# Patient Record
Sex: FEMALE | Race: OTHER | HISPANIC OR LATINO | Employment: FULL TIME | URBAN - METROPOLITAN AREA
[De-identification: names, ages, dates, MRNs, and addresses within clinical notes are randomized per-mention and may not be internally consistent; named-entity substitution may affect disease eponyms.]

---

## 2017-06-09 ENCOUNTER — OFFICE VISIT (OUTPATIENT)
Dept: URGENT CARE | Facility: PHYSICIAN GROUP | Age: 27
End: 2017-06-09
Payer: COMMERCIAL

## 2017-06-09 VITALS
OXYGEN SATURATION: 100 % | HEIGHT: 66 IN | DIASTOLIC BLOOD PRESSURE: 72 MMHG | HEART RATE: 65 BPM | WEIGHT: 193 LBS | SYSTOLIC BLOOD PRESSURE: 116 MMHG | TEMPERATURE: 97.2 F | BODY MASS INDEX: 31.02 KG/M2

## 2017-06-09 DIAGNOSIS — R11.0 NAUSEA: ICD-10-CM

## 2017-06-09 DIAGNOSIS — M54.5 BILATERAL LOW BACK PAIN, UNSPECIFIED CHRONICITY, WITH SCIATICA PRESENCE UNSPECIFIED: ICD-10-CM

## 2017-06-09 DIAGNOSIS — Z32.01 POSITIVE PREGNANCY TEST: ICD-10-CM

## 2017-06-09 LAB
APPEARANCE UR: CLEAR
BILIRUB UR STRIP-MCNC: NORMAL MG/DL
COLOR UR AUTO: NORMAL
GLUCOSE UR STRIP.AUTO-MCNC: NORMAL MG/DL
INT CON NEG: NEGATIVE
INT CON POS: POSITIVE
KETONES UR STRIP.AUTO-MCNC: NORMAL MG/DL
LEUKOCYTE ESTERASE UR QL STRIP.AUTO: NORMAL
NITRITE UR QL STRIP.AUTO: NORMAL
PH UR STRIP.AUTO: 6 [PH] (ref 5–8)
POC URINE PREGNANCY TEST: NORMAL
PROT UR QL STRIP: NORMAL MG/DL
RBC UR QL AUTO: NORMAL
SP GR UR STRIP.AUTO: 1.01
UROBILINOGEN UR STRIP-MCNC: NORMAL MG/DL

## 2017-06-09 PROCEDURE — 81002 URINALYSIS NONAUTO W/O SCOPE: CPT | Performed by: PHYSICIAN ASSISTANT

## 2017-06-09 PROCEDURE — 99213 OFFICE O/P EST LOW 20 MIN: CPT | Performed by: PHYSICIAN ASSISTANT

## 2017-06-09 PROCEDURE — 81025 URINE PREGNANCY TEST: CPT | Performed by: PHYSICIAN ASSISTANT

## 2017-06-09 RX ORDER — KETOROLAC TROMETHAMINE 30 MG/ML
60 INJECTION, SOLUTION INTRAMUSCULAR; INTRAVENOUS ONCE
Status: DISCONTINUED | OUTPATIENT
Start: 2017-06-09 | End: 2017-06-09

## 2017-06-09 ASSESSMENT — ENCOUNTER SYMPTOMS
NAUSEA: 1
PARESTHESIAS: 0
TINGLING: 0
ABDOMINAL PAIN: 0
VOMITING: 0
BACK PAIN: 1
NUMBNESS: 0
HEADACHES: 0
SHORTNESS OF BREATH: 0
PERIANAL NUMBNESS: 0
COUGH: 0
PALPITATIONS: 0
FEVER: 0
FLANK PAIN: 0
PARESIS: 0
CHILLS: 0
WEAKNESS: 0
BOWEL INCONTINENCE: 0
LEG PAIN: 0

## 2017-06-10 NOTE — PROGRESS NOTES
"Subjective:      Tonia Trevizo is a 27 y.o. female who presents with Back Pain            Back Pain  This is a new problem. Episode onset: 4 weeks  The problem occurs constantly. The problem is unchanged. The pain is present in the lumbar spine. The quality of the pain is described as aching. The pain does not radiate. The pain is at a severity of 7/10. The pain is the same all the time. Exacerbated by: nothing  Pertinent negatives include no abdominal pain, bladder incontinence, bowel incontinence, chest pain, dysuria, fever, headaches, leg pain, numbness, paresis, paresthesias, pelvic pain, perianal numbness, tingling or weakness. (Nausea)   The patient reports a history of chronic back pain that started in adolescence. She states she has never had an injury. She has had x-rays done in the  Past that showed \"arthritis\".,  She states she has noticed worsening pain over the past 4 weeks.     Past Medical History   Diagnosis Date   • Psychiatric problem      anxiety     Past Surgical History   Procedure Laterality Date   • Acl reconstruction       right   • Pr  delivery only     • Knee arthroscopy  2014     Performed by Dexter Pimentel M.D. at [facility]   • Hardware removal ortho  2014     Performed by Dexter Pimentel M.D. at [facility]   • Bone graft  2014     Performed by Dexter Pimentel M.D. at [facility]   • Acl reconstruction scope  10/2/2014     Performed by Dexter Pimentel M.D. at [facility]   • Repeat c section  10/19/2015     Procedure: REPEAT C SECTION;  Surgeon: Mary Randolph M.D.;  Location: LABOR AND DELIVERY;  Service:        Family History   Problem Relation Age of Onset   • Diabetes     • Hypertension         No Known Allergies    Medications, Allergies, and current problem list reviewed today in Epic    Review of Systems   Constitutional: Negative for fever and chills.   Respiratory: Negative for cough and shortness " "of breath.    Cardiovascular: Negative for chest pain, palpitations and leg swelling.   Gastrointestinal: Positive for nausea. Negative for vomiting, abdominal pain and bowel incontinence.   Genitourinary: Negative for bladder incontinence, dysuria, urgency, frequency, hematuria, flank pain and pelvic pain.   Musculoskeletal: Positive for back pain.   Neurological: Negative for tingling, weakness, numbness, headaches and paresthesias.     All other systems reviewed and are negative.       Objective:     /72 mmHg  Pulse 65  Temp(Src) 36.2 °C (97.2 °F)  Ht 1.676 m (5' 6\")  Wt 87.544 kg (193 lb)  BMI 31.17 kg/m2  SpO2 100%  LMP 05/06/2017     Physical Exam   Constitutional: She is oriented to person, place, and time. She appears well-developed and well-nourished. No distress.   Eyes: Conjunctivae are normal.   Cardiovascular: Normal rate, regular rhythm and normal heart sounds.  Exam reveals no gallop and no friction rub.    No murmur heard.  Pulmonary/Chest: Effort normal and breath sounds normal. No respiratory distress. She has no wheezes. She has no rales.   Abdominal: Normal appearance and bowel sounds are normal. She exhibits no distension. There is no tenderness.   Musculoskeletal:        Lumbar back: She exhibits tenderness and bony tenderness. She exhibits normal range of motion, no swelling, no edema, no pain and no spasm.        Back:    Extremities with FROM and distal n/v intact.   Neurological: She is alert and oriented to person, place, and time. No cranial nerve deficit.   Psychiatric: She has a normal mood and affect. Her behavior is normal. Judgment and thought content normal.     UA: trace of blood. No leuks. No nitrates        POCT pregnancy- positive.    Assessment/Plan:     1. Bilateral low back pain, unspecified chronicity, with sciatica presence unspecified    2. Nausea    3. Positive pregnancy test      I believe the patient's nausea and lower back pain is related to pregnancy. "   The patient admits that she had similar back pain with her previous pregnancies.   She states she believes her LMP was about 1 month ago. Advised heat, ice, massage.  Suggested OTC Tylenol for severe pain. Encouraged follow-up with OB. Start Pre- vitamins.  Suggested avoidance of NSAIDS.     Differential diagnoses, Supportive care, and indications for immediate follow-up discussed with patient.   Instructed to return to clinic or nearest emergency department for any change in condition, further concerns, or worsening of symptoms.    The patient demonstrated a good understanding and agreed with the treatment plan.    Linda Wade PA-C

## 2017-09-29 ENCOUNTER — OFFICE VISIT (OUTPATIENT)
Dept: URGENT CARE | Facility: PHYSICIAN GROUP | Age: 27
End: 2017-09-29
Payer: COMMERCIAL

## 2017-09-29 VITALS
TEMPERATURE: 98.6 F | OXYGEN SATURATION: 98 % | HEART RATE: 114 BPM | SYSTOLIC BLOOD PRESSURE: 124 MMHG | RESPIRATION RATE: 14 BRPM | BODY MASS INDEX: 34.09 KG/M2 | WEIGHT: 204.6 LBS | HEIGHT: 65 IN | DIASTOLIC BLOOD PRESSURE: 74 MMHG

## 2017-09-29 DIAGNOSIS — J02.0 STREP THROAT: ICD-10-CM

## 2017-09-29 DIAGNOSIS — J02.9 SORE THROAT: ICD-10-CM

## 2017-09-29 DIAGNOSIS — Z3A.22 22 WEEKS GESTATION OF PREGNANCY: ICD-10-CM

## 2017-09-29 LAB
INT CON NEG: NEGATIVE
INT CON POS: POSITIVE
S PYO AG THROAT QL: POSITIVE

## 2017-09-29 PROCEDURE — 87880 STREP A ASSAY W/OPTIC: CPT | Performed by: NURSE PRACTITIONER

## 2017-09-29 PROCEDURE — 99214 OFFICE O/P EST MOD 30 MIN: CPT | Performed by: NURSE PRACTITIONER

## 2017-09-29 RX ORDER — AMOXICILLIN 875 MG/1
875 TABLET, COATED ORAL 2 TIMES DAILY
Qty: 20 TAB | Refills: 0 | Status: ON HOLD | OUTPATIENT
Start: 2017-09-29 | End: 2017-11-29

## 2017-09-29 ASSESSMENT — PAIN SCALES - GENERAL: PAINLEVEL: NO PAIN

## 2017-09-29 NOTE — PROGRESS NOTES
"Subjective:      Tonia Trevizo is a 27 y.o. female who presents with Cough (x4 days. nasal congestion and sore throat)            HPI New problem. 27 year old female with 4 day history of cough, congestion and sore throat. She denies ear pain, fever, chills or myalgia. She denies nausea or vomiting. She is 22 week pregnant, feeling baby move. Not taking any medication for these symptoms.  Allergies, medications and history reviewed by me today  Son with similar symptoms.    ROS  Please see HPI       Objective:     /74   Pulse (!) 114   Temp 37 °C (98.6 °F)   Resp 14   Ht 1.651 m (5' 5\")   Wt 92.8 kg (204 lb 9.6 oz)   SpO2 98%   Breastfeeding? No   BMI 34.05 kg/m²      Physical Exam   Constitutional: She is oriented to person, place, and time. She appears well-developed and well-nourished. No distress.   HENT:   Head: Normocephalic and atraumatic.   Right Ear: External ear and ear canal normal. Tympanic membrane is not injected and not perforated. No middle ear effusion.   Left Ear: External ear and ear canal normal. Tympanic membrane is not injected and not perforated.  No middle ear effusion.   Nose: Mucosal edema present.   Mouth/Throat: Posterior oropharyngeal erythema present. No oropharyngeal exudate.   Eyes: Conjunctivae are normal. Right eye exhibits no discharge. Left eye exhibits no discharge.   Neck: Normal range of motion. Neck supple.   Cardiovascular: Normal rate, regular rhythm and normal heart sounds.    No murmur heard.  Pulmonary/Chest: Effort normal and breath sounds normal. No respiratory distress.   Abdominal:   gravid   Musculoskeletal: Normal range of motion.   Normal movement of all 4 extremities.   Lymphadenopathy:     She has no cervical adenopathy.        Right: No supraclavicular adenopathy present.        Left: No supraclavicular adenopathy present.   Neurological: She is alert and oriented to person, place, and time. Gait normal.   Skin: Skin is warm and dry. "   Psychiatric: She has a normal mood and affect. Her behavior is normal. Thought content normal.   Nursing note and vitals reviewed.              Assessment/Plan:     1. Sore throat  POCT Rapid Strep A   2. Strep throat  amoxicillin (AMOXIL) 875 MG tablet   3. 22 weeks gestation of pregnancy       Change toothbrush in 48 hours. Follow up with PCP as needed. May take tylenol for discomfort as directed. Patient v/u of care instructions.  Work note given.  Follow up as needed.

## 2017-09-29 NOTE — LETTER
September 29, 2017        Tonia Trevizo  [address]  [place] [address]        Tonia was seen in our clinic today and she is excused from work. Thank you.  If you have any questions or concerns, please don't hesitate to call.        Sincerely,        COLE Rose.    Electronically Signed

## 2017-11-04 ENCOUNTER — HOSPITAL ENCOUNTER (INPATIENT)
Facility: MEDICAL CENTER | Age: 27
LOS: 25 days | End: 2017-11-29
Attending: OBSTETRICS & GYNECOLOGY | Admitting: OBSTETRICS & GYNECOLOGY
Payer: COMMERCIAL

## 2017-11-04 PROBLEM — O42.919 PRETERM PREMATURE RUPTURE OF MEMBRANES (PPROM) WITH UNKNOWN ONSET OF LABOR: Status: ACTIVE | Noted: 2017-11-04

## 2017-11-04 LAB — A1 MICROGLOB PLACENTAL VAG QL: POSITIVE

## 2017-11-04 PROCEDURE — 770002 HCHG ROOM/CARE - OB PRIVATE (112)

## 2017-11-04 PROCEDURE — 81003 URINALYSIS AUTO W/O SCOPE: CPT

## 2017-11-04 PROCEDURE — 302790 HCHG STAT ANTEPARTUM CARE, DAILY

## 2017-11-04 PROCEDURE — 84112 EVAL AMNIOTIC FLUID PROTEIN: CPT

## 2017-11-04 RX ORDER — MAGNESIUM SULFATE HEPTAHYDRATE 40 MG/ML
2 INJECTION, SOLUTION INTRAVENOUS CONTINUOUS
Status: DISCONTINUED | OUTPATIENT
Start: 2017-11-05 | End: 2017-11-25 | Stop reason: HOSPADM

## 2017-11-04 RX ORDER — CALCIUM GLUCONATE 94 MG/ML
1 INJECTION, SOLUTION INTRAVENOUS PRN
Status: DISCONTINUED | OUTPATIENT
Start: 2017-11-04 | End: 2017-11-25 | Stop reason: HOSPADM

## 2017-11-04 RX ORDER — ONDANSETRON 4 MG/1
4 TABLET, ORALLY DISINTEGRATING ORAL EVERY 6 HOURS PRN
Status: DISCONTINUED | OUTPATIENT
Start: 2017-11-04 | End: 2017-11-25 | Stop reason: HOSPADM

## 2017-11-04 RX ORDER — AMOXICILLIN 500 MG/1
500 CAPSULE ORAL EVERY 8 HOURS
Status: COMPLETED | OUTPATIENT
Start: 2017-11-07 | End: 2017-11-11

## 2017-11-04 RX ORDER — SODIUM CHLORIDE, SODIUM LACTATE, POTASSIUM CHLORIDE, CALCIUM CHLORIDE 600; 310; 30; 20 MG/100ML; MG/100ML; MG/100ML; MG/100ML
INJECTION, SOLUTION INTRAVENOUS CONTINUOUS
Status: DISPENSED | OUTPATIENT
Start: 2017-11-05 | End: 2017-11-05

## 2017-11-04 RX ORDER — MAGNESIUM SULFATE HEPTAHYDRATE 40 MG/ML
6 INJECTION, SOLUTION INTRAVENOUS ONCE
Status: COMPLETED | OUTPATIENT
Start: 2017-11-05 | End: 2017-11-05

## 2017-11-04 RX ORDER — BETAMETHASONE SODIUM PHOSPHATE AND BETAMETHASONE ACETATE 3; 3 MG/ML; MG/ML
12 INJECTION, SUSPENSION INTRA-ARTICULAR; INTRALESIONAL; INTRAMUSCULAR; SOFT TISSUE EVERY 24 HOURS
Status: COMPLETED | OUTPATIENT
Start: 2017-11-05 | End: 2017-11-05

## 2017-11-04 RX ORDER — AMPICILLIN 2 G/1
2 INJECTION, POWDER, FOR SOLUTION INTRAVENOUS EVERY 6 HOURS
Status: COMPLETED | OUTPATIENT
Start: 2017-11-05 | End: 2017-11-06

## 2017-11-04 RX ORDER — DOCUSATE SODIUM 100 MG/1
100 CAPSULE, LIQUID FILLED ORAL 2 TIMES DAILY
Status: DISCONTINUED | OUTPATIENT
Start: 2017-11-05 | End: 2017-11-25 | Stop reason: HOSPADM

## 2017-11-04 RX ORDER — AZITHROMYCIN 250 MG/1
1000 TABLET, FILM COATED ORAL ONCE
Status: COMPLETED | OUTPATIENT
Start: 2017-11-05 | End: 2017-11-05

## 2017-11-04 RX ORDER — ONDANSETRON 2 MG/ML
4 INJECTION INTRAMUSCULAR; INTRAVENOUS EVERY 6 HOURS PRN
Status: DISCONTINUED | OUTPATIENT
Start: 2017-11-04 | End: 2017-11-25 | Stop reason: HOSPADM

## 2017-11-05 ENCOUNTER — APPOINTMENT (OUTPATIENT)
Dept: RADIOLOGY | Facility: MEDICAL CENTER | Age: 27
End: 2017-11-05
Attending: OBSTETRICS & GYNECOLOGY
Payer: COMMERCIAL

## 2017-11-05 LAB
ALBUMIN SERPL BCP-MCNC: 3 G/DL (ref 3.2–4.9)
ALBUMIN/GLOB SERPL: 1.1 G/DL
ALP SERPL-CCNC: 120 U/L (ref 30–99)
ALT SERPL-CCNC: 8 U/L (ref 2–50)
ANION GAP SERPL CALC-SCNC: 8 MMOL/L (ref 0–11.9)
APPEARANCE UR: CLEAR
AST SERPL-CCNC: 10 U/L (ref 12–45)
BASOPHILS # BLD AUTO: 0.3 % (ref 0–1.8)
BASOPHILS # BLD: 0.05 K/UL (ref 0–0.12)
BILIRUB SERPL-MCNC: 0.4 MG/DL (ref 0.1–1.5)
BILIRUB UR QL STRIP.AUTO: NEGATIVE
BUN SERPL-MCNC: 7 MG/DL (ref 8–22)
CALCIUM SERPL-MCNC: 7.6 MG/DL (ref 8.5–10.5)
CHLORIDE SERPL-SCNC: 106 MMOL/L (ref 96–112)
CO2 SERPL-SCNC: 20 MMOL/L (ref 20–33)
COLOR UR: YELLOW
CREAT SERPL-MCNC: 0.47 MG/DL (ref 0.5–1.4)
EOSINOPHIL # BLD AUTO: 0.24 K/UL (ref 0–0.51)
EOSINOPHIL NFR BLD: 1.5 % (ref 0–6.9)
ERYTHROCYTE [DISTWIDTH] IN BLOOD BY AUTOMATED COUNT: 34.8 FL (ref 35.9–50)
GFR SERPL CREATININE-BSD FRML MDRD: >60 ML/MIN/1.73 M 2
GLOBULIN SER CALC-MCNC: 2.8 G/DL (ref 1.9–3.5)
GLUCOSE SERPL-MCNC: 117 MG/DL (ref 65–99)
GLUCOSE UR STRIP.AUTO-MCNC: NEGATIVE MG/DL
GP B STREP DNA SPEC QL NAA+PROBE: NEGATIVE
HCT VFR BLD AUTO: 33.8 % (ref 37–47)
HGB BLD-MCNC: 11.1 G/DL (ref 12–16)
HOLDING TUBE BB 8507: NORMAL
IMM GRANULOCYTES # BLD AUTO: 0.19 K/UL (ref 0–0.11)
IMM GRANULOCYTES NFR BLD AUTO: 1.2 % (ref 0–0.9)
KETONES UR STRIP.AUTO-MCNC: NEGATIVE MG/DL
LEUKOCYTE ESTERASE UR QL STRIP.AUTO: NEGATIVE
LYMPHOCYTES # BLD AUTO: 2.21 K/UL (ref 1–4.8)
LYMPHOCYTES NFR BLD: 13.5 % (ref 22–41)
MAGNESIUM SERPL-MCNC: 5.1 MG/DL (ref 1.5–2.5)
MCH RBC QN AUTO: 23.4 PG (ref 27–33)
MCHC RBC AUTO-ENTMCNC: 32.8 G/DL (ref 33.6–35)
MCV RBC AUTO: 71.3 FL (ref 81.4–97.8)
MICRO URNS: NORMAL
MONOCYTES # BLD AUTO: 0.95 K/UL (ref 0–0.85)
MONOCYTES NFR BLD AUTO: 5.8 % (ref 0–13.4)
NEUTROPHILS # BLD AUTO: 12.68 K/UL (ref 2–7.15)
NEUTROPHILS NFR BLD: 77.7 % (ref 44–72)
NITRITE UR QL STRIP.AUTO: NEGATIVE
NRBC # BLD AUTO: 0 K/UL
NRBC BLD AUTO-RTO: 0 /100 WBC
PH UR STRIP.AUTO: 7.5 [PH]
PLATELET # BLD AUTO: 299 K/UL (ref 164–446)
PMV BLD AUTO: 10.9 FL (ref 9–12.9)
POTASSIUM SERPL-SCNC: 4.2 MMOL/L (ref 3.6–5.5)
PROT SERPL-MCNC: 5.8 G/DL (ref 6–8.2)
PROT UR QL STRIP: NEGATIVE MG/DL
RBC # BLD AUTO: 4.74 M/UL (ref 4.2–5.4)
RBC UR QL AUTO: NEGATIVE
SODIUM SERPL-SCNC: 134 MMOL/L (ref 135–145)
SP GR UR STRIP.AUTO: 1.01
UROBILINOGEN UR STRIP.AUTO-MCNC: 0.2 MG/DL
WBC # BLD AUTO: 16.3 K/UL (ref 4.8–10.8)

## 2017-11-05 PROCEDURE — 770002 HCHG ROOM/CARE - OB PRIVATE (112)

## 2017-11-05 PROCEDURE — 36415 COLL VENOUS BLD VENIPUNCTURE: CPT

## 2017-11-05 PROCEDURE — 87653 STREP B DNA AMP PROBE: CPT

## 2017-11-05 PROCEDURE — 700102 HCHG RX REV CODE 250 W/ 637 OVERRIDE(OP): Performed by: OBSTETRICS & GYNECOLOGY

## 2017-11-05 PROCEDURE — 700112 HCHG RX REV CODE 229: Performed by: OBSTETRICS & GYNECOLOGY

## 2017-11-05 PROCEDURE — 76815 OB US LIMITED FETUS(S): CPT

## 2017-11-05 PROCEDURE — 85025 COMPLETE CBC W/AUTO DIFF WBC: CPT

## 2017-11-05 PROCEDURE — 83735 ASSAY OF MAGNESIUM: CPT

## 2017-11-05 PROCEDURE — A9270 NON-COVERED ITEM OR SERVICE: HCPCS | Performed by: OBSTETRICS & GYNECOLOGY

## 2017-11-05 PROCEDURE — 80053 COMPREHEN METABOLIC PANEL: CPT

## 2017-11-05 PROCEDURE — 700105 HCHG RX REV CODE 258

## 2017-11-05 PROCEDURE — 700111 HCHG RX REV CODE 636 W/ 250 OVERRIDE (IP): Performed by: OBSTETRICS & GYNECOLOGY

## 2017-11-05 PROCEDURE — 302790 HCHG STAT ANTEPARTUM CARE, DAILY

## 2017-11-05 PROCEDURE — 700105 HCHG RX REV CODE 258: Performed by: OBSTETRICS & GYNECOLOGY

## 2017-11-05 RX ORDER — SODIUM CHLORIDE, SODIUM LACTATE, POTASSIUM CHLORIDE, CALCIUM CHLORIDE 600; 310; 30; 20 MG/100ML; MG/100ML; MG/100ML; MG/100ML
INJECTION, SOLUTION INTRAVENOUS
Status: ACTIVE
Start: 2017-11-05 | End: 2017-11-06

## 2017-11-05 RX ORDER — SODIUM CHLORIDE, SODIUM LACTATE, POTASSIUM CHLORIDE, CALCIUM CHLORIDE 600; 310; 30; 20 MG/100ML; MG/100ML; MG/100ML; MG/100ML
INJECTION, SOLUTION INTRAVENOUS CONTINUOUS
Status: DISCONTINUED | OUTPATIENT
Start: 2017-11-05 | End: 2017-11-25

## 2017-11-05 RX ORDER — SODIUM CHLORIDE 9 MG/ML
INJECTION, SOLUTION INTRAVENOUS
Status: COMPLETED
Start: 2017-11-05 | End: 2017-11-05

## 2017-11-05 RX ORDER — ACETAMINOPHEN 325 MG/1
975 TABLET ORAL EVERY 8 HOURS PRN
Status: DISCONTINUED | OUTPATIENT
Start: 2017-11-05 | End: 2017-11-05

## 2017-11-05 RX ORDER — VITAMIN A ACETATE, BETA CAROTENE, ASCORBIC ACID, CHOLECALCIFEROL, .ALPHA.-TOCOPHEROL ACETATE, DL-, THIAMINE MONONITRATE, RIBOFLAVIN, NIACINAMIDE, PYRIDOXINE HYDROCHLORIDE, FOLIC ACID, CYANOCOBALAMIN, CALCIUM CARBONATE, FERROUS FUMARATE, ZINC OXIDE, CUPRIC OXIDE 3080; 12; 120; 400; 1; 1.84; 3; 20; 22; 920; 25; 200; 27; 10; 2 [IU]/1; UG/1; MG/1; [IU]/1; MG/1; MG/1; MG/1; MG/1; MG/1; [IU]/1; MG/1; MG/1; MG/1; MG/1; MG/1
1 TABLET, FILM COATED ORAL EVERY MORNING
Status: DISCONTINUED | OUTPATIENT
Start: 2017-11-05 | End: 2017-11-25

## 2017-11-05 RX ORDER — ACETAMINOPHEN 500 MG
1000 TABLET ORAL EVERY 8 HOURS PRN
Status: DISCONTINUED | OUTPATIENT
Start: 2017-11-05 | End: 2017-11-25

## 2017-11-05 RX ADMIN — ONDANSETRON 4 MG: 2 INJECTION INTRAMUSCULAR; INTRAVENOUS at 01:19

## 2017-11-05 RX ADMIN — MAGNESIUM SULFATE IN WATER 2 G/HR: 40 INJECTION, SOLUTION INTRAVENOUS at 19:33

## 2017-11-05 RX ADMIN — ONDANSETRON 4 MG: 2 INJECTION INTRAMUSCULAR; INTRAVENOUS at 16:05

## 2017-11-05 RX ADMIN — AZITHROMYCIN 1000 MG: 250 TABLET, FILM COATED ORAL at 01:30

## 2017-11-05 RX ADMIN — ACETAMINOPHEN 1000 MG: 500 TABLET ORAL at 20:48

## 2017-11-05 RX ADMIN — BETAMETHASONE SODIUM PHOSPHATE AND BETAMETHASONE ACETATE 12 MG: 3; 3 INJECTION, SUSPENSION INTRA-ARTICULAR; INTRALESIONAL; INTRAMUSCULAR at 00:43

## 2017-11-05 RX ADMIN — BETAMETHASONE SODIUM PHOSPHATE AND BETAMETHASONE ACETATE 12 MG: 3; 3 INJECTION, SUSPENSION INTRA-ARTICULAR; INTRALESIONAL; INTRAMUSCULAR at 23:38

## 2017-11-05 RX ADMIN — SODIUM CHLORIDE 100 ML: 9 INJECTION, SOLUTION INTRAVENOUS at 01:20

## 2017-11-05 RX ADMIN — AMPICILLIN SODIUM 2 G: 2 INJECTION, POWDER, FOR SOLUTION INTRAMUSCULAR; INTRAVENOUS at 01:20

## 2017-11-05 RX ADMIN — SODIUM CHLORIDE, POTASSIUM CHLORIDE, SODIUM LACTATE AND CALCIUM CHLORIDE: 600; 310; 30; 20 INJECTION, SOLUTION INTRAVENOUS at 08:34

## 2017-11-05 RX ADMIN — MAGNESIUM SULFATE IN WATER 2 G/HR: 40 INJECTION, SOLUTION INTRAVENOUS at 10:25

## 2017-11-05 RX ADMIN — AMPICILLIN SODIUM 2 G: 2 INJECTION, POWDER, FOR SOLUTION INTRAMUSCULAR; INTRAVENOUS at 18:23

## 2017-11-05 RX ADMIN — AMPICILLIN SODIUM 2 G: 2 INJECTION, POWDER, FOR SOLUTION INTRAMUSCULAR; INTRAVENOUS at 06:07

## 2017-11-05 RX ADMIN — DOCUSATE SODIUM 100 MG: 100 CAPSULE ORAL at 19:33

## 2017-11-05 RX ADMIN — AMPICILLIN SODIUM 2 G: 2 INJECTION, POWDER, FOR SOLUTION INTRAMUSCULAR; INTRAVENOUS at 12:13

## 2017-11-05 RX ADMIN — SODIUM CHLORIDE, POTASSIUM CHLORIDE, SODIUM LACTATE AND CALCIUM CHLORIDE: 600; 310; 30; 20 INJECTION, SOLUTION INTRAVENOUS at 00:20

## 2017-11-05 RX ADMIN — AMPICILLIN SODIUM 2 G: 2 INJECTION, POWDER, FOR SOLUTION INTRAMUSCULAR; INTRAVENOUS at 23:37

## 2017-11-05 RX ADMIN — MAGNESIUM SULFATE IN WATER 2 G/HR: 40 INJECTION, SOLUTION INTRAVENOUS at 01:48

## 2017-11-05 RX ADMIN — MAGNESIUM SULFATE IN WATER 6 G: 40 INJECTION, SOLUTION INTRAVENOUS at 01:01

## 2017-11-05 RX ADMIN — ONDANSETRON 4 MG: 2 INJECTION INTRAMUSCULAR; INTRAVENOUS at 08:32

## 2017-11-05 RX ADMIN — DOCUSATE SODIUM 100 MG: 100 CAPSULE ORAL at 10:00

## 2017-11-05 RX ADMIN — Medication 1 TABLET: at 16:05

## 2017-11-05 RX ADMIN — SODIUM CHLORIDE, POTASSIUM CHLORIDE, SODIUM LACTATE AND CALCIUM CHLORIDE: 600; 310; 30; 20 INJECTION, SOLUTION INTRAVENOUS at 23:41

## 2017-11-05 ASSESSMENT — COPD QUESTIONNAIRES
HAVE YOU SMOKED AT LEAST 100 CIGARETTES IN YOUR ENTIRE LIFE: NO/DON'T KNOW
DO YOU EVER COUGH UP ANY MUCUS OR PHLEGM?: NO/ONLY WITH OCCASIONAL COLDS OR INFECTIONS
COPD SCREENING SCORE: 0
DURING THE PAST 4 WEEKS HOW MUCH DID YOU FEEL SHORT OF BREATH: NONE/LITTLE OF THE TIME
HAVE YOU SMOKED AT LEAST 100 CIGARETTES IN YOUR ENTIRE LIFE: NO/DON'T KNOW
DURING THE PAST 4 WEEKS HOW MUCH DID YOU FEEL SHORT OF BREATH: NONE/LITTLE OF THE TIME
DO YOU EVER COUGH UP ANY MUCUS OR PHLEGM?: NO/ONLY WITH OCCASIONAL COLDS OR INFECTIONS

## 2017-11-05 ASSESSMENT — PAIN SCALES - GENERAL
PAINLEVEL_OUTOF10: 0
PAINLEVEL_OUTOF10: 0

## 2017-11-05 ASSESSMENT — LIFESTYLE VARIABLES
EVER_SMOKED: NEVER
EVER_SMOKED: NEVER
DO YOU DRINK ALCOHOL: NO
DO YOU DRINK ALCOHOL: NO
ALCOHOL_USE: NO
DO YOU DRINK ALCOHOL: NO

## 2017-11-05 ASSESSMENT — PATIENT HEALTH QUESTIONNAIRE - PHQ9
2. FEELING DOWN, DEPRESSED, IRRITABLE, OR HOPELESS: NOT AT ALL
2. FEELING DOWN, DEPRESSED, IRRITABLE, OR HOPELESS: NOT AT ALL
SUM OF ALL RESPONSES TO PHQ QUESTIONS 1-9: 0
SUM OF ALL RESPONSES TO PHQ9 QUESTIONS 1 AND 2: 0
SUM OF ALL RESPONSES TO PHQ QUESTIONS 1-9: 0
1. LITTLE INTEREST OR PLEASURE IN DOING THINGS: NOT AT ALL
SUM OF ALL RESPONSES TO PHQ9 QUESTIONS 1 AND 2: 0
1. LITTLE INTEREST OR PLEASURE IN DOING THINGS: NOT AT ALL

## 2017-11-05 NOTE — CARE PLAN
Problem: Knowledge Deficit  Goal: Patient/Support Person demonstrates understanding regarding condition, prognosis and treatment needs during the pregnancy  Outcome: MET Date Met: 11/05/17  POC discussed and pt states understanding at this time, Pt asks questions as they present.

## 2017-11-05 NOTE — CARE PLAN
Problem: Risk for Deep Vein Thrombosis/Venous Thromboembolism  Goal: DVT/VTE Prevention Measures in Place  Outcome: PROGRESSING AS EXPECTED  SCDs applied and on, pt moving limbs/readjusting position frequently    Problem: Risk for Infection, Impaired Wound Healing  Goal: Remain free from signs and symptoms of infection  Outcome: PROGRESSING AS EXPECTED  Pt is afebrile at this time and shows no s/s of infection.

## 2017-11-05 NOTE — PROGRESS NOTES
0700  Report from NOC RN in room with pt, pt resting and RN will return later for assessment.  0730  RN into room and pt up at this time to BR, pt states she is feeling a little nauseated but she feels ok getting up still.  POC discussed and pt states understanding at this time.  0830 Pt given medication for her nausea.  Pt continues to report positive fetal movement and leaking of fluid.  1030  Pt has no new complaints at this time.  Pt desires to try to sit at BS to help herself feel better.   Pt reports a mild HA and she wants to try to nap first before taking any medications for it.  1230  Pt eating lunch at BS chair and states that she is feeling better with her HA.  Pt states that her belly feels weird and she doesn't know how to explain it better.   Dr. Bolivar given report and new orders received at this time.  1430  Pt back to bed and reports that she just feels a little nauseated.  POC discussed and no new complaints at this time.  1630  Pt continues to have visitors throughout the day, voids in BR and RN measures.  1850  Report to NOC RN in room with pt at this time.  Pt has no new complaints.

## 2017-11-05 NOTE — PROGRESS NOTES
Pt is a  26 yo who presents to L&D c/o lof clear, without odor beginning at approximately 2100 . Pt denies VB or UCs. + fetal movement.      Pt oriented to room and unit, external monitors applied. POC discussed. Pt and s/o encouraged to state needs or questions at any time.     Dr. Bolivar called and given report, received order to collect sample for amnisure ROM assay     Received call from [employer] [id number] in lab, was informed of positive amnisure ROM assay    230 Dr. Bolivar informed of lab result    4725 Report given to AMEE Lange RN by ALEENA Pearce RN

## 2017-11-05 NOTE — H&P
"Labor and Delivery History and Physical    CC: leaking fluid    HPI:Tonia Trevizo is a 26 yo  at 27wk5d who presented to OBT with complaints of leaking fluid. Denies contractions. Reports positive fetal movement. Denies fever or chills. She received her initial prenatal care with [facility] and is due to transfer care to myself next week.     All other systems were reviewed and were negative.    PMH:none  PSH: x2, 3 right knee surgeries  OB HX:2 term  deliveries  Gyn Hx:denies STI or abnormal pap smears  SH:denies T/E/D  Meds:PNV  Allergies:NKDA    BP (!) 99/54 Comment: pt states BP is within normal range for her  Pulse 86   Temp 36.3 °C (97.4 °F)   Ht 1.651 m (5' 5\")   Wt 93.9 kg (207 lb)   BMI 34.45 kg/m²     Physical Exam  Gen: NAD  Abd: gravid, non tender  Ext: no edema    FHT:140/mod variability  [place]: quiescent  SVE: deferred    + Amnisure    Laboratory Evaluation  ABO: B pos  GBS unknown  GTT:unknown  Rubella: Immune  HIV: Neg  RPR: NR  Hep sAg: neg  Pap: NILM    Assessment:   1.  intrauterine pregnancy  2. PPROM  3. Previous  delivery x2    Plan:Discussed positive amnisure test. Admit to L&D. Will give BMTZ x2. Start magnesium for tocolysis while starting azithro and ampicillin. Will get sonogram for GERMÁN, position and cervical examination. Plan for repeat  at 34 weeks. NICU consult while in house    Asmita Bolivar M.D.      "

## 2017-11-05 NOTE — CARE PLAN
Problem: Pain  Goal: Alleviation of Pain or a reduction in pain to the patient's comfort goal  Outcome: MET Date Met: 11/05/17  Pt not having pain with her UC's, pt understands to notify RN if she starts hurting anywhere.

## 2017-11-06 LAB — MAGNESIUM SERPL-MCNC: 4.7 MG/DL (ref 1.5–2.5)

## 2017-11-06 PROCEDURE — 3E0234Z INTRODUCTION OF SERUM, TOXOID AND VACCINE INTO MUSCLE, PERCUTANEOUS APPROACH: ICD-10-PCS | Performed by: OBSTETRICS & GYNECOLOGY

## 2017-11-06 PROCEDURE — 700102 HCHG RX REV CODE 250 W/ 637 OVERRIDE(OP): Performed by: OBSTETRICS & GYNECOLOGY

## 2017-11-06 PROCEDURE — 83735 ASSAY OF MAGNESIUM: CPT

## 2017-11-06 PROCEDURE — 700111 HCHG RX REV CODE 636 W/ 250 OVERRIDE (IP): Performed by: OBSTETRICS & GYNECOLOGY

## 2017-11-06 PROCEDURE — 770002 HCHG ROOM/CARE - OB PRIVATE (112)

## 2017-11-06 PROCEDURE — 700105 HCHG RX REV CODE 258: Performed by: OBSTETRICS & GYNECOLOGY

## 2017-11-06 PROCEDURE — A9270 NON-COVERED ITEM OR SERVICE: HCPCS | Performed by: OBSTETRICS & GYNECOLOGY

## 2017-11-06 PROCEDURE — 302790 HCHG STAT ANTEPARTUM CARE, DAILY

## 2017-11-06 PROCEDURE — 700112 HCHG RX REV CODE 229: Performed by: OBSTETRICS & GYNECOLOGY

## 2017-11-06 PROCEDURE — 36415 COLL VENOUS BLD VENIPUNCTURE: CPT

## 2017-11-06 RX ORDER — SODIUM CHLORIDE 9 MG/ML
INJECTION, SOLUTION INTRAVENOUS
Status: ACTIVE
Start: 2017-11-06 | End: 2017-11-07

## 2017-11-06 RX ORDER — SODIUM CHLORIDE 9 MG/ML
INJECTION, SOLUTION INTRAVENOUS
Status: ACTIVE
Start: 2017-11-06 | End: 2017-11-06

## 2017-11-06 RX ADMIN — DOCUSATE SODIUM 100 MG: 100 CAPSULE ORAL at 22:42

## 2017-11-06 RX ADMIN — MAGNESIUM SULFATE IN WATER 2 G/HR: 40 INJECTION, SOLUTION INTRAVENOUS at 05:35

## 2017-11-06 RX ADMIN — MAGNESIUM SULFATE IN WATER 2 G/HR: 40 INJECTION, SOLUTION INTRAVENOUS at 13:09

## 2017-11-06 RX ADMIN — SODIUM CHLORIDE, POTASSIUM CHLORIDE, SODIUM LACTATE AND CALCIUM CHLORIDE 1000 ML: 600; 310; 30; 20 INJECTION, SOLUTION INTRAVENOUS at 11:51

## 2017-11-06 RX ADMIN — AMPICILLIN SODIUM 2 G: 2 INJECTION, POWDER, FOR SOLUTION INTRAMUSCULAR; INTRAVENOUS at 05:35

## 2017-11-06 RX ADMIN — AMPICILLIN SODIUM 2 G: 2 INJECTION, POWDER, FOR SOLUTION INTRAMUSCULAR; INTRAVENOUS at 17:54

## 2017-11-06 RX ADMIN — Medication 1 TABLET: at 11:50

## 2017-11-06 RX ADMIN — DOCUSATE SODIUM 100 MG: 100 CAPSULE ORAL at 11:49

## 2017-11-06 RX ADMIN — AMPICILLIN SODIUM 2 G: 2 INJECTION, POWDER, FOR SOLUTION INTRAMUSCULAR; INTRAVENOUS at 13:07

## 2017-11-06 ASSESSMENT — COPD QUESTIONNAIRES
DO YOU EVER COUGH UP ANY MUCUS OR PHLEGM?: NO/ONLY WITH OCCASIONAL COLDS OR INFECTIONS
DURING THE PAST 4 WEEKS HOW MUCH DID YOU FEEL SHORT OF BREATH: NONE/LITTLE OF THE TIME
COPD SCREENING SCORE: 0
HAVE YOU SMOKED AT LEAST 100 CIGARETTES IN YOUR ENTIRE LIFE: NO/DON'T KNOW

## 2017-11-06 ASSESSMENT — PAIN SCALES - GENERAL
PAINLEVEL_OUTOF10: 0

## 2017-11-06 ASSESSMENT — PATIENT HEALTH QUESTIONNAIRE - PHQ9
SUM OF ALL RESPONSES TO PHQ QUESTIONS 1-9: 0
SUM OF ALL RESPONSES TO PHQ9 QUESTIONS 1 AND 2: 0
1. LITTLE INTEREST OR PLEASURE IN DOING THINGS: NOT AT ALL
2. FEELING DOWN, DEPRESSED, IRRITABLE, OR HOPELESS: NOT AT ALL

## 2017-11-06 ASSESSMENT — LIFESTYLE VARIABLES: DO YOU DRINK ALCOHOL: NO

## 2017-11-06 NOTE — PROGRESS NOTES
"Antepartum Progress Note    Tonia Trevizo is a 28 yo  at 27wk6d who presented with positive PPROM. She is doing well this morning. No fever or chills. Denies abdominal pain. No contractions or bleeding. Reports positive FM.  Eating and voiding with mild nausea.     BP (!) 96/53   Pulse 90   Temp 36.4 °C (97.6 °F)   Resp 20   Ht 1.651 m (5' 5\")   Wt 93.9 kg (207 lb)   SpO2 94%   BMI 34.45 kg/m²     Gen: laying on her back in bed, NAD  GI: soft, NT, ND  : no fundal tenderness  Ext: SCD in place, no edema    FHT: 130, moderate variability   [place]: quiescent    Assessment:  1.  intrauterine pregnancy at 27wk6d  2. PPROM  3. Fetal breech malpresentation  4. History of previous  x2    Plan:  She received her second dose of BMTZ last night. I would like to continue her magnesium until 24 hours post her second dose. She is currently transitioning from IV to PO latency antibiotics today. She appears largely quiescent and although she does demonstrate leukocytosis and intermittent tachycardia, there is otherwise no clinical evidence of chorioamnionitis or  labor. NICU did see her yesterday. Delivery plan is by repeat  at 34 weeks or sooner if the above conditions develop. Will monitor closely    Asmita Bolivar M.D.      "

## 2017-11-06 NOTE — CARE PLAN
Problem: Safety  Goal: Will remain free from injury  Outcome: PROGRESSING AS EXPECTED  Patient/Family instructed to call RN with any spills, cords in the way on the floor or any other safety hazards. Patient/Family also instructed to call RN with change to LOC, feelings of dizziness, blurry vision or any change to comfort or concern for safety.   Medication administered as ordered, verified with patient/scanned in to MAR and armband on patient.     Problem: Infection  Goal: Will remain free from infection  Outcome: PROGRESSING AS EXPECTED      Problem: Venous Thromboembolism (VTW)/Deep Vein Thrombosis (DVT) Prevention:  Goal: Patient will participate in Venous Thrombosis (VTE)/Deep Vein Thrombosis (DVT)Prevention Measures  Outcome: PROGRESSING AS EXPECTED  Ongoing discussion regarding importance of moving extremities frequently. Ambulating to the BR, walking to the sink and while in bed stretching legs/arms, moving feet and pointing toes toward the wall and the ceiling frequently. Use of sequentials for napping and at bedtime.

## 2017-11-06 NOTE — PROGRESS NOTES
Camilla from Lab called with critical result ofMagnesium 5.1 at 2238. Critical lab result read back to Camilla.   This critical lab result is within parameters established by  for this patient

## 2017-11-06 NOTE — PROGRESS NOTES
0700 - Report received from Sondra HARRISON RN, care assumed. Gregg Gestation today at 27.6 weeks    Patient sleeping soundly without family/friends at BS, no signs of distress noted- respirations at 15, FM/TOCO in place.  Date and RN contact information changed on the dry erase board, patient left undisturbed.     0800 - RN at BS to adjust monitor and assist patient to the BR. Patient in bed just waking without family/friends at BS; patient is expecting visitors today. Reports sleep last night, denies contractions/cramping or discomfort, denies ill feeling, reports frequent FM, SROM on 11/04/2017 at 2100 - clear fluid. No Bleeding,  Denies change to vision/edema/HA; states she has been getting up to the BR without feeling dizziness or weakness, states she calls for assistance to unplug the IV/monitors/sequentials/BP cuff and SpO2 when she needs to get up but otherwise needs no assistance.    FM/[place] in place, discussed POC, cheerful affect/mood, denies questions/concerns regarding care since arrival to [facility]. Patient encouraged to call RN with all questions/concerns/needs. Dry erase board updated with RN contact information, with review of information.      1900 - Report to Gerri MIGUEL RN

## 2017-11-07 PROCEDURE — 770002 HCHG ROOM/CARE - OB PRIVATE (112)

## 2017-11-07 PROCEDURE — 700102 HCHG RX REV CODE 250 W/ 637 OVERRIDE(OP): Performed by: OBSTETRICS & GYNECOLOGY

## 2017-11-07 PROCEDURE — A9270 NON-COVERED ITEM OR SERVICE: HCPCS | Performed by: OBSTETRICS & GYNECOLOGY

## 2017-11-07 PROCEDURE — 700112 HCHG RX REV CODE 229: Performed by: OBSTETRICS & GYNECOLOGY

## 2017-11-07 PROCEDURE — 302790 HCHG STAT ANTEPARTUM CARE, DAILY

## 2017-11-07 RX ORDER — CALCIUM CARBONATE 500 MG/1
500 TABLET, CHEWABLE ORAL
Status: DISCONTINUED | OUTPATIENT
Start: 2017-11-07 | End: 2017-11-25

## 2017-11-07 RX ADMIN — AMOXICILLIN 500 MG: 500 CAPSULE ORAL at 00:03

## 2017-11-07 RX ADMIN — AMOXICILLIN 500 MG: 500 CAPSULE ORAL at 08:51

## 2017-11-07 RX ADMIN — AMOXICILLIN 500 MG: 500 CAPSULE ORAL at 18:00

## 2017-11-07 RX ADMIN — DOCUSATE SODIUM 100 MG: 100 CAPSULE ORAL at 21:44

## 2017-11-07 RX ADMIN — Medication 1 TABLET: at 08:51

## 2017-11-07 RX ADMIN — ANTACID TABLETS 500 MG: 500 TABLET, CHEWABLE ORAL at 21:44

## 2017-11-07 RX ADMIN — DOCUSATE SODIUM 100 MG: 100 CAPSULE ORAL at 08:51

## 2017-11-07 ASSESSMENT — PATIENT HEALTH QUESTIONNAIRE - PHQ9
SUM OF ALL RESPONSES TO PHQ9 QUESTIONS 1 AND 2: 0
2. FEELING DOWN, DEPRESSED, IRRITABLE, OR HOPELESS: NOT AT ALL
SUM OF ALL RESPONSES TO PHQ QUESTIONS 1-9: 0
1. LITTLE INTEREST OR PLEASURE IN DOING THINGS: NOT AT ALL

## 2017-11-07 NOTE — PROGRESS NOTES
1900: Report received from RAMANDEEP Mark RN. POC discussed with pt. Family at bedside. Pt on magnesium sulfate infusion. Pt ambulates to bathroom. Encouraged pt to call with needs.   2119: Assessment done. Pt afebrile. Denies VB, UCs, or cramping. Reports + FM. Notes clear fluid. Instructed pt to notify RN if feeling feverish or with chills, if pt notes odor in amniotic fluid, or if pt feeling cramping or UCs. Pt verbalized understanding.   2120: Dr. Howard given report on pt. MD ordering for the magnesium sulfate to be discontinued and saline locked at 0000 and for external U/S to be removed at that time. Continuous toco ordered.   0000: Magnesium sulfate infusion discontinued.   0700: Report given to JELANI Barnes RN. Dr. Bolivar at bedside. MD ordering for pt to receive NST q shift for fetal monitoring starting today.

## 2017-11-07 NOTE — PROGRESS NOTES
"Antepartum Progress Note    Tonia Trevizo is a 28 yo  at 28wk0d who presented with positive PPROM. No complaints this morning. Denies abdominal pain or contractions. Continues to leak small amounts of fluid. No bleeding. Positive FM. Eating and voiding without difficulty.     BP (!) 81/43 Comment: RN notified  Pulse 78   Temp 37.1 °C (98.8 °F)   Resp 16   Ht 1.651 m (5' 5\")   Wt 93.9 kg (207 lb)   SpO2 90%   BMI 34.45 kg/m²     Gen: laying on her back in bed, NAD  GI: soft, NT, ND  : no fundal tenderness  Ext: SCD in place, no edema    FHT: 130, moderate variability   [place]: quiescent    Assessment:  1.  intrauterine pregnancy at 28wk0d  2. PPROM  3. Fetal breech malpresentation  4. History of previous  x2    Plan:  Now s/p BMTZ x2 and magnesium for tocolysis. No clinical evidence of  labor or chorioamnionitis. She is on day 2 of PO antibiotics. GTT tomorrow. Will continue to monitor. Delivery plan is RLTCS at 34 weeks if possible.    Asmita Bolivar M.D.      "

## 2017-11-08 ENCOUNTER — APPOINTMENT (OUTPATIENT)
Dept: RADIOLOGY | Facility: MEDICAL CENTER | Age: 27
End: 2017-11-08
Attending: OBSTETRICS & GYNECOLOGY
Payer: COMMERCIAL

## 2017-11-08 LAB
GLUCOSE 1H P CHAL SERPL-MCNC: 105 MG/DL (ref 65–180)
GLUCOSE 2H P CHAL SERPL-MCNC: 98 MG/DL (ref 65–155)
GLUCOSE 3H P CHAL SERPL-MCNC: 90 MG/DL (ref 65–140)
GLUCOSE BS SERPL-MCNC: 80 MG/DL (ref 65–95)

## 2017-11-08 PROCEDURE — 90471 IMMUNIZATION ADMIN: CPT

## 2017-11-08 PROCEDURE — 700102 HCHG RX REV CODE 250 W/ 637 OVERRIDE(OP): Performed by: OBSTETRICS & GYNECOLOGY

## 2017-11-08 PROCEDURE — 82952 GTT-ADDED SAMPLES: CPT

## 2017-11-08 PROCEDURE — 82951 GLUCOSE TOLERANCE TEST (GTT): CPT

## 2017-11-08 PROCEDURE — 700111 HCHG RX REV CODE 636 W/ 250 OVERRIDE (IP): Performed by: OBSTETRICS & GYNECOLOGY

## 2017-11-08 PROCEDURE — A9270 NON-COVERED ITEM OR SERVICE: HCPCS | Performed by: OBSTETRICS & GYNECOLOGY

## 2017-11-08 PROCEDURE — 90715 TDAP VACCINE 7 YRS/> IM: CPT | Performed by: OBSTETRICS & GYNECOLOGY

## 2017-11-08 PROCEDURE — 3E0234Z INTRODUCTION OF SERUM, TOXOID AND VACCINE INTO MUSCLE, PERCUTANEOUS APPROACH: ICD-10-PCS | Performed by: OBSTETRICS & GYNECOLOGY

## 2017-11-08 PROCEDURE — 36415 COLL VENOUS BLD VENIPUNCTURE: CPT

## 2017-11-08 PROCEDURE — 76819 FETAL BIOPHYS PROFIL W/O NST: CPT

## 2017-11-08 PROCEDURE — 302790 HCHG STAT ANTEPARTUM CARE, DAILY

## 2017-11-08 PROCEDURE — 770002 HCHG ROOM/CARE - OB PRIVATE (112)

## 2017-11-08 RX ORDER — LOPERAMIDE HYDROCHLORIDE 2 MG/1
2 CAPSULE ORAL EVERY 4 HOURS PRN
Status: DISCONTINUED | OUTPATIENT
Start: 2017-11-08 | End: 2017-11-25

## 2017-11-08 RX ADMIN — AMOXICILLIN 500 MG: 500 CAPSULE ORAL at 00:22

## 2017-11-08 RX ADMIN — CLOSTRIDIUM TETANI TOXOID ANTIGEN (FORMALDEHYDE INACTIVATED), CORYNEBACTERIUM DIPHTHERIAE TOXOID ANTIGEN (FORMALDEHYDE INACTIVATED), BORDETELLA PERTUSSIS TOXOID ANTIGEN (GLUTARALDEHYDE INACTIVATED), BORDETELLA PERTUSSIS FILAMENTOUS HEMAGGLUTININ ANTIGEN (FORMALDEHYDE INACTIVATED), BORDETELLA PERTUSSIS PERTACTIN ANTIGEN, AND BORDETELLA PERTUSSIS FIMBRIAE 2/3 ANTIGEN 0.5 ML: 5; 2; 2.5; 5; 3; 5 INJECTION, SUSPENSION INTRAMUSCULAR at 08:29

## 2017-11-08 RX ADMIN — LOPERAMIDE HYDROCHLORIDE 2 MG: 2 CAPSULE ORAL at 08:28

## 2017-11-08 RX ADMIN — LOPERAMIDE HYDROCHLORIDE 2 MG: 2 CAPSULE ORAL at 15:56

## 2017-11-08 RX ADMIN — Medication 1 TABLET: at 08:28

## 2017-11-08 RX ADMIN — AMOXICILLIN 500 MG: 500 CAPSULE ORAL at 08:28

## 2017-11-08 RX ADMIN — LOPERAMIDE HYDROCHLORIDE 2 MG: 2 CAPSULE ORAL at 21:32

## 2017-11-08 RX ADMIN — AMOXICILLIN 500 MG: 500 CAPSULE ORAL at 15:56

## 2017-11-08 NOTE — PROGRESS NOTES
27 y.o.  EDC 17 EGA 28.0 PPROM 17 2100 clear. NKDA. Hx C/Sx2. Steroids complete on 17   PO Amoxicillin given q 8 hr.     1600 Pt reports SOB, RN applied pulse ox sat >90% Lung sounds diminished, IS given, pt hitting 500 ml on IS. Pt educated to use IS every time a commercial comes on the TV. 1700 Pt now hitting 1000 ml on IS.     1800 late dose of amoxicillin given.   1900 Report to Jayleen LEÓN RN.

## 2017-11-08 NOTE — PROGRESS NOTES
1900-rcvd report from dayshift RN and assumed care of pt.  0000-pt NPO at this time for 3 hour glucose draw at 0800  0600-pt had restful/uneventful night.  0700-report given to dayshift RN

## 2017-11-08 NOTE — PROGRESS NOTES
"Antepartum Progress Note    Tonia Trevizo is a 28 yo  at 28wk1d who presented with positive PPROM at 27wk5d. Fasting this morning for her GTT. Nonbloody diarrhea. Small pink spotting this morning. Positive FM, no contractions.    BP (!) 97/51   Pulse 85   Temp 36.9 °C (98.5 °F)   Resp 16   Ht 1.651 m (5' 5\")   Wt 93.9 kg (207 lb)   SpO2 95%   BMI 34.45 kg/m²     Gen: laying on her back in bed, NAD  GI: soft, NT, ND  : no fundal tenderness  Ext: no edema    FHT: 140, moderate variability, positive accelerations  [place]: quiescent    Assessment:  1.  intrauterine pregnancy at 28wk1d  2. PPROM  3. Fetal breech malpresentation  4. History of previous  x2    Plan:  Now s/p BMTZ x2 and magnesium for tocolysis. No clinical evidence of  labor or chorioamnionitis. She is on day 3 of PO antibiotics. GTT today. Biophysical profile weekly. Will perform today. Imodium for diarrhea. IS use encouraged. Plan for  RLTCS at 34 weeks.    Asmita Bolivar M.D.      "

## 2017-11-09 PROCEDURE — 302790 HCHG STAT ANTEPARTUM CARE, DAILY

## 2017-11-09 PROCEDURE — A9270 NON-COVERED ITEM OR SERVICE: HCPCS | Performed by: OBSTETRICS & GYNECOLOGY

## 2017-11-09 PROCEDURE — 700102 HCHG RX REV CODE 250 W/ 637 OVERRIDE(OP): Performed by: OBSTETRICS & GYNECOLOGY

## 2017-11-09 PROCEDURE — 59025 FETAL NON-STRESS TEST: CPT | Performed by: OBSTETRICS & GYNECOLOGY

## 2017-11-09 PROCEDURE — 770002 HCHG ROOM/CARE - OB PRIVATE (112)

## 2017-11-09 RX ADMIN — AMOXICILLIN 500 MG: 500 CAPSULE ORAL at 00:04

## 2017-11-09 RX ADMIN — Medication 1 TABLET: at 08:09

## 2017-11-09 RX ADMIN — AMOXICILLIN 500 MG: 500 CAPSULE ORAL at 08:09

## 2017-11-09 RX ADMIN — AMOXICILLIN 500 MG: 500 CAPSULE ORAL at 16:08

## 2017-11-09 ASSESSMENT — PAIN SCALES - GENERAL
PAINLEVEL_OUTOF10: 0

## 2017-11-09 NOTE — PROGRESS NOTES
"Antepartum Progress Note    Tonia Trevizo is a 28 yo  at 28wk2d who presented with positive PPROM at 27wk5d. No complaints. No CTX, bleeding or LOF. Diarrhea improved. No CP or SOB.    BP (!) 97/50   Pulse 82   Temp 37 °C (98.6 °F)   Resp 16   Ht 1.651 m (5' 5\")   Wt 93.9 kg (207 lb)   SpO2 95%   BMI 34.45 kg/m²     Gen: laying on her back in bed, NAD  GI: soft, NT, ND  : no fundal tenderness  Ext: no edema    FHT: 140, moderate variability, positive accelerations  [place]: quiescent    Assessment:  1.  intrauterine pregnancy at 28wk2d  2. PPROM  3. History of previous  x2    Plan:  Now s/p BMTZ x2 and magnesium for tocolysis. Day 3 of PO antibiotics. No clinical evidence of  labor or chorioamnionitis.  Associates consulted for routine growth assessments.     Asmita Bolivar M.D.      "

## 2017-11-09 NOTE — PROGRESS NOTES
1900: Report received from EMILY Burns POC discussed care assumed.  2130: At bedside, VSS.  Imodium given per pt request.  Pt states scant LOF-clear.  No other needs at this time  0000: Pt sleeping-did not disturb  0400: Pt sleeping-did not disturb  0700: Report given to EMILY Irene POC discussed.

## 2017-11-09 NOTE — PROGRESS NOTES
Report received from NOC RN, POC discussed, assumed pt care.  0700 Dr. Bolivar at bedside, MD desires weekly BPP's  Pt reports diarhhea, Imodium ordered.   0800 Lab here for 3 hr glucose tolerance test. Baseline drawn. Glucola given.   0828 Loperamide given. TDAP vaccine given.  0930 BPP 8/9, GERMÁN 5.3 cm.  SL R hand, flushed and reinforced.  0900 Lab draw  1000 Lab draw  1100 Lab draw    1420 EFM & [place] placed 's, moderate variability, accels present, occaisional variable. [place] shows no UC's, abdomen palpates soft. PT reports positive fetal movement, denies vaginal bleeding scant LOF. Afebrile VSS  1556 Additional Loperamide given per Pt request.

## 2017-11-09 NOTE — CARE PLAN
Problem: Infection  Goal: Will remain free from infection  Outcome: PROGRESSING AS EXPECTED  Patient is afebrile. No S&S of infections.     Problem: Pain  Goal: Alleviation of Pain or a reduction in pain to the patient's comfort goal  Outcome: PROGRESSING AS EXPECTED  Patient denies any pain or contractions. Educated to notify RN if any pain develops.

## 2017-11-09 NOTE — PROGRESS NOTES
0700- Report received from JOSELINE Clay. Plan of care assumed. Patient is a , edc 18 making her 28.3 weeks.    0810- Assessment done. Patient denies any contractions or any vaginal bleeding. States positive fetal movement. Continues to leak a scant amount clear fluid. No abdomen tenderness.     1230- patient resting comfortably. Denies any needs at this time.     1600- patient resting comfortably. Denies any needs at this time.     1900- report given to Myrna DIAZ

## 2017-11-10 PROCEDURE — A9270 NON-COVERED ITEM OR SERVICE: HCPCS | Performed by: OBSTETRICS & GYNECOLOGY

## 2017-11-10 PROCEDURE — 59025 FETAL NON-STRESS TEST: CPT | Performed by: OBSTETRICS & GYNECOLOGY

## 2017-11-10 PROCEDURE — 302790 HCHG STAT ANTEPARTUM CARE, DAILY

## 2017-11-10 PROCEDURE — 700102 HCHG RX REV CODE 250 W/ 637 OVERRIDE(OP): Performed by: OBSTETRICS & GYNECOLOGY

## 2017-11-10 PROCEDURE — 770002 HCHG ROOM/CARE - OB PRIVATE (112)

## 2017-11-10 PROCEDURE — 700112 HCHG RX REV CODE 229: Performed by: OBSTETRICS & GYNECOLOGY

## 2017-11-10 RX ADMIN — Medication 1 TABLET: at 07:40

## 2017-11-10 RX ADMIN — AMOXICILLIN 500 MG: 500 CAPSULE ORAL at 00:22

## 2017-11-10 RX ADMIN — AMOXICILLIN 500 MG: 500 CAPSULE ORAL at 07:40

## 2017-11-10 RX ADMIN — DOCUSATE SODIUM 100 MG: 100 CAPSULE ORAL at 07:40

## 2017-11-10 RX ADMIN — ACETAMINOPHEN 1000 MG: 500 TABLET ORAL at 15:47

## 2017-11-10 RX ADMIN — AMOXICILLIN 500 MG: 500 CAPSULE ORAL at 15:47

## 2017-11-10 ASSESSMENT — PATIENT HEALTH QUESTIONNAIRE - PHQ9
1. LITTLE INTEREST OR PLEASURE IN DOING THINGS: NOT AT ALL
1. LITTLE INTEREST OR PLEASURE IN DOING THINGS: NOT AT ALL
SUM OF ALL RESPONSES TO PHQ QUESTIONS 1-9: 0
2. FEELING DOWN, DEPRESSED, IRRITABLE, OR HOPELESS: NOT AT ALL
SUM OF ALL RESPONSES TO PHQ9 QUESTIONS 1 AND 2: 0
SUM OF ALL RESPONSES TO PHQ QUESTIONS 1-9: 0
2. FEELING DOWN, DEPRESSED, IRRITABLE, OR HOPELESS: NOT AT ALL
SUM OF ALL RESPONSES TO PHQ9 QUESTIONS 1 AND 2: 0

## 2017-11-10 ASSESSMENT — LIFESTYLE VARIABLES
DO YOU DRINK ALCOHOL: NO
DO YOU DRINK ALCOHOL: NO

## 2017-11-10 ASSESSMENT — PAIN SCALES - GENERAL
PAINLEVEL_OUTOF10: 0
PAINLEVEL_OUTOF10: 0

## 2017-11-10 ASSESSMENT — COPD QUESTIONNAIRES
DO YOU EVER COUGH UP ANY MUCUS OR PHLEGM?: NO/ONLY WITH OCCASIONAL COLDS OR INFECTIONS
COPD SCREENING SCORE: 0
HAVE YOU SMOKED AT LEAST 100 CIGARETTES IN YOUR ENTIRE LIFE: NO/DON'T KNOW
HAVE YOU SMOKED AT LEAST 100 CIGARETTES IN YOUR ENTIRE LIFE: NO/DON'T KNOW
DO YOU EVER COUGH UP ANY MUCUS OR PHLEGM?: NO/ONLY WITH OCCASIONAL COLDS OR INFECTIONS
DURING THE PAST 4 WEEKS HOW MUCH DID YOU FEEL SHORT OF BREATH: NONE/LITTLE OF THE TIME
DURING THE PAST 4 WEEKS HOW MUCH DID YOU FEEL SHORT OF BREATH: NONE/LITTLE OF THE TIME
COPD SCREENING SCORE: 0

## 2017-11-10 NOTE — PROGRESS NOTES
0700 - Report received from Myrna CHEN RN, care assumed. Gregg Gestation today at 28.4 weeks    Patient sleeping soundly without family/friends at BS, no signs of distress noted- respirations at 15, FM/TOCOnot in place at this time.  Date and RN contact information changed on the dry erase board, patient left undisturbed.     0830 - Patient awake to RN at BS. Patient in bed without family/friends at BS; patient is expecting visitors later today. Reports sleep last night, denies contractions/cramping or discomfort, denies ill feeling, reports frequent FM, SROM on 11/04/2017 at 2100 - clear fluid reported without change to color or odor. No Bleeding,  Denies change to vision/edema/HA; states she has been getting up to the BR herself without assist, denies feeling dizziness or weakness.    FM/[place] to be placed, discussed POC, cheerful affect/mood, denies questions/concerns regarding care since arrival to [facility]. Patient encouraged to call RN with all questions/concerns/needs. Dry erase board updated with RN contact information, with review of information.      2130 - Report to Zabrina HOWARD RN

## 2017-11-10 NOTE — PROGRESS NOTES
Received report and assumed care of patient from JELANI Pastor. Patient is resting comfortably at this time. POC reviewed, questions answered, needs met at this time.   0700 Report given to RAMANDEEP Mark and assumed care of patient.

## 2017-11-10 NOTE — CARE PLAN
Problem: Safety  Goal: Will remain free from injury  Outcome: PROGRESSING AS EXPECTED  Patient instructed to call RN with any spills, cords in the way on the floor or any other safety hazards. Patient also instructed to call RN with change to LOC, feelings of dizziness, blurry vision or any change to comfort or concern for safety. Medication administered as ordered, verified with patient/scanned in to MAR and armband on patient.     Problem: Infection  Goal: Will remain free from infection  Outcome: PROGRESSING AS EXPECTED  Ongoing observation and assessment of signs/symptoms of potential infection. Patient/family aware of importance of handwashing and available foam on the wall for use as well.

## 2017-11-11 PROCEDURE — 700102 HCHG RX REV CODE 250 W/ 637 OVERRIDE(OP): Performed by: OBSTETRICS & GYNECOLOGY

## 2017-11-11 PROCEDURE — 770002 HCHG ROOM/CARE - OB PRIVATE (112)

## 2017-11-11 PROCEDURE — 302790 HCHG STAT ANTEPARTUM CARE, DAILY

## 2017-11-11 PROCEDURE — A9270 NON-COVERED ITEM OR SERVICE: HCPCS | Performed by: OBSTETRICS & GYNECOLOGY

## 2017-11-11 PROCEDURE — 59025 FETAL NON-STRESS TEST: CPT | Performed by: OBSTETRICS & GYNECOLOGY

## 2017-11-11 PROCEDURE — 700112 HCHG RX REV CODE 229: Performed by: OBSTETRICS & GYNECOLOGY

## 2017-11-11 RX ADMIN — AMOXICILLIN 500 MG: 500 CAPSULE ORAL at 16:04

## 2017-11-11 RX ADMIN — DOCUSATE SODIUM 100 MG: 100 CAPSULE ORAL at 21:39

## 2017-11-11 RX ADMIN — AMOXICILLIN 500 MG: 500 CAPSULE ORAL at 09:22

## 2017-11-11 RX ADMIN — DOCUSATE SODIUM 100 MG: 100 CAPSULE ORAL at 09:22

## 2017-11-11 RX ADMIN — AMOXICILLIN 500 MG: 500 CAPSULE ORAL at 01:19

## 2017-11-11 RX ADMIN — Medication 1 TABLET: at 09:22

## 2017-11-11 ASSESSMENT — PAIN SCALES - GENERAL
PAINLEVEL_OUTOF10: 0
PAINLEVEL_OUTOF10: 0

## 2017-11-11 ASSESSMENT — COPD QUESTIONNAIRES
HAVE YOU SMOKED AT LEAST 100 CIGARETTES IN YOUR ENTIRE LIFE: NO/DON'T KNOW
DURING THE PAST 4 WEEKS HOW MUCH DID YOU FEEL SHORT OF BREATH: NONE/LITTLE OF THE TIME
DO YOU EVER COUGH UP ANY MUCUS OR PHLEGM?: NO/ONLY WITH OCCASIONAL COLDS OR INFECTIONS

## 2017-11-11 ASSESSMENT — LIFESTYLE VARIABLES: DO YOU DRINK ALCOHOL: NO

## 2017-11-11 NOTE — PROGRESS NOTES
"Antepartum Progress Note    Tonia Trevizo is a 28 yo  at 28wk3d who presented with positive PPROM at 27wk5d. Overnight no new complaints. No pain, bleeding or any further leaking. Positive FM. Eating and voiding well. No fever or chills    BP (!) 94/52   Pulse 76   Temp 36.2 °C (97.2 °F)   Resp 16   Ht 1.651 m (5' 5\")   Wt 93.9 kg (207 lb)   SpO2 95%   BMI 34.45 kg/m²     Gen: laying on her back in bed, NAD  GI: soft, NT, ND  : no fundal tenderness  Ext: no edema    FHT: 140, moderate variability, positive accelerations  [place]: quiescent    Assessment:  1.  intrauterine pregnancy at 28wk3d  2. PPROM  3. History of previous  x2    Plan:  Now s/p BMTZ x2 and magnesium for tocolysis. Day 5 of PO antibiotics. No clinical evidence of  labor or chorioamnionitis.  Associates consulted for routine growth assessments. Will continue to monitor closely    Asmita Bolivar M.D.      "

## 2017-11-11 NOTE — CARE PLAN
Problem: Pain  Goal: Patient will have relaxed facial expressions and be able to rest between uterine contractions  Pt not having UC's and understands to notify RN if she starts hurting.

## 2017-11-11 NOTE — CARE PLAN
Problem: Knowledge Deficit  Goal: Knowledge of disease process/condition, treatment plan, diagnostic tests, and medications will improve  Outcome: MET Date Met: 11/11/17  POC discussed and pt states understanding at this time.  Pt asks questions as they present.

## 2017-11-11 NOTE — PROGRESS NOTES
"Antepartum Progress Note    Tonia Trevizo is a 26 yo  at 28wk3d who presented with positive PPROM at 27wk5d. Doing well today overall but does feel weakened from laying in bed and endorses vaginal discomfort.     BP (!) 99/48   Pulse 90   Temp 35.9 °C (96.7 °F)   Resp 17   Ht 1.651 m (5' 5\")   Wt 93.9 kg (207 lb)   SpO2 95%   BMI 34.45 kg/m²     Gen: laying on her back in bed, NAD  GI: soft, NT, ND  : no fundal tenderness  Ext: no edema    FHT: 140, moderate variability, positive accelerations  [place]: quiescent    Assessment:  1.  intrauterine pregnancy at 28wk3d  2. PPROM  3. History of previous  x2    Plan:  Now s/p BMTZ x2 and magnesium for tocolysis. Day 4 of PO antibiotics. No clinical evidence of  labor or chorioamnionitis.  Associates consulted for routine growth assessments. Will continue to monitor closely.     Asmita Bolivar M.D.      "

## 2017-11-11 NOTE — PROGRESS NOTES
2130: report received from Bry grijalva, assumed care of patient  2145: rn at bs, pt denies needs.  Pt reports minimal cramping.  Pt to report if cramping gets worse.  Pt denies vaginal bleeding and reports fetal movement.  Pt denies needs at this time.    0000: rn and cna at bs, pt sleeping.  Denies needs  0100: rn at bs, pt denies needs  0400: cna at bs, pt denies needs  0630: rn at bs, pt sleeping  0700: report to sulma grijalva

## 2017-11-12 PROCEDURE — 302790 HCHG STAT ANTEPARTUM CARE, DAILY

## 2017-11-12 PROCEDURE — 700112 HCHG RX REV CODE 229: Performed by: OBSTETRICS & GYNECOLOGY

## 2017-11-12 PROCEDURE — 59025 FETAL NON-STRESS TEST: CPT | Performed by: OBSTETRICS & GYNECOLOGY

## 2017-11-12 PROCEDURE — 700102 HCHG RX REV CODE 250 W/ 637 OVERRIDE(OP): Performed by: OBSTETRICS & GYNECOLOGY

## 2017-11-12 PROCEDURE — 770002 HCHG ROOM/CARE - OB PRIVATE (112)

## 2017-11-12 PROCEDURE — A9270 NON-COVERED ITEM OR SERVICE: HCPCS | Performed by: OBSTETRICS & GYNECOLOGY

## 2017-11-12 PROCEDURE — 700105 HCHG RX REV CODE 258: Performed by: OBSTETRICS & GYNECOLOGY

## 2017-11-12 RX ORDER — SODIUM CHLORIDE, SODIUM LACTATE, POTASSIUM CHLORIDE, CALCIUM CHLORIDE 600; 310; 30; 20 MG/100ML; MG/100ML; MG/100ML; MG/100ML
1000 INJECTION, SOLUTION INTRAVENOUS CONTINUOUS
Status: DISCONTINUED | OUTPATIENT
Start: 2017-11-12 | End: 2017-11-25

## 2017-11-12 RX ADMIN — DOCUSATE SODIUM 100 MG: 100 CAPSULE ORAL at 09:06

## 2017-11-12 RX ADMIN — DOCUSATE SODIUM 100 MG: 100 CAPSULE ORAL at 21:00

## 2017-11-12 RX ADMIN — SODIUM CHLORIDE, POTASSIUM CHLORIDE, SODIUM LACTATE AND CALCIUM CHLORIDE 1000 ML: 600; 310; 30; 20 INJECTION, SOLUTION INTRAVENOUS at 02:06

## 2017-11-12 RX ADMIN — Medication 1 TABLET: at 09:07

## 2017-11-12 ASSESSMENT — PAIN SCALES - GENERAL
PAINLEVEL_OUTOF10: 0

## 2017-11-12 NOTE — CARE PLAN
Problem: Infection  Goal: Will remain free from infection  Outcome: PROGRESSING AS EXPECTED  Pt educated on s/s of infection and to report them ASAP. Will continue to monitor VS.     Problem: Venous Thromboembolism (VTW)/Deep Vein Thrombosis (DVT) Prevention:  Goal: Patient will participate in Venous Thrombosis (VTE)/Deep Vein Thrombosis (DVT)Prevention Measures  Outcome: PROGRESSING AS EXPECTED  Pt ambulatory to restroom. Pt educated on the need for SED if not getting out of bed.

## 2017-11-12 NOTE — PROGRESS NOTES
Tonia Trevizo is a 26 yo  at 28wk4d who presented with positive PPROM at 27wk5d. Overnight had some increase in cramping and received an IV fluid bolus. Now has no pain, bleeding or any further leaking. Positive FM. Eating and voiding well. No fever or chills     Vitals:    17 0419 17 0802 17 1208 17 1251   BP: (!) 96/47 101/55 104/53    Pulse: 77 78 85    Resp:     Temp: 37 °C (98.6 °F) 36 °C (96.8 °F) 36 °C (96.8 °F) 36.2 °C (97.1 °F)   TempSrc:       SpO2:       Weight:       Height:            Gen: sitting up in bed, NAD  GI: soft, NT, ND  : no fundal tenderness  Ext: no edema     FHT: 140, moderate variability, positive accelerations  [place]: rare ctx     Assessment:  1.  intrauterine pregnancy at 28wk4d  2. PPROM  3. History of previous  x2     Plan:  Now s/p BMTZ x2 and magnesium for tocolysis.s/p 5 days of PO antibiotics. No clinical evidence of  labor or chorioamnionitis.  Associates consulted for routine growth assessments. Will continue to monitor closely

## 2017-11-12 NOTE — PROGRESS NOTES
1900 Report received from Carmelina VARGHESE RN, POC discussed    0050 CNA Gladys updated RN on pt stated she's feeling cramps.     0054 TOCO applied, pt stated that the cramping is worse than it was last night.     0150 Dr. Mcwilliams updated on pt status, orders to give pt a bolus of LR.     0600 RN at bedside, to replace IV, pt sleeping comfortably, will attempt IV start once pt is awake.     0700 Report given to Mariano DIAZ, POC discussed

## 2017-11-12 NOTE — PROGRESS NOTES
0700  Report to NOC RN in room with pt at this time.  Pt is resting and RN will return later for assessment.  0920  PO medications given and pt awake and alert at this time.  Pt has no report of UC's and no changes in her leaking.  Pt understands to notify RN with any changes.  1130  Pt has no new complaints and POC discussed.  Pt will have lunch and then desires to monitor the baby.  1230  Monitors placed and HT's obtained.  Pt reports positive fetal movement and has no other complaints.  1430  Pt has family visiting and will notify RN when she desires to shower.  1630  Pt phoned CNA for shower and linens changed at this time.  1850  Report to NOC RN in room with pt and pt has no new complaints at this time.

## 2017-11-12 NOTE — PROGRESS NOTES
Late Entry    0700- Bedside report received from EMILY Castillo. Pt resting comfortably in bed. No c/o pain or discomfort. VS stable.  1000- 18 G IV in right hand not patent and d/c'd. New 18G IV started in Left forearm. Placed by EMILY Byers. Pt tolerated procedure well.   1055- Reactive NST completed from [id number]. Pt states baby moving well.   1550- Pt states feeling more cramping in lower abdomen and lower back.[place] in place and will monitor.  1600-pt states cramping has gotten better. [place] readjusted.   1800- Pt resting comfortably in bed. VS stable.   1915- Report given to EMILY Javed

## 2017-11-13 PROCEDURE — 770002 HCHG ROOM/CARE - OB PRIVATE (112)

## 2017-11-13 PROCEDURE — 700102 HCHG RX REV CODE 250 W/ 637 OVERRIDE(OP): Performed by: OBSTETRICS & GYNECOLOGY

## 2017-11-13 PROCEDURE — A9270 NON-COVERED ITEM OR SERVICE: HCPCS | Performed by: OBSTETRICS & GYNECOLOGY

## 2017-11-13 PROCEDURE — 700112 HCHG RX REV CODE 229: Performed by: OBSTETRICS & GYNECOLOGY

## 2017-11-13 PROCEDURE — 302790 HCHG STAT ANTEPARTUM CARE, DAILY

## 2017-11-13 RX ADMIN — DOCUSATE SODIUM 100 MG: 100 CAPSULE ORAL at 20:19

## 2017-11-13 RX ADMIN — ACETAMINOPHEN 1000 MG: 500 TABLET ORAL at 20:19

## 2017-11-13 RX ADMIN — Medication 1 TABLET: at 09:18

## 2017-11-13 ASSESSMENT — PAIN SCALES - GENERAL
PAINLEVEL_OUTOF10: 0
PAINLEVEL_OUTOF10: 0

## 2017-11-13 NOTE — PROGRESS NOTES
"Antepartum Progress Note    Tonia Trevizo is a 28 yo  at 28wk5d who presented with positive PPROM at 27wk5d. This morning she noticed a small amount of bright red blood on her pad. She has a sensation of increased pelvic pressure but no cramping pain currently. Positive FM. Eating and voiding without difficulty. No fever or chills    /54   Pulse 77   Temp 36.4 °C (97.6 °F)   Resp 18   Ht 1.651 m (5' 5\")   Wt 93.9 kg (207 lb)   SpO2 95%   BMI 34.45 kg/m²     Gen: laying on her back in bed, NAD  GI: soft, NT, ND  : no fundal tenderness, uterus soft  Ext: no edema    FHT: 130, moderate variability, positive accelerations  [place]: quiescent    Assessment:  1.  intrauterine pregnancy at 28wk5d  2. PPROM  3. History of previous  x2    Plan:  Now s/p BMTZ x2 and magnesium for tocolysis. She has completed her latency antibiotics. Reviewed warning signs for  labor. I am currently not suspicious she may be in early labor but certainly SVE may be warranted if her bleeding persists or her sensation of pelvic heaviness worsens. Will arrange for follow up growth scan tomorrow with  associates. Will monitor closely.    Asmita Bolivar M.D.      "

## 2017-11-13 NOTE — CARE PLAN
Problem: Infection  Goal: Will remain free from infection  Outcome: PROGRESSING AS EXPECTED  Patient is afebrile.  Denies uterine tenderness, no foul smell from amniotic fluid.  Doing well.

## 2017-11-13 NOTE — PROGRESS NOTES
Report received and plan of care discussed.  GA = 28.5 wks.  Patient states she is feeling well, patient is afebrile, denies feeling uterine tenderness, denies foul odor from amniotic fluid, states she is feeling the baby moving well, denies feeling UC's that are painful or frequent.   0000--Patient resting comfortably @ this time.  No change in status.  0400--Patient has been resting well.  Denies feeling painful or frequent UC's,  States baby has been moving well.  0600--No change in status.  0700--Report to oncoming RN and plan of care discussed.

## 2017-11-13 NOTE — PROGRESS NOTES
0700 Report received from NOC RN, Ivelisse Javed, POC discussed assumed pt care.   27 y.o.  @28 . PPROM on 17. Hx C/sx2, POC R C/S at 34 weeks if possible. Steroids complete on 17     0700 Pt reports small amount bright red bleeding on pad and bed sheets. Pt reports positive fetal movement, denies increased cramping or contractions. Up to the bathroom. Pads changed, linens changed. Will continue to monitor and notify MD.   0920 EFM applied 's with moderate variability, accels present, no decels. Reactive NST obtained. Uterus palpates soft. toco shows minimal uterine activity.  Pt reports decreased bleeding. Will continue to monitor.   1115  Office called, Message with RN for MD to return call when available for Pt update.   1140 Dr. Bolivar returned call. Updated on pt status. MD will come assess pt.   1200 Dr. Bolivar at bedside, no new orders at present time.   1700 Pt reports decreased bleeding but still present, no cramping, Positive fetal movement.   1930 Report to Skye Jamil RN.

## 2017-11-13 NOTE — CARE PLAN
Problem: Psychosocial needs  Goal: Anxiety reduction  Outcome: PROGRESSING AS EXPECTED  Patient given choices in her care where possible.  All questions asked and answered to her understanding.  Participating in her own POC.

## 2017-11-14 PROCEDURE — A9270 NON-COVERED ITEM OR SERVICE: HCPCS | Performed by: OBSTETRICS & GYNECOLOGY

## 2017-11-14 PROCEDURE — 770002 HCHG ROOM/CARE - OB PRIVATE (112)

## 2017-11-14 PROCEDURE — 302790 HCHG STAT ANTEPARTUM CARE, DAILY

## 2017-11-14 PROCEDURE — 700102 HCHG RX REV CODE 250 W/ 637 OVERRIDE(OP): Performed by: OBSTETRICS & GYNECOLOGY

## 2017-11-14 PROCEDURE — 700112 HCHG RX REV CODE 229: Performed by: OBSTETRICS & GYNECOLOGY

## 2017-11-14 RX ADMIN — DOCUSATE SODIUM 100 MG: 100 CAPSULE ORAL at 21:22

## 2017-11-14 RX ADMIN — Medication 1 TABLET: at 09:09

## 2017-11-14 RX ADMIN — DOCUSATE SODIUM 100 MG: 100 CAPSULE ORAL at 09:09

## 2017-11-14 ASSESSMENT — PAIN SCALES - GENERAL
PAINLEVEL_OUTOF10: 0
PAINLEVEL_OUTOF10: 0

## 2017-11-14 NOTE — PROGRESS NOTES
0700 report from Skye DIAZ.patient is sleeping.no new bleeding.seen by Dr Bolivar.0900 meds.no bleeding or cramping at this time.1200 lunch.1230 patient stated that she had a little spotting-looked at by nurse-a small light red spot on pad.TOCO readjusted-patient said she felt a little cramping-will observe.1645 red blood on pad-she isn't cramping.she feels fine.patient said they checked her yesterday and she was not dilated.baby is moving well.

## 2017-11-14 NOTE — PROGRESS NOTES
"Antepartum Progress Note    Tonia Trevizo is a 28 yo  at 28wk6d who presented with positive PPROM at 27wk5d. Pelvic pressure overnight but SVE closed and [place]eter quiescent. No complaints otherwise    BP (!) 88/47   Pulse 77   Temp 36.4 °C (97.6 °F)   Resp 16   Ht 1.651 m (5' 5\")   Wt 93.9 kg (207 lb)   SpO2 95%   BMI 34.45 kg/m²     Gen: laying on her back in bed, NAD  GI: soft, NT, ND  : no fundal tenderness, uterus soft  Ext: no edema    FHT: 140, moderate variability, positive accelerations  [place]: quiescent    Assessment:  1.  intrauterine pregnancy at 28wk6d  2. PPROM  3. History of previous  x2    Plan:  Now s/p BMTZ x2 and magnesium for tocolysis. She has completed her latency antibiotics. Reviewed warning signs for  labor. Will obtain growth scan today    Asmita Bolivar M.D.      "

## 2017-11-14 NOTE — PROGRESS NOTES
"1730: Report received from JELANI Barnes RN.   1815: Assessment completed, POC discussed. Pt states, \"+FM, but declines UC's.\" Pt remains afebrile at this time, no foul odor from amniotic fluid.   0015: Pt calls out with complaints of UC's and states, \"I have been feeling like I am having regular contractions every 5 minutes that are really painful.\" Pt rates contraction pain at a 6 using a 0 to 10 pain scale. No contractions felt upon palpations, TOCO not picking up contractions at this time. LR bolus started at this time, 500 mL.  0020: Dr. Briscoe's updated with patient status. No orders at this time.   0038: Pt up to bathroom, blood noted on pad. Pt states, \"I have been having bleeding since yesterday morning.\" Pt states there has been a small amount of bleeding on her  pads every time she gets up to the bathroom. This RN asked pt to save pads, so this RN can assess bleeding.  0040: Dr. Briscoe updated with blood noted on pad. Orders received from Dr. Briscoe to check cervix.   0045: SVE Fingertip, unsure if foot presentation upon assessment.   0050: Dr. Briscoe updated.   0105: Dr. Working at bedside, SVE closed.   0235: Pt resting at this time.   0536: Pt resting at this time.   0700: Report given to CARL Beal RN.   "

## 2017-11-15 PROCEDURE — A9270 NON-COVERED ITEM OR SERVICE: HCPCS | Performed by: OBSTETRICS & GYNECOLOGY

## 2017-11-15 PROCEDURE — 770002 HCHG ROOM/CARE - OB PRIVATE (112)

## 2017-11-15 PROCEDURE — 700102 HCHG RX REV CODE 250 W/ 637 OVERRIDE(OP): Performed by: OBSTETRICS & GYNECOLOGY

## 2017-11-15 PROCEDURE — 700112 HCHG RX REV CODE 229: Performed by: OBSTETRICS & GYNECOLOGY

## 2017-11-15 PROCEDURE — 302790 HCHG STAT ANTEPARTUM CARE, DAILY

## 2017-11-15 RX ORDER — DIPHENHYDRAMINE HCL 25 MG
25 TABLET ORAL NIGHTLY PRN
Status: DISCONTINUED | OUTPATIENT
Start: 2017-11-15 | End: 2017-11-25

## 2017-11-15 RX ADMIN — DOCUSATE SODIUM 100 MG: 100 CAPSULE ORAL at 21:10

## 2017-11-15 RX ADMIN — DOCUSATE SODIUM 100 MG: 100 CAPSULE ORAL at 09:18

## 2017-11-15 RX ADMIN — DIPHENHYDRAMINE HCL 25 MG: 25 TABLET ORAL at 21:10

## 2017-11-15 RX ADMIN — Medication 1 TABLET: at 09:18

## 2017-11-15 ASSESSMENT — PAIN SCALES - GENERAL
PAINLEVEL_OUTOF10: 0
PAINLEVEL_OUTOF10: 5
PAINLEVEL_OUTOF10: 1
PAINLEVEL_OUTOF10: 2

## 2017-11-15 ASSESSMENT — PATIENT HEALTH QUESTIONNAIRE - PHQ9
SUM OF ALL RESPONSES TO PHQ QUESTIONS 1-9: 0
2. FEELING DOWN, DEPRESSED, IRRITABLE, OR HOPELESS: NOT AT ALL
SUM OF ALL RESPONSES TO PHQ9 QUESTIONS 1 AND 2: 0
1. LITTLE INTEREST OR PLEASURE IN DOING THINGS: NOT AT ALL

## 2017-11-15 ASSESSMENT — COPD QUESTIONNAIRES
HAVE YOU SMOKED AT LEAST 100 CIGARETTES IN YOUR ENTIRE LIFE: NO/DON'T KNOW
DO YOU EVER COUGH UP ANY MUCUS OR PHLEGM?: NO/ONLY WITH OCCASIONAL COLDS OR INFECTIONS
COPD SCREENING SCORE: 0
DURING THE PAST 4 WEEKS HOW MUCH DID YOU FEEL SHORT OF BREATH: NONE/LITTLE OF THE TIME

## 2017-11-15 ASSESSMENT — LIFESTYLE VARIABLES: DO YOU DRINK ALCOHOL: NO

## 2017-11-15 NOTE — PROGRESS NOTES
"Report rcvd from EMILY Loyd. POC discussed, pt care assumed.   0740 Pt found to be sitting up in bed, eating breakfast. Assessment done. POC discussed. Pt would like to shower today. Will ask Dr. Bolivar if pt is okay for booth ambulation and wheelchair ride. No further needs at this time. Pt reports pos fm, light bleeding and occasional UCs.   1045 Pt up to bathroom for shower. CNA reported to RN that there was a large spot of blood on gown when pt got up.   1200 Pt states that she has had \"a little more cramping and bleeding today.\" Pos fm. Feels better after shower.   1510 Dr. Zarate at bedside. BPP done.   1600 Pt states that before Dr. Zarate came she was having more cramping.   1840 Pt called RN to say that she was having a sharp pain in her right abdomen that was radiating to the front. RN at bedside. Palpation revealed UC. Pt in tears. FHTs placed on.   1845 Dr. Lloyd notified. Will continue to monitor baby and UCs continuously.   1900 Report to EMILY Leone, at bedside.       "

## 2017-11-15 NOTE — PROGRESS NOTES
"Antepartum Progress Note    Tonia Trevizo is a 26 yo  at 29wk0d who presented with positive PPROM at 27wk5d. Pressure improved overnight but continued spotting. Good fetal movement. Eating and voiding without difficulty. No fever or chills.     BP (!) 85/40   Pulse 75   Temp 36.3 °C (97.3 °F)   Resp 16   Ht 1.651 m (5' 5\")   Wt 93.9 kg (207 lb)   SpO2 95%   BMI 34.45 kg/m²     Gen: laying on her back in bed, NAD  GI: soft, NT, ND  : no fundal tenderness, uterus soft  Ext: no edema    FHT: 140, moderate variability, positive accelerations  [place]: quiescent    Assessment:  1.  intrauterine pregnancy at 29wk0d  2. PPROM  3. History of previous  x2    Plan:  Now s/p BMTZ x2 and magnesium for tocolysis. She has completed her latency antibiotics. No clinical signs of  labor or chorio.  associates contacted yesterday late afternoon. Plan for growth scan today.     Asmita Bolivar M.D.      "

## 2017-11-15 NOTE — CARE PLAN
Problem: Infection  Goal: Will remain free from infection    Intervention: Assess signs and symptoms of infection  Pt afebrile, abdomen soft, non tender.

## 2017-11-15 NOTE — CONSULTS
Asked to see and evaluate  Patient by Dr Bolivar    Ultrasound:    SIUP in vertex presentation  EFW: 1408g (57%ile)  GERMÁN: reduced: 3.24  Doppler of UA normal (S/D: 2.56)    No clinical indication of chorio.

## 2017-11-15 NOTE — CARE PLAN
Problem: Risk for injury  Goal: Patient and fetus will be free of preventable injury/complications    Intervention: Monitor Fetal well being  Reactive NST obtained. Pt states + fetal movement.

## 2017-11-16 LAB
BASOPHILS # BLD AUTO: 0.3 % (ref 0–1.8)
BASOPHILS # BLD: 0.05 K/UL (ref 0–0.12)
EOSINOPHIL # BLD AUTO: 0.17 K/UL (ref 0–0.51)
EOSINOPHIL NFR BLD: 1 % (ref 0–6.9)
ERYTHROCYTE [DISTWIDTH] IN BLOOD BY AUTOMATED COUNT: 36.3 FL (ref 35.9–50)
HCT VFR BLD AUTO: 31.8 % (ref 37–47)
HGB BLD-MCNC: 10.5 G/DL (ref 12–16)
IMM GRANULOCYTES # BLD AUTO: 0.32 K/UL (ref 0–0.11)
IMM GRANULOCYTES NFR BLD AUTO: 2 % (ref 0–0.9)
LYMPHOCYTES # BLD AUTO: 2.35 K/UL (ref 1–4.8)
LYMPHOCYTES NFR BLD: 14.4 % (ref 22–41)
MCH RBC QN AUTO: 23.8 PG (ref 27–33)
MCHC RBC AUTO-ENTMCNC: 33 G/DL (ref 33.6–35)
MCV RBC AUTO: 71.9 FL (ref 81.4–97.8)
MONOCYTES # BLD AUTO: 1.2 K/UL (ref 0–0.85)
MONOCYTES NFR BLD AUTO: 7.3 % (ref 0–13.4)
NEUTROPHILS # BLD AUTO: 12.28 K/UL (ref 2–7.15)
NEUTROPHILS NFR BLD: 75 % (ref 44–72)
NRBC # BLD AUTO: 0 K/UL
NRBC BLD AUTO-RTO: 0 /100 WBC
PLATELET # BLD AUTO: 296 K/UL (ref 164–446)
PMV BLD AUTO: 10.6 FL (ref 9–12.9)
RBC # BLD AUTO: 4.42 M/UL (ref 4.2–5.4)
WBC # BLD AUTO: 16.4 K/UL (ref 4.8–10.8)

## 2017-11-16 PROCEDURE — 59025 FETAL NON-STRESS TEST: CPT | Performed by: OBSTETRICS & GYNECOLOGY

## 2017-11-16 PROCEDURE — A9270 NON-COVERED ITEM OR SERVICE: HCPCS | Performed by: OBSTETRICS & GYNECOLOGY

## 2017-11-16 PROCEDURE — 85025 COMPLETE CBC W/AUTO DIFF WBC: CPT

## 2017-11-16 PROCEDURE — 700112 HCHG RX REV CODE 229: Performed by: OBSTETRICS & GYNECOLOGY

## 2017-11-16 PROCEDURE — 90471 IMMUNIZATION ADMIN: CPT

## 2017-11-16 PROCEDURE — 90686 IIV4 VACC NO PRSV 0.5 ML IM: CPT | Performed by: OBSTETRICS & GYNECOLOGY

## 2017-11-16 PROCEDURE — 36415 COLL VENOUS BLD VENIPUNCTURE: CPT

## 2017-11-16 PROCEDURE — 700102 HCHG RX REV CODE 250 W/ 637 OVERRIDE(OP): Performed by: OBSTETRICS & GYNECOLOGY

## 2017-11-16 PROCEDURE — 700111 HCHG RX REV CODE 636 W/ 250 OVERRIDE (IP): Performed by: OBSTETRICS & GYNECOLOGY

## 2017-11-16 PROCEDURE — 770002 HCHG ROOM/CARE - OB PRIVATE (112)

## 2017-11-16 PROCEDURE — 302790 HCHG STAT ANTEPARTUM CARE, DAILY

## 2017-11-16 RX ADMIN — INFLUENZA A VIRUS A/MICHIGAN/45/2015 X-275 (H1N1) ANTIGEN (FORMALDEHYDE INACTIVATED), INFLUENZA A VIRUS A/HONG KONG/4801/2014 X-263B (H3N2) ANTIGEN (FORMALDEHYDE INACTIVATED), INFLUENZA B VIRUS B/PHUKET/3073/2013 ANTIGEN (FORMALDEHYDE INACTIVATED), AND INFLUENZA B VIRUS B/BRISBANE/60/2008 ANTIGEN (FORMALDEHYDE INACTIVATED) 0.5 ML: 15; 15; 15; 15 INJECTION, SUSPENSION INTRAMUSCULAR at 17:16

## 2017-11-16 RX ADMIN — DOCUSATE SODIUM 100 MG: 100 CAPSULE ORAL at 20:33

## 2017-11-16 RX ADMIN — DOCUSATE SODIUM 100 MG: 100 CAPSULE ORAL at 08:22

## 2017-11-16 RX ADMIN — Medication 1 TABLET: at 08:22

## 2017-11-16 ASSESSMENT — PAIN SCALES - GENERAL
PAINLEVEL_OUTOF10: 0
PAINLEVEL_OUTOF10: 0
PAINLEVEL_OUTOF10: 2
PAINLEVEL_OUTOF10: 0

## 2017-11-16 ASSESSMENT — COPD QUESTIONNAIRES
DO YOU EVER COUGH UP ANY MUCUS OR PHLEGM?: NO/ONLY WITH OCCASIONAL COLDS OR INFECTIONS
DURING THE PAST 4 WEEKS HOW MUCH DID YOU FEEL SHORT OF BREATH: NONE/LITTLE OF THE TIME
HAVE YOU SMOKED AT LEAST 100 CIGARETTES IN YOUR ENTIRE LIFE: NO/DON'T KNOW
COPD SCREENING SCORE: 0

## 2017-11-16 ASSESSMENT — LIFESTYLE VARIABLES: DO YOU DRINK ALCOHOL: NO

## 2017-11-16 NOTE — CARE PLAN
Problem: Risk for Infection, Impaired Wound Healing  Goal: Remain free from signs and symptoms of infection    Intervention: Infection prevention measures  [place] pads provided for frequent changing to prevent risk of infection.      Problem: Fatigue  Goal: Patient will use techniques to conserve energy    Intervention: Plan care to limit interruptions  Care clustered to provide rest to combat fatigue.

## 2017-11-16 NOTE — PROGRESS NOTES
"1900- Report received from CARL Byers RN. Pt is anxious,  family at the bedside. Pt has lower abdomen discomfort, 5/10 pain. Pt denies rhythmical UC's, but has soreness in lower abdomen. Pt has a small amount of pink tinged fluid on peripad, no odor present. Pt reports +FM.  2005- Dr. Lloyd at the bedside, pt assessed, pt has pink tinged fluid on peripad, pad is saturated, pt is having less lower abdominal discomfort, 2/10 discomfort . Pt reports +FM. Dr. Lloyd is ok with pt having regular diet at this time.   0000- pt denies discomfort at this time.  0145- pt states, \"I just can't get comfortable.\" Pt denies UC's, 2/10 discomfort, abdomen palpates soft with bouts of mild irritability.  Pt encouraged to use restroom.  0200- pt voided, pt states, \"I feel much better.\"  0603- pt denies UC's.  0630- Dr. Lloyd updated on pt's status, labs ordered.  0700-Report given to RAMANDEEP Mark RN.  "

## 2017-11-16 NOTE — PROGRESS NOTES
0700 - Report received care assumed. [place] Gestation today at 29.2 weeks    0730 - Patient in bed sleeping, awake briefly with RN at BS, without family/friends at BS; patient is expecting visitors today. Reports sleep last night, denies contractions/cramping or discomfort, denies ill feeling, reports frequent FM, SROM on 11/4, patient reports no change to odor/amount or color of fluid discharge patient does reports light pink/tinged fluid noted occasionally on tissue after void. Denies change to vision/edema/HA; states she has been getting up to the BR herself without assist, denies dizziness or weakness.    FM/[place] in place, discussed POC, sleepy affect/mood, denies questions/concerns regarding care since arrival [facility]. Patient encouraged to call RN with all questions/concerns/needs. Dry erase board updated with RN/CNA contact information.      1600 - Dr. Bolivar at  discussing current status and POC, patient denies needs at this time. No new orders.    1900 -  report to Marli HENRY RN

## 2017-11-17 PROCEDURE — A9270 NON-COVERED ITEM OR SERVICE: HCPCS | Performed by: OBSTETRICS & GYNECOLOGY

## 2017-11-17 PROCEDURE — 700112 HCHG RX REV CODE 229: Performed by: OBSTETRICS & GYNECOLOGY

## 2017-11-17 PROCEDURE — 770002 HCHG ROOM/CARE - OB PRIVATE (112)

## 2017-11-17 PROCEDURE — 59025 FETAL NON-STRESS TEST: CPT | Performed by: OBSTETRICS & GYNECOLOGY

## 2017-11-17 PROCEDURE — 700102 HCHG RX REV CODE 250 W/ 637 OVERRIDE(OP): Performed by: OBSTETRICS & GYNECOLOGY

## 2017-11-17 PROCEDURE — 302790 HCHG STAT ANTEPARTUM CARE, DAILY

## 2017-11-17 RX ADMIN — Medication 1 TABLET: at 09:00

## 2017-11-17 RX ADMIN — DOCUSATE SODIUM 100 MG: 100 CAPSULE ORAL at 20:21

## 2017-11-17 RX ADMIN — DOCUSATE SODIUM 100 MG: 100 CAPSULE ORAL at 09:00

## 2017-11-17 ASSESSMENT — PAIN SCALES - GENERAL
PAINLEVEL_OUTOF10: 0

## 2017-11-17 ASSESSMENT — LIFESTYLE VARIABLES: DO YOU DRINK ALCOHOL: NO

## 2017-11-17 ASSESSMENT — COPD QUESTIONNAIRES
DURING THE PAST 4 WEEKS HOW MUCH DID YOU FEEL SHORT OF BREATH: NONE/LITTLE OF THE TIME
HAVE YOU SMOKED AT LEAST 100 CIGARETTES IN YOUR ENTIRE LIFE: NO/DON'T KNOW
DO YOU EVER COUGH UP ANY MUCUS OR PHLEGM?: NO/ONLY WITH OCCASIONAL COLDS OR INFECTIONS

## 2017-11-17 NOTE — PROGRESS NOTES
0700  Report from NOC RN in room with pt and pt resting at this time, RN will return later for assessment.  Dr. Bolivar changed continuous monitoring to 1 hour per shift.  0800  In with pt and POC discussed, pt has no new complaints at this time.  No report of any changes with her bleeding or leaking.  HT monitors removed at this time and pt given breakfast tray.  1200  In with pt and pt has no new complaints at this time.  Pt does report occasional painful UC, maybe 1 or 2 and hour.  Pt will notify RN if she feels like they get more regular.  1630  In with pt and pt reports that she went to the BR and she had bleeding on her pad, pad observed in the trash and soaked through.  Pt reports that she had some pressure and possible tightening, she wasn't sure if it was that, so she got up to pee and saw the blood.  While placing the monitors pt reports that she feels the continued leaking onto her panty liner.  RN observed and it remains bright red.  1700  Dr. Bolivar notified and no new orders received at this time.  Dr. Bolivar will notify Dr. Alvarado.  1800  Second pad of bleeding noted at this time, same amount. 1930  Third pad noted, smaller amount with some clotting noted.  Pt continues to have no report of UC's and positive fetal movement.

## 2017-11-17 NOTE — CARE PLAN
Problem: Knowledge Deficit  Goal: Knowledge of the prescribed therapeutic regimen will improve  Outcome: MET Date Met: 11/17/17  Pt understands the POC and has no new concerns at this time.

## 2017-11-17 NOTE — CARE PLAN
Problem: Risk for Infection, Impaired Wound Healing  Goal: Remain free from signs and symptoms of infection    Intervention: Infection prevention measures  VSS. Pt is afebrile. Discussed the reasoning for continuous vs monitoring and discussed s/s of infection and what to look for, pt verbalized understanding.       Problem: Fatigue  Goal: Patient will use techniques to conserve energy    Intervention: Plan care to limit interruptions  Will continue to cluster care to promote rest and prevent fatigue.

## 2017-11-17 NOTE — PROGRESS NOTES
"Antepartum Progress Note    Tonia Trevizo is a 28 yo  at 29wk2d who presented with positive PPROM at 27wk5d. Small contractions overnight but no other complaints.      /58   Pulse 80   Temp 36.3 °C (97.3 °F) (Temporal)   Resp 18   Ht 1.651 m (5' 5\")   Wt 92.6 kg (204 lb 2.3 oz)   SpO2 95%   BMI 33.97 kg/m²     Gen: getting up to use the restroom  GI: soft, NT, ND  : no fundal tenderness, uterus soft  Ext: no edema    FHT: 140, moderate variability, positive accelerations  [place]: quiescent    Assessment:  1.  intrauterine pregnancy at 29wk2d  2. PPROM  3. History of previous  x2    Plan:  Now s/p BMTZ x2 and magnesium for tocolysis. She has completed her latency antibiotics. No clinical signs of  labor or chorio. Will continue to monitor closely. Plan for delivery with repeat  at 34 weeks    Asmita Bolivar M.D.      "

## 2017-11-17 NOTE — CARE PLAN
Problem: Pain Management  Goal: Pain level will decrease to patient's comfort goal  Outcome: MET Date Met: 11/17/17  Pt does not have pain at this time, pt will notify RN if this changes and she is having painful UC's.

## 2017-11-17 NOTE — PROGRESS NOTES
"Antepartum Progress Note    Tonia Trevizo is a 28 yo  at 29wk1d who presented with positive PPROM at 27wk5d. Continued light bloody fluid. One painful contraction overnight but comfortable today.     /58   Pulse 80   Temp 36.3 °C (97.3 °F)   Resp 16   Ht 1.651 m (5' 5\")   Wt 92.6 kg (204 lb 2.3 oz)   SpO2 95%   BMI 33.97 kg/m²     Gen: getting up to use the restroom  GI: soft, NT, ND  : no fundal tenderness, uterus soft  Ext: no edema    FHT: 150, moderate variability, positive accelerations  [place]: quiescent    Assessment:  1.  intrauterine pregnancy at 29wk1d  2. PPROM  3. History of previous  x2    Plan:  Now s/p BMTZ x2 and magnesium for tocolysis. She has completed her latency antibiotics. No clinical signs of  labor or chorio. Growth appropriate. VTX position. Will continue to monitor closely.     Asmita Bolivar M.D.      "

## 2017-11-17 NOTE — PROGRESS NOTES
1900- Report received from Bry DIAZ. POC discussed. PT resting comfortably, declines interventions at this time. VSS. Pt denies feeling ill or s/s of infection. PT also reported +FM, states spotting has been minimal with no change from previously noted, and -UCs felt at this time.  0355- Rn at bedside after reviewing tracing. Palpating mild-moderate UCs. Pt states she has been feeling cramping for 15 min. Statine her pain is a 6/10 with UCs. Pt encourage to void and orally hydrate.   0420- RN at bedside no UCs palpated after pt up to BR and orally hydrated. Pt states she has no pain and is no longer feeling cramping at this time. Pt educated to call RN if cramping persists or worsens.    0700- Dr Bolivar updated on pt evening. Orders to notify Dr Bolivar if pt actively labor; would like to personally deliver if possible. Report given to Carmelina KIM RN.

## 2017-11-18 PROCEDURE — 59025 FETAL NON-STRESS TEST: CPT | Performed by: OBSTETRICS & GYNECOLOGY

## 2017-11-18 PROCEDURE — 770002 HCHG ROOM/CARE - OB PRIVATE (112)

## 2017-11-18 PROCEDURE — 700112 HCHG RX REV CODE 229: Performed by: OBSTETRICS & GYNECOLOGY

## 2017-11-18 PROCEDURE — A9270 NON-COVERED ITEM OR SERVICE: HCPCS | Performed by: OBSTETRICS & GYNECOLOGY

## 2017-11-18 PROCEDURE — 302790 HCHG STAT ANTEPARTUM CARE, DAILY

## 2017-11-18 PROCEDURE — 700102 HCHG RX REV CODE 250 W/ 637 OVERRIDE(OP): Performed by: OBSTETRICS & GYNECOLOGY

## 2017-11-18 RX ADMIN — DOCUSATE SODIUM 100 MG: 100 CAPSULE ORAL at 21:22

## 2017-11-18 RX ADMIN — Medication 1 TABLET: at 07:56

## 2017-11-18 RX ADMIN — DOCUSATE SODIUM 100 MG: 100 CAPSULE ORAL at 07:56

## 2017-11-18 ASSESSMENT — PATIENT HEALTH QUESTIONNAIRE - PHQ9
SUM OF ALL RESPONSES TO PHQ9 QUESTIONS 1 AND 2: 0
1. LITTLE INTEREST OR PLEASURE IN DOING THINGS: NOT AT ALL
SUM OF ALL RESPONSES TO PHQ QUESTIONS 1-9: 0
2. FEELING DOWN, DEPRESSED, IRRITABLE, OR HOPELESS: NOT AT ALL

## 2017-11-18 ASSESSMENT — PAIN SCALES - GENERAL
PAINLEVEL_OUTOF10: 0
PAINLEVEL_OUTOF10: 4

## 2017-11-18 NOTE — PROGRESS NOTES
Received report and assumed care of patient from JELANI Goncalvse. Patient is resting comfortably at this time. POC reviewed, questions answered, needs met at this time.  0700 Report given to JELANI Pastor and assumed care of patient.

## 2017-11-18 NOTE — PROGRESS NOTES
29 3/7 week prolonged PROM    Remains free of pain or ctx  Afebrile  abd not tender    No change in clinical status

## 2017-11-18 NOTE — CARE PLAN
Problem: Risk for Infection, Impaired Wound Healing  Goal: Remain free from signs and symptoms of infection  Outcome: PROGRESSING AS EXPECTED  Patient is afebrile. No abdomen tenderness. No foul odor. No S&S of infections.     Problem: Pain  Goal: Alleviation of Pain or a reduction in pain to the patient's comfort goal  Outcome: PROGRESSING AS EXPECTED  Patient having occasional cramping. Rates cramping at a 4/10. Denies any need for pain intervention at this time.

## 2017-11-18 NOTE — PROGRESS NOTES
0700- Report received from HAYDEE Lundberg RN. Plan of care assumed. Patient is a , edc  making her 29.4 weeks.    0755- Assessment done. Patient states she is having cramping that started at 0300 today. States she has had 1-2 contractions/ hr. Rates cramping as mild rating it at a 4/10. Patient is still leaking pink tinge fluid. No abdomen tenderness. No change to smell of fluid per patient.  Patient just changed her pad. Will assess pad at next change. States positive fetal movement.    0815- IV in left hand leaking at site when flushed. Patient has pain with IV. IV restarted in right FA with 18 g.     1015-  Patient states the cramping is better and is not really there any more. Pad assessed. Pad full of fluid. Bright red blood on pad. Patient states it is slightly lighter than it was yesterday. EFM and TOCO off. Patient sleepy. Will continue to monitor.     1240- patient resting comfortably. States cramping is gone. Small amount of bright red blood on pad.     1620- patient continues to deny any cramping or contractions. Patient still has a small amount of bright red fluid on pad. Will continue to monitor.     1900- report given to MAURO Cordero RN

## 2017-11-19 PROCEDURE — A9270 NON-COVERED ITEM OR SERVICE: HCPCS | Performed by: OBSTETRICS & GYNECOLOGY

## 2017-11-19 PROCEDURE — 59025 FETAL NON-STRESS TEST: CPT | Performed by: OBSTETRICS & GYNECOLOGY

## 2017-11-19 PROCEDURE — 700112 HCHG RX REV CODE 229: Performed by: OBSTETRICS & GYNECOLOGY

## 2017-11-19 PROCEDURE — 700102 HCHG RX REV CODE 250 W/ 637 OVERRIDE(OP): Performed by: OBSTETRICS & GYNECOLOGY

## 2017-11-19 PROCEDURE — 770002 HCHG ROOM/CARE - OB PRIVATE (112)

## 2017-11-19 PROCEDURE — 302790 HCHG STAT ANTEPARTUM CARE, DAILY

## 2017-11-19 RX ADMIN — DOCUSATE SODIUM 100 MG: 100 CAPSULE ORAL at 21:35

## 2017-11-19 RX ADMIN — DOCUSATE SODIUM 100 MG: 100 CAPSULE ORAL at 07:54

## 2017-11-19 RX ADMIN — Medication 1 TABLET: at 07:54

## 2017-11-19 ASSESSMENT — PAIN SCALES - GENERAL
PAINLEVEL_OUTOF10: 0
PAINLEVEL_OUTOF10: 2
PAINLEVEL_OUTOF10: 0
PAINLEVEL_OUTOF10: 0
PAINLEVEL_OUTOF10: 1

## 2017-11-19 ASSESSMENT — PATIENT HEALTH QUESTIONNAIRE - PHQ9
1. LITTLE INTEREST OR PLEASURE IN DOING THINGS: NOT AT ALL
SUM OF ALL RESPONSES TO PHQ QUESTIONS 1-9: 0
SUM OF ALL RESPONSES TO PHQ9 QUESTIONS 1 AND 2: 0
2. FEELING DOWN, DEPRESSED, IRRITABLE, OR HOPELESS: NOT AT ALL

## 2017-11-19 NOTE — PROGRESS NOTES
0700-Report received from GODFREY Cordero . Plan of care assumed. Patient is a , edc  making her 29.5 weeks.    0755- Assessment done. Patient states she feels occasional cramping. Denies any contractions. Patient leaking a small amount of bloody fluid. Patient states her leaking has decreased over the last day. States positive fetal movement. EFM and TOCO applied. Positive fetal heart tones.     0930- patient states she was sleeping and woke up because she was wet. Patient pad full of bloody fluid. Patient had a 8x11 size of light brownish blood on linen on bed. Liens changed and pads change. will reassess.     1140-patient had half her pad of bloody fluid. Patient states she feels an occasional cramping, but states its feeling better. Will continue to monitor    1250- report given to JOSELINE Goncalves RN.

## 2017-11-19 NOTE — PROGRESS NOTES
1900- Received report from from. JELANI Rutherford RN. Assumed care of pt.   1925- Pt up in room. On phone. Will monitor.    2009- Pt assessed. WDL. VSS. Pt states minimal LOF, denies bright red vaginal bleeding, UC's, cramping, states + fetal movement. Monitors applied. Pt denies needs at this time. Encouraged to call with needs. Will monitor.    7021- Report to JELANI Rutherford RN

## 2017-11-19 NOTE — PROGRESS NOTES
29 4/7 week PPROM    Remains symptom free, no pain or bleeding  Normal FM    Afebrile  Abd not tender FHT present and reactive    Remains stable at rest

## 2017-11-19 NOTE — CARE PLAN
Problem: Risk for Infection, Impaired Wound Healing  Goal: Remain free from signs and symptoms of infection    Intervention: Instruct Patient regarding signs and symptoms of infection  Pt instructed to call RN for abdominal tenderness, foul smelling discharge, UC's. Pt denies those signs/symptoms of infection.

## 2017-11-19 NOTE — CARE PLAN
Problem: Risk for Infection, Impaired Wound Healing  Goal: Remain free from signs and symptoms of infection  Outcome: PROGRESSING AS EXPECTED  Patient is afebrile. No abdomen tenderness. No S&S of infections.     Problem: Pain  Goal: Alleviation of Pain or a reduction in pain to the patient's comfort goal  Outcome: PROGRESSING AS EXPECTED  Patient states she has occasional cramping. Denies any need for pain intervention.

## 2017-11-20 PROCEDURE — 302790 HCHG STAT ANTEPARTUM CARE, DAILY

## 2017-11-20 PROCEDURE — 700112 HCHG RX REV CODE 229: Performed by: OBSTETRICS & GYNECOLOGY

## 2017-11-20 PROCEDURE — A9270 NON-COVERED ITEM OR SERVICE: HCPCS | Performed by: OBSTETRICS & GYNECOLOGY

## 2017-11-20 PROCEDURE — 770002 HCHG ROOM/CARE - OB PRIVATE (112)

## 2017-11-20 PROCEDURE — 700102 HCHG RX REV CODE 250 W/ 637 OVERRIDE(OP): Performed by: OBSTETRICS & GYNECOLOGY

## 2017-11-20 RX ADMIN — DOCUSATE SODIUM 100 MG: 100 CAPSULE ORAL at 09:08

## 2017-11-20 RX ADMIN — Medication 1 TABLET: at 09:08

## 2017-11-20 RX ADMIN — ACETAMINOPHEN 1000 MG: 500 TABLET ORAL at 14:55

## 2017-11-20 ASSESSMENT — PAIN SCALES - GENERAL
PAINLEVEL_OUTOF10: 0
PAINLEVEL_OUTOF10: 2
PAINLEVEL_OUTOF10: 0
PAINLEVEL_OUTOF10: 2

## 2017-11-20 NOTE — PROGRESS NOTES
1850- Received report from JELANI Goncalves RN. Assumed care of pt. Pt denies needs at this time. Will monitor.    2020- Pt assessed. WDL. VSS. Monitors applied. Pt denies cramping, UC's, states small amount of LOF, states minimal vaginal, improved since this AM, states + fetal movement. Abdomen soft, non tender. POC discussed. Will monitor x 1 hour and maximize pts rest periods. Encouraged to call with needs. Will monitor.    0545- Pt called RN to room. Pt experiencing an increase in fluid on pad. Pink, blood tinge color noted. Pt denies cramping, TOCO placed on pt. Will monitor.    0655- Dr. Bolivar at BS.    0700- Report to JELANI Barnes RN

## 2017-11-20 NOTE — CARE PLAN
Problem: Risk for Infection, Impaired Wound Healing  Goal: Remain free from signs and symptoms of infection    Intervention: Instruct Patient regarding signs and symptoms of infection  Pt remains free from signs of infection. No foul discharge noted, pt afebrile, abdomen remains soft, non tender. Pt instructed to call if she experiences any of these.

## 2017-11-20 NOTE — PROGRESS NOTES
1300  Report from JELANI Pastor at this time, pt resting and RN will return later for assessment.  1500  Pt desires a shower, pt understands to move quickly and notify RN if she has a change in her leaking.  Pt currently leaking amniotic fluid mixed with blood.  1600  Pt has no report of change in fluid and no new complaints at this time.  1850  Report to NOC RN in room with pt.

## 2017-11-20 NOTE — PROGRESS NOTES
0700 Report received from NOC RN Rach Cordero, YUSEF discussed assumed pt care.   27 y.o.  EDC 18 EGA 33.6 Hx of C/S x 2  Admitted for PPROM on 17. Pt had increased bleeding this morning saturating through a pad. Pt reports mild abdomal cramping and lower back pain, uterus palpates soft. Pt notified to call RN if cramping intensifies or bleeding increases. 6378 Dr. Bolivar at bedside. MD visualized pad in garbage. Will continue to monitor.

## 2017-11-20 NOTE — CARE PLAN
Problem: Psychosocial needs  Goal: Anxiety reduction    Intervention: Identify and remove anxiety triggers  Pt feeling nervous about increase in fluid. Support given to pt.

## 2017-11-20 NOTE — PROGRESS NOTES
"Antepartum Progress Note    Tonia Trevizo is a 26 yo  at 29wk6d who presented with positive PPROM at 27wk5d. Continued bloody fluid.  Occasional cramping lower abdominal pain. Positive FM. No fever or chills    /55   Pulse 76   Temp 36.9 °C (98.4 °F)   Resp 16   Ht 1.651 m (5' 5\")   Wt 92.6 kg (204 lb 2.3 oz)   SpO2 95%   BMI 33.97 kg/m²     Gen: laying comfortably in bed  GI: soft, NT, ND  : no fundal tenderness, uterus soft  Ext: no edema    FHT: 140-150, moderate variability, positive accelerations  [place]: quiescent    Assessment:  1.  intrauterine pregnancy at 29wk6d  2. PPROM  3. History of previous  x2    Plan:  Now s/p BMTZ x2 and magnesium for tocolysis. She has completed her latency antibiotics. No clinical signs of  labor or chorio. Will continue to monitor closely. Plan for delivery with repeat  at 34 weeks    Asmita Bolivar M.D.      "

## 2017-11-21 PROCEDURE — 302790 HCHG STAT ANTEPARTUM CARE, DAILY

## 2017-11-21 PROCEDURE — 770002 HCHG ROOM/CARE - OB PRIVATE (112)

## 2017-11-21 PROCEDURE — 700112 HCHG RX REV CODE 229: Performed by: OBSTETRICS & GYNECOLOGY

## 2017-11-21 PROCEDURE — A9270 NON-COVERED ITEM OR SERVICE: HCPCS | Performed by: OBSTETRICS & GYNECOLOGY

## 2017-11-21 PROCEDURE — 700102 HCHG RX REV CODE 250 W/ 637 OVERRIDE(OP): Performed by: OBSTETRICS & GYNECOLOGY

## 2017-11-21 RX ADMIN — Medication 1 TABLET: at 09:09

## 2017-11-21 RX ADMIN — DOCUSATE SODIUM 100 MG: 100 CAPSULE ORAL at 09:09

## 2017-11-21 ASSESSMENT — PAIN SCALES - GENERAL
PAINLEVEL_OUTOF10: 4
PAINLEVEL_OUTOF10: 7
PAINLEVEL_OUTOF10: 3
PAINLEVEL_OUTOF10: 3
PAINLEVEL_OUTOF10: 8
PAINLEVEL_OUTOF10: 4
PAINLEVEL_OUTOF10: 6
PAINLEVEL_OUTOF10: 2
PAINLEVEL_OUTOF10: 0
PAINLEVEL_OUTOF10: 4

## 2017-11-21 NOTE — CARE PLAN
Problem: Risk for Infection, Impaired Wound Healing  Goal: Remain free from signs and symptoms of infection  Outcome: PROGRESSING AS EXPECTED  Pt afebrile, no odor noted in amniotic fluid, no abdominal tenderness    Problem: Pain  Goal: Alleviation of Pain or a reduction in pain to the patient's comfort goal  Outcome: PROGRESSING AS EXPECTED  Pt reports pain as tolerable, declines intervention, care clustered to allow pt to sleep

## 2017-11-21 NOTE — PROGRESS NOTES
Report received from NOC RN, Sudha Kirkpatrick, POC discussed, assumed pt care. PT has been NPO since 2100 after increased bleeding and contractions through out night. Report to Dr. Bolivar. Dr. Bolivar at bedside SVE closed. Will continue to monitor.

## 2017-11-21 NOTE — CARE PLAN
Problem: Risk for Infection, Impaired Wound Healing  Goal: Remain free from signs and symptoms of infection  Outcome: PROGRESSING AS EXPECTED  Pt afebrile, no odor noted in amniotic fluid, denies abdominal tenderness    Problem: Pain  Goal: Alleviation of Pain or a reduction in pain to the patient's comfort goal  Outcome: PROGRESSING AS EXPECTED  Pt reports pain as manageable at this time, denies h/a, desires rest for pain management

## 2017-11-21 NOTE — PROGRESS NOTES
27 y.o. , EDC =29w6d, PPROM     1810- Report received from Katy MCRAE RN, assumed care and to pt bedside, all needs met at this time     - External monitors applied     - Call to Dr. Bolivar, updated on pt status, ctx q 4 minutes, pt reports lower abdominal tightening though minimal pain. Per Dr. Bolivar keep pt NPO until AM, continuous monitoring, will update Dr. Hernandez if ctx/pain intensifies     0400- To bedside to readjust monitor, pt reports bright bloody fluid on pad, pads visualized and saved, previous LOF pink-tinged, reports no change in intensity of ctx. Call to Dr. Hernandez, updated, will continue to monitor    0600- Report given to Katy MCRAE RN

## 2017-11-21 NOTE — PROGRESS NOTES
"Antepartum Progress Note    Tonia Trevizo is a 28 yo  at 30wk0d who presented with positive PPROM at 27wk5d. Regular, painful contractions last night.     /53   Pulse 83   Temp 36.7 °C (98 °F)   Resp 16   Ht 1.651 m (5' 5\")   Wt 92.6 kg (204 lb 2.3 oz)   SpO2 95%   BMI 33.97 kg/m²     Gen: laying comfortably in bed  GI: soft, NT, ND  : no fundal tenderness, uterus soft  Ext: no edema    FHT: 140-150, moderate variability, positive accelerations  [place]: every 4 minutes  SVE by myself: FT/thick/high    Assessment:  1.  intrauterine pregnancy at 30wk0d  2. PPROM  3. History of previous  x2    Plan:  Now s/p BMTZ x2 and magnesium for tocolysis. She has completed her latency antibiotics. Concern for  labor but currently fingertip examination. Will continue to monitor closely.     Asmita Bolivar M.D.      "

## 2017-11-22 PROCEDURE — 700112 HCHG RX REV CODE 229: Performed by: OBSTETRICS & GYNECOLOGY

## 2017-11-22 PROCEDURE — 770002 HCHG ROOM/CARE - OB PRIVATE (112)

## 2017-11-22 PROCEDURE — A9270 NON-COVERED ITEM OR SERVICE: HCPCS | Performed by: OBSTETRICS & GYNECOLOGY

## 2017-11-22 PROCEDURE — 302790 HCHG STAT ANTEPARTUM CARE, DAILY

## 2017-11-22 PROCEDURE — 700102 HCHG RX REV CODE 250 W/ 637 OVERRIDE(OP): Performed by: OBSTETRICS & GYNECOLOGY

## 2017-11-22 RX ADMIN — DOCUSATE SODIUM 100 MG: 100 CAPSULE ORAL at 08:37

## 2017-11-22 RX ADMIN — DOCUSATE SODIUM 100 MG: 100 CAPSULE ORAL at 00:10

## 2017-11-22 RX ADMIN — DOCUSATE SODIUM 100 MG: 100 CAPSULE ORAL at 20:53

## 2017-11-22 RX ADMIN — Medication 1 TABLET: at 08:37

## 2017-11-22 ASSESSMENT — PAIN SCALES - GENERAL
PAINLEVEL_OUTOF10: 0
PAINLEVEL_OUTOF10: 2
PAINLEVEL_OUTOF10: 0
PAINLEVEL_OUTOF10: 0
PAINLEVEL_OUTOF10: 6
PAINLEVEL_OUTOF10: 1
PAINLEVEL_OUTOF10: 0
PAINLEVEL_OUTOF10: 0

## 2017-11-22 ASSESSMENT — COPD QUESTIONNAIRES
HAVE YOU SMOKED AT LEAST 100 CIGARETTES IN YOUR ENTIRE LIFE: NO/DON'T KNOW
COPD SCREENING SCORE: 0
DO YOU EVER COUGH UP ANY MUCUS OR PHLEGM?: NO/ONLY WITH OCCASIONAL COLDS OR INFECTIONS
DURING THE PAST 4 WEEKS HOW MUCH DID YOU FEEL SHORT OF BREATH: NONE/LITTLE OF THE TIME

## 2017-11-22 ASSESSMENT — PATIENT HEALTH QUESTIONNAIRE - PHQ9
1. LITTLE INTEREST OR PLEASURE IN DOING THINGS: NOT AT ALL
SUM OF ALL RESPONSES TO PHQ9 QUESTIONS 1 AND 2: 0
2. FEELING DOWN, DEPRESSED, IRRITABLE, OR HOPELESS: NOT AT ALL
SUM OF ALL RESPONSES TO PHQ QUESTIONS 1-9: 0

## 2017-11-22 ASSESSMENT — LIFESTYLE VARIABLES: DO YOU DRINK ALCOHOL: NO

## 2017-11-22 NOTE — CARE PLAN
Problem: Risk for Infection, Impaired Wound Healing  Goal: Remain free from signs and symptoms of infection  Outcome: PROGRESSING AS EXPECTED  Pt remains afebrile with no other s/s of infection.      Problem: Risk for injury  Goal: Patient and fetus will be free of preventable injury/complications  Outcome: PROGRESSING AS EXPECTED  Pt placed on EFM and TOCO every shift to monitor for fetal well being and uterine activity.

## 2017-11-22 NOTE — CARE PLAN
Problem: Infection  Goal: Will remain free from infection    Intervention: Assess signs and symptoms of infection  Pt afebrile, no foul smelling discharge noted.

## 2017-11-22 NOTE — CARE PLAN
Problem: Pain Management  Goal: Pain level will decrease to patient's comfort goal    Intervention: Follow pain managment plan developed in collaboration with patient and Interdisciplinary Team  Pt had contractions throughout shift. Pain well controlled.

## 2017-11-22 NOTE — PROGRESS NOTES
IUP 30 1/7wks  S) pt without c/o, occasional uc's  O) vss  Fundus: nontender, gravid  Ext: no edema  Fht's 140's reactive  [place]: no uc's  A/P IUP 30 1/7wks, Psrom  Stable - continue orders, deliver if s/s chorio/labor, by repeat c/s

## 2017-11-22 NOTE — PROGRESS NOTES
Pt reports decreased cramping and contractions. Still has some bleeding. Pt instructed to call RN if increased cramping or bleeding occurs.

## 2017-11-22 NOTE — PROGRESS NOTES
"0700 received report from GODFREY Cordero RN, assumed care.  RN at bedside to check on pt, POC discussed, all questions answered. Pt resting comfortably in bed, denies any needs at this time. Pt reports positive fetal movement, denies any change in amniotic fluid or leaking of vaginal fluid character. Pt denies pain or abdominal tenderness, no odor present, pt remains afebrile. Pt encouraged to call RN for any needs, wants desires or concerns. VSS. Pt on continuous EFm and TOCO.  0837 RN at bedside to administer daytime meds, assessment complete, VSS, pt denies needs, reports positive fetal movement, denies feeling difference in contractions/cramping and states she will let RN know of any changes and or pain. Will continue to monitor.   1222 RN at bedside, pt called out stating that she was starting to feel contractions again and wanted to  Notify me. RN discussed with pt what she was feeling, she states it was like what she felt off and on throughout the night, just a tightening across her lower abdomen, TOCO readjusted, POC discussed.  1240 RN phoned Dr. Bolivar, update given, RN to continue to monitor and if pt becomes increasingly more uncomfortable then RN is to perform SVE and update MD as needed. Will continue with POC.  1646 RN at bedside to readjust FHR monitor, upon RN assessment, pt states that her cramping is still there but comes and goes as it has been, when RN adjusts abdominal straps, pt states she feels some tenderness on her right side and a little bit \"hot\", at this time pt remains afebrile with temporal thermometer, pt states she just drank so RN will be back in 20 min to take oral temp. No foul odor is noted by Pt or RN and pt denies any change in color, consistency or odor of vaginal fluid.  1710 pt remains afebrile orally.  1723 Dr Bolivar phoned and updated on pt status and RN's assessment. At this time, per MD order, will continue with POC and monitor maternal VS closely as well as EFM(fetal " tachycardia). Per MD order, pt is allowed to eat dinner at this time.   1745 pt remains afebrile with oral temp, pt given dinner tray.   1900 report given to NOC RN, assumed care. POC discussed, all questions answered.

## 2017-11-22 NOTE — CARE PLAN
Problem: Risk for Infection, Impaired Wound Healing  Goal: Remain free from signs and symptoms of infection    Intervention: Instruct Patient regarding signs and symptoms of infection  P instructed to call RN for abdominal tenderness, cramping, UC's, or foul smelling discharge. Pt free from signs of infection. Pt had minimal UC's throughout shift, which resolved.

## 2017-11-22 NOTE — PROGRESS NOTES
1900- Received report from JELANI Barnes RN. Assumed care of pt.    1906- Pt assessed. WDL. VSS. Monitors in place. Pt states + fetal movement, denies cramping, UC's, minimal vaginal bleeding and LOF. Abdomen soft, non tender. Pt denies needs at this time. Encouraged to call with needs. Will monitor.    2359- P feeling cramping. Abdominal tenderness noted 2/10. Will monitor.    0350- Pt called RN to room. Pad saturated with amniotic fluid and blood. Pt feeling cramping. TOCO readjusted. Abdomen soft, non tender. Pt encouraged to call RN if cramping gets worse.    0700- Report to INDIA Cabrales RN

## 2017-11-23 LAB
BASOPHILS # BLD AUTO: 0.2 % (ref 0–1.8)
BASOPHILS # BLD: 0.03 K/UL (ref 0–0.12)
EOSINOPHIL # BLD AUTO: 0.19 K/UL (ref 0–0.51)
EOSINOPHIL NFR BLD: 1.3 % (ref 0–6.9)
ERYTHROCYTE [DISTWIDTH] IN BLOOD BY AUTOMATED COUNT: 37.5 FL (ref 35.9–50)
HCT VFR BLD AUTO: 33.4 % (ref 37–47)
HGB BLD-MCNC: 11.2 G/DL (ref 12–16)
IMM GRANULOCYTES # BLD AUTO: 0.14 K/UL (ref 0–0.11)
IMM GRANULOCYTES NFR BLD AUTO: 0.9 % (ref 0–0.9)
LYMPHOCYTES # BLD AUTO: 2.15 K/UL (ref 1–4.8)
LYMPHOCYTES NFR BLD: 14.3 % (ref 22–41)
MCH RBC QN AUTO: 23.7 PG (ref 27–33)
MCHC RBC AUTO-ENTMCNC: 33.5 G/DL (ref 33.6–35)
MCV RBC AUTO: 70.8 FL (ref 81.4–97.8)
MONOCYTES # BLD AUTO: 0.91 K/UL (ref 0–0.85)
MONOCYTES NFR BLD AUTO: 6.1 % (ref 0–13.4)
NEUTROPHILS # BLD AUTO: 11.62 K/UL (ref 2–7.15)
NEUTROPHILS NFR BLD: 77.2 % (ref 44–72)
NRBC # BLD AUTO: 0 K/UL
NRBC BLD AUTO-RTO: 0 /100 WBC
PLATELET # BLD AUTO: 263 K/UL (ref 164–446)
PMV BLD AUTO: 10.4 FL (ref 9–12.9)
RBC # BLD AUTO: 4.72 M/UL (ref 4.2–5.4)
WBC # BLD AUTO: 15 K/UL (ref 4.8–10.8)

## 2017-11-23 PROCEDURE — A9270 NON-COVERED ITEM OR SERVICE: HCPCS | Performed by: OBSTETRICS & GYNECOLOGY

## 2017-11-23 PROCEDURE — 302790 HCHG STAT ANTEPARTUM CARE, DAILY

## 2017-11-23 PROCEDURE — 700102 HCHG RX REV CODE 250 W/ 637 OVERRIDE(OP): Performed by: OBSTETRICS & GYNECOLOGY

## 2017-11-23 PROCEDURE — 770002 HCHG ROOM/CARE - OB PRIVATE (112)

## 2017-11-23 PROCEDURE — 85025 COMPLETE CBC W/AUTO DIFF WBC: CPT

## 2017-11-23 PROCEDURE — 700112 HCHG RX REV CODE 229: Performed by: OBSTETRICS & GYNECOLOGY

## 2017-11-23 RX ADMIN — Medication 1 TABLET: at 09:00

## 2017-11-23 RX ADMIN — DOCUSATE SODIUM 100 MG: 100 CAPSULE ORAL at 20:47

## 2017-11-23 RX ADMIN — DOCUSATE SODIUM 100 MG: 100 CAPSULE ORAL at 09:00

## 2017-11-23 ASSESSMENT — PAIN SCALES - GENERAL
PAINLEVEL_OUTOF10: 0
PAINLEVEL_OUTOF10: 4

## 2017-11-23 ASSESSMENT — COPD QUESTIONNAIRES
DURING THE PAST 4 WEEKS HOW MUCH DID YOU FEEL SHORT OF BREATH: NONE/LITTLE OF THE TIME
COPD SCREENING SCORE: 0
DO YOU EVER COUGH UP ANY MUCUS OR PHLEGM?: NO/ONLY WITH OCCASIONAL COLDS OR INFECTIONS
HAVE YOU SMOKED AT LEAST 100 CIGARETTES IN YOUR ENTIRE LIFE: NO/DON'T KNOW

## 2017-11-23 NOTE — PROGRESS NOTES
1900- Received report from INDIA Cabrales RN. Assumed care of pt. Pt sitting up in bed. Visiting with friends. Pt denies current abdominal tenderness. Planning to shower soon. Will call RN to be placed back on monitors.    2052- Pt assessed. WDL. VSS. Pt states + fetal movement, LOF, denies regular UC's. Abdomen soft, non tender, no foul discharge noted. Encouraged to call with needs. Will monitor.    0700- Report to CARL Barton RN

## 2017-11-23 NOTE — CARE PLAN
Problem: Risk for Infection, Impaired Wound Healing  Goal: Remain free from signs and symptoms of infection    Intervention: Instruct Patient regarding signs and symptoms of infection  Pt free from signs of infection. Abdomen soft, non tender, pt afebrile.

## 2017-11-23 NOTE — PROGRESS NOTES
2017    IUP 30 2/7wks  S) patient reports good fetal movement, still has mild to moderate bleeding which has been persistent for the last week  O) vss  Fundus: nontender, gravid  Ext: no edema  Fht's 140's reactive  [place]: no uc's  A/P IUP 30 2/7wks,   PPROM  Continue current care. Delivery by  section of delivery deemed necessary

## 2017-11-23 NOTE — CARE PLAN
Problem: Knowledge Deficit  Goal: Patient/Support Person demonstrates understanding regarding condition, prognosis and treatment needs during the pregnancy  Outcome: PROGRESSING AS EXPECTED  Pt is up to date on the POC. She will ask questions as needed and the RN will notify her of any changes.

## 2017-11-23 NOTE — CARE PLAN
Problem: Pain Management  Goal: Pain level will decrease to patient's comfort goal  Outcome: PROGRESSING AS EXPECTED  Pt will let the RN know when and if she is having pain and knows her pain mangement options.

## 2017-11-23 NOTE — PROGRESS NOTES
0700. Report from Rach PALOMARES RN. POC discussed and resumed.    0825. Pt called out with c/o cramping in her lower abdomin. Pt up to void. Pad noted to have bright red bloody fluid. Pt states that this is not different for her. TOCO and EFM adjusted.    0900. In room, pt states she is still having cramping in her lower abdomen, TOCO readjusted.    0920. Dr. Moore in room, observed bloody fluid on the pads, no new orders received.    1815. In room, pt c/o increase in cramping. TOCO readjusted.    1900. Report to Rach PALOMARES RN. POC discussed.

## 2017-11-24 ENCOUNTER — APPOINTMENT (OUTPATIENT)
Dept: RADIOLOGY | Facility: MEDICAL CENTER | Age: 27
End: 2017-11-24
Attending: OBSTETRICS & GYNECOLOGY
Payer: COMMERCIAL

## 2017-11-24 PROCEDURE — 700105 HCHG RX REV CODE 258: Performed by: OBSTETRICS & GYNECOLOGY

## 2017-11-24 PROCEDURE — A9270 NON-COVERED ITEM OR SERVICE: HCPCS | Performed by: OBSTETRICS & GYNECOLOGY

## 2017-11-24 PROCEDURE — 700112 HCHG RX REV CODE 229: Performed by: OBSTETRICS & GYNECOLOGY

## 2017-11-24 PROCEDURE — 302128 INFUSION PUMP: Performed by: OBSTETRICS & GYNECOLOGY

## 2017-11-24 PROCEDURE — 700102 HCHG RX REV CODE 250 W/ 637 OVERRIDE(OP): Performed by: OBSTETRICS & GYNECOLOGY

## 2017-11-24 PROCEDURE — 700111 HCHG RX REV CODE 636 W/ 250 OVERRIDE (IP): Performed by: OBSTETRICS & GYNECOLOGY

## 2017-11-24 PROCEDURE — 76819 FETAL BIOPHYS PROFIL W/O NST: CPT

## 2017-11-24 PROCEDURE — 302790 HCHG STAT ANTEPARTUM CARE, DAILY

## 2017-11-24 PROCEDURE — 85025 COMPLETE CBC W/AUTO DIFF WBC: CPT

## 2017-11-24 PROCEDURE — 36415 COLL VENOUS BLD VENIPUNCTURE: CPT

## 2017-11-24 PROCEDURE — 770002 HCHG ROOM/CARE - OB PRIVATE (112)

## 2017-11-24 RX ORDER — SODIUM CHLORIDE, SODIUM LACTATE, POTASSIUM CHLORIDE, CALCIUM CHLORIDE 600; 310; 30; 20 MG/100ML; MG/100ML; MG/100ML; MG/100ML
500 INJECTION, SOLUTION INTRAVENOUS ONCE
Status: COMPLETED | OUTPATIENT
Start: 2017-11-24 | End: 2017-11-24

## 2017-11-24 RX ADMIN — SODIUM CHLORIDE, POTASSIUM CHLORIDE, SODIUM LACTATE AND CALCIUM CHLORIDE 500 ML: 600; 310; 30; 20 INJECTION, SOLUTION INTRAVENOUS at 18:37

## 2017-11-24 RX ADMIN — SODIUM CHLORIDE, POTASSIUM CHLORIDE, SODIUM LACTATE AND CALCIUM CHLORIDE: 600; 310; 30; 20 INJECTION, SOLUTION INTRAVENOUS at 23:49

## 2017-11-24 RX ADMIN — DOCUSATE SODIUM 100 MG: 100 CAPSULE ORAL at 21:14

## 2017-11-24 RX ADMIN — Medication 1 TABLET: at 09:15

## 2017-11-24 RX ADMIN — ACETAMINOPHEN 1000 MG: 500 TABLET ORAL at 09:17

## 2017-11-24 RX ADMIN — DOCUSATE SODIUM 100 MG: 100 CAPSULE ORAL at 09:15

## 2017-11-24 RX ADMIN — ACETAMINOPHEN 1000 MG: 500 TABLET ORAL at 21:14

## 2017-11-24 RX ADMIN — FENTANYL CITRATE 50 MCG: 50 INJECTION, SOLUTION INTRAMUSCULAR; INTRAVENOUS at 23:54

## 2017-11-24 ASSESSMENT — PATIENT HEALTH QUESTIONNAIRE - PHQ9
SUM OF ALL RESPONSES TO PHQ9 QUESTIONS 1 AND 2: 0
1. LITTLE INTEREST OR PLEASURE IN DOING THINGS: NOT AT ALL
2. FEELING DOWN, DEPRESSED, IRRITABLE, OR HOPELESS: NOT AT ALL
SUM OF ALL RESPONSES TO PHQ QUESTIONS 1-9: 0

## 2017-11-24 ASSESSMENT — COPD QUESTIONNAIRES
DURING THE PAST 4 WEEKS HOW MUCH DID YOU FEEL SHORT OF BREATH: NONE/LITTLE OF THE TIME
COPD SCREENING SCORE: 0
HAVE YOU SMOKED AT LEAST 100 CIGARETTES IN YOUR ENTIRE LIFE: NO/DON'T KNOW
DO YOU EVER COUGH UP ANY MUCUS OR PHLEGM?: NO/ONLY WITH OCCASIONAL COLDS OR INFECTIONS

## 2017-11-24 ASSESSMENT — PAIN SCALES - GENERAL
PAINLEVEL_OUTOF10: 7
PAINLEVEL_OUTOF10: 7
PAINLEVEL_OUTOF10: 8
PAINLEVEL_OUTOF10: 0
PAINLEVEL_OUTOF10: 0
PAINLEVEL_OUTOF10: 7
PAINLEVEL_OUTOF10: 7
PAINLEVEL_OUTOF10: 5

## 2017-11-24 NOTE — PROGRESS NOTES
1900- Received report from CARL Barton RN. Assumed care of pt. Pt denies needs at time. Pt denies cramping or UC's at this time. Instructed to call RN with changes and encouraged to call RN with other needs.    2049- Pt assessed. WDL. Abdomen soft, non tender. Pt denies complaints at this time. Encouraged to call with needs. Will monitor.    0700- Report to CARL Barton, RN

## 2017-11-24 NOTE — CARE PLAN
Problem: Skin Integrity  Goal: Skin Integrity is maintained or improved  Outcome: PROGRESSING AS EXPECTED  Pt is able to ambulate and change positions freely.

## 2017-11-24 NOTE — CARE PLAN
Problem: Risk for Infection, Impaired Wound Healing  Goal: Remain free from signs and symptoms of infection    Intervention: Instruct Patient regarding signs and symptoms of infection  Pt educated to call RN for abdominal cramping, foul smelling discharge, or abdominal tenderness. Abdomen remains soft, non tender. Pt afebrile.

## 2017-11-24 NOTE — CARE PLAN
Problem: Knowledge Deficit  Goal: Patient/Support Person demonstrates understanding regarding condition, prognosis and treatment needs during the pregnancy  Outcome: PROGRESSING AS EXPECTED  Pt is up tot date on the POC. She will ask questions as needed and the RN will notify her of any changes.

## 2017-11-24 NOTE — PROGRESS NOTES
0700. Report from Rach PALOMARES RN. POC discussed and resumed.    1100. Dr. Howard in room, SVE 1/Thick/posterior. No new orders received.    1230. Ultrasound in room, BPP6/8. GERMÁN of 1. Dr. Howard notified, no new orders received.    1400. In room, pt still feeling some contractions and cramping. Pt states that her bleeding is the same as it has been for the past week.    1700. In room, pt has no needs at this time.    1830. In room, pt states that her uc's are getting stronger and she is having a hard time lying in because of the back pain. Report to Dr. Howard, orders for 500 ml bolus of LR.    1900. Report to  Anita DIAZ. POC discussed.

## 2017-11-24 NOTE — CARE PLAN
Problem: Psychosocial Needs:  Goal: Level of anxiety will decrease    Intervention: Identify and develop with patient strategies to cope with anxiety triggers  Pt expressed sadness about family leaving and being in the hospital over the holiday. Support given to pt.

## 2017-11-24 NOTE — PROGRESS NOTES
Labor Progress Note    Tonia Trevizo       Subjective:  30 3/7  Pt states she was breathing though some of the contractions she was feeling. Still having some vaginal bleeding/spotting.  Otherwise no complaints. States ctxns are no change from yesterdays.   Objective:   Vitals:    11/23/17 2355 11/24/17 0552 11/24/17 0821 11/24/17 1137   BP: (!) 95/54 (!) 93/53 (!) 94/55 106/53   Pulse: 73 77 73 72   Resp: 17 16 16 16   Temp: 36.4 °C (97.5 °F) 36.8 °C (98.2 °F) 36.3 °C (97.3 °F) 36.4 °C (97.5 °F)   TempSrc:       SpO2:       Weight:       Height:         Gen: no acute distress  Abd: gravid Non-tender fundus  Ext: no calf pain bilateral  SVE: Mod amt of blood admixed w/ fluid on her peripad: 1/thick/posterior - small amt of blood on my jacobsen  [place]: irregular- q2-10   Fht: cat 2: mod variability, reactive but occasional variables present    bpp 6/8  (off for nano of 1cm)  Labs:  Recent Results (from the past 24 hour(s))   CBC WITH DIFFERENTIAL    Collection Time: 11/23/17  1:33 PM   Result Value Ref Range    WBC 15.0 (H) 4.8 - 10.8 K/uL    RBC 4.72 4.20 - 5.40 M/uL    Hemoglobin 11.2 (L) 12.0 - 16.0 g/dL    Hematocrit 33.4 (L) 37.0 - 47.0 %    MCV 70.8 (L) 81.4 - 97.8 fL    MCH 23.7 (L) 27.0 - 33.0 pg    MCHC 33.5 (L) 33.6 - 35.0 g/dL    RDW 37.5 35.9 - 50.0 fL    Platelet Count 263 164 - 446 K/uL    MPV 10.4 9.0 - 12.9 fL    Neutrophils-Polys 77.20 (H) 44.00 - 72.00 %    Lymphocytes 14.30 (L) 22.00 - 41.00 %    Monocytes 6.10 0.00 - 13.40 %    Eosinophils 1.30 0.00 - 6.90 %    Basophils 0.20 0.00 - 1.80 %    Immature Granulocytes 0.90 0.00 - 0.90 %    Nucleated RBC 0.00 /100 WBC    Neutrophils (Absolute) 11.62 (H) 2.00 - 7.15 K/uL    Lymphs (Absolute) 2.15 1.00 - 4.80 K/uL    Monos (Absolute) 0.91 (H) 0.00 - 0.85 K/uL    Eos (Absolute) 0.19 0.00 - 0.51 K/uL    Baso (Absolute) 0.03 0.00 - 0.12 K/uL    Immature Granulocytes (abs) 0.14 (H) 0.00 - 0.11 K/uL    NRBC (Absolute) 0.00 K/uL       Assessment:   30 3/7  weeks  PPROM -  Bloody fluid and some contractions but no cervical change. S/p steroids/mag/abx  Prev c/s x 2  Bpp reassuring despite variables  Continuous monitoring - no sign of chorio but may have chronic abruption since this bloody fluid has been present over a week now.   Gbs negative      Arely Howard

## 2017-11-25 LAB
BASOPHILS # BLD AUTO: 0.3 % (ref 0–1.8)
BASOPHILS # BLD: 0.05 K/UL (ref 0–0.12)
EOSINOPHIL # BLD AUTO: 0.2 K/UL (ref 0–0.51)
EOSINOPHIL NFR BLD: 1.3 % (ref 0–6.9)
ERYTHROCYTE [DISTWIDTH] IN BLOOD BY AUTOMATED COUNT: 37.6 FL (ref 35.9–50)
ERYTHROCYTE [DISTWIDTH] IN BLOOD BY AUTOMATED COUNT: 38.3 FL (ref 35.9–50)
HCT VFR BLD AUTO: 27.4 % (ref 37–47)
HCT VFR BLD AUTO: 30.2 % (ref 37–47)
HGB BLD-MCNC: 10.1 G/DL (ref 12–16)
HGB BLD-MCNC: 9.1 G/DL (ref 12–16)
HOLDING TUBE BB 8507: NORMAL
IMM GRANULOCYTES # BLD AUTO: 0.19 K/UL (ref 0–0.11)
IMM GRANULOCYTES NFR BLD AUTO: 1.2 % (ref 0–0.9)
LYMPHOCYTES # BLD AUTO: 2.77 K/UL (ref 1–4.8)
LYMPHOCYTES NFR BLD: 18 % (ref 22–41)
MCH RBC QN AUTO: 23.5 PG (ref 27–33)
MCH RBC QN AUTO: 23.7 PG (ref 27–33)
MCHC RBC AUTO-ENTMCNC: 33.2 G/DL (ref 33.6–35)
MCHC RBC AUTO-ENTMCNC: 33.4 G/DL (ref 33.6–35)
MCV RBC AUTO: 70.2 FL (ref 81.4–97.8)
MCV RBC AUTO: 71.4 FL (ref 81.4–97.8)
MONOCYTES # BLD AUTO: 1.07 K/UL (ref 0–0.85)
MONOCYTES NFR BLD AUTO: 6.9 % (ref 0–13.4)
NEUTROPHILS # BLD AUTO: 11.15 K/UL (ref 2–7.15)
NEUTROPHILS NFR BLD: 72.3 % (ref 44–72)
NRBC # BLD AUTO: 0 K/UL
NRBC BLD AUTO-RTO: 0 /100 WBC
PLATELET # BLD AUTO: 233 K/UL (ref 164–446)
PLATELET # BLD AUTO: 234 K/UL (ref 164–446)
PMV BLD AUTO: 10.8 FL (ref 9–12.9)
PMV BLD AUTO: 11 FL (ref 9–12.9)
RBC # BLD AUTO: 3.84 M/UL (ref 4.2–5.4)
RBC # BLD AUTO: 4.3 M/UL (ref 4.2–5.4)
WBC # BLD AUTO: 15.4 K/UL (ref 4.8–10.8)
WBC # BLD AUTO: 19.4 K/UL (ref 4.8–10.8)

## 2017-11-25 PROCEDURE — 36415 COLL VENOUS BLD VENIPUNCTURE: CPT

## 2017-11-25 PROCEDURE — 88307 TISSUE EXAM BY PATHOLOGIST: CPT

## 2017-11-25 PROCEDURE — A9270 NON-COVERED ITEM OR SERVICE: HCPCS | Performed by: OBSTETRICS & GYNECOLOGY

## 2017-11-25 PROCEDURE — 700102 HCHG RX REV CODE 250 W/ 637 OVERRIDE(OP): Performed by: ANESTHESIOLOGY

## 2017-11-25 PROCEDURE — 306288 HCHG RETRACTOR C SECTION LG

## 2017-11-25 PROCEDURE — 304964 HCHG RECOVERY ROOM TIME 1HR

## 2017-11-25 PROCEDURE — 700101 HCHG RX REV CODE 250

## 2017-11-25 PROCEDURE — 770002 HCHG ROOM/CARE - OB PRIVATE (112)

## 2017-11-25 PROCEDURE — 59514 CESAREAN DELIVERY ONLY: CPT

## 2017-11-25 PROCEDURE — 700111 HCHG RX REV CODE 636 W/ 250 OVERRIDE (IP): Performed by: ANESTHESIOLOGY

## 2017-11-25 PROCEDURE — 305385 HCHG SURGICAL SERVICES 1/4 HOUR

## 2017-11-25 PROCEDURE — 700111 HCHG RX REV CODE 636 W/ 250 OVERRIDE (IP): Performed by: OBSTETRICS & GYNECOLOGY

## 2017-11-25 PROCEDURE — 306828 HCHG ANES-TIME GENERAL: Performed by: OBSTETRICS & GYNECOLOGY

## 2017-11-25 PROCEDURE — 700112 HCHG RX REV CODE 229: Performed by: OBSTETRICS & GYNECOLOGY

## 2017-11-25 PROCEDURE — A9270 NON-COVERED ITEM OR SERVICE: HCPCS | Performed by: ANESTHESIOLOGY

## 2017-11-25 PROCEDURE — 700111 HCHG RX REV CODE 636 W/ 250 OVERRIDE (IP)

## 2017-11-25 PROCEDURE — 700102 HCHG RX REV CODE 250 W/ 637 OVERRIDE(OP): Performed by: OBSTETRICS & GYNECOLOGY

## 2017-11-25 PROCEDURE — 700105 HCHG RX REV CODE 258: Performed by: OBSTETRICS & GYNECOLOGY

## 2017-11-25 PROCEDURE — 85027 COMPLETE CBC AUTOMATED: CPT

## 2017-11-25 RX ORDER — HYDROMORPHONE HYDROCHLORIDE 2 MG/ML
0.2 INJECTION, SOLUTION INTRAMUSCULAR; INTRAVENOUS; SUBCUTANEOUS
Status: DISCONTINUED | OUTPATIENT
Start: 2017-11-25 | End: 2017-11-26

## 2017-11-25 RX ORDER — SODIUM CHLORIDE, SODIUM LACTATE, POTASSIUM CHLORIDE, CALCIUM CHLORIDE 600; 310; 30; 20 MG/100ML; MG/100ML; MG/100ML; MG/100ML
INJECTION, SOLUTION INTRAVENOUS PRN
Status: DISCONTINUED | OUTPATIENT
Start: 2017-11-25 | End: 2017-11-29 | Stop reason: HOSPADM

## 2017-11-25 RX ORDER — KETOROLAC TROMETHAMINE 30 MG/ML
30 INJECTION, SOLUTION INTRAMUSCULAR; INTRAVENOUS EVERY 6 HOURS
Status: DISCONTINUED | OUTPATIENT
Start: 2017-11-25 | End: 2017-11-25

## 2017-11-25 RX ORDER — MORPHINE SULFATE 4 MG/ML
4 INJECTION, SOLUTION INTRAMUSCULAR; INTRAVENOUS
Status: DISCONTINUED | OUTPATIENT
Start: 2017-11-25 | End: 2017-11-29 | Stop reason: HOSPADM

## 2017-11-25 RX ORDER — DOCUSATE SODIUM 100 MG/1
100 CAPSULE, LIQUID FILLED ORAL 2 TIMES DAILY PRN
Status: DISCONTINUED | OUTPATIENT
Start: 2017-11-25 | End: 2017-11-29 | Stop reason: HOSPADM

## 2017-11-25 RX ORDER — SIMETHICONE 80 MG
80 TABLET,CHEWABLE ORAL 4 TIMES DAILY PRN
Status: DISCONTINUED | OUTPATIENT
Start: 2017-11-25 | End: 2017-11-29 | Stop reason: HOSPADM

## 2017-11-25 RX ORDER — METOCLOPRAMIDE HYDROCHLORIDE 5 MG/ML
10 INJECTION INTRAMUSCULAR; INTRAVENOUS EVERY 6 HOURS
Status: DISCONTINUED | OUTPATIENT
Start: 2017-11-25 | End: 2017-11-25

## 2017-11-25 RX ORDER — MISOPROSTOL 200 UG/1
600 TABLET ORAL
Status: DISCONTINUED | OUTPATIENT
Start: 2017-11-25 | End: 2017-11-29 | Stop reason: HOSPADM

## 2017-11-25 RX ORDER — ONDANSETRON 2 MG/ML
4 INJECTION INTRAMUSCULAR; INTRAVENOUS EVERY 6 HOURS PRN
Status: DISCONTINUED | OUTPATIENT
Start: 2017-11-25 | End: 2017-11-26

## 2017-11-25 RX ORDER — METHYLERGONOVINE MALEATE 0.2 MG/ML
0.2 INJECTION INTRAVENOUS
Status: DISCONTINUED | OUTPATIENT
Start: 2017-11-25 | End: 2017-11-29 | Stop reason: HOSPADM

## 2017-11-25 RX ORDER — CARBOPROST TROMETHAMINE 250 UG/ML
250 INJECTION, SOLUTION INTRAMUSCULAR
Status: DISCONTINUED | OUTPATIENT
Start: 2017-11-25 | End: 2017-11-29 | Stop reason: HOSPADM

## 2017-11-25 RX ORDER — OXYCODONE HYDROCHLORIDE AND ACETAMINOPHEN 5; 325 MG/1; MG/1
1 TABLET ORAL EVERY 4 HOURS PRN
Status: DISCONTINUED | OUTPATIENT
Start: 2017-11-26 | End: 2017-11-29 | Stop reason: HOSPADM

## 2017-11-25 RX ORDER — CITRIC ACID/SODIUM CITRATE 334-500MG
30 SOLUTION, ORAL ORAL ONCE
Status: COMPLETED | OUTPATIENT
Start: 2017-11-25 | End: 2017-11-25

## 2017-11-25 RX ORDER — BISACODYL 10 MG
10 SUPPOSITORY, RECTAL RECTAL PRN
Status: DISCONTINUED | OUTPATIENT
Start: 2017-11-25 | End: 2017-11-29 | Stop reason: HOSPADM

## 2017-11-25 RX ORDER — KETOROLAC TROMETHAMINE 30 MG/ML
30 INJECTION, SOLUTION INTRAMUSCULAR; INTRAVENOUS EVERY 6 HOURS
Status: DISCONTINUED | OUTPATIENT
Start: 2017-11-25 | End: 2017-11-26

## 2017-11-25 RX ORDER — DIPHENHYDRAMINE HYDROCHLORIDE 50 MG/ML
25 INJECTION INTRAMUSCULAR; INTRAVENOUS EVERY 6 HOURS PRN
Status: DISCONTINUED | OUTPATIENT
Start: 2017-11-25 | End: 2017-11-25

## 2017-11-25 RX ORDER — MAGNESIUM SULFATE HEPTAHYDRATE 40 MG/ML
4 INJECTION, SOLUTION INTRAVENOUS ONCE
Status: COMPLETED | OUTPATIENT
Start: 2017-11-25 | End: 2017-11-25

## 2017-11-25 RX ORDER — OXYCODONE HYDROCHLORIDE AND ACETAMINOPHEN 5; 325 MG/1; MG/1
1 TABLET ORAL EVERY 4 HOURS PRN
Status: DISCONTINUED | OUTPATIENT
Start: 2017-11-25 | End: 2017-11-26

## 2017-11-25 RX ORDER — HYDROMORPHONE HYDROCHLORIDE 2 MG/ML
0.4 INJECTION, SOLUTION INTRAMUSCULAR; INTRAVENOUS; SUBCUTANEOUS
Status: DISCONTINUED | OUTPATIENT
Start: 2017-11-25 | End: 2017-11-26

## 2017-11-25 RX ORDER — METOCLOPRAMIDE HYDROCHLORIDE 5 MG/ML
10 INJECTION INTRAMUSCULAR; INTRAVENOUS EVERY 6 HOURS PRN
Status: DISCONTINUED | OUTPATIENT
Start: 2017-11-25 | End: 2017-11-25

## 2017-11-25 RX ORDER — MAGNESIUM SULFATE HEPTAHYDRATE 40 MG/ML
2 INJECTION, SOLUTION INTRAVENOUS ONCE
Status: COMPLETED | OUTPATIENT
Start: 2017-11-25 | End: 2017-11-25

## 2017-11-25 RX ORDER — ONDANSETRON 2 MG/ML
4 INJECTION INTRAMUSCULAR; INTRAVENOUS EVERY 6 HOURS PRN
Status: DISCONTINUED | OUTPATIENT
Start: 2017-11-25 | End: 2017-11-25

## 2017-11-25 RX ORDER — METOCLOPRAMIDE HYDROCHLORIDE 5 MG/ML
10 INJECTION INTRAMUSCULAR; INTRAVENOUS EVERY 6 HOURS PRN
Status: DISCONTINUED | OUTPATIENT
Start: 2017-11-25 | End: 2017-11-29 | Stop reason: HOSPADM

## 2017-11-25 RX ORDER — MAG HYDROX/ALUMINUM HYD/SIMETH 400-400-40
1 SUSPENSION, ORAL (FINAL DOSE FORM) ORAL
Status: DISCONTINUED | OUTPATIENT
Start: 2017-11-25 | End: 2017-11-29 | Stop reason: HOSPADM

## 2017-11-25 RX ORDER — BETAMETHASONE SODIUM PHOSPHATE AND BETAMETHASONE ACETATE 3; 3 MG/ML; MG/ML
12 INJECTION, SUSPENSION INTRA-ARTICULAR; INTRALESIONAL; INTRAMUSCULAR; SOFT TISSUE EVERY 24 HOURS
Status: DISCONTINUED | OUTPATIENT
Start: 2017-11-25 | End: 2017-11-25

## 2017-11-25 RX ORDER — NALOXONE HYDROCHLORIDE 0.4 MG/ML
0.1 INJECTION, SOLUTION INTRAMUSCULAR; INTRAVENOUS; SUBCUTANEOUS PRN
Status: DISCONTINUED | OUTPATIENT
Start: 2017-11-25 | End: 2017-11-26

## 2017-11-25 RX ORDER — ONDANSETRON 4 MG/1
4 TABLET, ORALLY DISINTEGRATING ORAL EVERY 6 HOURS PRN
Status: DISCONTINUED | OUTPATIENT
Start: 2017-11-25 | End: 2017-11-25

## 2017-11-25 RX ORDER — OXYCODONE AND ACETAMINOPHEN 10; 325 MG/1; MG/1
1 TABLET ORAL EVERY 4 HOURS PRN
Status: DISCONTINUED | OUTPATIENT
Start: 2017-11-25 | End: 2017-11-26

## 2017-11-25 RX ORDER — VITAMIN A ACETATE, BETA CAROTENE, ASCORBIC ACID, CHOLECALCIFEROL, .ALPHA.-TOCOPHEROL ACETATE, DL-, THIAMINE MONONITRATE, RIBOFLAVIN, NIACINAMIDE, PYRIDOXINE HYDROCHLORIDE, FOLIC ACID, CYANOCOBALAMIN, CALCIUM CARBONATE, FERROUS FUMARATE, ZINC OXIDE, CUPRIC OXIDE 3080; 12; 120; 400; 1; 1.84; 3; 20; 22; 920; 25; 200; 27; 10; 2 [IU]/1; UG/1; MG/1; [IU]/1; MG/1; MG/1; MG/1; MG/1; MG/1; [IU]/1; MG/1; MG/1; MG/1; MG/1; MG/1
1 TABLET, FILM COATED ORAL EVERY MORNING
Status: DISCONTINUED | OUTPATIENT
Start: 2017-11-25 | End: 2017-11-29 | Stop reason: HOSPADM

## 2017-11-25 RX ORDER — OXYCODONE HYDROCHLORIDE AND ACETAMINOPHEN 5; 325 MG/1; MG/1
1 TABLET ORAL EVERY 4 HOURS PRN
Status: DISCONTINUED | OUTPATIENT
Start: 2017-11-25 | End: 2017-11-25

## 2017-11-25 RX ORDER — ACETAMINOPHEN 325 MG/1
325 TABLET ORAL EVERY 4 HOURS PRN
Status: DISCONTINUED | OUTPATIENT
Start: 2017-11-25 | End: 2017-11-29 | Stop reason: HOSPADM

## 2017-11-25 RX ORDER — MAGNESIUM SULFATE HEPTAHYDRATE 40 MG/ML
3 INJECTION, SOLUTION INTRAVENOUS CONTINUOUS
Status: DISCONTINUED | OUTPATIENT
Start: 2017-11-25 | End: 2017-11-25

## 2017-11-25 RX ORDER — DIPHENHYDRAMINE HCL 25 MG
25 TABLET ORAL EVERY 6 HOURS PRN
Status: DISCONTINUED | OUTPATIENT
Start: 2017-11-25 | End: 2017-11-29 | Stop reason: HOSPADM

## 2017-11-25 RX ORDER — DIPHENHYDRAMINE HCL 25 MG
25 TABLET ORAL ONCE
Status: DISCONTINUED | OUTPATIENT
Start: 2017-11-25 | End: 2017-11-25

## 2017-11-25 RX ORDER — DIPHENHYDRAMINE HYDROCHLORIDE 50 MG/ML
25 INJECTION INTRAMUSCULAR; INTRAVENOUS EVERY 6 HOURS PRN
Status: DISCONTINUED | OUTPATIENT
Start: 2017-11-25 | End: 2017-11-29 | Stop reason: HOSPADM

## 2017-11-25 RX ORDER — IBUPROFEN 800 MG/1
800 TABLET ORAL EVERY 8 HOURS PRN
Status: DISCONTINUED | OUTPATIENT
Start: 2017-11-26 | End: 2017-11-29 | Stop reason: HOSPADM

## 2017-11-25 RX ORDER — IBUPROFEN 800 MG/1
800 TABLET ORAL EVERY 8 HOURS PRN
Status: DISCONTINUED | OUTPATIENT
Start: 2017-11-25 | End: 2017-11-25

## 2017-11-25 RX ORDER — OXYCODONE HYDROCHLORIDE AND ACETAMINOPHEN 5; 325 MG/1; MG/1
2 TABLET ORAL EVERY 4 HOURS PRN
Status: DISCONTINUED | OUTPATIENT
Start: 2017-11-25 | End: 2017-11-25

## 2017-11-25 RX ORDER — DIPHENHYDRAMINE HYDROCHLORIDE 50 MG/ML
12.5 INJECTION INTRAMUSCULAR; INTRAVENOUS EVERY 6 HOURS PRN
Status: DISCONTINUED | OUTPATIENT
Start: 2017-11-25 | End: 2017-11-25

## 2017-11-25 RX ORDER — OXYCODONE HYDROCHLORIDE AND ACETAMINOPHEN 5; 325 MG/1; MG/1
2 TABLET ORAL EVERY 4 HOURS PRN
Status: DISCONTINUED | OUTPATIENT
Start: 2017-11-26 | End: 2017-11-29 | Stop reason: HOSPADM

## 2017-11-25 RX ADMIN — DIPHENHYDRAMINE HCL 25 MG: 25 TABLET ORAL at 01:53

## 2017-11-25 RX ADMIN — FENTANYL CITRATE 50 MCG: 50 INJECTION, SOLUTION INTRAMUSCULAR; INTRAVENOUS at 07:15

## 2017-11-25 RX ADMIN — FAMOTIDINE 20 MG: 10 INJECTION, SOLUTION INTRAVENOUS at 04:39

## 2017-11-25 RX ADMIN — SODIUM CITRATE AND CITRIC ACID MONOHYDRATE 30 ML: 500; 334 SOLUTION ORAL at 04:39

## 2017-11-25 RX ADMIN — KETOROLAC TROMETHAMINE 30 MG: 30 INJECTION, SOLUTION INTRAMUSCULAR at 18:26

## 2017-11-25 RX ADMIN — ONDANSETRON 4 MG: 2 INJECTION INTRAMUSCULAR; INTRAVENOUS at 15:58

## 2017-11-25 RX ADMIN — OXYCODONE HYDROCHLORIDE AND ACETAMINOPHEN 1 TABLET: 5; 325 TABLET ORAL at 22:26

## 2017-11-25 RX ADMIN — Medication 20 UNITS: at 09:15

## 2017-11-25 RX ADMIN — MAGNESIUM SULFATE IN WATER 2 G: 40 INJECTION, SOLUTION INTRAVENOUS at 04:01

## 2017-11-25 RX ADMIN — FENTANYL CITRATE 50 MCG: 50 INJECTION, SOLUTION INTRAMUSCULAR; INTRAVENOUS at 01:52

## 2017-11-25 RX ADMIN — ONDANSETRON 4 MG: 2 INJECTION INTRAMUSCULAR; INTRAVENOUS at 09:20

## 2017-11-25 RX ADMIN — OXYCODONE HYDROCHLORIDE AND ACETAMINOPHEN 1 TABLET: 5; 325 TABLET ORAL at 18:26

## 2017-11-25 RX ADMIN — KETOROLAC TROMETHAMINE 30 MG: 30 INJECTION, SOLUTION INTRAMUSCULAR at 12:19

## 2017-11-25 RX ADMIN — FENTANYL CITRATE 50 MCG: 50 INJECTION, SOLUTION INTRAMUSCULAR; INTRAVENOUS at 07:55

## 2017-11-25 RX ADMIN — DOCUSATE SODIUM 100 MG: 100 CAPSULE ORAL at 22:26

## 2017-11-25 RX ADMIN — METOCLOPRAMIDE 10 MG: 5 INJECTION, SOLUTION INTRAMUSCULAR; INTRAVENOUS at 04:39

## 2017-11-25 RX ADMIN — ONDANSETRON 4 MG: 2 INJECTION INTRAMUSCULAR; INTRAVENOUS at 00:45

## 2017-11-25 RX ADMIN — BETAMETHASONE SODIUM PHOSPHATE AND BETAMETHASONE ACETATE 12 MG: 3; 3 INJECTION, SUSPENSION INTRA-ARTICULAR; INTRALESIONAL; INTRAMUSCULAR at 04:47

## 2017-11-25 RX ADMIN — SODIUM CHLORIDE, POTASSIUM CHLORIDE, SODIUM LACTATE AND CALCIUM CHLORIDE: 600; 310; 30; 20 INJECTION, SOLUTION INTRAVENOUS at 04:45

## 2017-11-25 RX ADMIN — MAGNESIUM SULFATE IN WATER 4 G: 40 INJECTION, SOLUTION INTRAVENOUS at 04:30

## 2017-11-25 RX ADMIN — MAGNESIUM SULFATE IN WATER 3 G/HR: 40 INJECTION, SOLUTION INTRAVENOUS at 05:37

## 2017-11-25 RX ADMIN — OXYCODONE HYDROCHLORIDE AND ACETAMINOPHEN 1 TABLET: 5; 325 TABLET ORAL at 12:19

## 2017-11-25 ASSESSMENT — PAIN SCALES - GENERAL
PAINLEVEL_OUTOF10: 8
PAINLEVEL_OUTOF10: 1
PAINLEVEL_OUTOF10: 2
PAINLEVEL_OUTOF10: 5
PAINLEVEL_OUTOF10: 9
PAINLEVEL_OUTOF10: 0
PAINLEVEL_OUTOF10: 5
PAINLEVEL_OUTOF10: 2
PAINLEVEL_OUTOF10: 3
PAINLEVEL_OUTOF10: 9
PAINLEVEL_OUTOF10: 9
PAINLEVEL_OUTOF10: 5
PAINLEVEL_OUTOF10: 5
PAINLEVEL_OUTOF10: 3
PAINLEVEL_OUTOF10: 2
PAINLEVEL_OUTOF10: 3

## 2017-11-25 NOTE — H&P
"History and Physical Update    Original H&P performed on 17 by myself. There are no updates to her medical history. Plan for repeat  delivery at this time for PPROM with labor.     Asmita Bolivar M.D.      Labor and Delivery History and Physical    CC: leaking fluid    HPI:Tonia Trevizo is a 28 yo  at 27wk5d who presented to [facility] with complaints of leaking fluid. Denies contractions. Reports positive fetal movement. Denies fever or chills. She received her initial prenatal care with [facility] and is due to transfer care to myself next week.     All other systems were reviewed and were negative.    PMH:none  PSH: x2, 3 right knee surgeries  OB HX:2 term  deliveries  Gyn Hx:denies STI or abnormal pap smears  SH:denies T/E/D  Meds:PNV  Allergies:NKDA    /56   Pulse 78   Temp 36.1 °C (97 °F)   Resp 20   Ht 1.651 m (5' 5\")   Wt 92.6 kg (204 lb 2.3 oz)   SpO2 95%   BMI 33.97 kg/m²     Physical Exam  Gen: NAD  Abd: gravid, non tender  Ext: no edema    FHT:140/mod variability  [place]: quiescent  SVE: deferred    + Amnisure    Laboratory Evaluation  ABO: B pos  GBS unknown  GTT:unknown  Rubella: Immune  HIV: Neg  RPR: NR  Hep sAg: neg  Pap: NILM    Assessment:   1.  intrauterine pregnancy  2. PPROM  3. Previous  delivery x2    Plan:Discussed positive amnisure test. Admit to L&D. Will give BMTZ x2. Start magnesium for tocolysis while starting azithro and ampicillin. Will get sonogram for GERMÁN, position and cervical examination. Plan for repeat  at 34 weeks. NICU consult while in house    Asmita Bolivar M.D.      "

## 2017-11-25 NOTE — CARE PLAN
Problem: Altered physiologic condition related to postoperative  delivery  Goal: Patient physiologically stable as evidenced by normal lochia, palpable uterine involution and vital signs within normal limits    Intervention: Massage fundus as necessary to prevent excessive lochia  Fundus firm, lochia light      Problem: Potential for postpartum infection related to surgical incision, compromised uterine condition, urinary tract or respiratory compromise  Goal: Patient will be afebrile and free from signs and symptoms of infection  Outcome: PROGRESSING AS EXPECTED  Pt remains afebrile and free from signs of infection

## 2017-11-25 NOTE — PROGRESS NOTES
Discussed electric breast pump with patient. Discussed settings, cleaning and duration. No questions at this time.

## 2017-11-25 NOTE — PROGRESS NOTES
0500- report received from Lianet DIAZ, POC discussed.  0525- pt to OR 2 for repeat  section.  0535- order received to restart magnesium at 3gm/hr for fetal neuro protection.   0954- placenta sent to lab; [place]ger in lab notified

## 2017-11-25 NOTE — PROGRESS NOTES
Pt up from L&D via [facility]. Assessment complete. VSS. Fundus firm, lochia light. , farr and SCD's in place. Pt oriented to room, call light, emergency light, skylight. Call light within reach. Will continue to monitor.

## 2017-11-25 NOTE — PROGRESS NOTES
OB Progress Note    Patient is now reporting 9/10 pain with contractions. Fetal strip reviewed. Baseline 140's with occasional late decelerations. Tocometer with contractions every 3-5 min. Discussed with patient that although her cervical exam remains unchanged I am concerned about the possibility of impending uterine rupture. Will bolus magnesium for neuroprotection, provide a rescue dose of betamethasone as it has been more than two weeks since her original dosing and proceed to the OR for RLTCS. Discussed with Dr Palm in NICU.     Asmita Bolivar M.D.

## 2017-11-25 NOTE — PROGRESS NOTES
1900- Report from CARL Barton RN. Pt resting in bed. LR bolus infusing. POC discussed.    2050- Pt c/o painful UCs. Dr. Howard notified by phone. Orders received. SVE by PAULINE East RN inconclusive. Dr. Howard called with update. Requested Dr. Howard perform SVE.    2100- SVE FT    2330- Report to Dr. Howard regarding increased frequency of UCs. Labs ordered.    2340- SVE FT. Orders for IV fluids, Fentanyl, and NPO diet received.    0143- Report to Dr. Howard in-unit regarding pt being woken by [facility].  Orders received for Fentanyl and Benadryl.    0325- Pt reporting 9/10 pain with [facility].    0328- Report to Dr. Howard and Dr. Bolivar by phone. C/S called by Dr. Bolivar.    0505- Report given to ANITA Goncalves RN.

## 2017-11-25 NOTE — PROGRESS NOTES
Late entry note   2100: called to see patient to recheck her. She had been complaining about feeling her ctxn more frequently and painful and breathing through some of them.  RN was unsure of exam so asked me to recheck:  Ft/thick/posterior - dark blood on exam  Fht: cat 1, mod variability; yisel q2-4 min  No fundal tenderness on exam  Will reevaluate in a few hours  Advised pt I want really to minimize her checks    2335  RN called by pt stating she is more uncomfortable and wants to be rechecked.  Again she is yisel frequently, fetal tracing still category 1  SVE: ft/thick/posterior; no cervical change (bleeding is less than before).    Advised pt that unless her cervix is changing; starting to have fundal tenderness or signs or chorio or fetal distress I'd like to only proceed w/ c/s if needed. Will keep npo/start iv fluids now, recheck cbc, and make sure hold clot is updated. Will try to make her comfortable w/ some pain meds but she's not a tocolytic candidate. Will give some fentanyl to see if helps her relax some. If needs delivery she will need mag for neuro protection. Dr Palm (NICU) is aware.

## 2017-11-25 NOTE — PROGRESS NOTES
Reviewed pump use, settings, milk collection, and cleaning of pump parts. Encouraged breast massage and hand expression, encouraged to watch [employer] hand expression video. Mother breast fed older two children successfully but has not had a child in the NICU before. Support and encouragement provided. Offered to assist with pumping session at this time, mother reports she is feeling nauseated and will pump after she has some clear liquids. Encouraged to call for assistance if needed when she is ready.

## 2017-11-25 NOTE — OP REPORT
DATE OF SERVICE:  2017    PREOPERATIVE DIAGNOSES:  1.   intrauterine pregnancy at 30 weeks and 3 days.  2.   premature rupture of membranes at 27 weeks and 5 days.  3.  Labor.  4.  History of previous  x2, desires future parity    POSTOPERATIVE DIAGNOSES:  1.   intrauterine pregnancy at 30 weeks and 3 days.  2.   premature rupture of membranes at 27 weeks and 5 days.  3.  Labor.  4.  History of previous  x2, desires future parity    PROCEDURE:  Repeat low transverse  delivery via Pfannenstiel incision.    SURGEON:  Asmita Bolivar MD    ASSISTANT:  Arely Howard MD    ANESTHESIA:  Spinal.    ESTIMATED BLOOD LOSS:  500 mL.    IV FLUIDS:  2000 mL.    URINE OUTPUT:  200 mL.    SPECIMENS:  Cord gases, placenta.    COMPLICATIONS:  None.    INTRAOPERATIVE FINDINGS:  Normal-appearing uterus, fallopian tubes and ovaries bilaterally.  Male infant in cephalic presentation with occiput posterior position, Apgars 8 and 9.  Amniotic fluid with large blood clots    INDICATIONS AND CONSENT:  The patient is a 27-year-old , admitted at 27 weeks and 5 days with AmniSure confirmed  premature rupture of membranes.  She received betamethasone x2 with the assistance of magnesium tocolysis.  She completed her latency antibiotics.  She did develop leaking of blood-tinged fluid with intermittent contractions within the last week.  Cervical examination confirmed that she was only 1 cm dilated. At 30 weeks and 3 days, she began to experience painful regular contractions and despite no evidence of cervical change, she did develop intermittent category   2 strip with concern for possible impending uterine rupture.  We discussed the risks, benefits, alternatives of repeat  delivery.  Patient stated understanding and desire to proceed.  Magnesium was infused for neuroprotection.  She did receive a 1 time rescue dose of betamethasone.    PROCEDURE IN DETAIL:  Patient  was taken back to the operating room where spinal anesthesia was administered without difficulty.  She was prepped and draped in dorsal supine position with a leftward tilt.  Time-out was performed.  A Pfannenstiel incision was made on her skin with a scalpel.  The incision  was carried down to the fascia with the scalpel.  The fascia was incised and extended laterally.  The superior and inferior aspects of the fascia were grasped with Kocher clamps and rectus muscles were dissected off.  The rectus muscles were  bluntly in the midline and the peritoneal cavity was entered sharply.  The incision was extended superiorly and inferiorly to good  visualization of the bladder.  An Adolfo O retractor was placed.  The anterior uterine wall was examined with no evidence of window.  An incision was made on the lower uterine segment with a scalpel.  The bag was ruptured sharply and a large volume of clots did come out.  The head of the infant was elevated tthe hysterotomy and delivered with gentle fundal pressure.  The remainder of the body delivered without difficulty.  The infant was placed in a preemie bag.  The cord was clamped and cut.  The placenta was removed with gentle traction.  The inner lining of the uterus was wiped free with moist laparotomy sponges.  The hysterotomy was reapproximated with 0 chromic in 2 layers.  The fallopian tubes and ovaries were examined and found to be normal.  Irrigation was performed.  The hysterotomy was reexamined and found to be hemostatic.  The Adolfo O retractor was removed.  The peritoneum was closed with 3-0   Vicryl.  The muscles were reapproximated with 3-0 Vicryl.  The fascia was reapproximated with 0 PDS.  The subcutaneous layer was approximated with 3-0 Vicryl and the skin was closed with 4-0 Vicryl.  All counts were correct x2.       ____________________________________     MD SHWETA Catalan / MILLIE    DD:  11/25/2017 07:05:28  DT:  11/25/2017  08:51:07    D#:  [id number]  Job#:  [id number]

## 2017-11-25 NOTE — OP REPORT
Brief Operative Note    Pre Operative Diagnosis:  1.  intrauterine pregnancy at 30wk3d  2. PPROM at 27wk5d  3. Labor  4. History of previous  x2. Desires future parity.    Post Operative Diagnosis  1.  intrauterine pregnancy at 30wk3d  2. PPROM at 27wk5d  3. Labor  4. History of previous     Procedure: Repeat low transverse  delivery via Pfannenstiel incision    Surgeon: Katt DE LOS SANTOS    Assistant:Lee DE LOS SANTOS    Intraoperative findings: Normal appearing uterus, fallopian tubes and ovaries bilaterally, male infant in cephalic presentation with occiput posterior position and APGARS 8/9, bloody fluid with large blood clots    Anesthesia:spinal    EBL:500cc    IVF:2000cc    UOP:200cc    Specimens:Cord gases, placenta    Complications:None    Dispo:to PACU    Asmita Bolivar M.D.

## 2017-11-26 PROCEDURE — A9270 NON-COVERED ITEM OR SERVICE: HCPCS | Performed by: ANESTHESIOLOGY

## 2017-11-26 PROCEDURE — A9270 NON-COVERED ITEM OR SERVICE: HCPCS | Performed by: OBSTETRICS & GYNECOLOGY

## 2017-11-26 PROCEDURE — 700102 HCHG RX REV CODE 250 W/ 637 OVERRIDE(OP): Performed by: OBSTETRICS & GYNECOLOGY

## 2017-11-26 PROCEDURE — 700102 HCHG RX REV CODE 250 W/ 637 OVERRIDE(OP): Performed by: ANESTHESIOLOGY

## 2017-11-26 PROCEDURE — 700111 HCHG RX REV CODE 636 W/ 250 OVERRIDE (IP): Performed by: ANESTHESIOLOGY

## 2017-11-26 PROCEDURE — 770002 HCHG ROOM/CARE - OB PRIVATE (112)

## 2017-11-26 PROCEDURE — 700112 HCHG RX REV CODE 229: Performed by: OBSTETRICS & GYNECOLOGY

## 2017-11-26 RX ADMIN — OXYCODONE HYDROCHLORIDE AND ACETAMINOPHEN 2 TABLET: 5; 325 TABLET ORAL at 15:45

## 2017-11-26 RX ADMIN — OXYCODONE HYDROCHLORIDE AND ACETAMINOPHEN 1 TABLET: 5; 325 TABLET ORAL at 10:45

## 2017-11-26 RX ADMIN — Medication 1 TABLET: at 07:33

## 2017-11-26 RX ADMIN — IBUPROFEN 800 MG: 800 TABLET, FILM COATED ORAL at 12:25

## 2017-11-26 RX ADMIN — OXYCODONE HYDROCHLORIDE AND ACETAMINOPHEN 1 TABLET: 5; 325 TABLET ORAL at 02:35

## 2017-11-26 RX ADMIN — KETOROLAC TROMETHAMINE 30 MG: 30 INJECTION, SOLUTION INTRAMUSCULAR at 00:35

## 2017-11-26 RX ADMIN — DOCUSATE SODIUM 100 MG: 100 CAPSULE ORAL at 10:45

## 2017-11-26 RX ADMIN — OXYCODONE HYDROCHLORIDE AND ACETAMINOPHEN 1 TABLET: 5; 325 TABLET ORAL at 20:48

## 2017-11-26 RX ADMIN — IBUPROFEN 800 MG: 800 TABLET, FILM COATED ORAL at 20:48

## 2017-11-26 RX ADMIN — OXYCODONE HYDROCHLORIDE AND ACETAMINOPHEN 1 TABLET: 5; 325 TABLET ORAL at 06:38

## 2017-11-26 ASSESSMENT — PAIN SCALES - GENERAL
PAINLEVEL_OUTOF10: 5
PAINLEVEL_OUTOF10: 3
PAINLEVEL_OUTOF10: 1
PAINLEVEL_OUTOF10: 4
PAINLEVEL_OUTOF10: 5
PAINLEVEL_OUTOF10: 4
PAINLEVEL_OUTOF10: 4
PAINLEVEL_OUTOF10: 7
PAINLEVEL_OUTOF10: 2
PAINLEVEL_OUTOF10: 4
PAINLEVEL_OUTOF10: 5

## 2017-11-26 NOTE — PROGRESS NOTES
"Patient was given abdominal binder after c/o pain from lack of support on her stomach when walking.  Patient states that the binder \"feels better.\"  Patient told that she may wear it as much or as little as she would like that it is only to aid in her comfort.  "

## 2017-11-26 NOTE — PROGRESS NOTES
Abdominal binder was delivered to patient.  If any further assistance needed, please call extension [phone] or place order for Ortho Technician assistance as a communication order in [employer].

## 2017-11-26 NOTE — PROGRESS NOTES
Baby in NICU, mom has been pumping 3 times yesterday and once today. Discussed pumping frequency q 3 hours for 15 minutes with 4-5 hours sleep time at night, settings maintenance,  and pumping bedside in NICU to help initiate her milk supply. Mother may be able to put the baby skin to skin today, gave written information on NICU pathway. Encouraged her to call for LC help when she can put baby to breast. Has dual breast pump at home, she got it from the hospital with her first child, thinks it is hospital grade. She denies any nipple/breast pain or soreness.

## 2017-11-26 NOTE — CARE PLAN
Problem: Alteration in comfort related to surgical incision and/or after birth pains  Goal: Patient verbalizes acceptable pain level  Outcome: PROGRESSING AS EXPECTED  Patient states pain is tolerable with pain medication.  Will continue to reassess with hourly rounding and medicate accordingly to 0-10 pain scale.    Problem: Potential anxiety related to difficulty adapting to parental role  Goal: Patient will verbalize and demonstrate effective bonding and parenting behavior  Outcome: PROGRESSING AS EXPECTED  Patient is bonding well with baby, is pumping, and visiting baby frequently in NICU.

## 2017-11-26 NOTE — CARE PLAN
Problem: Alteration in comfort related to surgical incision and/or after birth pains  Goal: Patient is able to ambulate, care for self and infant with acceptable pain level  Outcome: PROGRESSING AS EXPECTED  Assess pain every 2-4 hours. Give pain medications routinely per patient request

## 2017-11-26 NOTE — CARE PLAN
Problem: Altered physiologic condition related to postoperative  delivery  Goal: Patient physiologically stable as evidenced by normal lochia, palpable uterine involution and vital signs within normal limits  Outcome: PROGRESSING AS EXPECTED  Assess lochia and fundus q 4 hours x12 followed by every shift. Educate on appropriate amounts of bleeding and when to contact physician

## 2017-11-26 NOTE — PROGRESS NOTES
BEdside report received from off going RN. Plan of care discussed, questions answered and understanding verbalized. Reoriented to room, call light, care board, emergency light and darinel light. Encouraged to call for assistance or with questions, comments or concerns. Patient is pumping due to infant in NICU.  She has had farr removed and is aware that she has 6 hours to void.  She would like to have her pain medications given on schedule.

## 2017-11-26 NOTE — PROGRESS NOTES
Pt assessment complete, wnl. Fundus firm at umbilicus with minimal discharge.  Pt ambulating and voiding without difficulty. Bonding well with infant and pumping. Plan of care discussed with patient for the day. Questions answered. Encouraged to call with needs, will monitor.

## 2017-11-26 NOTE — PROGRESS NOTES
"POD#1    S: Pain controlled. Lochia normal. +Flatus. Lucian po. Denies n/v. No BM yet. Void without issues. Baby in NICU \"doing well\"    O: Blood pressure 102/62, pulse 72, temperature 36.4 °C (97.6 °F), resp. rate 16, height 1.651 m (5' 5\"), weight 92.6 kg (204 lb 2.3 oz), SpO2 94 %.  Gen: NAD  Fundus firm below umbilicus  Incision: +suture. Bandage c/d/i  Ext: no c/c/e    Labs: Hct 30-->27    A/P 26yo  s/p ERCS, PPROM  1. Routing post-op care. Bandage off today.   2. Home in 2-3 days   "

## 2017-11-27 PROCEDURE — A9270 NON-COVERED ITEM OR SERVICE: HCPCS | Performed by: ANESTHESIOLOGY

## 2017-11-27 PROCEDURE — 700102 HCHG RX REV CODE 250 W/ 637 OVERRIDE(OP): Performed by: OBSTETRICS & GYNECOLOGY

## 2017-11-27 PROCEDURE — 302131 K PAD MOTOR: Performed by: OBSTETRICS & GYNECOLOGY

## 2017-11-27 PROCEDURE — A9270 NON-COVERED ITEM OR SERVICE: HCPCS | Performed by: OBSTETRICS & GYNECOLOGY

## 2017-11-27 PROCEDURE — 302151 K-PAD 3X20: Performed by: OBSTETRICS & GYNECOLOGY

## 2017-11-27 PROCEDURE — 700102 HCHG RX REV CODE 250 W/ 637 OVERRIDE(OP): Performed by: ANESTHESIOLOGY

## 2017-11-27 PROCEDURE — 770002 HCHG ROOM/CARE - OB PRIVATE (112)

## 2017-11-27 PROCEDURE — 700112 HCHG RX REV CODE 229: Performed by: OBSTETRICS & GYNECOLOGY

## 2017-11-27 RX ORDER — FERROUS SULFATE 325(65) MG
325 TABLET ORAL
Status: DISCONTINUED | OUTPATIENT
Start: 2017-11-28 | End: 2017-11-29 | Stop reason: HOSPADM

## 2017-11-27 RX ADMIN — OXYCODONE HYDROCHLORIDE AND ACETAMINOPHEN 1 TABLET: 5; 325 TABLET ORAL at 09:12

## 2017-11-27 RX ADMIN — IBUPROFEN 800 MG: 800 TABLET, FILM COATED ORAL at 12:56

## 2017-11-27 RX ADMIN — IBUPROFEN 800 MG: 800 TABLET, FILM COATED ORAL at 05:02

## 2017-11-27 RX ADMIN — DOCUSATE SODIUM 100 MG: 100 CAPSULE ORAL at 07:37

## 2017-11-27 RX ADMIN — Medication 1 TABLET: at 07:37

## 2017-11-27 RX ADMIN — OXYCODONE HYDROCHLORIDE AND ACETAMINOPHEN 1 TABLET: 5; 325 TABLET ORAL at 01:13

## 2017-11-27 RX ADMIN — OXYCODONE HYDROCHLORIDE AND ACETAMINOPHEN 1 TABLET: 5; 325 TABLET ORAL at 05:02

## 2017-11-27 RX ADMIN — IBUPROFEN 800 MG: 800 TABLET, FILM COATED ORAL at 21:37

## 2017-11-27 RX ADMIN — OXYCODONE HYDROCHLORIDE AND ACETAMINOPHEN 1 TABLET: 5; 325 TABLET ORAL at 19:50

## 2017-11-27 RX ADMIN — OXYCODONE HYDROCHLORIDE AND ACETAMINOPHEN 1 TABLET: 5; 325 TABLET ORAL at 12:56

## 2017-11-27 ASSESSMENT — PAIN SCALES - GENERAL
PAINLEVEL_OUTOF10: 2
PAINLEVEL_OUTOF10: 1
PAINLEVEL_OUTOF10: 1
PAINLEVEL_OUTOF10: 4
PAINLEVEL_OUTOF10: 2
PAINLEVEL_OUTOF10: 5
PAINLEVEL_OUTOF10: 4
PAINLEVEL_OUTOF10: 4
PAINLEVEL_OUTOF10: 3
PAINLEVEL_OUTOF10: 3
PAINLEVEL_OUTOF10: 1
PAINLEVEL_OUTOF10: 5

## 2017-11-27 NOTE — PROGRESS NOTES
Assessment done. Vital signs stable. Patient voiding without difficulty, claims to be passing flatus. Fundus firm at umbilicus with light lochia. Steri strips over low abdominal incision with old drainage noted . No active drainage seen. No redness or swelling. Skin well approximated. Patient ambulating with steady gait. Patient claims to have good pain relief with p.o meds. Pumping for infant in nicu. Family at bedside. Patient encouraged to call for any needs. Pt claims she would like medication as scheduled

## 2017-11-27 NOTE — PROGRESS NOTES
Pt assessment done, medicated for pain per mar.  POC discussed, pt requested to have pain medication when available.  Reviewed pump settings with pt, questions answered.  All needs attended to at this time.

## 2017-11-27 NOTE — CONSULTS
Spoke to mom about her pumping plan. Mom was pleased that she collected 2mls. Colostrum this am. Mom is currently pumping every 3 hours; suction of 25, rate of 80, (which she knows to decrease to 60 once milk flows), and a time of 15 minutes. Reviewed cleaning of pump parts. Mom will be losing her insurance after 11/30 and plans to apply for [place].  Mom will use her personal double electric pump once discharged. Rental information discussed.

## 2017-11-27 NOTE — CARE PLAN
Problem: Potential for postpartum infection related to surgical incision, compromised uterine condition, urinary tract or respiratory compromise  Goal: Patient will be afebrile and free from signs and symptoms of infection  Outcome: PROGRESSING AS EXPECTED  No signs of infection noted at time of assessment    Problem: Alteration in comfort related to surgical incision and/or after birth pains  Goal: Patient verbalizes acceptable pain level  Outcome: PROGRESSING AS EXPECTED  Pt claims to have good pain relief with P.O medications

## 2017-11-27 NOTE — CARE PLAN
Problem: Altered physiologic condition related to postoperative  delivery  Goal: Patient physiologically stable as evidenced by normal lochia, palpable uterine involution and vital signs within normal limits  Outcome: PROGRESSING AS EXPECTED  Fundus firm, lochia light, vss.  Assessment to be done Q shift.    Problem: Alteration in comfort related to surgical incision and/or after birth pains  Goal: Patient is able to ambulate, care for self and infant with acceptable pain level  Outcome: PROGRESSING AS EXPECTED  Pt's pain well managed with prn pain medication, pt able to care for self and ambulate to ICN to visit infant.  Pt requesting to have pain medication when available.

## 2017-11-27 NOTE — PROGRESS NOTES
"OB Post Operative Note    Doing well. Breast pumping. Pain controlled. Tolerating PO with flatus. Voiding without difficulty    Vitals  /70   Pulse 83   Temp 36.7 °C (98.1 °F)   Resp 18   Ht 1.651 m (5' 5\")   Wt 92.6 kg (204 lb 2.3 oz)   SpO2 99%   BMI 33.97 kg/m²     Gen: NAD, resting comfortably  GI: soft, non tender to palpation, incision c/d/i  :fundus firm and below umbilicus  Ext: no edema      Recent Labs      11/24/17   2335  11/25/17   1659   WBC  15.4*  19.4*   RBC  4.30  3.84*   HEMOGLOBIN  10.1*  9.1*   HEMATOCRIT  30.2*  27.4*   MCV  70.2*  71.4*   MCH  23.5*  23.7*   RDW  37.6  38.3   PLATELETCT  233  234   MPV  11.0  10.8   NEUTSPOLYS  72.30*   --    LYMPHOCYTES  18.00*   --    MONOCYTES  6.90   --    EOSINOPHILS  1.30   --    BASOPHILS  0.30   --        Assessment:  POD day # 2  Anemia likely iron deficiency and acute blood loss    Plan:  Routine post operative care  Start ferrous sulfate  Discharge home on POD# 4    Asmita Bolivar M.D.      "

## 2017-11-28 PROCEDURE — 700102 HCHG RX REV CODE 250 W/ 637 OVERRIDE(OP): Performed by: ANESTHESIOLOGY

## 2017-11-28 PROCEDURE — A9270 NON-COVERED ITEM OR SERVICE: HCPCS | Performed by: OBSTETRICS & GYNECOLOGY

## 2017-11-28 PROCEDURE — A9270 NON-COVERED ITEM OR SERVICE: HCPCS | Performed by: ANESTHESIOLOGY

## 2017-11-28 PROCEDURE — 770002 HCHG ROOM/CARE - OB PRIVATE (112)

## 2017-11-28 PROCEDURE — 700112 HCHG RX REV CODE 229: Performed by: OBSTETRICS & GYNECOLOGY

## 2017-11-28 PROCEDURE — 700102 HCHG RX REV CODE 250 W/ 637 OVERRIDE(OP): Performed by: OBSTETRICS & GYNECOLOGY

## 2017-11-28 RX ADMIN — OXYCODONE HYDROCHLORIDE AND ACETAMINOPHEN 1 TABLET: 5; 325 TABLET ORAL at 00:26

## 2017-11-28 RX ADMIN — Medication 325 MG: at 07:43

## 2017-11-28 RX ADMIN — IBUPROFEN 800 MG: 800 TABLET, FILM COATED ORAL at 23:39

## 2017-11-28 RX ADMIN — OXYCODONE HYDROCHLORIDE AND ACETAMINOPHEN 1 TABLET: 5; 325 TABLET ORAL at 23:39

## 2017-11-28 RX ADMIN — DOCUSATE SODIUM 100 MG: 100 CAPSULE ORAL at 07:43

## 2017-11-28 RX ADMIN — OXYCODONE HYDROCHLORIDE AND ACETAMINOPHEN 1 TABLET: 5; 325 TABLET ORAL at 11:39

## 2017-11-28 RX ADMIN — Medication 1 TABLET: at 07:43

## 2017-11-28 RX ADMIN — IBUPROFEN 800 MG: 800 TABLET, FILM COATED ORAL at 14:17

## 2017-11-28 RX ADMIN — IBUPROFEN 800 MG: 800 TABLET, FILM COATED ORAL at 05:51

## 2017-11-28 RX ADMIN — OXYCODONE HYDROCHLORIDE AND ACETAMINOPHEN 2 TABLET: 5; 325 TABLET ORAL at 15:31

## 2017-11-28 RX ADMIN — OXYCODONE HYDROCHLORIDE AND ACETAMINOPHEN 1 TABLET: 5; 325 TABLET ORAL at 05:51

## 2017-11-28 ASSESSMENT — PAIN SCALES - GENERAL
PAINLEVEL_OUTOF10: 3
PAINLEVEL_OUTOF10: 3
PAINLEVEL_OUTOF10: 5
PAINLEVEL_OUTOF10: 7
PAINLEVEL_OUTOF10: 5
PAINLEVEL_OUTOF10: 3
PAINLEVEL_OUTOF10: 7
PAINLEVEL_OUTOF10: 4

## 2017-11-28 NOTE — PROGRESS NOTES
HG pump is in room, and MOB has been pumping and getting increasing amounts of colostrum/milk.     MOB states she still feels full when she pumps for  15 minutes. Assessed right breast, and MOB is filling, and some knots are palpable along lateral side of breast. Encouraged to do gentle massage to help with swelling, and use warm packs before pumping.     Assessed flange size, some rubbing noted from nipples. Changed to 28.5, and MOB reported less pain from pumping.     Encouraged to call for support as needed when infant is in NICU.

## 2017-11-28 NOTE — CARE PLAN
Problem: Urinary Elimination:  Goal: Ability to reestablish a normal urinary elimination pattern will improve  Outcome: PROGRESSING AS EXPECTED  Pt claims to be voiding without difficulty    Problem: Potential for postpartum infection related to surgical incision, compromised uterine condition, urinary tract or respiratory compromise  Goal: Patient will be afebrile and free from signs and symptoms of infection  Outcome: PROGRESSING AS EXPECTED  No signs of infection noted at time of assessment

## 2017-11-28 NOTE — CARE PLAN
Problem: Urinary Elimination:  Goal: Ability to reestablish a normal urinary elimination pattern will improve  Outcome: PROGRESSING AS EXPECTED  Pt voiding without difficulty.    Problem: Potential knowledge deficit related to lack of understanding of self and  care  Goal: Patient will demonstrate ability to care for self and infant  Outcome: PROGRESSING AS EXPECTED  Pt demonstrates the ability to care for herself. Pt is pumping and ambulating to ICN for infant care times.

## 2017-11-28 NOTE — PROGRESS NOTES
Mom's breasts filling with edema and inflammation. Assisted with pumping using the larger flanges and breast massage. Rat 80 to 60, suction of 25.   Ice packs given to apply to breasts for 10 minutes after pumping until inflammation lessens.

## 2017-11-29 VITALS
WEIGHT: 204.15 LBS | HEART RATE: 68 BPM | RESPIRATION RATE: 18 BRPM | TEMPERATURE: 98.6 F | DIASTOLIC BLOOD PRESSURE: 53 MMHG | BODY MASS INDEX: 34.01 KG/M2 | HEIGHT: 65 IN | SYSTOLIC BLOOD PRESSURE: 99 MMHG | OXYGEN SATURATION: 98 %

## 2017-11-29 PROBLEM — Z98.891 S/P CESAREAN SECTION: Status: ACTIVE | Noted: 2017-11-29

## 2017-11-29 PROCEDURE — A9270 NON-COVERED ITEM OR SERVICE: HCPCS | Performed by: OBSTETRICS & GYNECOLOGY

## 2017-11-29 PROCEDURE — A9270 NON-COVERED ITEM OR SERVICE: HCPCS | Performed by: ANESTHESIOLOGY

## 2017-11-29 PROCEDURE — 700102 HCHG RX REV CODE 250 W/ 637 OVERRIDE(OP): Performed by: ANESTHESIOLOGY

## 2017-11-29 PROCEDURE — 700102 HCHG RX REV CODE 250 W/ 637 OVERRIDE(OP): Performed by: OBSTETRICS & GYNECOLOGY

## 2017-11-29 RX ORDER — FERROUS SULFATE 325(65) MG
325 TABLET ORAL
Qty: 60 TAB | Refills: 0 | Status: SHIPPED | OUTPATIENT
Start: 2017-11-29 | End: 2018-11-08

## 2017-11-29 RX ORDER — IBUPROFEN 800 MG/1
800 TABLET ORAL EVERY 8 HOURS PRN
Qty: 30 TAB | Refills: 0 | Status: SHIPPED | OUTPATIENT
Start: 2017-11-29 | End: 2018-01-11

## 2017-11-29 RX ORDER — OXYCODONE HYDROCHLORIDE AND ACETAMINOPHEN 5; 325 MG/1; MG/1
1 TABLET ORAL EVERY 4 HOURS PRN
Qty: 20 TAB | Refills: 0 | Status: SHIPPED | OUTPATIENT
Start: 2017-11-29 | End: 2018-01-11

## 2017-11-29 RX ADMIN — OXYCODONE HYDROCHLORIDE AND ACETAMINOPHEN 1 TABLET: 5; 325 TABLET ORAL at 03:36

## 2017-11-29 RX ADMIN — Medication 1 TABLET: at 07:42

## 2017-11-29 RX ADMIN — Medication 325 MG: at 07:42

## 2017-11-29 RX ADMIN — OXYCODONE HYDROCHLORIDE AND ACETAMINOPHEN 1 TABLET: 5; 325 TABLET ORAL at 07:44

## 2017-11-29 ASSESSMENT — PAIN SCALES - GENERAL
PAINLEVEL_OUTOF10: 4
PAINLEVEL_OUTOF10: 5

## 2017-11-29 NOTE — PROGRESS NOTES
Delayed Entry Note 11/28 at 12:45    POD#3  No new complaints today. Desires D/C home tomorrow  Vital signs stable. Exam unchanged  Routine post operative care  D/C home POD #4.    Asmita Bolivar M.D.

## 2017-11-29 NOTE — PROGRESS NOTES
Discharge orders received. Paperwork reviewed and signed. Prescriptions given. No questions at this time.

## 2017-11-29 NOTE — CARE PLAN
Problem: Safety  Goal: Will remain free from falls  Outcome: PROGRESSING AS EXPECTED  Pt is up self and steady.  Ambulates to NICU without difficulty.  Denies any sob or dizziness.  Will continue to monitor

## 2017-11-29 NOTE — PROGRESS NOTES
Pt care assumed and assessment completed.  VSS Fundus firm and lochia light .  Steri strips over lower abd incision CDI old drainage noted.  Pt up self and steady. Ice packs provided and medicated pain per MAR.  Pumping without difficulty, breast milk delivered to NICU.  Q2 rounds in place and call light within reach

## 2017-11-29 NOTE — DISCHARGE INSTRUCTIONS
POSTPARTUM DISCHARGE INSTRUCTIONS FOR MOM    YOB: 1990   Age: 27 y.o.               Admit Date: 2017     Discharge Date: 2017  Attending Doctor:  Asmita Bolivar M.D.                  Allergies:  Patient has no known allergies.    Discharged to home by car. Discharged via wheelchair, hospital escort: Yes.  Special equipment needed: Not Applicable  Belongings with: Personal  Be sure to schedule a follow-up appointment with your primary care doctor or any specialists as instructed.     Discharge Plan:   Diet Plan: Discussed  Activity Level: Discussed  Confirmed Follow up Appointment: Patient to Call and Schedule Appointment  Confirmed Symptoms Management: Discussed  Medication Reconciliation Updated: Yes  Influenza Vaccine Indication: Indicated: 9 to 64 years of age  Influenza Vaccine Given - only chart on this line when given: Influenza Vaccine Given (See MAR)    REASONS TO CALL YOUR OBSTETRICIAN:  1.   Persistent fever or shaking chills (Temperature higher than 100.4)  2.   Heavy bleeding (soaking more than 1 pad per hour); Passing clots  3.   Foul odor from vagina  4.   Mastitis (Breast infection; breast pain, chills, fever, redness)  5.   Urinary pain, burning or frequency  6.   Episiotomy infection  7.   Abdominal incision infection  8.   Severe depression longer than 24 hours    HAND WASHING  · Prior to handling the baby.  · Before breastfeeding or bottle feeding baby.  · After using the bathroom or changing the baby's diaper.    WOUND CARE  Ask your physician for additional care instructions.  In general:    ·  Incision:      · Keep clean and dry.    · Do NOT lift anything heavier than your baby for up to 6 weeks.    · There should not be any opening or pus.      VAGINAL CARE  · Nothing inside vagina for 6 weeks: no sexual intercourse, tampons or douching.  · Bleeding may continue for 2-4 weeks.  Amount may vary.    · Call your physician for heavy bleeding which means soaking  "more than 1 pad per hour    BIRTH CONTROL  · It is possible to become pregnant at any time after delivery and while breastfeeding.  · Plan to discuss a method of birth control with your physician at your follow up visit. visit.    DIET AND ELIMINATION  · Eating more fiber (bran cereal, fruits, and vegetables) and drinking plenty of fluids will help to avoid constipation.  · Urinary frequency after childbirth is normal.    POSTPARTUM BLUES  During the first few days after birth, you may experience a sense of the \"blues\" which may include impatience, irritability or even crying.  These feeling come and go quickly.  However, as many as 1 in 10 women experience emotional symptoms known as postpartum depression.    Postpartum depression:  May start as early as the second or third day after delivery or take several weeks or months to develop.  Symptoms of \"blues\" are present, but are more intense:  Crying spells; loss of appetite; feelings of hopelessness or loss of control; fear of touching the baby; over concern or no concern at all about the baby; little or no concern about your own appearance/caring for yourself; and/or inability to sleep or excessive sleeping.  Contact your physician if you are experiencing any of these symptoms.    Crisis Hotline:  · [place] Crisis Hotline:  1-627-STQGZEN  Or 1-247.395.8439  · [place] Crisis Hotline:  1-645.811.7511  Or 995-230-8360    PREVENTING SHAKEN BABY:  If you are angry or stressed, PUT THE BABY IN THE CRIB, step away, take some deep breaths, and wait until you are calm to care for the baby.  DO NOT SHAKE THE BABY.  You are not alone, call a supporter for help.    · Crisis Call Center 24/7 crisis line 328-148-5640 or 1-141.523.5652  · You can also text them, text \"ANSWER\" to 147424    QUIT SMOKING/TOBACCO USE:  I understand the use of any tobacco products increases my chance of suffering from future heart disease and could cause other illnesses which may shorten my life. " Quitting the use of tobacco products is the single most important thing I can do to improve my health. For further information on smoking / tobacco cessation call a Toll Free Quit Line at 1-950.204.2488 (*[facility] [place]) or 1-302.373.8056 (American Lung Association) or you can access the web based program at www.lungusa.org.    · [place] Tobacco Users Help Line:  (581) 205-2473       Toll Free: 1-990.375.4991  · Quit Tobacco Program [facility] (150)163-1365    DEPRESSION / SUICIDE RISK:  As you are discharged from this [facility], it is important to learn how to keep safe from harming yourself.    Recognize the warning signs:  · Abrupt changes in personality, positive or negative- including increase in energy   · Giving away possessions  · Change in eating patterns- significant weight changes-  positive or negative  · Change in sleeping patterns- unable to sleep or sleeping all the time   · Unwillingness or inability to communicate  · Depression  · Unusual sadness, discouragement and loneliness  · Talk of wanting to die  · Neglect of personal appearance   · Rebelliousness- reckless behavior  · Withdrawal from people/activities they love  · Confusion- inability to concentrate     If you or a loved one observes any of these behaviors or has concerns about self-harm, here's what you can do:  · Talk about it- your feelings and reasons for harming yourself  · Remove any means that you might use to hurt yourself (examples: pills, rope, extension cords, firearm)  · Get professional help from the community (Mental Health, Substance Abuse, psychological counseling)  · Do not be alone:Call your Safe Contact- someone whom you trust who will be there for you.  · Call your local CRISIS HOTLINE 525-5357 or 726-003-8257  · Call your local Children's Mobile Crisis Response Team [place] (026) 151-5872 or www.[employer]  · Call the toll free National Suicide Prevention Hotlines    · National Suicide Prevention Lifeline 249-219-VPUC (2039)  · National Hope Line Network 800-SUICIDE (944-3998)    DISCHARGE SURVEY:  Thank you for choosing [facility].  We hope we provided you with very good care.  You may be receiving a survey in the mail.  Please fill it out.  Your opinion is valuable to us.    ADDITIONAL EDUCATIONAL MATERIALS GIVEN TO PATIENT:        My signature on this form indicates that:  1.  I have reviewed and understand the above information  2.  My questions regarding this information have been answered to my satisfaction.  3.  I have formulated a plan with my discharge nurse to obtain my prescribed medication for home.

## 2017-11-29 NOTE — DISCHARGE SUMMARY
"Discharge Summary    Admission Date: 17    Discharge Date: 17    Admission Diagnosis:  premature rupture of membranes    Discharge Diagnosis: Status post repeat  delivery    Subjective: Denies complaints this morning. Ambulating. Scant lochia. Eating and voiding without difficulty. Pain controlled.     /62   Pulse 78   Temp 36.5 °C (97.7 °F)   Resp 18   Ht 1.651 m (5' 5\")   Wt 92.6 kg (204 lb 2.3 oz)   SpO2 94%   BMI 33.97 kg/m²     GEN:laying bed comfortable  GI: soft, non tender, non distended, Pfannenstiel incision c/d/i  : fundus firm and below umbilicus  EXT: no edema    Hospital Course: Tonia Trevizo is a 26 yo  admitted at 27wk5d with PPROM. She had a history of previous  delivery x2. She received a full steroid course with the assistance of magnesium tocolysis. She was completed a full course of latency antibiotics. She began have leakage of bloody fluid approximately 1-2 weeks after admission and developed painful contractions at 30wk3d. Although she was not demonstrating cervical change, her fetal tracing developed variable decelerations potentially worrisome for impending uterine rupture. She was started on magnesium again for neuroprotection during delivery and received a rescue dose of steroids. She underwent a RLTCS via Pfannenstiel incision. Delivery of a viable male infant \"John\" with frankly bloody amniotic fluid concerning for chronic abruption. APGARS 8/9. EBL 500cc. She was meeting all post operative goals and ready for discharge home on POD #4.    Discharge Instructions   1. Diet: General   2. Activity: pelvic rest for 6 weeks, no heavy lifting, pushing, pulling >10 lbs for six weeks, keep incisions clean and dry     Tonia Trevizo   Home Medication Instructions MARGOT:[id number]    Printed on:17 8291   Medication Information                      ferrous sulfate 325 (65 Fe) MG tablet  Take 1 Tab by mouth every morning with breakfast.      "        ibuprofen (MOTRIN) 800 MG Tab  Take 1 Tab by mouth every 8 hours as needed (For cramping after delivery; do not give if patient is receiving ketorolac (Toradol)).             Omega-3 Fatty Acids (FISH OIL) 1200 MG CAPS  Take  by mouth every day.             oxycodone-acetaminophen (PERCOCET) 5-325 MG Tab  Take 1 Tab by mouth every four hours as needed (for Moderate Pain (Pain Scale 4-6) after delivery).             Prenatal MV-Min-Fe Fum-FA-DHA (PRENATAL 1 PO)  Take 1 Tab by mouth every day.                 Follow up: 2 weeks with Dr Bolivar in office    Complications: none    Asmita Bolivar M.D.

## 2017-11-29 NOTE — PROGRESS NOTES
Assumed pt care. Assessment complete. VSS. Fundus firm, lochia scant. Incision ALEXEY, no drainage. Pt ambulating and voiding without difficulty. Pt would like pain medication as needed. Call light within reach. Will continue to monitor.

## 2018-01-11 ENCOUNTER — OFFICE VISIT (OUTPATIENT)
Dept: MEDICAL GROUP | Facility: MEDICAL CENTER | Age: 28
End: 2018-01-11
Attending: NURSE PRACTITIONER
Payer: MEDICAID

## 2018-01-11 VITALS
TEMPERATURE: 97.6 F | HEIGHT: 65 IN | DIASTOLIC BLOOD PRESSURE: 76 MMHG | RESPIRATION RATE: 16 BRPM | SYSTOLIC BLOOD PRESSURE: 100 MMHG | BODY MASS INDEX: 30.82 KG/M2 | OXYGEN SATURATION: 98 % | WEIGHT: 185 LBS | HEART RATE: 56 BPM

## 2018-01-11 DIAGNOSIS — B35.1 FUNGAL TOENAIL INFECTION: ICD-10-CM

## 2018-01-11 DIAGNOSIS — Z13.1 SCREENING FOR DIABETES MELLITUS: ICD-10-CM

## 2018-01-11 DIAGNOSIS — Z13.220 SCREENING CHOLESTEROL LEVEL: ICD-10-CM

## 2018-01-11 DIAGNOSIS — L60.8 TOENAIL DEFORMITY: ICD-10-CM

## 2018-01-11 DIAGNOSIS — F41.9 ANXIETY: ICD-10-CM

## 2018-01-11 DIAGNOSIS — Z13.0 SCREENING, IRON DEFICIENCY ANEMIA: ICD-10-CM

## 2018-01-11 DIAGNOSIS — E66.9 OBESITY (BMI 30-39.9): ICD-10-CM

## 2018-01-11 DIAGNOSIS — Z13.21 ENCOUNTER FOR VITAMIN DEFICIENCY SCREENING: ICD-10-CM

## 2018-01-11 DIAGNOSIS — Z13.29 SCREENING FOR THYROID DISORDER: ICD-10-CM

## 2018-01-11 PROBLEM — Z98.891 S/P CESAREAN SECTION: Status: RESOLVED | Noted: 2017-11-29 | Resolved: 2018-01-11

## 2018-01-11 PROBLEM — O42.919 PRETERM PREMATURE RUPTURE OF MEMBRANES (PPROM) WITH UNKNOWN ONSET OF LABOR: Status: RESOLVED | Noted: 2017-11-04 | Resolved: 2018-01-11

## 2018-01-11 PROCEDURE — 99203 OFFICE O/P NEW LOW 30 MIN: CPT | Performed by: NURSE PRACTITIONER

## 2018-01-11 PROCEDURE — 99214 OFFICE O/P EST MOD 30 MIN: CPT | Performed by: NURSE PRACTITIONER

## 2018-01-11 RX ORDER — FLUCONAZOLE 150 MG/1
TABLET ORAL
Qty: 6 TAB | Refills: 0 | Status: SHIPPED | OUTPATIENT
Start: 2018-01-11 | End: 2018-10-17

## 2018-01-11 RX ORDER — SERTRALINE HYDROCHLORIDE 25 MG/1
25 TABLET, FILM COATED ORAL DAILY
Qty: 30 TAB | Refills: 3 | Status: SHIPPED | OUTPATIENT
Start: 2018-01-11 | End: 2018-02-01

## 2018-01-11 RX ORDER — CLOTRIMAZOLE AND BETAMETHASONE DIPROPIONATE 10; .64 MG/G; MG/G
CREAM TOPICAL
Qty: 1 TUBE | Refills: 2 | Status: SHIPPED | OUTPATIENT
Start: 2018-01-11 | End: 2018-10-17

## 2018-01-11 ASSESSMENT — PATIENT HEALTH QUESTIONNAIRE - PHQ9: CLINICAL INTERPRETATION OF PHQ2 SCORE: 0

## 2018-01-11 ASSESSMENT — PAIN SCALES - GENERAL: PAINLEVEL: NO PAIN

## 2018-01-11 NOTE — ASSESSMENT & PLAN NOTE
Pt reports large toe nails have dark area under each nail  Did not get better w Tea Tree oil BID and has not helped.

## 2018-02-01 ENCOUNTER — OFFICE VISIT (OUTPATIENT)
Dept: MEDICAL GROUP | Facility: MEDICAL CENTER | Age: 28
End: 2018-02-01
Attending: NURSE PRACTITIONER
Payer: MEDICAID

## 2018-02-01 VITALS
HEART RATE: 84 BPM | WEIGHT: 186 LBS | DIASTOLIC BLOOD PRESSURE: 70 MMHG | TEMPERATURE: 96.8 F | OXYGEN SATURATION: 99 % | RESPIRATION RATE: 16 BRPM | SYSTOLIC BLOOD PRESSURE: 100 MMHG | BODY MASS INDEX: 29.89 KG/M2 | HEIGHT: 66 IN

## 2018-02-01 DIAGNOSIS — E55.9 VITAMIN D DEFICIENCY: ICD-10-CM

## 2018-02-01 DIAGNOSIS — F41.9 ANXIETY: ICD-10-CM

## 2018-02-01 DIAGNOSIS — L60.8 TOENAIL DEFORMITY: ICD-10-CM

## 2018-02-01 PROCEDURE — 99213 OFFICE O/P EST LOW 20 MIN: CPT | Performed by: NURSE PRACTITIONER

## 2018-02-01 NOTE — ASSESSMENT & PLAN NOTE
All labs reviewed, Discussed mildly low Vit d and why optimal levels  Are important to her health.  Recommend low dose supplement.

## 2018-02-01 NOTE — ASSESSMENT & PLAN NOTE
Pt reports no change in mood from Zoloft for her anxiety.  Is taking Zoloft 25 and wants to try increasing.    Pt also reports needs DETR paperwork filled out but does not have it with her.  Have asked her to make appt next week to do the DETR papers.

## 2018-02-01 NOTE — PROGRESS NOTES
Tonia presents to the clinic for Lab Results    Her PMH includes:  Anxiety  ACl Reconstruction of Right Ankle  Obesity  LBP/Back Strain  Strep Throat  Toenail darkening  Breast Feeding Currently (1/11/18)  Vitamin D Deficiency     [place]  RE[place]:  11/29/17 Percocet 5/325 # 20 by Maureen Bolivar     Review of Records:  1/11/18 Clinic New Patient, Breast feeding, Toenail deformity, Anxiety  LAbs ordered, RX for Lotrisone, Diflucan, Zoloft.    Results REview:  CBC- WBC, H and H normal, iron levels normal, B12 normal, A1c= 5.1, CMP normal except ALT= 37.7, Lipids normal  Vitamin D= 28, low.    Chief Complaint: Results, Wants DETR papers completed in near future    HPI:      Vitamin D deficiency  All labs reviewed, Discussed mildly low Vit d and why optimal levels  Are important to her health.  Recommend low dose supplement.    Anxiety  Pt reports no change in mood from Zoloft for her anxiety.  Is taking Zoloft 25 and wants to try increasing.    Pt also reports needs DETR paperwork filled out but does not have it with her.  Have asked her to make appt next week to do the DETR papers.      Patient Active Problem List    Diagnosis Date Noted   • Vitamin D deficiency 02/01/2018   • Toenail deformity 01/11/2018   • Obesity (BMI 30-39.9) 01/11/2018   • Anxiety 01/11/2018       Allergies:Patient has no known allergies.    Current Outpatient Prescriptions   Medication Sig Dispense Refill   • Cholecalciferol 1000 UNIT Cap Take 1 Cap by mouth every day. 30 Cap 5   • sertraline (ZOLOFT) 50 MG Tab Take 1 Tab by mouth every day. 30 Tab 2   • clotrimazole-betamethasone (LOTRISONE) 1-0.05 % Cream Apply to affected toes twice daily 1 Tube 2   • fluconazole (DIFLUCAN) 150 MG tablet Take one tablet day 1, then one tablet day 3, then one tablet day 7, May repeat in 2 weeks 6 Tab 0   • ferrous sulfate 325 (65 Fe) MG tablet Take 1 Tab by mouth every morning with breakfast. 60 Tab 0   • Prenatal MV-Min-Fe Fum-FA-DHA (PRENATAL 1 PO) Take 1  "Tab by mouth every day.       No current facility-administered medications for this visit.        Social History   Substance Use Topics   • Smoking status: Never Smoker   • Smokeless tobacco: Never Used   • Alcohol use No       Family History   Problem Relation Age of Onset   • Diabetes     • Hypertension         ROS:  Review of Systems   See HPI Above        Exam:  Blood pressure 100/70, pulse 84, temperature 36 °C (96.8 °F), resp. rate 16, height 1.676 m (5' 6\"), weight 84.4 kg (186 lb), SpO2 99 %, currently breastfeeding.  General:  Well nourished, well developed female in NAD  HENT:Head is grossly normal. PERRL.  Neck: Supple. Trachea is midline.  Pulmonary: Clear to ausculation .  Normal effort. No rales, ronchi, or wheezing.   Cardiovascular: Regular rate and rhythm.  Abdomen-Abdomen is soft, No tenderness.  Upper extremities-  Good ROM  Lower extremities- neg for edema, redness, tenderness.  Neuro- A & O x 4. Speech clear and appropriate.     Current medications, allergies, and problem list reviewed with patient and updated in  [facility] today.    Assessment/Plan:  1. Vitamin D deficiency  Cholecalciferol 1000 UNIT Cap   2. Toenail deformity  REFERRAL TO PODIATRY   3. Anxiety  sertraline (ZOLOFT) 50 MG Tab-increase in dose   Follow up in 2 weeks for DETR papers. Call or return if questions, concerns, or worsening condition.     "

## 2018-02-06 ENCOUNTER — OFFICE VISIT (OUTPATIENT)
Dept: MEDICAL GROUP | Facility: MEDICAL CENTER | Age: 28
End: 2018-02-06
Attending: NURSE PRACTITIONER
Payer: MEDICAID

## 2018-02-06 VITALS
OXYGEN SATURATION: 100 % | WEIGHT: 187 LBS | HEART RATE: 60 BPM | HEIGHT: 66 IN | BODY MASS INDEX: 30.05 KG/M2 | TEMPERATURE: 97.8 F | SYSTOLIC BLOOD PRESSURE: 90 MMHG | RESPIRATION RATE: 16 BRPM | DIASTOLIC BLOOD PRESSURE: 60 MMHG

## 2018-02-06 DIAGNOSIS — Z56.0 OUT OF WORK: ICD-10-CM

## 2018-02-06 PROCEDURE — 7101 PR PHYSICAL: Performed by: NURSE PRACTITIONER

## 2018-02-06 PROCEDURE — 99213 OFFICE O/P EST LOW 20 MIN: CPT | Performed by: NURSE PRACTITIONER

## 2018-02-06 SDOH — ECONOMIC STABILITY - INCOME SECURITY: UNEMPLOYMENT, UNSPECIFIED: Z56.0

## 2018-02-06 NOTE — PROGRESS NOTES
"Tonia presents to the clinic for DETR paperwork r/t her previous pregnancy issue.    Her PMH includes:    Anxiety  ACl Reconstruction of Right Ankle  Obesity  LBP/Back Strain  Strep Throat  Toenail darkening  Breast Feeding Currently (18)  Vitamin D Deficiency     [place]  RE[place]  17 Percocet 5/325 # 20 by Maureen Bolivar      Review of Records:  18 Clinic for Results Review, RX for Vitamin D,  Increase Zoloft to 50 mg at hs for Anxiety, Referred to Podiatry for toenail darkening  But no Podiatry accepting her insurance.    18 Clinic New Patient, Breast feeding, Toenail deformity, Anxiety  LAbs ordered, RX for Lotrisone, Diflucan, Zoloft.    Chief Complaint: Paperwork for DETR r/t issues w complicate pregnancy in 2017.    HPI:      Out of work  Pt reports is actively looking for work.  Is asking for me to complete DETR paper work r/t her inability to work due to her hospitalization from  17 through 17 ( Premature Rupture of Membranes followed by ).  D/C notes by Ob GYN recommend: No heavy lifting, or pushing/pulling > 10 lbs for 6 weeks.  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  After interviewing Tonia and reviewing the Medical Record I filled out the DETR paperwork  And asked Adrienne LARSEN to fax it to [facility] per directions, scanned it into our records,  And original given to Tonia.          Patient Active Problem List    Diagnosis Date Noted   • Out of work 2018   • Vitamin D deficiency 2018   • Toenail deformity 2018   • Obesity (BMI 30-39.9) 2018   • Anxiety 2018       Allergies:Patient has no known allergies.    Current Outpatient Prescriptions   Medication Sig Dispense Refill   • Cholecalciferol 1000 UNIT Cap Take 1 Cap by mouth every day. 30 Cap 5   • sertraline (ZOLOFT) 50 MG Tab Take 1 Tab by mouth every day. 30 Tab 2   • clotrimazole-betamethasone (LOTRISONE) 1-0.05 % Cream Apply to affected toes twice daily 1 Tube 2   • " "fluconazole (DIFLUCAN) 150 MG tablet Take one tablet day 1, then one tablet day 3, then one tablet day 7, May repeat in 2 weeks 6 Tab 0   • ferrous sulfate 325 (65 Fe) MG tablet Take 1 Tab by mouth every morning with breakfast. 60 Tab 0   • Prenatal MV-Min-Fe Fum-FA-DHA (PRENATAL 1 PO) Take 1 Tab by mouth every day.       No current facility-administered medications for this visit.        Social History   Substance Use Topics   • Smoking status: Never Smoker   • Smokeless tobacco: Never Used   • Alcohol use No       Family History   Problem Relation Age of Onset   • Diabetes     • Hypertension         ROS:  Review of Systems   See HPI Above        Exam:  Blood pressure (!) 90/60, pulse 60, temperature 36.6 °C (97.8 °F), resp. rate 16, height 1.67 m (5' 5.75\"), weight 84.8 kg (187 lb), SpO2 100 %, currently breastfeeding.  General:  Well nourished, well developed female in NAD  HENT:Head is grossly normal. PERRL.  Neck: Supple. Trachea is midline.  Pulmonary: speaks in full sentences w ease. Normal effort.   Cardiovascular: Regular rate and rhythm.  Abdomen-Abdomen is soft  Upper extremities- Good ROM  Lower extremities- neg for edema, redness, tenderness.  Neuro- A & O x 4. Speech clear and appropriate.     Current medications, allergies, and problem list reviewed with patient and updated in  [facility] today.    Assessment/Plan:  1. Out of work  DETR paperwork completed after interviewing patient and reviewing medical records.  (15 minutes)   Follow up as needed. Call or return if questions, concerns, or worsening condition.       "

## 2018-02-06 NOTE — ASSESSMENT & PLAN NOTE
"Pt reports is actively looking for work.  Is asking for me to complete DETR paper work r/t her inability to work due to her hospitalization from  17 through 17 ( Premature Rupture of Membranes followed by ).  D/C notes by Ob GYN recommend: No heavy lifting, or pushing/pulling > 10 lbs for 6 weeks.  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  After interviewing Tonia and reviewing the Medical Record I filled out the DETR paperwork  And asked Adrienne LARSEN to fax it to [facility] per directions, scanned it into our records,  And original given to Tonia.      "

## 2018-05-02 DIAGNOSIS — F41.9 ANXIETY: ICD-10-CM

## 2018-05-22 ENCOUNTER — HOSPITAL ENCOUNTER (OUTPATIENT)
Facility: MEDICAL CENTER | Age: 28
End: 2018-05-22
Attending: PREVENTIVE MEDICINE
Payer: COMMERCIAL

## 2018-05-22 ENCOUNTER — EH NON-PROVIDER (OUTPATIENT)
Dept: OCCUPATIONAL MEDICINE | Facility: CLINIC | Age: 28
End: 2018-05-22

## 2018-05-22 ENCOUNTER — EMPLOYEE HEALTH (OUTPATIENT)
Dept: OCCUPATIONAL MEDICINE | Facility: CLINIC | Age: 28
End: 2018-05-22

## 2018-05-22 VITALS
WEIGHT: 198 LBS | RESPIRATION RATE: 16 BRPM | DIASTOLIC BLOOD PRESSURE: 84 MMHG | SYSTOLIC BLOOD PRESSURE: 124 MMHG | HEIGHT: 65 IN | OXYGEN SATURATION: 95 % | BODY MASS INDEX: 32.99 KG/M2 | HEART RATE: 107 BPM | TEMPERATURE: 97.7 F

## 2018-05-22 VITALS
DIASTOLIC BLOOD PRESSURE: 84 MMHG | SYSTOLIC BLOOD PRESSURE: 124 MMHG | TEMPERATURE: 97.7 F | RESPIRATION RATE: 16 BRPM | HEIGHT: 65 IN | WEIGHT: 198 LBS | BODY MASS INDEX: 32.99 KG/M2 | OXYGEN SATURATION: 95 % | HEART RATE: 107 BPM

## 2018-05-22 DIAGNOSIS — Z56.89 OCCUPATIONAL HEALTH PROBLEM: ICD-10-CM

## 2018-05-22 DIAGNOSIS — Z02.1 PRE-EMPLOYMENT HEALTH SCREENING EXAMINATION: ICD-10-CM

## 2018-05-22 PROCEDURE — 86480 TB TEST CELL IMMUN MEASURE: CPT | Performed by: PREVENTIVE MEDICINE

## 2018-05-22 PROCEDURE — 8915 PR COMPREHENSIVE PHYSICAL: Performed by: PREVENTIVE MEDICINE

## 2018-05-22 ASSESSMENT — VISUAL ACUITY
OS_CC: 20/15
OD_CC: 20/20

## 2018-05-23 LAB
M TB TUBERC IFN-G BLD QL: NEGATIVE
M TB TUBERC IFN-G/MITOGEN IGNF BLD: 0
M TB TUBERC IGNF/MITOGEN IGNF CONTROL: 43.62 [IU]/ML
MITOGEN IGNF BCKGRD COR BLD-ACNC: 0.03 [IU]/ML

## 2018-05-25 ENCOUNTER — DOCUMENTATION (OUTPATIENT)
Dept: OCCUPATIONAL MEDICINE | Facility: CLINIC | Age: 28
End: 2018-05-25

## 2018-06-04 ENCOUNTER — APPOINTMENT (OUTPATIENT)
Dept: LAB | Facility: MEDICAL CENTER | Age: 28
End: 2018-06-04
Payer: COMMERCIAL

## 2018-06-14 ENCOUNTER — HOSPITAL ENCOUNTER (OUTPATIENT)
Dept: LAB | Facility: MEDICAL CENTER | Age: 28
End: 2018-06-14
Payer: COMMERCIAL

## 2018-06-14 LAB
BDY FAT % MEASURED: 35.8 %
BP DIAS: 56 MMHG
BP SYS: 112 MMHG
CHOLEST SERPL-MCNC: 141 MG/DL (ref 100–199)
DIABETES HTDIA: NO
EVENT NAME HTEVT: NORMAL
FASTING HTFAS: YES
GLUCOSE SERPL-MCNC: 94 MG/DL (ref 65–99)
HDLC SERPL-MCNC: 39 MG/DL
HYPERTENSION HTHYP: NO
LDLC SERPL CALC-MCNC: 65 MG/DL
SCREENING LOC CITY HTCIT: NORMAL
SCREENING LOC STATE HTSTA: NORMAL
SCREENING LOCATION HTLOC: NORMAL
SMOKING HTSMO: NO
SUBSCRIBER ID HTSID: NORMAL
TRIGL SERPL-MCNC: 184 MG/DL (ref 0–149)

## 2018-07-03 DIAGNOSIS — E55.9 VITAMIN D DEFICIENCY: ICD-10-CM

## 2018-07-05 DIAGNOSIS — E55.9 VITAMIN D DEFICIENCY: ICD-10-CM

## 2018-07-05 NOTE — TELEPHONE ENCOUNTER
Was the patient seen in the last year in this department? Yes     Does patient have an active prescription for medications requested? No     Received Request Via: Pharmacy   Future Appointments       Provider Department [employer]    7/5/2018 11:13 AM [facility]

## 2018-07-18 ENCOUNTER — HOSPITAL ENCOUNTER (OUTPATIENT)
Dept: LAB | Facility: MEDICAL CENTER | Age: 28
End: 2018-07-18
Attending: FAMILY MEDICINE
Payer: COMMERCIAL

## 2018-07-18 LAB
T3FREE SERPL-MCNC: 3.92 PG/ML (ref 2.4–4.2)
T4 FREE SERPL-MCNC: 0.88 NG/DL (ref 0.53–1.43)
TSH SERPL DL<=0.005 MIU/L-ACNC: 0.53 UIU/ML (ref 0.38–5.33)

## 2018-07-18 PROCEDURE — 84439 ASSAY OF FREE THYROXINE: CPT

## 2018-07-18 PROCEDURE — 84443 ASSAY THYROID STIM HORMONE: CPT

## 2018-07-18 PROCEDURE — 36415 COLL VENOUS BLD VENIPUNCTURE: CPT

## 2018-07-18 PROCEDURE — 84481 FREE ASSAY (FT-3): CPT

## 2018-07-27 ENCOUNTER — HOSPITAL ENCOUNTER (EMERGENCY)
Facility: MEDICAL CENTER | Age: 28
End: 2018-07-27
Attending: EMERGENCY MEDICINE
Payer: COMMERCIAL

## 2018-07-27 VITALS
SYSTOLIC BLOOD PRESSURE: 118 MMHG | BODY MASS INDEX: 33.43 KG/M2 | DIASTOLIC BLOOD PRESSURE: 77 MMHG | HEIGHT: 65 IN | RESPIRATION RATE: 16 BRPM | TEMPERATURE: 97.6 F | HEART RATE: 90 BPM | OXYGEN SATURATION: 94 % | WEIGHT: 200.62 LBS
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
## 2018-07-27 DIAGNOSIS — S29.019A THORACIC MYOFASCIAL STRAIN, INITIAL ENCOUNTER: ICD-10-CM

## 2018-07-27 DIAGNOSIS — S16.1XXA STRAIN OF NECK MUSCLE, INITIAL ENCOUNTER: ICD-10-CM

## 2018-07-27 PROCEDURE — 700102 HCHG RX REV CODE 250 W/ 637 OVERRIDE(OP): Performed by: EMERGENCY MEDICINE

## 2018-07-27 PROCEDURE — A9270 NON-COVERED ITEM OR SERVICE: HCPCS | Performed by: EMERGENCY MEDICINE

## 2018-07-27 PROCEDURE — 99284 EMERGENCY DEPT VISIT MOD MDM: CPT

## 2018-07-27 RX ORDER — IBUPROFEN 200 MG
200 TABLET ORAL EVERY 6 HOURS PRN
Status: SHIPPED | COMMUNITY
End: 2018-11-08

## 2018-07-27 RX ORDER — DIAZEPAM 2 MG/1
2 TABLET ORAL ONCE
Status: COMPLETED | OUTPATIENT
Start: 2018-07-27 | End: 2018-07-27

## 2018-07-27 RX ADMIN — DIAZEPAM 2 MG: 2 TABLET ORAL at 19:49

## 2018-07-27 ASSESSMENT — ENCOUNTER SYMPTOMS
DIZZINESS: 0
BACK PAIN: 1
CHILLS: 0
NECK PAIN: 1
EYES NEGATIVE: 1
VOMITING: 0
PSYCHIATRIC NEGATIVE: 1
NAUSEA: 0
HEADACHES: 0
ABDOMINAL PAIN: 0
BRUISES/BLEEDS EASILY: 0
SHORTNESS OF BREATH: 0
FEVER: 0

## 2018-07-27 ASSESSMENT — PAIN SCALES - GENERAL
PAINLEVEL_OUTOF10: 4
PAINLEVEL_OUTOF10: 4

## 2018-07-28 NOTE — ED NOTES
Discharge information provided. Pt verbalized understanding of discharge instructions to follow up with PCP and to return to ER if condition worsens. Pt expressed the awareness of not driving or operating heavy machinery, has ride home with S.O. Pt ambulated out of ER in a steady gait, no additional questions or concerns.
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
## 2018-08-09 ENCOUNTER — HOSPITAL ENCOUNTER (OUTPATIENT)
Dept: RADIOLOGY | Facility: MEDICAL CENTER | Age: 28
End: 2018-08-09
Attending: FAMILY MEDICINE
Payer: COMMERCIAL

## 2018-08-09 DIAGNOSIS — M54.5 LOW BACK PAIN, UNSPECIFIED BACK PAIN LATERALITY, UNSPECIFIED CHRONICITY, WITH SCIATICA PRESENCE UNSPECIFIED: ICD-10-CM

## 2018-08-09 PROCEDURE — 72100 X-RAY EXAM L-S SPINE 2/3 VWS: CPT

## 2018-08-31 ENCOUNTER — OFFICE VISIT (OUTPATIENT)
Dept: ENDOCRINOLOGY | Facility: MEDICAL CENTER | Age: 28
End: 2018-08-31
Payer: COMMERCIAL

## 2018-08-31 ENCOUNTER — HOSPITAL ENCOUNTER (OUTPATIENT)
Dept: RADIOLOGY | Facility: MEDICAL CENTER | Age: 28
End: 2018-08-31
Attending: ORTHOPAEDIC SURGERY
Payer: COMMERCIAL

## 2018-08-31 VITALS
WEIGHT: 198.2 LBS | DIASTOLIC BLOOD PRESSURE: 64 MMHG | HEIGHT: 66 IN | SYSTOLIC BLOOD PRESSURE: 110 MMHG | OXYGEN SATURATION: 96 % | HEART RATE: 95 BPM | BODY MASS INDEX: 31.85 KG/M2

## 2018-08-31 DIAGNOSIS — E66.9 OBESITY (BMI 30-39.9): ICD-10-CM

## 2018-08-31 DIAGNOSIS — M25.561 RIGHT KNEE PAIN, UNSPECIFIED CHRONICITY: ICD-10-CM

## 2018-08-31 PROCEDURE — 99213 OFFICE O/P EST LOW 20 MIN: CPT | Performed by: PHYSICIAN ASSISTANT

## 2018-08-31 PROCEDURE — 73721 MRI JNT OF LWR EXTRE W/O DYE: CPT | Mod: RT

## 2018-08-31 NOTE — PROGRESS NOTES
New Patient Consult Note for Endocrinology  Referred by: Dylon López M.D.    Reason for consult:  Weight loss    HPI:  Tonia Xavier is a 28 y.o. old patient who is seeing us today for care.  This is a pleasant patient and I appreciate the opportunity to participate in the care of this patient.  This is a new patient with me today.      1. Obesity (BMI 30-39.9)  This is a new patient with me on 8/31/18  She is here for weight loss    She is 198 pounds and BMI 31.99, TSH 0.530,   Labs of 11/5/17 Fasting was 117  Glucose TT on 11/8/17 was normal    She is interested in getting back down to a weight before she was pregnant.       ROS:   Constitutional: No change in weight , No fatigue, No night sweats.  HEENT: No Headache.  Eyes:  No blurred vision, No visual changes.  Cardiac: No chest pain, No palpitations.  Resp: No shortness of breath, No cough,   Gastro: No nausea or vomiting, No diarrhea.  Neuro: Denies numbness or tinging in bilateral feet or hands, and no loss of sensation.  Endo: No heat or cold intolerance.  : No polyuria, No polydipsia, No chronic UTI's.  Lower extremities: No lower leg edema bilateral.  All other systems were reviewed and were negative.    Past Medical History:  Patient Active Problem List    Diagnosis Date Noted   • Out of work 02/06/2018   • Vitamin D deficiency 02/01/2018   • Toenail deformity 01/11/2018   • Obesity (BMI 30-39.9) 01/11/2018   • Anxiety 01/11/2018       Past Surgical History:  Past Surgical History:   Procedure Laterality Date   • REPEAT C SECTION  11/25/2017    Procedure: REPEAT C SECTION;  Surgeon: Asmita Bolivar M.D.;  Location: LABOR AND DELIVERY;  Service: Obstetrics   • REPEAT C SECTION  10/19/2015    Procedure: REPEAT C SECTION;  Surgeon: Mary Randolph M.D.;  Location: LABOR AND DELIVERY;  Service:    • ACL RECONSTRUCTION SCOPE  10/2/2014    Performed by Dexter Pimentel M.D. at [facility]   • KNEE ARTHROSCOPY  4/17/2014  "   Performed by Dexter Pimentel M.D. at SURGERY [facility]   • HARDWARE REMOVAL ORTHO  2014    Performed by Dexter Pimentel M.D. at [facility]   • BONE GRAFT  2014    Performed by Dexter Pimentel M.D. at [facility]   • PB  DELIVERY ONLY     • ACL RECONSTRUCTION      right       Allergies:  Patient has no known allergies.    Social History:  Social History     Social History   • Marital status:      Spouse name: N/A   • Number of children: N/A   • Years of education: N/A     Occupational History   • Not on file.     Social History Main Topics   • Smoking status: Never Smoker   • Smokeless tobacco: Never Used   • Alcohol use No   • Drug use: No   • Sexual activity: Yes     Other Topics Concern   • Not on file     Social History Narrative   • No narrative on file       Family History:  Family History   Problem Relation Age of Onset   • Diabetes Unknown    • Hypertension Unknown        Medications:    Current Outpatient Prescriptions:   •  ibuprofen (MOTRIN) 200 MG Tab, Take 200 mg by mouth every 6 hours as needed., Disp: , Rfl:   •  Cholecalciferol 1000 UNIT Cap, Take 1 Cap by mouth every day., Disp: 90 Cap, Rfl: 2  •  sertraline (ZOLOFT) 50 MG Tab, TAKE 1 TABLET BY MOUTH EVERY DAY., Disp: 30 Tab, Rfl: 2  •  clotrimazole-betamethasone (LOTRISONE) 1-0.05 % Cream, Apply to affected toes twice daily, Disp: 1 Tube, Rfl: 2  •  fluconazole (DIFLUCAN) 150 MG tablet, Take one tablet day 1, then one tablet day 3, then one tablet day 7, May repeat in 2 weeks, Disp: 6 Tab, Rfl: 0  •  ferrous sulfate 325 (65 Fe) MG tablet, Take 1 Tab by mouth every morning with breakfast., Disp: 60 Tab, Rfl: 0  •  Prenatal MV-Min-Fe Fum-FA-DHA (PRENATAL 1 PO), Take 1 Tab by mouth every day., Disp: , Rfl:       Physical Examination:   Vital signs: /64   Pulse 95   Ht 1.676 m (5' 6\")   Wt 89.9 kg (198 lb 3.2 oz)   SpO2 96%   BMI 31.99 kg/m²   General: No distress, cooperative, well " dressed and well nourished.   Eyes: No scleral icterus or discharge, No hyposphagma  ENMT: Normal on external inspection of nose, lips, No nasal drainage   Neck: No abnormal masses on inspection  Resp: Normal effort, Bilateral clear to auscultation, No wheezing, No rales  CVS: Regular rate and rhythm, S1 S2 normal, No murmur. No gallop  Extremities: No edema bilateral extremities  Neuro: Alert and oriented  Skin: No rash, No Ulcers  Psych: Normal mood and affect    Assessment and Plan:    1. Obesity (BMI 30-39.9)  Start on Ozempic 0.25 once week for 3 weeks then Increase to 0.5    Return in about 3 weeks (around 9/21/2018).       This patient during there office visit was started on new medication Ozempic.  Side effects of new medications were discussed with the patient today in the office. The patient was supplied paperwork on this new medication.    Thank you kindly for allowing me to participate in the diabetes care plan for this patient.    Dung Anguiano PA-C, BC-ADM  08/31/18    CC:   Dylon López M.D.

## 2018-09-17 ENCOUNTER — HOSPITAL ENCOUNTER (OUTPATIENT)
Dept: LAB | Facility: MEDICAL CENTER | Age: 28
End: 2018-09-17
Attending: FAMILY MEDICINE
Payer: COMMERCIAL

## 2018-09-17 LAB
ALBUMIN SERPL BCP-MCNC: 4.2 G/DL (ref 3.2–4.9)
ALBUMIN/GLOB SERPL: 1.4 G/DL
ALP SERPL-CCNC: 93 U/L (ref 30–99)
ALT SERPL-CCNC: 23 U/L (ref 2–50)
ANION GAP SERPL CALC-SCNC: 6 MMOL/L (ref 0–11.9)
AST SERPL-CCNC: 19 U/L (ref 12–45)
BASOPHILS # BLD AUTO: 0.2 % (ref 0–1.8)
BASOPHILS # BLD: 0.03 K/UL (ref 0–0.12)
BILIRUB SERPL-MCNC: 0.8 MG/DL (ref 0.1–1.5)
BUN SERPL-MCNC: 13 MG/DL (ref 8–22)
CALCIUM SERPL-MCNC: 8.6 MG/DL (ref 8.5–10.5)
CHLORIDE SERPL-SCNC: 105 MMOL/L (ref 96–112)
CHOLEST SERPL-MCNC: 174 MG/DL (ref 100–199)
CO2 SERPL-SCNC: 24 MMOL/L (ref 20–33)
CREAT SERPL-MCNC: 0.66 MG/DL (ref 0.5–1.4)
EOSINOPHIL # BLD AUTO: 0.27 K/UL (ref 0–0.51)
EOSINOPHIL NFR BLD: 1.8 % (ref 0–6.9)
ERYTHROCYTE [DISTWIDTH] IN BLOOD BY AUTOMATED COUNT: 38.5 FL (ref 35.9–50)
EST. AVERAGE GLUCOSE BLD GHB EST-MCNC: 117 MG/DL
FASTING STATUS PATIENT QL REPORTED: NORMAL
FERRITIN SERPL-MCNC: 44.3 NG/ML (ref 10–291)
GLOBULIN SER CALC-MCNC: 2.9 G/DL (ref 1.9–3.5)
GLUCOSE SERPL-MCNC: 97 MG/DL (ref 65–99)
HBA1C MFR BLD: 5.7 % (ref 0–5.6)
HCT VFR BLD AUTO: 43.3 % (ref 37–47)
HDLC SERPL-MCNC: 37 MG/DL
HGB BLD-MCNC: 14.8 G/DL (ref 12–16)
IMM GRANULOCYTES # BLD AUTO: 0.08 K/UL (ref 0–0.11)
IMM GRANULOCYTES NFR BLD AUTO: 0.5 % (ref 0–0.9)
IRON SATN MFR SERPL: 25 % (ref 15–55)
IRON SERPL-MCNC: 111 UG/DL (ref 40–170)
LDLC SERPL CALC-MCNC: 101 MG/DL
LYMPHOCYTES # BLD AUTO: 1.73 K/UL (ref 1–4.8)
LYMPHOCYTES NFR BLD: 11.2 % (ref 22–41)
MCH RBC QN AUTO: 27.2 PG (ref 27–33)
MCHC RBC AUTO-ENTMCNC: 34.2 G/DL (ref 33.6–35)
MCV RBC AUTO: 79.4 FL (ref 81.4–97.8)
MONOCYTES # BLD AUTO: 0.88 K/UL (ref 0–0.85)
MONOCYTES NFR BLD AUTO: 5.7 % (ref 0–13.4)
NEUTROPHILS # BLD AUTO: 12.39 K/UL (ref 2–7.15)
NEUTROPHILS NFR BLD: 80.6 % (ref 44–72)
NRBC # BLD AUTO: 0.02 K/UL
NRBC BLD-RTO: 0.1 /100 WBC
PLATELET # BLD AUTO: 363 K/UL (ref 164–446)
PMV BLD AUTO: 11.5 FL (ref 9–12.9)
POTASSIUM SERPL-SCNC: 4 MMOL/L (ref 3.6–5.5)
PROT SERPL-MCNC: 7.1 G/DL (ref 6–8.2)
RBC # BLD AUTO: 5.45 M/UL (ref 4.2–5.4)
SODIUM SERPL-SCNC: 135 MMOL/L (ref 135–145)
T3FREE SERPL-MCNC: 3.96 PG/ML (ref 2.4–4.2)
T4 FREE SERPL-MCNC: 0.96 NG/DL (ref 0.53–1.43)
TIBC SERPL-MCNC: 441 UG/DL (ref 250–450)
TRIGL SERPL-MCNC: 181 MG/DL (ref 0–149)
TSH SERPL DL<=0.005 MIU/L-ACNC: 0.34 UIU/ML (ref 0.38–5.33)
WBC # BLD AUTO: 15.4 K/UL (ref 4.8–10.8)

## 2018-09-17 PROCEDURE — 84439 ASSAY OF FREE THYROXINE: CPT

## 2018-09-17 PROCEDURE — 80061 LIPID PANEL: CPT

## 2018-09-17 PROCEDURE — 36415 COLL VENOUS BLD VENIPUNCTURE: CPT

## 2018-09-17 PROCEDURE — 83550 IRON BINDING TEST: CPT

## 2018-09-17 PROCEDURE — 84481 FREE ASSAY (FT-3): CPT

## 2018-09-17 PROCEDURE — 80053 COMPREHEN METABOLIC PANEL: CPT

## 2018-09-17 PROCEDURE — 83540 ASSAY OF IRON: CPT

## 2018-09-17 PROCEDURE — 85025 COMPLETE CBC W/AUTO DIFF WBC: CPT

## 2018-09-17 PROCEDURE — 83036 HEMOGLOBIN GLYCOSYLATED A1C: CPT

## 2018-09-17 PROCEDURE — 82728 ASSAY OF FERRITIN: CPT

## 2018-09-17 PROCEDURE — 84443 ASSAY THYROID STIM HORMONE: CPT

## 2018-10-02 ENCOUNTER — HOSPITAL ENCOUNTER (OUTPATIENT)
Dept: LAB | Facility: MEDICAL CENTER | Age: 28
End: 2018-10-02
Attending: FAMILY MEDICINE
Payer: COMMERCIAL

## 2018-10-02 LAB
BASOPHILS # BLD AUTO: 1 % (ref 0–1.8)
BASOPHILS # BLD: 0.1 K/UL (ref 0–0.12)
EOSINOPHIL # BLD AUTO: 0.76 K/UL (ref 0–0.51)
EOSINOPHIL NFR BLD: 7.3 % (ref 0–6.9)
ERYTHROCYTE [DISTWIDTH] IN BLOOD BY AUTOMATED COUNT: 38.4 FL (ref 35.9–50)
HCT VFR BLD AUTO: 40.4 % (ref 37–47)
HGB BLD-MCNC: 13.5 G/DL (ref 12–16)
IMM GRANULOCYTES # BLD AUTO: 0.03 K/UL (ref 0–0.11)
IMM GRANULOCYTES NFR BLD AUTO: 0.3 % (ref 0–0.9)
LYMPHOCYTES # BLD AUTO: 2.7 K/UL (ref 1–4.8)
LYMPHOCYTES NFR BLD: 25.9 % (ref 22–41)
MCH RBC QN AUTO: 26.4 PG (ref 27–33)
MCHC RBC AUTO-ENTMCNC: 33.4 G/DL (ref 33.6–35)
MCV RBC AUTO: 79.1 FL (ref 81.4–97.8)
MONOCYTES # BLD AUTO: 0.47 K/UL (ref 0–0.85)
MONOCYTES NFR BLD AUTO: 4.5 % (ref 0–13.4)
NEUTROPHILS # BLD AUTO: 6.35 K/UL (ref 2–7.15)
NEUTROPHILS NFR BLD: 61 % (ref 44–72)
NRBC # BLD AUTO: 0 K/UL
NRBC BLD-RTO: 0 /100 WBC
PLATELET # BLD AUTO: 302 K/UL (ref 164–446)
PMV BLD AUTO: 11.5 FL (ref 9–12.9)
RBC # BLD AUTO: 5.11 M/UL (ref 4.2–5.4)
TSH SERPL DL<=0.005 MIU/L-ACNC: 0.69 UIU/ML (ref 0.38–5.33)
WBC # BLD AUTO: 10.4 K/UL (ref 4.8–10.8)

## 2018-10-02 PROCEDURE — 86376 MICROSOMAL ANTIBODY EACH: CPT

## 2018-10-02 PROCEDURE — 84439 ASSAY OF FREE THYROXINE: CPT

## 2018-10-02 PROCEDURE — 85025 COMPLETE CBC W/AUTO DIFF WBC: CPT

## 2018-10-02 PROCEDURE — 84481 FREE ASSAY (FT-3): CPT

## 2018-10-02 PROCEDURE — 84443 ASSAY THYROID STIM HORMONE: CPT

## 2018-10-02 PROCEDURE — 36415 COLL VENOUS BLD VENIPUNCTURE: CPT

## 2018-10-02 PROCEDURE — 86800 THYROGLOBULIN ANTIBODY: CPT

## 2018-10-03 LAB
T3FREE SERPL-MCNC: 3.4 PG/ML (ref 2.4–4.2)
T4 FREE SERPL-MCNC: 0.75 NG/DL (ref 0.53–1.43)
THYROPEROXIDASE AB SERPL-ACNC: 0.7 IU/ML (ref 0–9)

## 2018-10-04 LAB — THYROGLOB AB SERPL-ACNC: <0.9 IU/ML (ref 0–4)

## 2018-10-05 ENCOUNTER — IMMUNIZATION (OUTPATIENT)
Dept: OCCUPATIONAL MEDICINE | Facility: CLINIC | Age: 28
End: 2018-10-05
Payer: COMMERCIAL

## 2018-10-05 DIAGNOSIS — Z23 NEED FOR VACCINATION: ICD-10-CM

## 2018-10-17 ENCOUNTER — OFFICE VISIT (OUTPATIENT)
Dept: URGENT CARE | Facility: PHYSICIAN GROUP | Age: 28
End: 2018-10-17
Payer: COMMERCIAL

## 2018-10-17 ENCOUNTER — HOSPITAL ENCOUNTER (OUTPATIENT)
Dept: RADIOLOGY | Facility: MEDICAL CENTER | Age: 28
End: 2018-10-17
Attending: NURSE PRACTITIONER
Payer: COMMERCIAL

## 2018-10-17 VITALS
HEIGHT: 66 IN | TEMPERATURE: 96.9 F | RESPIRATION RATE: 14 BRPM | HEART RATE: 95 BPM | SYSTOLIC BLOOD PRESSURE: 110 MMHG | BODY MASS INDEX: 30.53 KG/M2 | WEIGHT: 190 LBS | DIASTOLIC BLOOD PRESSURE: 70 MMHG | OXYGEN SATURATION: 100 %

## 2018-10-17 DIAGNOSIS — N83.202 CYST OF LEFT OVARY: ICD-10-CM

## 2018-10-17 DIAGNOSIS — R10.32 LLQ PAIN: ICD-10-CM

## 2018-10-17 DIAGNOSIS — R19.7 DIARRHEA, UNSPECIFIED TYPE: ICD-10-CM

## 2018-10-17 DIAGNOSIS — R11.0 NAUSEA: ICD-10-CM

## 2018-10-17 LAB
INT CON NEG: NEGATIVE
INT CON POS: POSITIVE
POC URINE PREGNANCY TEST: NORMAL

## 2018-10-17 PROCEDURE — 74177 CT ABD & PELVIS W/CONTRAST: CPT

## 2018-10-17 PROCEDURE — 99213 OFFICE O/P EST LOW 20 MIN: CPT | Performed by: NURSE PRACTITIONER

## 2018-10-17 PROCEDURE — 81025 URINE PREGNANCY TEST: CPT | Performed by: NURSE PRACTITIONER

## 2018-10-17 PROCEDURE — 700117 HCHG RX CONTRAST REV CODE 255: Performed by: NURSE PRACTITIONER

## 2018-10-17 RX ORDER — ONDANSETRON 4 MG/1
4 TABLET, ORALLY DISINTEGRATING ORAL ONCE
Status: COMPLETED | OUTPATIENT
Start: 2018-10-17 | End: 2018-10-17

## 2018-10-17 RX ORDER — NORGESTIMATE AND ETHINYL ESTRADIOL 0.25-0.035
1 KIT ORAL
Refills: 3 | COMMUNITY
Start: 2018-08-17 | End: 2019-09-19 | Stop reason: SDUPTHER

## 2018-10-17 RX ORDER — VALACYCLOVIR HYDROCHLORIDE 500 MG/1
500 TABLET, FILM COATED ORAL
Refills: 1 | COMMUNITY
Start: 2018-09-14 | End: 2019-09-12

## 2018-10-17 RX ADMIN — IOHEXOL 50 ML: 240 INJECTION, SOLUTION INTRATHECAL; INTRAVASCULAR; INTRAVENOUS; ORAL at 17:12

## 2018-10-17 RX ADMIN — IOHEXOL 100 ML: 350 INJECTION, SOLUTION INTRAVENOUS at 17:12

## 2018-10-17 RX ADMIN — ONDANSETRON 4 MG: 4 TABLET, ORALLY DISINTEGRATING ORAL at 14:26

## 2018-10-17 NOTE — LETTER
October 17, 2018         Patient: Tonia Xavier   YOB: 1990   Date of Visit: 10/17/2018           To Whom it May Concern:    Tonia Xavier was seen in my clinic on 10/17/2018. She may be excused from work today.    If you have any questions or concerns, please don't hesitate to call.        Sincerely,           JAY Kellogg  Electronically Signed

## 2018-10-17 NOTE — PROGRESS NOTES
Chief Complaint   Patient presents with   • Diarrhea     nausea cramping since monday        HISTORY OF PRESENT ILLNESS: Patient is a 28 y.o. female who presents to urgent care today with complaints of diarrhea, nausea, and abdominal pain. Her symptoms started two days ago with diarrhea. She then developed lower abdominal pain and nausea. Since onset, she has had approx 5 episodes of diarrhea per day. She denies any blood/black in her stools. In addition, she experiences intermittent abdominal pain to lower abdomen, lasting 10-15 minutes, described as sharp. She denies fever, chills, vomiting, malaise, or urinary symptoms. She denies previous history of the same. She has taken pepto bismol for symptom relief. Denies known ill contacts.       Patient Active Problem List    Diagnosis Date Noted   • Out of work 02/06/2018   • Vitamin D deficiency 02/01/2018   • Toenail deformity 01/11/2018   • Obesity (BMI 30-39.9) 01/11/2018   • Anxiety 01/11/2018       Allergies:Patient has no known allergies.    Current Outpatient Prescriptions Ordered in [facility]   Medication Sig Dispense Refill   • CYCLOBENZAPRINE HCL ER PO Take  by mouth.     • sertraline (ZOLOFT) 50 MG Tab TAKE 1 TABLET BY MOUTH EVERY DAY. 30 Tab 2   • Prenatal MV-Min-Fe Fum-FA-DHA (PRENATAL 1 PO) Take 1 Tab by mouth every day.     • FEMYNOR 0.25-35 MG-MCG per tablet Take 1 Tab by mouth every day.  3   • valACYclovir (VALTREX) 500 MG Tab TAKE 1 TABLET BY MOUTH DAILY  1   • ibuprofen (MOTRIN) 200 MG Tab Take 200 mg by mouth every 6 hours as needed.     • Cholecalciferol 1000 UNIT Cap Take 1 Cap by mouth every day. 90 Cap 2   • ferrous sulfate 325 (65 Fe) MG tablet Take 1 Tab by mouth every morning with breakfast. 60 Tab 0     No current Epic-ordered facility-administered medications on file.        Past Medical History:   Diagnosis Date   • Anxiety    • Pregnant    • Psychiatric problem     anxiety       Social History   Substance Use Topics   • Smoking status: Never  "Smoker   • Smokeless tobacco: Never Used   • Alcohol use No       Family Status   Relation Status   • Mo Alive   • Fa Alive   • Unknown (Not Specified)   • Unknown (Not Specified)     Family History   Problem Relation Age of Onset   • Diabetes Unknown    • Hypertension Unknown        ROS:  Review of Systems   Constitutional: Negative for fever, chills, weight loss, malaise, and fatigue.   HENT: Negative for ear pain, nosebleeds, congestion, sore throat and neck pain.    Eyes: Negative for vision changes.   Neuro: Negative for headache, sensory changes, weakness, seizure, LOC.   Cardiovascular: Negative for chest pain, palpitations, orthopnea and leg swelling.   Respiratory: Negative for cough, sputum production, shortness of breath and wheezing.   Gastrointestinal: Positive for abdominal pain, nausea, and diarrhea. Negative for vomiting.   Genitourinary: Negative for dysuria, urgency and frequency.  Musculoskeletal: Negative for falls, neck pain, back pain, joint pain, myalgias.   Skin: Negative for rash, diaphoresis.     Exam:  Blood pressure 110/70, pulse 95, temperature 36.1 °C (96.9 °F), temperature source Temporal, resp. rate 14, height 1.676 m (5' 6\"), weight 86.2 kg (190 lb), SpO2 100 %, currently breastfeeding.  General: well-nourished, well-developed female in NAD  Head: normocephalic, atraumatic  Eyes: PERRLA, no conjunctival injection, acuity grossly intact, lids normal.  Ears: normal shape and symmetry, no tenderness, no discharge. External canals are without any significant edema or erythema. Tympanic membranes are without any inflammation, no effusion. Gross auditory acuity is intact.  Nose: symmetrical without tenderness, no discharge.  Mouth/Throat: reasonable hygiene, no erythema, exudates or tonsillar enlargement.  Neck: no masses, range of motion within normal limits, no tracheal deviation. No obvious thyroid enlargement.   Lymph: no cervical adenopathy. No supraclavicular adenopathy.   Neuro: " "alert and oriented. Cranial nerves 1-12 grossly intact. No sensory deficit.   Cardiovascular: regular rate and rhythm. No edema.  Pulmonary: no distress. Chest is symmetrical with respiration, no wheezes, crackles, or rhonchi.   Abdomen: soft, left lower quadrant tenderness, no guarding, no hepatosplenomegaly.  Musculoskeletal: no clubbing, appropriate muscle tone, gait is stable.  Skin: warm, dry, intact, no clubbing, no cyanosis, no rashes.   Psych: appropriate mood, affect, judgement.         Assessment/Plan:  1. LLQ pain  CT-ABDOMEN-PELVIS WITH    POCT Pregnancy    US-PELVIC TRANSVAGINAL ONLY    REFERRAL TO GYNECOLOGY   2. Nausea  ondansetron (ZOFRAN ODT) dispertab 4 mg    POCT Pregnancy    US-PELVIC TRANSVAGINAL ONLY    REFERRAL TO GYNECOLOGY   3. Diarrhea, unspecified type  US-PELVIC TRANSVAGINAL ONLY   4. Cyst of left ovary  US-PELVIC TRANSVAGINAL ONLY    REFERRAL TO GYNECOLOGY           CT abdomen radiology reading \"6 x 4.9 cm left ovarian cyst. Further evaluation with pelvic ultrasound is recommended. Liver is mildly hypodense suggestive of hepatic steatosis. Borderline splenomegaly.\"      CT abdomen ordered due to LLQ tenderness along with nausea and continued diarrhea, resulted negative for acute process. CT does show ovarian cyst, will order US and call with results. Referral placed to GYN.   Supportive care, differential diagnoses, and indications for immediate follow-up discussed with patient.   Pathogenesis of diagnosis discussed including typical length and natural progression.   Instructed to return to clinic or nearest emergency department for any change in condition, further concerns, or worsening of symptoms.  Patient states understanding of the plan of care and discharge instructions.  Instructed to make an appointment, for follow up, with her primary care provider.        Please note that this dictation was created using voice recognition software. I have made every reasonable attempt to " correct obvious errors, but I expect that there are errors of grammar and possibly content that I did not discover before finalizing the note.      WOJCIECH Kellogg.

## 2018-10-18 ENCOUNTER — HOSPITAL ENCOUNTER (OUTPATIENT)
Dept: RADIOLOGY | Facility: MEDICAL CENTER | Age: 28
End: 2018-10-18
Attending: NURSE PRACTITIONER
Payer: COMMERCIAL

## 2018-10-18 DIAGNOSIS — R11.0 NAUSEA: ICD-10-CM

## 2018-10-18 DIAGNOSIS — R10.32 LLQ PAIN: ICD-10-CM

## 2018-10-18 DIAGNOSIS — N83.202 CYST OF LEFT OVARY: ICD-10-CM

## 2018-10-18 DIAGNOSIS — R19.7 DIARRHEA, UNSPECIFIED TYPE: ICD-10-CM

## 2018-10-18 PROCEDURE — 76830 TRANSVAGINAL US NON-OB: CPT

## 2018-10-29 ENCOUNTER — OFFICE VISIT (OUTPATIENT)
Dept: URGENT CARE | Facility: CLINIC | Age: 28
End: 2018-10-29
Payer: COMMERCIAL

## 2018-10-29 VITALS
HEART RATE: 92 BPM | WEIGHT: 187 LBS | OXYGEN SATURATION: 98 % | HEIGHT: 66 IN | SYSTOLIC BLOOD PRESSURE: 110 MMHG | BODY MASS INDEX: 30.05 KG/M2 | RESPIRATION RATE: 20 BRPM | DIASTOLIC BLOOD PRESSURE: 70 MMHG | TEMPERATURE: 98 F

## 2018-10-29 DIAGNOSIS — K52.9 ACUTE GASTROENTERITIS: ICD-10-CM

## 2018-10-29 DIAGNOSIS — R11.2 NAUSEA AND VOMITING, INTRACTABILITY OF VOMITING NOT SPECIFIED, UNSPECIFIED VOMITING TYPE: ICD-10-CM

## 2018-10-29 LAB
INT CON NEG: NORMAL
INT CON POS: NORMAL
POC URINE PREGNANCY TEST: NEGATIVE

## 2018-10-29 PROCEDURE — 81025 URINE PREGNANCY TEST: CPT | Performed by: PHYSICIAN ASSISTANT

## 2018-10-29 PROCEDURE — 99214 OFFICE O/P EST MOD 30 MIN: CPT | Performed by: PHYSICIAN ASSISTANT

## 2018-10-29 RX ORDER — KETOROLAC TROMETHAMINE 30 MG/ML
60 INJECTION, SOLUTION INTRAMUSCULAR; INTRAVENOUS ONCE
Status: COMPLETED | OUTPATIENT
Start: 2018-10-29 | End: 2018-10-29

## 2018-10-29 RX ORDER — ONDANSETRON 4 MG/1
4 TABLET, ORALLY DISINTEGRATING ORAL ONCE
Status: COMPLETED | OUTPATIENT
Start: 2018-10-29 | End: 2018-10-29

## 2018-10-29 RX ADMIN — ONDANSETRON 4 MG: 4 TABLET, ORALLY DISINTEGRATING ORAL at 09:18

## 2018-10-29 RX ADMIN — KETOROLAC TROMETHAMINE 60 MG: 30 INJECTION, SOLUTION INTRAMUSCULAR; INTRAVENOUS at 09:17

## 2018-10-29 NOTE — PROGRESS NOTES
Chief Complaint   Patient presents with   • Abdominal Pain     X 3 days abdominal pain , nauseas and vomitng couple times       HISTORY OF PRESENT ILLNESS: Patient is a 28 y.o. female who presents today for about 48 hours  of increased abdominal discomfort (diffuse cramping), nausea/vomiting and diarrhea.  No bloody emesis or stools.   She notes cramping/pain is diffuse without focal areas of pain.  Patient denies urinary symptoms, vaginal bleeding or pelvic pain.  Patient was seen earlier in the month and ultimately dx with left ovarian cyst.  She does not feel this discomfort is related or similar.  This pain seemed to regress.  She had CT abd/pelvis 12 days ago as well and this was grossly unremarkable other than the cyst.  She has not had fevers or chills.  She took 4 mg Zofran this morning without great relief.  Last vomit was at 8 am.  Had to leave work.  No recent travel or abx.     Patient Active Problem List    Diagnosis Date Noted   • Out of work 02/06/2018   • Vitamin D deficiency 02/01/2018   • Toenail deformity 01/11/2018   • Obesity (BMI 30-39.9) 01/11/2018   • Anxiety 01/11/2018       Allergies:Patient has no known allergies.    Current Outpatient Prescriptions Ordered in [facility]   Medication Sig Dispense Refill   • CYCLOBENZAPRINE HCL ER PO Take  by mouth.     • FEMYNOR 0.25-35 MG-MCG per tablet Take 1 Tab by mouth every day.  3   • valACYclovir (VALTREX) 500 MG Tab TAKE 1 TABLET BY MOUTH DAILY  1   • ibuprofen (MOTRIN) 200 MG Tab Take 200 mg by mouth every 6 hours as needed.     • Cholecalciferol 1000 UNIT Cap Take 1 Cap by mouth every day. 90 Cap 2   • sertraline (ZOLOFT) 50 MG Tab TAKE 1 TABLET BY MOUTH EVERY DAY. 30 Tab 2   • ferrous sulfate 325 (65 Fe) MG tablet Take 1 Tab by mouth every morning with breakfast. 60 Tab 0   • Prenatal MV-Min-Fe Fum-FA-DHA (PRENATAL 1 PO) Take 1 Tab by mouth every day.       No current [facility]-ordered facility-administered medications on file.        Past Medical  "History:   Diagnosis Date   • Anxiety    • Pregnant    • Psychiatric problem     anxiety       Social History   Substance Use Topics   • Smoking status: Never Smoker   • Smokeless tobacco: Never Used   • Alcohol use No       Family Status   Relation Status   • Mo Alive   • Fa Alive   • Unknown (Not Specified)   • Unknown (Not Specified)     Family History   Problem Relation Age of Onset   • Diabetes Unknown    • Hypertension Unknown        ROS:  Review of Systems   Constitutional: SEE HPI  HENT: Negative for ear pain, nosebleeds, congestion, sore throat and neck pain.    Eyes: Negative for blurred vision.   Respiratory: Negative for cough, sputum production, shortness of breath and wheezing.    Cardiovascular: Negative for chest pain, palpitations, orthopnea and leg swelling.   Gastrointestinal: SEE HPI  Genitourinary: Negative for dysuria, urgency and frequency.     Exam:  Blood pressure 110/70, pulse 92, temperature 36.7 °C (98 °F), temperature source Temporal, resp. rate 20, height 1.676 m (5' 6\"), weight 84.8 kg (187 lb), SpO2 98 %, currently breastfeeding.  General:  Well nourished, well developed female in NAD  Eyes: PERRLA, EOM within normal limits, no conjunctival injection, no scleral icterus, visual fields and acuity grossly intact.  Mouth: reasonable hygiene, no erythema exudates or tonsillar enlargement.  Moist mucus membranes.   Neck: no masses, range of motion within normal limits, no tracheal deviation. No lymphadenopathy  Pulmonary: Normal respiratory effort, no wheezes, crackles, or rhonchi.  Cardiovascular: regular rate and rhythm without murmurs, rubs, or gallops.  Abdomen: Soft, mild diffuse TTP, nondistended. Normal bowel sounds. No hepatosplenomegaly or masses, or hernias. No rebound or guarding. No CVA tednerness.  Negative Willingham's sign.  Non tender at McBurney's site.   Skin: No visible rashes or lesion. Warm, pink, dry.   Extremities: no clubbing, cyanosis, or edema.  Neuro: A&O x 3. Speech " normal/clear.  Normal gait.         Assessment/Plan:  1. Nausea and vomiting, intractability of vomiting not specified, unspecified vomiting type  ondansetron (ZOFRAN ODT) dispertab 4 mg    POCT Pregnancy   2. Acute gastroenteritis  ketorolac (TORADOL) injection 60 mg         -preg negative  -consistent with self limited viral gastroenteritis at this time.    -benign abdominal exam, no evidence of acute abdomen  -zofran tablet given in clinic, Toradol shot given, patient requesting for pain.  Discussed please hydrate with help of anti-nausea.  Avoid oral NSAIDs at this time.  Tylenol ok.    -electrolyte rehydration, advance bland diet as tolerated.   -abdominal pain red flags discussed, ER precautions.   -work note provided      Supportive care, differential diagnoses, and indications for immediate follow-up discussed with patient.   Pathogenesis of diagnosis discussed including typical length and natural progression.   Instructed to return to clinic or nearest emergency department for any change in condition, further concerns, or worsening of symptoms.  Patient states understanding of the plan of care and discharge instructions.        Linda Morel P.A.-C.

## 2018-10-29 NOTE — LETTER
October 29, 2018        Tonia Horner Xavier  [address]  [facility] [address]        To Whom it may Concern,     Tonia was seen at our office on 10/29/18, please excuse her from work on 10/29/18-10/30/18.     If you have any questions or concerns, please don't hesitate to call.        Sincerely,        Linda Morel P.A.-C.    Electronically Signed

## 2018-11-03 ENCOUNTER — OFFICE VISIT (OUTPATIENT)
Dept: URGENT CARE | Facility: CLINIC | Age: 28
End: 2018-11-03
Payer: COMMERCIAL

## 2018-11-03 VITALS
DIASTOLIC BLOOD PRESSURE: 70 MMHG | TEMPERATURE: 98.2 F | WEIGHT: 185 LBS | OXYGEN SATURATION: 94 % | HEIGHT: 66 IN | RESPIRATION RATE: 16 BRPM | BODY MASS INDEX: 29.73 KG/M2 | HEART RATE: 97 BPM | SYSTOLIC BLOOD PRESSURE: 98 MMHG

## 2018-11-03 DIAGNOSIS — E86.0 DEHYDRATION: ICD-10-CM

## 2018-11-03 DIAGNOSIS — R11.2 NON-INTRACTABLE VOMITING WITH NAUSEA, UNSPECIFIED VOMITING TYPE: ICD-10-CM

## 2018-11-03 DIAGNOSIS — R19.7 DIARRHEA, UNSPECIFIED TYPE: ICD-10-CM

## 2018-11-03 PROCEDURE — 99214 OFFICE O/P EST MOD 30 MIN: CPT | Performed by: FAMILY MEDICINE

## 2018-11-03 RX ORDER — DICYCLOMINE HCL 20 MG
20 TABLET ORAL EVERY 6 HOURS
Qty: 120 TAB | Refills: 0 | Status: SHIPPED | OUTPATIENT
Start: 2018-11-03 | End: 2019-03-13

## 2018-11-03 RX ORDER — ONDANSETRON 4 MG/1
4 TABLET, FILM COATED ORAL EVERY 4 HOURS PRN
Qty: 20 TAB | Refills: 0 | Status: SHIPPED | OUTPATIENT
Start: 2018-11-03 | End: 2019-03-13

## 2018-11-03 RX ORDER — LOPERAMIDE HYDROCHLORIDE 2 MG/1
2 CAPSULE ORAL 4 TIMES DAILY PRN
Qty: 30 CAP | Refills: 0 | Status: SHIPPED | OUTPATIENT
Start: 2018-11-03 | End: 2019-09-09

## 2018-11-03 ASSESSMENT — ENCOUNTER SYMPTOMS
NAUSEA: 1
ABDOMINAL PAIN: 1
SORE THROAT: 0
SHORTNESS OF BREATH: 0
COUGH: 0
DIARRHEA: 0
CHILLS: 0
BACK PAIN: 0
PALPITATIONS: 0
DEPRESSION: 0
VOMITING: 1
HEADACHES: 0
NECK PAIN: 0
FEVER: 0
BRUISES/BLEEDS EASILY: 0
CONSTIPATION: 0
DIZZINESS: 0
BLURRED VISION: 0
WHEEZING: 0

## 2018-11-03 NOTE — PROGRESS NOTES
"Subjective:      Tonia Xavier is a 28 y.o. female who presents with GI Problem (patient is still vomiting, have IBS episodes)      Patient was seen in urgent care on 10/29. Per that note, HPI as follows:    \"Patient is a 28 y.o. female who presents today for about 48 hours  of increased abdominal discomfort (diffuse cramping), nausea/vomiting and diarrhea.  No bloody emesis or stools.   She notes cramping/pain is diffuse without focal areas of pain.  Patient denies urinary symptoms, vaginal bleeding or pelvic pain.  Patient was seen earlier in the month and ultimately dx with left ovarian cyst.  She does not feel this discomfort is related or similar.  This pain seemed to regress.  She had CT abd/pelvis 12 days ago as well and this was grossly unremarkable other than the cyst.  She has not had fevers or chills.  She took 4 mg Zofran this morning without great relief.  Last vomit was at 8 am.  Had to leave work.  No recent travel or abx.\"    Interval HX:    Denies any new or worsening symptoms, just persistent diarrhea mostly. Patient states that her nausea/vomiting has improved, last episode of emesis was Thursday (2 days ago) but the diarrhea has continued. States that stools are not formed, watery, brown. Denies any blood or mucus in emesis or stool, denies black or dark stools. Has had 3 episodes of diarrhea this morning and multiple over night. Reports that the diarrhea is a new problem that started early October and did not have this problem previously. She is still having crampy abdominal pain as well (mild/mod). She has not yet been able to see OB for the ovarian cyst that was seen on abdominal CT. OTC immodium is minimally helpful. Reports her mother has multiple GI problems as well, possibly IBS and/or IBD (she is not sure). She has never had GI workup in the past.        Review of Systems   Constitutional: Negative for chills and fever.   HENT: Negative for congestion and sore throat.    Eyes: " Negative for blurred vision.   Respiratory: Negative for cough, shortness of breath and wheezing.    Cardiovascular: Negative for chest pain and palpitations.   Gastrointestinal: Positive for abdominal pain, nausea and vomiting. Negative for constipation and diarrhea.   Genitourinary: Negative for dysuria, frequency, hematuria and urgency.   Musculoskeletal: Negative for back pain, joint pain and neck pain.   Skin: Negative for itching and rash.   Neurological: Negative for dizziness and headaches.   Endo/Heme/Allergies: Negative for environmental allergies. Does not bruise/bleed easily.   Psychiatric/Behavioral: Negative for depression and suicidal ideas.   All other systems reviewed and are negative.    Past Medical History:   Diagnosis Date   • Anxiety    • Pregnant    • Psychiatric problem     anxiety     Past Surgical History:   Procedure Laterality Date   • REPEAT C SECTION  2017    Procedure: REPEAT C SECTION;  Surgeon: Asmita Bolivar M.D.;  Location: LABOR AND DELIVERY;  Service: Obstetrics   • REPEAT C SECTION  10/19/2015    Procedure: REPEAT C SECTION;  Surgeon: Mary Randolph M.D.;  Location: LABOR AND DELIVERY;  Service:    • ACL RECONSTRUCTION SCOPE  10/2/2014    Performed by Dexter Pimentel M.D. at [facility]   • KNEE ARTHROSCOPY  2014    Performed by Dexter Pimentel M.D. at [facility]   • HARDWARE REMOVAL ORTHO  2014    Performed by Dexter Pimentel M.D. at [facility]   • BONE GRAFT  2014    Performed by Dexter Pimentel M.D. at [facility]   • PB  DELIVERY ONLY     • ACL RECONSTRUCTION      right     Family History   Problem Relation Age of Onset   • Diabetes Unknown    • Hypertension Unknown    Mother has multiple GI issues, possibly IBS vs IBD (maybe UC?)    Social History   Substance Use Topics   • Smoking status: Never Smoker   • Smokeless tobacco: Never Used   • Alcohol use No     Patient has no known  "allergies.    Current Outpatient Prescriptions on File Prior to Visit   Medication Sig Dispense Refill   • CYCLOBENZAPRINE HCL ER PO Take  by mouth.     • FEMYNOR 0.25-35 MG-MCG per tablet Take 1 Tab by mouth every day.  3   • ibuprofen (MOTRIN) 200 MG Tab Take 200 mg by mouth every 6 hours as needed.     • ferrous sulfate 325 (65 Fe) MG tablet Take 1 Tab by mouth every morning with breakfast. 60 Tab 0   • Prenatal MV-Min-Fe Fum-FA-DHA (PRENATAL 1 PO) Take 1 Tab by mouth every day.     • valACYclovir (VALTREX) 500 MG Tab TAKE 1 TABLET BY MOUTH DAILY  1   • Cholecalciferol 1000 UNIT Cap Take 1 Cap by mouth every day. 90 Cap 2   • sertraline (ZOLOFT) 50 MG Tab TAKE 1 TABLET BY MOUTH EVERY DAY. 30 Tab 2     No current facility-administered medications on file prior to visit.           Objective:     BP (!) 98/70   Pulse 97   Temp 36.8 °C (98.2 °F)   Resp 16   Ht 1.676 m (5' 6\")   Wt 83.9 kg (185 lb)   SpO2 94%   BMI 29.86 kg/m²      Physical Exam   Constitutional: She is oriented to person, place, and time. She appears well-developed and well-nourished. No distress.   HENT:   Head: Normocephalic and atraumatic.   Eyes: EOM are normal. Right eye exhibits no discharge. Left eye exhibits no discharge.   Neck: Normal range of motion. Neck supple. No thyromegaly present.   Cardiovascular: Normal rate and regular rhythm.    Pulmonary/Chest: Breath sounds normal. No respiratory distress.   Abdominal: Soft. Bowel sounds are normal. She exhibits no distension and no mass. There is tenderness (mild diffuse). There is no rebound and no guarding. No hernia.   Musculoskeletal: Normal range of motion. She exhibits no edema or deformity.   Neurological: She is alert and oriented to person, place, and time.   Skin: Skin is warm and dry. Capillary refill takes less than 2 seconds. She is not diaphoretic.   Psychiatric: She has a normal mood and affect. Her behavior is normal.   Nursing note and vitals reviewed.     "   Assessment/Plan:     1. Diarrhea, unspecified type  GIARDIA ANTIGEN    CULTURE STOOL    dicyclomine (BENTYL) 20 MG Tab    COMPLETE O&P    REFERRAL TO GASTROENTEROLOGY    loperamide (IMODIUM) 2 MG Cap   2. Non-intractable vomiting with nausea, unspecified vomiting type  ondansetron (ZOFRAN) 4 MG Tab tablet   3. Dehydration       - Discussed with patient benefit of IV fluid hydration, and although patient is agreeable, she would like to avoid ER visit if possible. Discussed drinking pedialyte or other oral rehydration fluid as alternative and increasing overall fluid intake.   - Stool studies ordered  - Zofran refilled per patient request  - Start Bentyl 20 mg QID PRN  - Referral to GI. Discussed with patient that she may need further workup including colonoscopy.     Differential diagnosis, natural history, supportive care discussed. Follow-up with primary care provider within 7-10 days, emergency room precautions discussed.  Patient and/or family appears understanding of information.    Daily Coleman  11/03/18  1:12 PM

## 2018-11-07 ENCOUNTER — TELEPHONE (OUTPATIENT)
Dept: URGENT CARE | Facility: CLINIC | Age: 28
End: 2018-11-07

## 2018-11-07 NOTE — TELEPHONE ENCOUNTER
Left patient message regarding her question. As the diarrhea has resolved, she does not need to bring in a stool sample.

## 2018-11-07 NOTE — TELEPHONE ENCOUNTER
----- Message from Stacie Mendez, Med Ass't sent at 11/6/2018  7:23 PM PST -----  Regarding: FW: Non-Urgent Medical Question  Contact: [phone]  Please advise   ----- Message -----  From: Tonia Xavier  Sent: 11/6/2018   6:23 PM  To: [facility]  Message Pool  Subject: Non-Urgent Medical Question                      Good afternoon   I have not been able to leave a stool sample since I was last seen at Urgent Care. The last bowel movement I've had was once this past Sunday. I did not get clear instructions on how to store the samples over night. The MA said to refrigerate or freeze the samples? I will try to have that collected as soon as I can. I did not  the prescription for the Imodium. I still had some over the counter. My last dose of the Imodium was Saturday. I did start the new prescription of Dicyclomine on Saturday. I haven't had much cramping or pain.       Thank you

## 2018-11-08 ENCOUNTER — APPOINTMENT (OUTPATIENT)
Dept: ADMISSIONS | Facility: MEDICAL CENTER | Age: 28
End: 2018-11-08
Attending: ORTHOPAEDIC SURGERY
Payer: COMMERCIAL

## 2018-11-08 DIAGNOSIS — Z01.812 PRE-OPERATIVE LABORATORY EXAMINATION: ICD-10-CM

## 2018-11-08 LAB
ERYTHROCYTE [DISTWIDTH] IN BLOOD BY AUTOMATED COUNT: 39 FL (ref 35.9–50)
HCT VFR BLD AUTO: 37.8 % (ref 37–47)
HGB BLD-MCNC: 13 G/DL (ref 12–16)
MCH RBC QN AUTO: 27.5 PG (ref 27–33)
MCHC RBC AUTO-ENTMCNC: 34.4 G/DL (ref 33.6–35)
MCV RBC AUTO: 80.1 FL (ref 81.4–97.8)
PLATELET # BLD AUTO: 266 K/UL (ref 164–446)
PMV BLD AUTO: 12.4 FL (ref 9–12.9)
RBC # BLD AUTO: 4.72 M/UL (ref 4.2–5.4)
WBC # BLD AUTO: 8.5 K/UL (ref 4.8–10.8)

## 2018-11-08 PROCEDURE — 85027 COMPLETE CBC AUTOMATED: CPT

## 2018-11-08 PROCEDURE — 36415 COLL VENOUS BLD VENIPUNCTURE: CPT

## 2018-11-08 RX ORDER — CYCLOBENZAPRINE HCL 10 MG
10 TABLET ORAL 3 TIMES DAILY PRN
COMMUNITY
End: 2020-10-23

## 2018-11-08 NOTE — OR NURSING
"Pre-admit appointment completed. \"Preparing for your procedure\" sheet given to pt along with verbal and written instructions. Pt instructed to continue regularly prescribed medications through the day before surgery. Pt instructed to take the following medications the day of surgery with a sip of water, per anesthesia protocol; bentyl, if needed-flexeril, immodium, zofran.  "

## 2018-11-13 ENCOUNTER — APPOINTMENT (OUTPATIENT)
Dept: RADIOLOGY | Facility: MEDICAL CENTER | Age: 28
End: 2018-11-13
Attending: ORTHOPAEDIC SURGERY
Payer: COMMERCIAL

## 2018-11-13 ENCOUNTER — HOSPITAL ENCOUNTER (OUTPATIENT)
Facility: MEDICAL CENTER | Age: 28
End: 2018-11-13
Attending: ORTHOPAEDIC SURGERY | Admitting: ORTHOPAEDIC SURGERY
Payer: COMMERCIAL

## 2018-11-13 VITALS
BODY MASS INDEX: 29.83 KG/M2 | SYSTOLIC BLOOD PRESSURE: 106 MMHG | HEART RATE: 70 BPM | WEIGHT: 185.63 LBS | TEMPERATURE: 96.8 F | HEIGHT: 66 IN | RESPIRATION RATE: 14 BRPM | DIASTOLIC BLOOD PRESSURE: 65 MMHG | OXYGEN SATURATION: 95 %

## 2018-11-13 DIAGNOSIS — G89.18 POSTOPERATIVE PAIN: ICD-10-CM

## 2018-11-13 LAB
B-HCG FREE SERPL-ACNC: <5 MIU/ML
IHCGL IHCGL: NEGATIVE MIU/ML

## 2018-11-13 PROCEDURE — A9270 NON-COVERED ITEM OR SERVICE: HCPCS | Performed by: ANESTHESIOLOGY

## 2018-11-13 PROCEDURE — 700102 HCHG RX REV CODE 250 W/ 637 OVERRIDE(OP): Performed by: ANESTHESIOLOGY

## 2018-11-13 PROCEDURE — 160048 HCHG OR STATISTICAL LEVEL 1-5: Performed by: ORTHOPAEDIC SURGERY

## 2018-11-13 PROCEDURE — 160035 HCHG PACU - 1ST 60 MINS PHASE I: Performed by: ORTHOPAEDIC SURGERY

## 2018-11-13 PROCEDURE — 160029 HCHG SURGERY MINUTES - 1ST 30 MINS LEVEL 4: Performed by: ORTHOPAEDIC SURGERY

## 2018-11-13 PROCEDURE — 502580 HCHG PACK, KNEE ARTHROSCOPY: Performed by: ORTHOPAEDIC SURGERY

## 2018-11-13 PROCEDURE — 700111 HCHG RX REV CODE 636 W/ 250 OVERRIDE (IP): Performed by: ANESTHESIOLOGY

## 2018-11-13 PROCEDURE — A6222 GAUZE <=16 IN NO W/SAL W/O B: HCPCS | Performed by: ORTHOPAEDIC SURGERY

## 2018-11-13 PROCEDURE — 700111 HCHG RX REV CODE 636 W/ 250 OVERRIDE (IP)

## 2018-11-13 PROCEDURE — 160022 HCHG BLOCK: Performed by: ORTHOPAEDIC SURGERY

## 2018-11-13 PROCEDURE — 110372 HCHG SHELL REV 278: Performed by: ORTHOPAEDIC SURGERY

## 2018-11-13 PROCEDURE — 502000 HCHG MISC OR IMPLANTS RC 0278: Performed by: ORTHOPAEDIC SURGERY

## 2018-11-13 PROCEDURE — 160025 RECOVERY II MINUTES (STATS): Performed by: ORTHOPAEDIC SURGERY

## 2018-11-13 PROCEDURE — 160036 HCHG PACU - EA ADDL 30 MINS PHASE I: Performed by: ORTHOPAEDIC SURGERY

## 2018-11-13 PROCEDURE — 700102 HCHG RX REV CODE 250 W/ 637 OVERRIDE(OP)

## 2018-11-13 PROCEDURE — A9270 NON-COVERED ITEM OR SERVICE: HCPCS

## 2018-11-13 PROCEDURE — 700101 HCHG RX REV CODE 250

## 2018-11-13 PROCEDURE — 160009 HCHG ANES TIME/MIN: Performed by: ORTHOPAEDIC SURGERY

## 2018-11-13 PROCEDURE — 500881 HCHG PACK, EXTREMITY: Performed by: ORTHOPAEDIC SURGERY

## 2018-11-13 PROCEDURE — 84702 CHORIONIC GONADOTROPIN TEST: CPT

## 2018-11-13 PROCEDURE — 160002 HCHG RECOVERY MINUTES (STAT): Performed by: ORTHOPAEDIC SURGERY

## 2018-11-13 PROCEDURE — 160041 HCHG SURGERY MINUTES - EA ADDL 1 MIN LEVEL 4: Performed by: ORTHOPAEDIC SURGERY

## 2018-11-13 PROCEDURE — 501838 HCHG SUTURE GENERAL: Performed by: ORTHOPAEDIC SURGERY

## 2018-11-13 PROCEDURE — 160046 HCHG PACU - 1ST 60 MINS PHASE II: Performed by: ORTHOPAEDIC SURGERY

## 2018-11-13 DEVICE — BONE DOWEL 12MM FRZ DRIED: Type: IMPLANTABLE DEVICE | Site: KNEE | Status: FUNCTIONAL

## 2018-11-13 RX ORDER — DIPHENHYDRAMINE HYDROCHLORIDE 50 MG/ML
12.5 INJECTION INTRAMUSCULAR; INTRAVENOUS
Status: DISCONTINUED | OUTPATIENT
Start: 2018-11-13 | End: 2018-11-13 | Stop reason: HOSPADM

## 2018-11-13 RX ORDER — OXYCODONE HYDROCHLORIDE 5 MG/1
5 TABLET ORAL
Status: DISCONTINUED | OUTPATIENT
Start: 2018-11-13 | End: 2018-11-13 | Stop reason: HOSPADM

## 2018-11-13 RX ORDER — OXYCODONE HCL 5 MG/5 ML
SOLUTION, ORAL ORAL
Status: COMPLETED
Start: 2018-11-13 | End: 2018-11-13

## 2018-11-13 RX ORDER — OXYCODONE HYDROCHLORIDE AND ACETAMINOPHEN 5; 325 MG/1; MG/1
1 TABLET ORAL EVERY 6 HOURS
Qty: 30 TAB | Refills: 0 | Status: SHIPPED | OUTPATIENT
Start: 2018-11-13 | End: 2018-11-21

## 2018-11-13 RX ORDER — SODIUM CHLORIDE, SODIUM LACTATE, POTASSIUM CHLORIDE, CALCIUM CHLORIDE 600; 310; 30; 20 MG/100ML; MG/100ML; MG/100ML; MG/100ML
1000 INJECTION, SOLUTION INTRAVENOUS CONTINUOUS
Status: DISCONTINUED | OUTPATIENT
Start: 2018-11-13 | End: 2018-11-13 | Stop reason: HOSPADM

## 2018-11-13 RX ORDER — MEPERIDINE HYDROCHLORIDE 25 MG/ML
12.5 INJECTION INTRAMUSCULAR; INTRAVENOUS; SUBCUTANEOUS
Status: DISCONTINUED | OUTPATIENT
Start: 2018-11-13 | End: 2018-11-13 | Stop reason: HOSPADM

## 2018-11-13 RX ORDER — LIDOCAINE HYDROCHLORIDE 10 MG/ML
INJECTION, SOLUTION INFILTRATION; PERINEURAL
Status: COMPLETED
Start: 2018-11-13 | End: 2018-11-13

## 2018-11-13 RX ORDER — MIDAZOLAM HYDROCHLORIDE 1 MG/ML
1 INJECTION INTRAMUSCULAR; INTRAVENOUS
Status: DISCONTINUED | OUTPATIENT
Start: 2018-11-13 | End: 2018-11-13 | Stop reason: HOSPADM

## 2018-11-13 RX ORDER — SODIUM CHLORIDE, SODIUM LACTATE, POTASSIUM CHLORIDE, CALCIUM CHLORIDE 600; 310; 30; 20 MG/100ML; MG/100ML; MG/100ML; MG/100ML
INJECTION, SOLUTION INTRAVENOUS ONCE
Status: COMPLETED | OUTPATIENT
Start: 2018-11-13 | End: 2018-11-13

## 2018-11-13 RX ORDER — HALOPERIDOL 5 MG/ML
1 INJECTION INTRAMUSCULAR
Status: DISCONTINUED | OUTPATIENT
Start: 2018-11-13 | End: 2018-11-13 | Stop reason: HOSPADM

## 2018-11-13 RX ORDER — BUPIVACAINE HYDROCHLORIDE AND EPINEPHRINE 2.5; 5 MG/ML; UG/ML
INJECTION, SOLUTION EPIDURAL; INFILTRATION; INTRACAUDAL; PERINEURAL
Status: DISCONTINUED | OUTPATIENT
Start: 2018-11-13 | End: 2018-11-13 | Stop reason: HOSPADM

## 2018-11-13 RX ORDER — ONDANSETRON 2 MG/ML
4 INJECTION INTRAMUSCULAR; INTRAVENOUS
Status: COMPLETED | OUTPATIENT
Start: 2018-11-13 | End: 2018-11-13

## 2018-11-13 RX ADMIN — ONDANSETRON 4 MG: 2 INJECTION INTRAMUSCULAR; INTRAVENOUS at 16:13

## 2018-11-13 RX ADMIN — FENTANYL CITRATE 50 MCG: 50 INJECTION, SOLUTION INTRAMUSCULAR; INTRAVENOUS at 17:00

## 2018-11-13 RX ADMIN — HALOPERIDOL LACTATE 1 MG: 5 INJECTION, SOLUTION INTRAMUSCULAR at 17:36

## 2018-11-13 RX ADMIN — Medication 0.5 ML: at 12:12

## 2018-11-13 RX ADMIN — OXYCODONE HYDROCHLORIDE 5 MG: 5 SOLUTION ORAL at 17:16

## 2018-11-13 RX ADMIN — SODIUM CHLORIDE, SODIUM LACTATE, POTASSIUM CHLORIDE, CALCIUM CHLORIDE: 600; 310; 30; 20 INJECTION, SOLUTION INTRAVENOUS at 12:13

## 2018-11-13 RX ADMIN — SODIUM CHLORIDE, SODIUM LACTATE, POTASSIUM CHLORIDE, CALCIUM CHLORIDE 1000 ML: 600; 310; 30; 20 INJECTION, SOLUTION INTRAVENOUS at 16:15

## 2018-11-13 ASSESSMENT — PAIN SCALES - GENERAL
PAINLEVEL_OUTOF10: 6
PAINLEVEL_OUTOF10: 3
PAINLEVEL_OUTOF10: 6
PAINLEVEL_OUTOF10: 3
PAINLEVEL_OUTOF10: 8
PAINLEVEL_OUTOF10: 3
PAINLEVEL_OUTOF10: 5
PAINLEVEL_OUTOF10: 8

## 2018-11-14 ENCOUNTER — HOSPITAL ENCOUNTER (EMERGENCY)
Facility: MEDICAL CENTER | Age: 28
End: 2018-11-14
Attending: EMERGENCY MEDICINE
Payer: COMMERCIAL

## 2018-11-14 ENCOUNTER — APPOINTMENT (OUTPATIENT)
Dept: RADIOLOGY | Facility: MEDICAL CENTER | Age: 28
End: 2018-11-14
Attending: EMERGENCY MEDICINE
Payer: COMMERCIAL

## 2018-11-14 VITALS
HEART RATE: 78 BPM | WEIGHT: 195.33 LBS | BODY MASS INDEX: 31.39 KG/M2 | SYSTOLIC BLOOD PRESSURE: 103 MMHG | RESPIRATION RATE: 19 BRPM | HEIGHT: 66 IN | DIASTOLIC BLOOD PRESSURE: 69 MMHG | TEMPERATURE: 97.8 F

## 2018-11-14 DIAGNOSIS — G89.18 POST-OP PAIN: ICD-10-CM

## 2018-11-14 PROCEDURE — 99284 EMERGENCY DEPT VISIT MOD MDM: CPT

## 2018-11-14 PROCEDURE — 96372 THER/PROPH/DIAG INJ SC/IM: CPT

## 2018-11-14 PROCEDURE — 93971 EXTREMITY STUDY: CPT | Mod: RT

## 2018-11-14 PROCEDURE — 700111 HCHG RX REV CODE 636 W/ 250 OVERRIDE (IP): Performed by: EMERGENCY MEDICINE

## 2018-11-14 RX ORDER — HYDROMORPHONE HYDROCHLORIDE 1 MG/ML
1 INJECTION, SOLUTION INTRAMUSCULAR; INTRAVENOUS; SUBCUTANEOUS ONCE
Status: COMPLETED | OUTPATIENT
Start: 2018-11-14 | End: 2018-11-14

## 2018-11-14 RX ORDER — IBUPROFEN 800 MG/1
800 TABLET ORAL EVERY 8 HOURS PRN
Status: SHIPPED | COMMUNITY
End: 2019-09-12

## 2018-11-14 RX ADMIN — HYDROMORPHONE HYDROCHLORIDE 1 MG: 1 INJECTION, SOLUTION INTRAMUSCULAR; INTRAVENOUS; SUBCUTANEOUS at 20:10

## 2018-11-14 ASSESSMENT — PAIN DESCRIPTION - DESCRIPTORS: DESCRIPTORS: SORE;ACHING;THROBBING

## 2018-11-14 ASSESSMENT — PAIN SCALES - GENERAL
PAINLEVEL_OUTOF10: 8
PAINLEVEL_OUTOF10: 5
PAINLEVEL_OUTOF10: 8

## 2018-11-14 NOTE — DISCHARGE INSTRUCTIONS
ACTIVITY: Rest and take it easy for the first 24 hours.  A responsible adult is recommended to remain with you during that time.  It is normal to feel sleepy.  We encourage you to not do anything that requires balance, judgment or coordination.    MILD FLU-LIKE SYMPTOMS ARE NORMAL. YOU MAY EXPERIENCE GENERALIZED MUSCLE ACHES, THROAT IRRITATION, HEADACHE AND/OR SOME NAUSEA.    FOR 24 HOURS DO NOT:  Drive, operate machinery or run household appliances.  Drink beer or alcoholic beverages.   Make important decisions or sign legal documents.    SPECIAL INSTRUCTIONS:     May remove dressings Post op Day #2 (Thursday) and Shower with wound uncovered.  Apply bandaids after shower.  Do not soak or submerge incisions for two weeks. Ice and elevate extremity. Follow up 7-10 days.  Toe touch Weight Bearing for 4 weeks using crutches.    DIET: To avoid nausea, slowly advance diet as tolerated, avoiding spicy or greasy foods for the first day.  Add more substantial food to your diet according to your physician's instructions.  Babies can be fed formula or breast milk as soon as they are hungry.  INCREASE FLUIDS AND FIBER TO AVOID CONSTIPATION.    FOLLOW-UP APPOINTMENT:  A follow-up appointment should be arranged with your doctor on Wednesday, Nov 21; call to verify date and time as needed.    You should CALL YOUR PHYSICIAN if you develop:  Fever greater than 101 degrees F.  Pain not relieved by medication, or persistent nausea or vomiting.  Excessive bleeding (blood soaking through dressing) or unexpected drainage from the wound.  Extreme redness or swelling around the incision site, drainage of pus or foul smelling drainage.  Inability to urinate or empty your bladder within 8 hours.  Problems with breathing or chest pain.    You should call 911 if you develop problems with breathing or chest pain.  If you are unable to contact your doctor or surgical center, you should go to the nearest emergency room or urgent care center.   Physician's telephone #: [phone] Dr. Pimentel    If any questions arise, call your doctor.  If your doctor is not available, please feel free to call the Surgical Center at [phone].  The Center is open Monday through Friday from 7AM to 7PM.  You can also call the [employer] HOTLINE open 24 hours/day, 7 days/week and speak to a nurse at [phone], or toll free at [phone].    A registered nurse may call you a few days after your surgery to see how you are doing after your procedure.    MEDICATIONS: Resume taking daily medication.  Take prescribed pain medication with food.  If no medication is prescribed, you may take non-aspirin pain medication if needed.  PAIN MEDICATION CAN BE VERY CONSTIPATING.  Take a stool softener or laxative such as senokot, pericolace, or milk of magnesia if needed.    Prescription given for Percocet.  Last pain medication given at 5:16 PM.    If your physician has prescribed pain medication that includes Acetaminophen (Tylenol), do not take additional Acetaminophen (Tylenol) while taking the prescribed medication.    Peripheral Nerve Block Discharge Instructions from Same Day Surgery and Inpatient :    What to Expect - Lower Extremity  · The block may cause you to experience numbness and weakness in your hip and thigh, thigh and knee or calf and foot on the same side as your surgery  · Numbness, tingling and / or weakness are all normal. For some people, this may be an unpleasant sensation  · These issues will be resolved when the local anesthetic wears off   · You may experience numbness and tingling in your thigh on the same side as your surgery if the block medicine was injected at your groin area  · Numbness will make it difficult to walk  · You may have problems with balance and walking so be very careful   · Follow your surgeon's direction regarding weight bearing on your surgical limb  · Be very careful with your numb limb  Precautions  · The numbness may affect your  "balance  · Be careful when walking or moving around  · Your leg may be weak: be very careful putting weight on it  · If your surgeon did not specify a time, you should not bear weight for 24 hours  · Be sure to ask for help when you need it  · It is better to have help than to fall and hurt yourself  Prevent Injury  · Protect the limb like a baby  · Beware of exposing your limb to extreme heat or cold or trauma  · The limb may be injured without you noticing because it is numb  · Keep the limb elevated whenever possible  · Do not sleep on the limb  · Change the position of the limb regularly  · Avoid putting pressure on your surgical limb  Pain Control  · The initial block on the day of surgery will make your extremity feel \"numb\"  · Any consecutive injection including prior to discharge from the hospital will make your extremity feel \"numb\"  · You may feel an aching or burning when the local anesthesia starts to wear off  · Take pain pills as prescribed by your surgeon  · Call your surgeon or anesthesiologist if you do not have adequate pain control  Depression / Suicide Risk    As you are discharged from this [facility] facility, it is important to learn how to keep safe from harming yourself.    Recognize the warning signs:  · Abrupt changes in personality, positive or negative- including increase in energy   · Giving away possessions  · Change in eating patterns- significant weight changes-  positive or negative  · Change in sleeping patterns- unable to sleep or sleeping all the time   · Unwillingness or inability to communicate  · Depression  · Unusual sadness, discouragement and loneliness  · Talk of wanting to die  · Neglect of personal appearance   · Rebelliousness- reckless behavior  · Withdrawal from people/activities they love  · Confusion- inability to concentrate     If you or a loved one observes any of these behaviors or has concerns about self-harm, here's what you can do:  · Talk about it- your " feelings and reasons for harming yourself  · Remove any means that you might use to hurt yourself (examples: pills, rope, extension cords, firearm)  · Get professional help from the community (Mental Health, Substance Abuse, psychological counseling)  · Do not be alone:Call your Safe Contact- someone whom you trust who will be there for you.  · Call your local CRISIS HOTLINE 624-6159 or 821-112-8112  · Call your local Children's Mobile Crisis Response Team [place] (953) 546-5423 or www.[employer]  · Call the toll free National Suicide Prevention Hotlines   · National Suicide Prevention Lifeline 757-466-PZQT (3518)  National Hope Line Network 800-SUICIDE (487-2082)

## 2018-11-14 NOTE — OR NURSING
1605: To PACU from OR via danny, very sleepy, rouses briefly but does not answer questions re presence of pain and/or nausea. Respirations spontaneous and non-labored, Icepack applied over c/d/i R knee surgical dressings.    1613: Medicated for onset nausea. Pt also c/o 6/10 R knee pain but POSS = 3 at this time so no narcotics given.  1615: Report to Gladys DIAZ

## 2018-11-14 NOTE — OP REPORT
DATE OF SERVICE:  11/13/2018    PREOPERATIVE DIAGNOSIS:  Right knee failed anterior cruciate ligament   reconstruction with medial and lateral meniscal tears.    POSTOPERATIVE DIAGNOSES:  1.  Failed right knee multiply revised anterior cruciate ligament   reconstruction with stretched out, incompetent ACL graft.  2.  Radial tearing of the medial and lateral meniscus.  3.  6 mm squared full thickness articular cartilage defect of the medial   femoral condyle.  4.  Grade II chondromalacia of the patella.  5.  Synovitis of the knee.  6.  Cavitary defect in the distal femur secondary to multiple failed anterior   cruciate ligament reconstructions.  7.  Large cavitary defect of the proximal tibia secondary to multiple failed   anterior cruciate ligament reconstructions.  8.  Retained hardware on the distal femur and the proximal tibia from previous   anterior cruciate ligament reconstructions.    PROCEDURES PERFORMED:  1.  Right knee diagnostic arthroscopy with arthroscopic partial medial and   lateral meniscectomy.  2.  Right knee arthroscopic chondroplasty of the patella.  3.  Right knee arthroscopic microfracture of the medial femoral condyle.  4.  Right knee arthroscopic synovectomy.  5.  Hardware removal from the distal femur and the proximal tibia.  6.  Curettage and bone grafting of cavitary bone cyst of the distal femur   secondary to previous ACL reconstructions.  7.  Curettage and bone grafting of a cavitary defect of the proximal tibia   secondary to failed ACL reconstructions.    SURGEON:  Dexter Pimentel MD    ASSISTANT:  Mallory Mckenzie PA-C    ANESTHESIA:  General and an adductor canal nerve block.    ANESTHESIOLOGIST:  Cecelia Mendiola MD    IMPLANTS:  Dumont and Nephew Montage bone cement and a Cloward bone dowel.    COMPLICATIONS:  None.    DISPOSITION:  Stable to postanesthesia care unit.    INDICATIONS:  The patient is a very pleasant 28-year-old female who originally   had allograft ACL  reconstruction done, which failed.  She then went on to   revision autograft hamstring ACL reconstruction after hardware removal and   bone grafting that also failed.  The risks, benefits, alternatives, and   limitations of revision hardware removal and bone grafting, knee arthroscopy   procedures as indicated were discussed in detail.  She expressed understanding   and desired to proceed.    DESCRIPTION OF PROCEDURE:  The patient and the correct operative extremity   were identified in the preoperative area.  The knee was marked.  She was   brought to the operating room and the correct operative extremity was again   confirmed.  She was placed supine on the OR table where she underwent an   adductor canal nerve block performed at my request for postop pain control.    She underwent general anesthesia without complication.  Examination under   anesthesia showed full range of motion.  She had 2+ Lachman, but did have an   endpoint.  She had a 2+ pivot shift.  No medial or lateral instability was   discernible.  The LCL was palpable.  The knee was prepped with alcohol and   injected with 30 mL of 1% lidocaine with epinephrine.  The knee was then   prepped and draped in the usual sterile fashion using ChloraPrep.  A   diagnostic arthroscopy was then performed, which showed some grade II   chondromalacia of the patella.  The trochlea was intact.  The notch showed an   intact, but stretched out ACL graft, which was very, very thin.  The PCL was   intact.  The medial compartment showed a 6 mm full thickness articular   cartilage defect of the weightbearing part of the medial femoral condyle with   surrounding grade III loose chondral flaps.  That area was debrided with the   arthroscopic shaver and then a microfracture was performed down to bleeding   subchondral bone.  There was some fraying of the medial meniscus and a partial   medial meniscectomy was performed with the arthroscopic shaver.  The lateral   compartment  showed some radial tearing of the lateral meniscus.  Partial   lateral meniscectomy was performed with the arthroscopic shaver.  The   cartilage was intact to the lateral femoral condyle and lateral tibial   plateau.  The popliteus was intact.  I then debrided the torn ACL.  There was   synovitis in the joint.  I did a synovectomy in the suprapatellar pouch,   medial and lateral compartments, retropatellar fat pad and the notch.  I   performed a chondroplasty of the patella.  I then reopened the distal femoral   incision, found the button and removed it along with the stitches that were   into the tunnel, opened the anteromedial tibial incision, removed the post   screw and followed the stitches up into the tibial tunnel, pulled the stitches   out and placed a guide pin up through the tunnel, which passed easily into   the joint.  I then reintroduced the scope into the joint, curetted out the   femoral tunnel until it was just bone and then drilled through the tibial   tunnel with an 8 mm straight reamer, then a 10 mm straight reamer and curetted   all the edges until it was down to bone and took Cloward bone dowel and   pounded that up until it was flushed within the joint.  We then took Montage   bone cement, passed it through small Smith and Nephew cannula into the   cavitary defect of the distal femur and then packed that into the defect with   a Pineda and a bone tamp until it was nice and flush on the femoral surface.  I   then took some other Montage bone cement and impacted into the remaining   defect on the anteromedial tibia.  We used fluoroscopy to confirm that the   defects were filled and there was not any excess cement visible anywhere.  I   then copiously irrigated the wound after we had reintroduced the scope to make   sure that the Montage bone cement was staying within the distal femoral   defect, which it was.  We then closed the split in the IT band over the   lateral incision with interrupted #1  Vicryl suture, then closed the deep subQ   and all the incisions with Monocryl.  Benzoin and Steri-Strips were placed   over the incisions.  The portals were closed with 3-0 nylon.  The knee was   injected with 0.5% bupivacaine with epinephrine.  Sterile dressings were   applied.  The knee was loosely overwrapped with an Ace wrap.  A ANTONETTE stocking   was placed.  The patient was then allowed to awake from anesthesia,   transferred to her hospital cart and taken to the postanesthesia care unit in   stable condition.  She tolerated the procedure well.  There were no immediate   complications.       ____________________________________     JUAQUIN RICHARDS MD RD / MILLIE    DD:  11/13/2018 16:19:45  DT:  11/13/2018 16:50:19    D#:  [id number]  Job#:  [id number]

## 2018-11-14 NOTE — OR NURSING
1615- Report received from EMILY Fairchild. Patient arsousable but POSS of 3. Respirations even and unlabored. Surgical dressing to R knee C/D/I. Pulses 2+ throughout. Toes warm and pink with brisk cap refill. Patient complaining of 6/10 pain. Patient continues to appear to rest comfortably with eyes closed.   1630- Patient more awake. Able to stay awake during all questions, reporting 8/10  1640- Patient returns to POSS of 3 following pain medication administration. Comfort measures implemented, including blanket and repositioning as needed.  1655- Patient more awake. Tearful, reporting 8/10 pain. Pain medication administered, see MAR.   1710- Patient reporting tolerable 3/10 pain. Declines need for additional pain medication.   1755- VSS, see flowsheets. Patient's pain contiues to remain tolerable and declines any further nausea. Patient up to bathroom and dressed with assistance of CNAs.   1809- VSS, see flowsheets. Patient reports increase in pain to 5/10. However, declines need for additional pain medication.   1829-  Discharge instructions provided to patient and . Patient has crutches in car and verbalizes ability to safely ambulate. Declines additional education. Both verbalized understanding. All questions and concerns addressed. Patient D/Buck to care of family following an uneventful stay in Stage 2.

## 2018-11-15 NOTE — ED NOTES
Pt returned from imaging.  Back in sit-up recliner with legs elevated and ice pack re-applied.  Pt states pain is getting better -now 5/10 but she feels sleepy.

## 2018-11-15 NOTE — ED NOTES
Fall risk band placed on patient; patient ambulates with crutches and has tennis shoes on.  Yellow socks not used at this time.  Patient in sit up recliner with legs elevated, knee unwrapped and ice pack on post-op site.

## 2018-11-15 NOTE — ED PROVIDER NOTES
ED Provider Note    ED Provider Note      Primary care provider: Dylon López M.D.    CHIEF COMPLAINT  Chief Complaint   Patient presents with   • Post-Op Pain     pt had a scope done yesterday by Dr Pimentel.       HPI  Tonia Xavier is a 28 y.o. female who presents to the Emergency Department with chief complaint of postoperative knee pain.  Patient had arthroscopy reportedly for anterior cruciate ligament tear yesterday.  She reported that she woke up at approximately 2 AM and was having excruciating pain she feels as though the pain came on after her nerve block wore off.  She had no fevers no chills she has had bandage in place with compression stocking in place since time of surgery so she cannot evaluate if there is been any discoloration or swelling.  She reports no fevers no chills no nausea no vomiting cough congestion chest pain or shortness of breath.  Pain is rated as 8 out of 10 slightly better with pain medication provided and elevation no alleviating factors.    REVIEW OF SYSTEMS  10 systems reviewed and otherwise negative, pertinent positives and negatives listed in the history of present illness.    PAST MEDICAL HISTORY   has a past medical history of Anxiety; Bowel habit changes (2018); History of anemia; History of cold sores; Ovarian cyst (2018); Pain (2018); and Psychiatric problem.    SURGICAL HISTORY   has a past surgical history that includes acl reconstruction (Right, 2006);  delivery only (); knee arthroscopy (2014); hardware removal ortho (2014); bone graft (2014); acl reconstruction scope (10/2/2014); repeat c section (10/19/2015); repeat c section (2017); knee arthroscopy (Right, 2018); synovectomy (Right, 2018); medial meniscectomy (Right, 2018); chondroplasty (2018); hardware removal ortho (2018); and bone graft (2018).    SOCIAL HISTORY  Social History   Substance Use Topics   •  "Smoking status: Never Smoker   • Smokeless tobacco: Never Used   • Alcohol use No      History   Drug Use No       FAMILY HISTORY  Non-Contributory    CURRENT MEDICATIONS  Home Medications     Reviewed by Leigh Ann Joy R.N. (Registered Nurse) on 11/14/18 at 1919  Med List Status: Complete   Medication Last Dose Status   cyclobenzaprine (FLEXERIL) 10 MG Tab 11/13/2018 Active   dicyclomine (BENTYL) 20 MG Tab 11/12/2018 Active   FEMYNOR 0.25-35 MG-MCG per tablet 11/13/2018 Active   ibuprofen (MOTRIN) 800 MG Tab 11/14/2018 Active   loperamide (IMODIUM) 2 MG Cap prn Active   ondansetron (ZOFRAN) 4 MG Tab tablet 11/13/2018 Active   oxyCODONE-acetaminophen (PERCOCET) 5-325 MG Tab 11/14/2018 Active   sertraline (ZOLOFT) 50 MG Tab 11/13/2018 Active   valACYclovir (VALTREX) 500 MG Tab prn Active                ALLERGIES  No Known Allergies    PHYSICAL EXAM  VITAL SIGNS: /69   Pulse 99   Temp 36.7 °C (98.1 °F) (Temporal)   Resp 20   Ht 1.676 m (5' 6\")   Wt 88.6 kg (195 lb 5.2 oz)   LMP 11/12/2018   BMI 31.53 kg/m²   Pulse ox interpretation: I interpret this pulse ox as normal.  Constitutional: Alert and oriented x 3, minimal distress  HEENT: Atraumatic normocephalic, pupils are equal round reactive to light extraocular movements are intact. The nares is clear, external ears are normal, mouth shows moist mucous membranes  Neck: Supple, no JVD no tracheal deviation  Cardiovascular: Regular rate and rhythm no murmur rub or gallop 2+ pulses peripherally x4  Thorax & Lungs: No respiratory distress, no wheezes rales or rhonchi, No chest tenderness.     Skin: Warm dry no acute rash or lesion  Musculoskeletal: Bilateral upper and left lower extremities unremarkable.  Bandage and compression stocking taken down shows incision sites over the right minimal edema minimal surrounding fluctuance no palpable effusion no warmth to the wounds.  Minimal posterior popliteal fossa tenderness.  Neurologic: Cranial nerves III " "through XII are grossly intact, no sensory deficit, no cerebellar dysfunction   Psychiatric: Appropriate affect for situation at this time      DIAGNOSTIC STUDIES / PROCEDURES        RADIOLOGY  US-EXTREMITY VENOUS LOWER UNILAT RIGHT   Final Result      No evidence of Right  lower extremity deep venous thrombosis. Probable Baker's cyst.            INTERPRETING LOCATION: [address] [facility] [address], [place], [address]        The radiologist's interpretation of all radiological studies have been reviewed by me.    COURSE & MEDICAL DECISION MAKING  Pertinent Labs & Imaging studies reviewed. (See chart for details)    7:41 PM - Patient seen and examined at bedside.           Patient noted to have slightly elevated blood pressure likely circumstantial secondary to presenting complaint. Referred to primary care physician for further evaluation.        Medical Decision Making: Duplex is unremarkable for DVT performed with patient having pain in the popliteal fossa.  There is evidence of possible small Baker's cyst.  Patient was given 1 dose IM Dilaudid and has had resolution of symptoms.  We will follow-up with her surgeon and primary care physician return here for worsening pain swelling numbness tingling fevers chills nausea vomiting chest pain palpitations any other acute symptoms or concerns otherwise discharged in stable and improved condition.    /69   Pulse 99   Temp 36.7 °C (98.1 °F) (Temporal)   Resp 20   Ht 1.676 m (5' 6\")   Wt 88.6 kg (195 lb 5.2 oz)   LMP 11/12/2018   BMI 31.53 kg/m²     Dylon López M.D.  [address]  [place] [address]  [phone]    Schedule an appointment as soon as possible for a visit       [facility], Emergency Dept  [address]  [place] [id number] [phone]    If symptoms worsen    Dexter Pimentel M.D.  [address]  [place] [address]  [phone]            Discharge Medication List as of 11/14/2018 10:05 PM          FINAL " IMPRESSION  1. Post-op pain        This dictation has been created using voice recognition software and/or scribes. The accuracy of the dictation is limited by the abilities of the software and the expertise of the scribes. I expect there may be some errors of grammar and possibly content. I made every attempt to manually correct the errors within my dictation. However, errors related to voice recognition software and/or scribes may still exist and should be interpreted within the appropriate context.

## 2018-11-15 NOTE — ED TRIAGE NOTES
"Chief Complaint   Patient presents with   • Post-Op Pain     pt had a scope done yesterday by Dr Pimentel.     /69   Pulse 99   Temp 36.7 °C (98.1 °F) (Temporal)   Resp 20   Ht 1.676 m (5' 6\")   Wt 88.6 kg (195 lb 5.2 oz)   LMP 11/12/2018   BMI 31.53 kg/m²     Pt to triage with above complaint. Pt states she was scoped yesterday. Pt states \"block wore off and the pain was bad.\" pt states she called office and was told to add ibuprofen with percocet. Pt denies any pain relief.  "

## 2018-11-15 NOTE — DISCHARGE INSTRUCTIONS
Return for worsening pain swelling redness any other acute symptoms or concerns otherwise follow-up with your orthopedist as scheduled.

## 2018-11-15 NOTE — ED NOTES
Pt given discharge instructions including to wear compression stocking and ace wrap, to ice 20 minutes on and 20 minutes off and keep leg elevated as much as possible.  Pt also advised to follow up with ortho, Dr. Pimentel as needed.  Pt verbalized understanding of instructions.  Ambulated out on crutches.

## 2018-11-22 ENCOUNTER — OFFICE VISIT (OUTPATIENT)
Dept: URGENT CARE | Facility: PHYSICIAN GROUP | Age: 28
End: 2018-11-22
Payer: COMMERCIAL

## 2018-11-22 VITALS
SYSTOLIC BLOOD PRESSURE: 108 MMHG | HEIGHT: 66 IN | WEIGHT: 188 LBS | DIASTOLIC BLOOD PRESSURE: 72 MMHG | OXYGEN SATURATION: 96 % | RESPIRATION RATE: 16 BRPM | HEART RATE: 112 BPM | BODY MASS INDEX: 30.22 KG/M2 | TEMPERATURE: 96.7 F

## 2018-11-22 DIAGNOSIS — J06.9 UPPER RESPIRATORY TRACT INFECTION, UNSPECIFIED TYPE: ICD-10-CM

## 2018-11-22 DIAGNOSIS — J02.9 EXUDATIVE PHARYNGITIS: ICD-10-CM

## 2018-11-22 LAB
HETEROPH AB SER QL LA: NEGATIVE
INT CON NEG: NEGATIVE
INT CON NEG: NEGATIVE
INT CON POS: POSITIVE
INT CON POS: POSITIVE
S PYO AG THROAT QL: NEGATIVE

## 2018-11-22 PROCEDURE — 99214 OFFICE O/P EST MOD 30 MIN: CPT | Performed by: PHYSICIAN ASSISTANT

## 2018-11-22 PROCEDURE — 87880 STREP A ASSAY W/OPTIC: CPT | Performed by: PHYSICIAN ASSISTANT

## 2018-11-22 PROCEDURE — 86308 HETEROPHILE ANTIBODY SCREEN: CPT | Performed by: PHYSICIAN ASSISTANT

## 2018-11-22 RX ORDER — OXYCODONE HYDROCHLORIDE AND ACETAMINOPHEN 5; 325 MG/1; MG/1
1-2 TABLET ORAL EVERY 4 HOURS PRN
COMMUNITY
End: 2019-09-12

## 2018-11-22 RX ORDER — AMOXICILLIN 875 MG/1
875 TABLET, COATED ORAL 2 TIMES DAILY
Qty: 20 TAB | Refills: 0 | Status: SHIPPED | OUTPATIENT
Start: 2018-11-22 | End: 2019-09-09

## 2018-11-22 ASSESSMENT — ENCOUNTER SYMPTOMS
COUGH: 0
NAUSEA: 0
MYALGIAS: 1
DIARRHEA: 0
ABDOMINAL PAIN: 0
SORE THROAT: 1
EYE REDNESS: 0
SPUTUM PRODUCTION: 0
CHILLS: 0
FEVER: 0
SHORTNESS OF BREATH: 0
VOMITING: 0
WHEEZING: 0
EYE DISCHARGE: 0

## 2018-11-22 NOTE — PROGRESS NOTES
"Subjective:   Tonia Xavier is a 28 y.o. female who presents for Pharyngitis (x 1 week// hurts when swallowing)        Pharyngitis    This is a new problem. The current episode started in the past 7 days. Associated symptoms include congestion. Pertinent negatives include no abdominal pain, coughing, diarrhea, ear pain, shortness of breath or vomiting.     Notes last one week of ST, some waxing wanin g, pain w/ swallowing, globus sensation, denies fever/chills, denies cough, denies ear pain, denies congestion, denies nausea/voimtinga/bdpain/abdpain/diarrhea/rash, denies PMH of asthma/bronchitis/pneumoina, PMH of strep, no known exposures.  Patient did have revision ACL reconstruction last week with bone grafting and staged reconstruction    Review of Systems   Constitutional: Negative for chills and fever.   HENT: Positive for congestion and sore throat. Negative for ear pain.    Eyes: Negative for discharge and redness.   Respiratory: Negative for cough, sputum production, shortness of breath and wheezing.    Gastrointestinal: Negative for abdominal pain, diarrhea, nausea and vomiting.   Musculoskeletal: Positive for myalgias.   Skin: Negative for rash.   Endo/Heme/Allergies: Negative for environmental allergies.     Allergies   Allergen Reactions   • Benzoin Hives     Rash    I have worn a mask for the entire encounter with this patient.    Objective:   /72 (BP Location: Right arm, Patient Position: Sitting)   Pulse (!) 112   Temp 35.9 °C (96.7 °F) (Temporal)   Resp 16   Ht 1.676 m (5' 6\")   Wt 85.3 kg (188 lb)   LMP 11/12/2018   SpO2 96%   BMI 30.34 kg/m²   Physical Exam   Constitutional: She is oriented to person, place, and time. She appears well-developed and well-nourished. No distress.   HENT:   Head: Normocephalic and atraumatic.   Right Ear: Tympanic membrane, external ear and ear canal normal.   Left Ear: Tympanic membrane, external ear and ear canal normal.   Nose: Nose normal. "   Mouth/Throat: Uvula is midline and mucous membranes are normal. Posterior oropharyngeal erythema ( mild PND) present. No oropharyngeal exudate, posterior oropharyngeal edema or tonsillar abscesses. Tonsils are 1+ on the right. Tonsils are 1+ on the left. Tonsillar exudate ( right).   Eyes: Conjunctivae and lids are normal. Right eye exhibits no discharge. Left eye exhibits no discharge. No scleral icterus.   Neck: Neck supple.   Pulmonary/Chest: Effort normal. No respiratory distress. She has no decreased breath sounds. She has no wheezes. She has no rhonchi. She has no rales.   Musculoskeletal: Normal range of motion.   Lymphadenopathy:     She has cervical adenopathy ( mild bilat).   Neurological: She is alert and oriented to person, place, and time. She is not disoriented.   Skin: Skin is warm and dry. She is not diaphoretic. No erythema. No pallor.   Psychiatric: Her speech is normal and behavior is normal.   Nursing note and vitals reviewed.  POCT strep - NEG  POCT mono - NEG      Assessment/Plan:   1. Upper respiratory tract infection, unspecified type    2. Exudative pharyngitis  - lidocaine viscous 2% (XYLOCAINE) 2 % Solution; Take 5 mL by mouth as needed for Throat/Mouth Pain (q6hr PRN throat pain, ok to rinse and spit or swallow).  Dispense: 120 mL; Refill: 0  - amoxicillin (AMOXIL) 875 MG tablet; Take 1 Tab by mouth 2 times a day.  Dispense: 20 Tab; Refill: 0  - POCT Mononucleosis (mono)    Other orders  - POCT Rapid Strep A  - oxyCODONE-acetaminophen (PERCOCET) 5-325 MG Tab; Take 1-2 Tabs by mouth every four hours as needed.  Supportive care is reviewed with patient/caregiver - recommend to push PO fluids and electrolytes, Nsaids/tylenol, netti pot/saline irrig, humidifier in home,  take full course of Rx, take with probiotics, observe for resolution  Return to clinic with lack of resolution or progression of symptoms.  Throw away toothbrush    Differential diagnosis, natural history, supportive care,  and indications for immediate follow-up discussed.

## 2018-11-29 ENCOUNTER — PHYSICAL THERAPY (OUTPATIENT)
Dept: PHYSICAL THERAPY | Facility: MEDICAL CENTER | Age: 28
End: 2018-11-29
Attending: ORTHOPAEDIC SURGERY
Payer: COMMERCIAL

## 2018-11-29 DIAGNOSIS — S83.281A OTHER TEAR OF LATERAL MENISCUS, CURRENT INJURY, RIGHT KNEE, INITIAL ENCOUNTER: ICD-10-CM

## 2018-11-29 DIAGNOSIS — M25.561 RIGHT KNEE PAIN, UNSPECIFIED CHRONICITY: ICD-10-CM

## 2018-11-29 DIAGNOSIS — S83.241A OTHER TEAR OF MEDIAL MENISCUS, CURRENT INJURY, RIGHT KNEE, INITIAL ENCOUNTER: ICD-10-CM

## 2018-11-29 PROCEDURE — 97014 ELECTRIC STIMULATION THERAPY: CPT

## 2018-11-29 PROCEDURE — 97161 PT EVAL LOW COMPLEX 20 MIN: CPT

## 2018-11-29 ASSESSMENT — ENCOUNTER SYMPTOMS
QUALITY: SHARP
PAIN TIMING: ALL DAY
PAIN SCALE AT HIGHEST: 10
QUALITY: ACHING
PAIN SCALE AT LOWEST: 4
PAIN SCALE: 7

## 2018-11-29 NOTE — OP THERAPY EVALUATION
Outpatient Physical Therapy  INITIAL EVALUATION    [facility] Outpatient Physical Therapy  [address]  [place] [address]  Phone:  [phone]  Fax:  [phone]    Date of Evaluation: 2018    Patient: Tonia Xavier  YOB: 1990  MRN: [id number]     Referring Provider: Dexter Pimentel M.D.  [address] [place] [address]   Referring Diagnosis No admission diagnoses are documented for this encounter.     Time Calculation  Start time: 1045  Stop time: 1145 Time Calculation (min): 60 minutes     Physical Therapy Occurrence Codes    Date of onset of impairment:  18   Date physical therapy care plan established or reviewed:  18   Date physical therapy treatment started:  18          Chief Complaint: Knee Problem and Knee Injury    Visit Diagnoses     ICD-10-CM   1. Other tear of lateral meniscus, current injury, right knee, initial encounter S83.281A   2. Right knee pain, unspecified chronicity M25.561   3. Other tear of medial meniscus, current injury, right knee, initial encounter S83.241A         Subjective   History of Present Illness:     Date of onset:  2018    History of chief complaint:  Pt has had 2 previous ACL surgeries that have failed: 2006, . ACL not torn but it was stretched out. She just had a scope with hardware removal, bone graft, menisectomies, and microfractures. (see op report) Pt NWB for one month. She will have a 3rd ACL reconstruction in about 3 months.     Pain:     Current pain ratin    At best pain ratin    At worst pain rating:  10    Location:  Pain medial joint line     Quality:  Aching and sharp    Pain timing:  All day    Aggravating factors:  Post op pain.    Relieving factors:  Elevate and polar pack. NSAIDS.    Progression:  Improving    Activity Tolerance:     Current activity tolerance / Recreational activities:  Has 3 kids, 1, 3, 7 year old kids. Her mom and sister are helping  right now.     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     Social Support:     Lives in:  One-story house    Lives with:  Young children and spouse    Hand Dominance:     Hand dominance:  Right    Treatments:     Treatments to date:  Ice and elevation primarily.     Patient Goals:     Patient goals for therapy:  Get her motion and strength back for next surgery.       Past Medical History:   Diagnosis Date   • Anxiety    • Bowel habit changes 11/08/2018    diarrhea   • History of anemia    • History of cold sores    • Ovarian cyst 11/2018    left side   • Pain 11/08/2018    low back   • Psychiatric problem     anxiety     Past Surgical History:   Procedure Laterality Date   • KNEE ARTHROSCOPY Right 11/13/2018    Procedure: KNEE ARTHROSCOPY;  Surgeon: Dexter Pimentel M.D.;  Location: [facility];  Service: Orthopedics   • SYNOVECTOMY Right 11/13/2018    Procedure: SYNOVECTOMY;  Surgeon: Dexter Pimentel M.D.;  Location: [facility];  Service: Orthopedics   • MEDIAL MENISCECTOMY Right 11/13/2018    Procedure: MEDIAL MENISCECTOMY-  PARTIAL, AND PARTIAL LATERAL;  Surgeon: Dexter Pimentel M.D.;  Location: [facility];  Service: Orthopedics   • CHONDROPLASTY  11/13/2018    Procedure: CHONDROPLASTY;  Surgeon: Dexter Pimentel M.D.;  Location: [facility];  Service: Orthopedics   • HARDWARE REMOVAL ORTHO  11/13/2018    Procedure: HARDWARE REMOVAL ORTHO;  Surgeon: Dexter Pimentel M.D.;  Location: [facility];  Service: Orthopedics   • BONE GRAFT  11/13/2018    Procedure: BONE GRAFT;  Surgeon: Dexter Pimentel M.D.;  Location: [facility];  Service: Orthopedics   • REPEAT C SECTION  11/25/2017    Procedure: REPEAT C SECTION;  Surgeon: Asmita Bolivar M.D.;  [facility]: [facility];  Service: Obstetrics   • REPEAT C SECTION  10/19/2015    Procedure: REPEAT C SECTION;  Surgeon: Mary Randolph M.D.;  Location: [facility];  Service:    • ACL RECONSTRUCTION SCOPE  10/2/2014    Performed by Dexter Pimentel M.D. at SURGERY [facility]   • KNEE ARTHROSCOPY  2014    Performed by Dexter Pimentel M.D. at [facility]   • HARDWARE REMOVAL ORTHO  2014    Performed by Dexter Pimentel M.D. at [facility]   • BONE GRAFT  2014    Performed by Dexter Pimentel M.D. at [facility]   • PB  DELIVERY ONLY     • ACL RECONSTRUCTION Right 2006       Precautions:       Objective   Observation and functional movement:  Pt NWB on crutches. Surgical incisions healing nicely. She has mild edema and noticeable atrophy in her quadriceps.     Range of motion and strength:    AROM right knee=  degrees. Pt unable to actively contract quadriceps or do a SLR.       Sensation and reflexes:     Sensation is intact.    Palpation and joint mobility:     Pt has limited extension with pain posterior knee. She has TTP in medial HS.             Therapeutic Exercises (CPT 27591):       Therapeutic Exercise Summary: Pt instructed on: A/PROM exercises, heel slides, isometric quad work with baloon. Also suggested pt buy a home stim unit to use bc her co payment is high and she may only be able to do 1x/week.     Therapeutic Treatments and Modalities:     1. Manual Therapy (CPT 80483), IASTM to medial HS and popliteal fossa, gentle extension mobs    2. E Stim Unattended (CPT 79923), russian stim to vmo 10/10 with baloon    Time-based treatments/modalities:          Assessment, Response and Plan:   Impairments: abnormal gait, difficulty performing job, impaired functional mobility, impaired physical strength, lacks appropriate home exercise program and limited ADL's    Assessment details:  Ms. Xavier is a healthy 28 year old female 2.5 weeks s/p right knee arthroscopy with extensive work done. Pt will benefit from skilled PT to address mobility and strength to prepare her for future ACL reconstruction  surgery next spring.   Prognosis: good    Goals:   Short Term Goals:   Pt independent with daily home exercises.   Short term goal time span:  2-4 weeks      Long Term Goals:    Pt will have full ROM in her right knee.   Pt able to have normal gait when allowed to weight bear.  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   Pt independent with HEP and strengthening to prepare for surgery in the Spring.   Long term goal time span:  6-8 weeks    Plan:   Therapy options:  Physical therapy treatment to continue  Planned therapy interventions:  E Stim Unattended (CPT 38504), Therapeutic Exercise (CPT 14838), Gait Training (CPT 72553) and Manual Therapy (CPT 20707)  Other planned therapy interventions:  1-2x/week bc of $40 co payment  Duration in weeks:  8  Discussed with:  Patient  Plan details:  Pt would like to limit visits if possible bc of copayment. She will need to use her visits next year for next surgery.           Referring provider co-signature:  I have reviewed this plan of care and my co-signature certifies the need for services.  Certification Dates:   From 11-29-18  to   To 1-29-18    Physician Signature: ________________________________ Date: ______________

## 2018-11-30 PROCEDURE — 90686 IIV4 VACC NO PRSV 0.5 ML IM: CPT | Performed by: PREVENTIVE MEDICINE

## 2018-11-30 PROCEDURE — 90471 IMMUNIZATION ADMIN: CPT | Performed by: PREVENTIVE MEDICINE

## 2018-12-04 ENCOUNTER — PHYSICAL THERAPY (OUTPATIENT)
Dept: PHYSICAL THERAPY | Facility: MEDICAL CENTER | Age: 28
End: 2018-12-04
Attending: ORTHOPAEDIC SURGERY
Payer: COMMERCIAL

## 2018-12-04 DIAGNOSIS — M25.561 RIGHT KNEE PAIN, UNSPECIFIED CHRONICITY: ICD-10-CM

## 2018-12-04 DIAGNOSIS — S83.281A OTHER TEAR OF LATERAL MENISCUS, CURRENT INJURY, RIGHT KNEE, INITIAL ENCOUNTER: ICD-10-CM

## 2018-12-04 DIAGNOSIS — S83.241A OTHER TEAR OF MEDIAL MENISCUS, CURRENT INJURY, RIGHT KNEE, INITIAL ENCOUNTER: ICD-10-CM

## 2018-12-04 PROCEDURE — 97110 THERAPEUTIC EXERCISES: CPT

## 2018-12-04 PROCEDURE — 97014 ELECTRIC STIMULATION THERAPY: CPT

## 2018-12-04 PROCEDURE — 97140 MANUAL THERAPY 1/> REGIONS: CPT

## 2018-12-04 NOTE — OP THERAPY DAILY TREATMENT
Outpatient Physical Therapy  DAILY TREATMENT     [facility] Outpatient Physical Therapy  [address]  [place] [address]  Phone:  [phone]  Fax:  [phone]    Date: 12/04/2018    Patient: Tonia Xavier  YOB: 1990  MRN: [id number]     Time Calculation  Start time: 1015  Stop time: 1105 Time Calculation (min): 50 minutes     Chief Complaint: Knee Injury and Knee Problem    Visit #: 2    SUBJECTIVE:  Able to sleep, pain at 4/10.  Follow up with MD 12/19    OBJECTIVE:  Current objective measures: knee ROM 5-113          Therapeutic Exercises (CPT 71731):     1. Heel slides on bed and on floor, 10 each    2. Quad sets, 10    3. SLR with assistance , 3/2    Therapeutic Treatments and Modalities:     1. Manual Therapy (CPT 11464), 15 min, patella mobs, STM at quads and calf, light mobs into ext    2. E Stim Unattended (CPT 66432), 15 min, 10;10 russian stim with balloon with ice    Time-based treatments/modalities:  Manual therapy minutes (CPT 43293): 15 minutes  Therapeutic exercise minutes (CPT 89324): 15 minutes       Pain rating before treatment: 4  Pain rating after treatment: 6    ASSESSMENT:   Response to treatment: some increased soreness with exercise    PLAN/RECOMMENDATIONS:   Plan for treatment: therapy treatment to continue next visit.  Planned interventions for next visit: continue with current treatment.

## 2018-12-06 ENCOUNTER — PHYSICAL THERAPY (OUTPATIENT)
Dept: PHYSICAL THERAPY | Facility: MEDICAL CENTER | Age: 28
End: 2018-12-06
Attending: ORTHOPAEDIC SURGERY
Payer: COMMERCIAL

## 2018-12-06 DIAGNOSIS — S83.241A OTHER TEAR OF MEDIAL MENISCUS, CURRENT INJURY, RIGHT KNEE, INITIAL ENCOUNTER: ICD-10-CM

## 2018-12-06 DIAGNOSIS — S83.281A OTHER TEAR OF LATERAL MENISCUS, CURRENT INJURY, RIGHT KNEE, INITIAL ENCOUNTER: ICD-10-CM

## 2018-12-06 DIAGNOSIS — M25.561 RIGHT KNEE PAIN, UNSPECIFIED CHRONICITY: ICD-10-CM

## 2018-12-06 PROCEDURE — 97014 ELECTRIC STIMULATION THERAPY: CPT

## 2018-12-06 PROCEDURE — 97110 THERAPEUTIC EXERCISES: CPT

## 2018-12-06 PROCEDURE — 97140 MANUAL THERAPY 1/> REGIONS: CPT

## 2018-12-06 NOTE — OP THERAPY DAILY TREATMENT
Outpatient Physical Therapy  DAILY TREATMENT     [facility] Outpatient Physical Therapy  [address]  [place] [address]  Phone:  [phone]  Fax:  [phone]    Date: 12/06/2018    Patient: Tonia Xavier  YOB: 1990  MRN: [id number]     Time Calculation  Start time: 1244  Stop time: 1335 Time Calculation (min): 51 minutes     Chief Complaint: Knee Problem and Knee Injury    Visit #: 3    SUBJECTIVE:  Felt good after last time.  More sore today after had to put slight bit of weight into leg with ice/snow yesterday.  Pt was able to push in W/C most of the way.      OBJECTIVE:  Current objective measures: knee ROM 2-121          Therapeutic Exercises (CPT 91756):     1. Heel slides on bed and on floor, 10 each    2. Quad sets, 10/2    3. SLR with assistance , 5/3    4. Sidelying Hip abd , 10    5. Sidelying hip add, stopped after 1 rep as sore    6. Prone hip ext, 10    Therapeutic Treatments and Modalities:     1. Manual Therapy (CPT 59638), 15 min, patella mobs, STM at quads and calf, light mobs into ext    2. E Stim Unattended (CPT 45333), 15 min, 10;10 russian stim with balloon with ice    Time-based treatments/modalities:  Manual therapy minutes (CPT 32964): 15 minutes  Therapeutic exercise minutes (CPT 37281): 15 minutes       Pain rating before treatment: 5  Pain rating after treatment: 5    ASSESSMENT:   Response to treatment: improved quad contraction and SLR with better form    PLAN/RECOMMENDATIONS:   Plan for treatment: therapy treatment to continue next visit.  Planned interventions for next visit: continue with current treatment.

## 2018-12-10 ENCOUNTER — PHYSICAL THERAPY (OUTPATIENT)
Dept: PHYSICAL THERAPY | Facility: MEDICAL CENTER | Age: 28
End: 2018-12-10
Attending: ORTHOPAEDIC SURGERY
Payer: COMMERCIAL

## 2018-12-10 DIAGNOSIS — M25.561 RIGHT KNEE PAIN, UNSPECIFIED CHRONICITY: ICD-10-CM

## 2018-12-10 DIAGNOSIS — S83.241A OTHER TEAR OF MEDIAL MENISCUS, CURRENT INJURY, RIGHT KNEE, INITIAL ENCOUNTER: ICD-10-CM

## 2018-12-10 DIAGNOSIS — S83.281A OTHER TEAR OF LATERAL MENISCUS, CURRENT INJURY, RIGHT KNEE, INITIAL ENCOUNTER: ICD-10-CM

## 2018-12-10 PROCEDURE — 97014 ELECTRIC STIMULATION THERAPY: CPT

## 2018-12-10 PROCEDURE — 97110 THERAPEUTIC EXERCISES: CPT

## 2018-12-10 PROCEDURE — 97140 MANUAL THERAPY 1/> REGIONS: CPT

## 2018-12-10 NOTE — OP THERAPY DAILY TREATMENT
"  Outpatient Physical Therapy  DAILY TREATMENT     [facility] Outpatient Physical Therapy  [address]  [place] [address]  Phone:  [phone]  Fax:  [phone]    Date: 12/10/2018    Patient: Tonia Xavier  YOB: 1990  MRN: [id number]     Time Calculation  Start time: 1120  Stop time: 1210 Time Calculation (min): 50 minutes     Chief Complaint: No chief complaint on file.    Visit #: 4    SUBJECTIVE:  Knee has been a little sore since yesterday, didn't put weight on leg but might have moved it wrong and it's been sore since. Forgot ice for work today.     OBJECTIVE:  Current objective measures: No significant increase in swelling present, no bruising at R knee. R knee AROM: 2-122 degrees          Therapeutic Exercises (CPT 15107):     1. Heel slides on floor, 10x2    2. Quad sets, 10    3. SLR with assistance , 5x2    4. Sidelying Hip abd , 5x3    5. HS stretch with strap , 15\" x 2    6. Prone hip ext, 10    7. Heel prop, 20\" - pt to try at home    Therapeutic Treatments and Modalities:     1. Manual Therapy (CPT 03412), 15 min, patella mobs, STM at quads and calf, light mobs into ext    2. E Stim Unattended (CPT 38105), 10 min, 10;10 russian stim with balloon with ice, decreased to 10 min today as pt needed to return to work    Time-based treatments/modalities:  Manual therapy minutes (CPT 78169): 15 minutes  Therapeutic exercise minutes (CPT 91098): 15 minutes       Pain rating before treatment: 4  Pain rating after treatment: 2    ASSESSMENT:   Response to treatment: Verbal cues required to properly initiate quad contraction during QS and SLR. Good patellar mobility present in all directions.     PLAN/RECOMMENDATIONS:   Plan for treatment: therapy treatment to continue next visit.  Planned interventions for next visit: continue with current treatment. Progress strength as able within NWB status.    MD 12/19 .  "

## 2018-12-13 ENCOUNTER — APPOINTMENT (OUTPATIENT)
Dept: PHYSICAL THERAPY | Facility: MEDICAL CENTER | Age: 28
End: 2018-12-13
Attending: ORTHOPAEDIC SURGERY
Payer: COMMERCIAL

## 2018-12-18 ENCOUNTER — APPOINTMENT (OUTPATIENT)
Dept: PHYSICAL THERAPY | Facility: MEDICAL CENTER | Age: 28
End: 2018-12-18
Attending: ORTHOPAEDIC SURGERY
Payer: COMMERCIAL

## 2018-12-20 ENCOUNTER — PHYSICAL THERAPY (OUTPATIENT)
Dept: PHYSICAL THERAPY | Facility: MEDICAL CENTER | Age: 28
End: 2018-12-20
Attending: ORTHOPAEDIC SURGERY
Payer: COMMERCIAL

## 2018-12-20 DIAGNOSIS — M25.561 RIGHT KNEE PAIN, UNSPECIFIED CHRONICITY: ICD-10-CM

## 2018-12-20 DIAGNOSIS — S83.281A OTHER TEAR OF LATERAL MENISCUS, CURRENT INJURY, RIGHT KNEE, INITIAL ENCOUNTER: ICD-10-CM

## 2018-12-20 DIAGNOSIS — S83.241A OTHER TEAR OF MEDIAL MENISCUS, CURRENT INJURY, RIGHT KNEE, INITIAL ENCOUNTER: ICD-10-CM

## 2018-12-20 PROCEDURE — 97014 ELECTRIC STIMULATION THERAPY: CPT

## 2018-12-20 PROCEDURE — 97110 THERAPEUTIC EXERCISES: CPT

## 2018-12-20 NOTE — OP THERAPY DAILY TREATMENT
Outpatient Physical Therapy  DAILY TREATMENT     [facility]  [address]  [place] [address]  Phone:  [phone]  Fax:  [phone]    Date: 12/20/2018    Patient: Tonia Xavier  YOB: 1990  MRN: [id number]     Time Calculation  Start time: 1118  Stop time: 1203 Time Calculation (min): 45 minutes     Chief Complaint: Knee Injury and Knee Problem    Visit #: 5    SUBJECTIVE:  Saw MD, released to walk without crutches, but feeling not ready so using 1 crutch and a brace from past.    OBJECTIVE:  Current objective measures: 0-128 sitting  Supine 0-131  SLR with good control          Therapeutic Exercises (CPT 22514):     1. Stationary bike, 3 min    2. TG leg press, 10/3, L6    3. TG heel raises, 10/3, L6    4. SLR, 10/1    5. Bridge , 10    Therapeutic Treatments and Modalities:     1. Manual Therapy (CPT 92192), 5 min STM to right quad    2. E Stim Unattended (CPT 28649), 15 min, 10;10 russian stim with balloon with ice    Time-based treatments/modalities:  Manual therapy minutes (CPT 88406): 5 minutes  Therapeutic exercise minutes (CPT 89190): 25 minutes       Pain rating before treatment: 0  Pain rating after treatment: 0    ASSESSMENT:   Response to treatment: good response to new exercise    PLAN/RECOMMENDATIONS:   Plan for treatment: therapy treatment to continue next visit.  Planned interventions for next visit: continue with current treatment.

## 2018-12-27 ENCOUNTER — HOSPITAL ENCOUNTER (OUTPATIENT)
Dept: RADIOLOGY | Facility: MEDICAL CENTER | Age: 28
End: 2018-12-27
Attending: OBSTETRICS & GYNECOLOGY
Payer: COMMERCIAL

## 2018-12-27 ENCOUNTER — HOSPITAL ENCOUNTER (OUTPATIENT)
Facility: MEDICAL CENTER | Age: 28
End: 2018-12-27
Attending: PHYSICIAN ASSISTANT
Payer: COMMERCIAL

## 2018-12-27 ENCOUNTER — PHYSICAL THERAPY (OUTPATIENT)
Dept: PHYSICAL THERAPY | Facility: MEDICAL CENTER | Age: 28
End: 2018-12-27
Attending: ORTHOPAEDIC SURGERY
Payer: COMMERCIAL

## 2018-12-27 DIAGNOSIS — S83.281A OTHER TEAR OF LATERAL MENISCUS, CURRENT INJURY, RIGHT KNEE, INITIAL ENCOUNTER: ICD-10-CM

## 2018-12-27 DIAGNOSIS — S83.241A OTHER TEAR OF MEDIAL MENISCUS, CURRENT INJURY, RIGHT KNEE, INITIAL ENCOUNTER: ICD-10-CM

## 2018-12-27 DIAGNOSIS — N83.202 OVARIAN CYST, LEFT: ICD-10-CM

## 2018-12-27 DIAGNOSIS — M25.561 RIGHT KNEE PAIN, UNSPECIFIED CHRONICITY: ICD-10-CM

## 2018-12-27 PROCEDURE — 87493 C DIFF AMPLIFIED PROBE: CPT

## 2018-12-27 PROCEDURE — 76830 TRANSVAGINAL US NON-OB: CPT

## 2018-12-27 PROCEDURE — 87177 OVA AND PARASITES SMEARS: CPT

## 2018-12-27 PROCEDURE — 87046 STOOL CULTR AEROBIC BACT EA: CPT

## 2018-12-27 PROCEDURE — 87899 AGENT NOS ASSAY W/OPTIC: CPT

## 2018-12-27 PROCEDURE — 87209 SMEAR COMPLEX STAIN: CPT

## 2018-12-27 PROCEDURE — 87045 FECES CULTURE AEROBIC BACT: CPT

## 2018-12-27 PROCEDURE — 97014 ELECTRIC STIMULATION THERAPY: CPT

## 2018-12-27 NOTE — OP THERAPY DAILY TREATMENT
Outpatient Physical Therapy  DAILY TREATMENT     [facility]  [address]  [place] [address]  Phone:  [phone]  Fax:  [phone]    Date: 12/27/2018    Patient: Tonia Xavier  YOB: 1990  MRN: [id number]     Time Calculation  Start time: 1000  Stop time: 1050 Time Calculation (min): 50 minutes     Chief Complaint: Knee Problem    Visit #: 6    SUBJECTIVE:  Pt states she has stopped using the crutches, but has increased pain in the inner part of her knee and has difficulty walking normally.      OBJECTIVE:  Current objective measures:   Supine 0-126  SLR with good control  Gait: decreased step length on L, decreased stance time on R, decreased R knee flexion in stance and gait on the R, antalgic gait pattern  Gait with single crutch: normalized gait pattern          Therapeutic Exercises (CPT 70021):     1. Stationary bike, 3 min    2. TG leg press, 10 x 2, L6    3. TG heel raises, 10 x 2, L6    4. SLR, 10 x 1    5. Bridge , 10    6. WS, WS + lift, 10 x 1 each    7. Step throughs with crutch (R as stance leg), 10 x 1    Therapeutic Treatments and Modalities:     1. Manual Therapy (CPT 30507), 5 min STM to right quad/medial border of patella    2. E Stim Unattended (CPT 98729), 15 min, 10;10 russian stim with balloon with ice    Time-based treatments/modalities:  Manual therapy minutes (CPT 87463): 5 minutes  Therapeutic exercise minutes (CPT 45767): 25 minutes       Pain rating before treatment: 0  Pain rating after treatment: 0    ASSESSMENT:   Response to treatment: Pt has increased medial knee pain since walking without the crutches. Given pt's increase in pain and antalgic gait pattern without the crutches, pt encouraged to continue to use the one crutch until she can walk with normalized gait pattern. Pt given WS and WB exercises to improve tolerance of WB and gait without an AD.     PLAN/RECOMMENDATIONS:   Plan for  treatment: therapy treatment to continue next visit.  Planned interventions for next visit: continue with current treatment. Progress to more WB. Continue to wean from crutch.

## 2018-12-28 LAB
C DIFF DNA SPEC QL NAA+PROBE: NEGATIVE
C DIFF TOX GENS STL QL NAA+PROBE: NEGATIVE

## 2018-12-29 LAB
E COLI SXT1+2 STL IA: NORMAL
SIGNIFICANT IND 70042: NORMAL
SITE SITE: NORMAL
SOURCE SOURCE: NORMAL

## 2018-12-31 LAB
BACTERIA STL CULT: NORMAL
E COLI SXT1+2 STL IA: NORMAL
SIGNIFICANT IND 70042: NORMAL
SITE SITE: NORMAL
SOURCE SOURCE: NORMAL

## 2019-01-01 LAB
O+P SPEC MICRO: NORMAL
SIGNIFICANT IND 70042: NORMAL
SITE SITE: NORMAL
SOURCE SOURCE: NORMAL

## 2019-01-03 ENCOUNTER — PHYSICAL THERAPY (OUTPATIENT)
Dept: PHYSICAL THERAPY | Facility: MEDICAL CENTER | Age: 29
End: 2019-01-03
Attending: ORTHOPAEDIC SURGERY
Payer: COMMERCIAL

## 2019-01-03 DIAGNOSIS — S83.281A OTHER TEAR OF LATERAL MENISCUS, CURRENT INJURY, RIGHT KNEE, INITIAL ENCOUNTER: ICD-10-CM

## 2019-01-03 DIAGNOSIS — S83.241A OTHER TEAR OF MEDIAL MENISCUS, CURRENT INJURY, RIGHT KNEE, INITIAL ENCOUNTER: ICD-10-CM

## 2019-01-03 DIAGNOSIS — M25.561 RIGHT KNEE PAIN, UNSPECIFIED CHRONICITY: ICD-10-CM

## 2019-01-03 PROCEDURE — 97014 ELECTRIC STIMULATION THERAPY: CPT

## 2019-01-03 PROCEDURE — 97110 THERAPEUTIC EXERCISES: CPT

## 2019-01-03 NOTE — OP THERAPY DAILY TREATMENT
Outpatient Physical Therapy  DAILY TREATMENT     [facility]  [address]  [place] [address]  Phone:  [phone]  Fax:  [phone]    Date: 01/03/2019    Patient: Tonia Xavier  YOB: 1990  MRN: [id number]     Time Calculation  Start time: 1147  Stop time: 1232 Time Calculation (min): 45 minutes     Chief Complaint: Knee Injury and Knee Problem    Visit #: 7    SUBJECTIVE:  I am using 1 crutch less, but use it when I am limping so I don't limp.  Sleep is better.    OBJECTIVE:  Current objective measures: slight limp without crutch  135 flex knee with slight pain at tip of knee cap  -2 extension          Therapeutic Exercises (CPT 25072):     1. Stationary bike, 3 min    2. TG leg press, 10 x 3, L7    3. TG heel raises, 10 x 3, L7    4. SLR, 10 x 1    5. Bridge , 10    6. Clam, 10 each    7. Gait work with out crutch, 10 x 1    8. Stool scooting, fwd and retro 30 ft x 2    Therapeutic Treatments and Modalities:     1. Manual Therapy (CPT 86873), 5 min STM to right quad/medial border of patella    2. E Stim Unattended (CPT 53983), 15 min, 10;10 russian stim with balloon with ice    Time-based treatments/modalities:  Manual therapy minutes (CPT 78024): 5 minutes  Therapeutic exercise minutes (CPT 02774): 25 minutes       Pain rating before treatment: 4  Pain rating after treatment: 4    ASSESSMENT:   Response to treatment: improving with strength    PLAN/RECOMMENDATIONS:   Plan for treatment: therapy treatment to continue next visit.  Planned interventions for next visit: continue with current treatment.

## 2019-01-10 ENCOUNTER — APPOINTMENT (OUTPATIENT)
Dept: PHYSICAL THERAPY | Facility: MEDICAL CENTER | Age: 29
End: 2019-01-10
Attending: ORTHOPAEDIC SURGERY
Payer: COMMERCIAL

## 2019-01-17 ENCOUNTER — PHYSICAL THERAPY (OUTPATIENT)
Dept: PHYSICAL THERAPY | Facility: MEDICAL CENTER | Age: 29
End: 2019-01-17
Attending: ORTHOPAEDIC SURGERY
Payer: COMMERCIAL

## 2019-01-17 DIAGNOSIS — M25.561 RIGHT KNEE PAIN, UNSPECIFIED CHRONICITY: ICD-10-CM

## 2019-01-17 DIAGNOSIS — S83.241A OTHER TEAR OF MEDIAL MENISCUS, CURRENT INJURY, RIGHT KNEE, INITIAL ENCOUNTER: ICD-10-CM

## 2019-01-17 DIAGNOSIS — S83.281A OTHER TEAR OF LATERAL MENISCUS, CURRENT INJURY, RIGHT KNEE, INITIAL ENCOUNTER: ICD-10-CM

## 2019-01-17 PROCEDURE — 97110 THERAPEUTIC EXERCISES: CPT

## 2019-01-17 NOTE — OP THERAPY DAILY TREATMENT
Outpatient Physical Therapy  DAILY TREATMENT     [facility] Outpatient Physical Therapy  [address]  [place] [address]  Phone:  [phone]  Fax:  [phone]    Date: 01/17/2019    Patient: Tonia Xavier  YOB: 1990  MRN: [id number]     Time Calculation  Start time: 1304  Stop time: 1339 Time Calculation (min): 35 minutes     Chief Complaint: Knee Problem and Knee Injury    Visit #: 8    SUBJECTIVE:  I am still feeling a little weak with step ups.  Pain 5/10.  Sleep is better and overall pain less often.  Getting farther in 2nd opinion consult for [place].  Still pops with movement occasionally.    OBJECTIVE:  Current objective measures: able to single leg press on TG at L6          Therapeutic Exercises (CPT 31925):     1. Stationary bike, 4 min    2. TG leg press, 10 x 3, L9    3. TG heel raises, 10 x 3, L9    4. TG leg press single leg, 5/3 joshua, L6    5. Calf stretch on slant board, 10 reps, then 30 sec hold    6. Rocker board in II bars AP and lateral, 10 each    7. Step up  and down 6 inch step in II bars, 10 x 1    8. Step up and down lateral 4 inch step in II bars, 10 x 1, 6 inch step difficult    9. Star matrix, 10    10. Pull back with L1 band and step back with opp LE, 10 each, L1    11. Pull fwd with L1 band and step fwd with opp LE, 10 each, L1    12. Cone work tapping in star and along straight line, 10    Therapeutic Treatments and Modalities:     1. Manual Therapy (CPT 88177), 5 min STM to right quad/medial border of patella    2. E Stim Unattended (CPT 28477), 15 min, 10;10 russian stim with balloon with ice    Time-based treatments/modalities:  Therapeutic exercise minutes (CPT 84470): 35 minutes       Pain rating before treatment: 5  Pain rating after treatment: less than 5    ASSESSMENT:   Response to treatment: improving in control and strength of right leg/knee, gait continues to be favored with right leg    PLAN/RECOMMENDATIONS:    Plan for treatment: therapy treatment to continue next visit.  Planned interventions for next visit: continue with current treatment.

## 2019-01-18 ENCOUNTER — APPOINTMENT (OUTPATIENT)
Dept: PHYSICAL THERAPY | Facility: MEDICAL CENTER | Age: 29
End: 2019-01-18
Attending: ORTHOPAEDIC SURGERY
Payer: COMMERCIAL

## 2019-01-31 ENCOUNTER — APPOINTMENT (OUTPATIENT)
Dept: PHYSICAL THERAPY | Facility: MEDICAL CENTER | Age: 29
End: 2019-01-31
Attending: ORTHOPAEDIC SURGERY
Payer: COMMERCIAL

## 2019-02-01 ENCOUNTER — PHYSICAL THERAPY (OUTPATIENT)
Dept: PHYSICAL THERAPY | Facility: MEDICAL CENTER | Age: 29
End: 2019-02-01
Attending: ORTHOPAEDIC SURGERY
Payer: COMMERCIAL

## 2019-02-01 DIAGNOSIS — S83.281A OTHER TEAR OF LATERAL MENISCUS, CURRENT INJURY, RIGHT KNEE, INITIAL ENCOUNTER: ICD-10-CM

## 2019-02-01 DIAGNOSIS — M25.561 RIGHT KNEE PAIN, UNSPECIFIED CHRONICITY: ICD-10-CM

## 2019-02-01 DIAGNOSIS — S83.241A OTHER TEAR OF MEDIAL MENISCUS, CURRENT INJURY, RIGHT KNEE, INITIAL ENCOUNTER: ICD-10-CM

## 2019-02-01 PROCEDURE — 97140 MANUAL THERAPY 1/> REGIONS: CPT

## 2019-02-01 PROCEDURE — 97014 ELECTRIC STIMULATION THERAPY: CPT

## 2019-02-01 PROCEDURE — 97110 THERAPEUTIC EXERCISES: CPT

## 2019-02-01 NOTE — OP THERAPY DAILY TREATMENT
Outpatient Physical Therapy  DAILY TREATMENT     [facility]  [address]  [place] [address]  Phone:  [phone]  Fax:  [phone]    Date: 02/01/2019    Patient: Tonia Xavier  YOB: 1990  MRN: [id number]     Time Calculation  Start time: 0930  Stop time: 1015 Time Calculation (min): 45 minutes     Chief Complaint: Post-Op Pain and Knee Problem    Visit #: 9    SUBJECTIVE:  Feeling ok. Trying to get stronger. Just trouble with going up/down steps  OBJECTIVE:  Current objective measures: able to single leg press on TG at L6          Therapeutic Exercises (CPT 89158):     1. Nu step, Level 5 x 5 min    2. Supine ball work, HS/Bridge in bal/Bridge with curl    3. Standing pulley hip ext 10#, 1 x 10 reps each side    4. BOx push, 40 ft x 2 @ 70#    Therapeutic Treatments and Modalities:     1. Manual Therapy (CPT 74961), R knee, IASTM to R knee, quad and patellar tendon    2. E Stim Unattended (CPT 70967), 15 min, 10;10 russian stim with balloon with ice    Time-based treatments/modalities:  Manual therapy minutes (CPT 03035): 15 minutes  Therapeutic exercise minutes (CPT 40076): 15 minutes       Pain rating before treatment: 5  Pain rating after treatment: less than 5    ASSESSMENT:   Response to treatment: improving in control and strength of right leg/knee, gait continues to be favored with right leg    PLAN/RECOMMENDATIONS:   Plan for treatment: therapy treatment to continue next visit.  Planned interventions for next visit: continue with current treatment.

## 2019-02-22 ENCOUNTER — APPOINTMENT (OUTPATIENT)
Dept: PHYSICAL THERAPY | Facility: MEDICAL CENTER | Age: 29
End: 2019-02-22
Attending: ORTHOPAEDIC SURGERY
Payer: COMMERCIAL

## 2019-02-27 ENCOUNTER — HOSPITAL ENCOUNTER (OUTPATIENT)
Dept: LAB | Facility: MEDICAL CENTER | Age: 29
End: 2019-02-27
Attending: FAMILY MEDICINE
Payer: COMMERCIAL

## 2019-02-27 LAB
BASOPHILS # BLD AUTO: 0.5 % (ref 0–1.8)
BASOPHILS # BLD: 0.05 K/UL (ref 0–0.12)
EOSINOPHIL # BLD AUTO: 0.28 K/UL (ref 0–0.51)
EOSINOPHIL NFR BLD: 2.9 % (ref 0–6.9)
ERYTHROCYTE [DISTWIDTH] IN BLOOD BY AUTOMATED COUNT: 36.7 FL (ref 35.9–50)
HCT VFR BLD AUTO: 41 % (ref 37–47)
HGB BLD-MCNC: 13.7 G/DL (ref 12–16)
IMM GRANULOCYTES # BLD AUTO: 0.03 K/UL (ref 0–0.11)
IMM GRANULOCYTES NFR BLD AUTO: 0.3 % (ref 0–0.9)
LYMPHOCYTES # BLD AUTO: 2.45 K/UL (ref 1–4.8)
LYMPHOCYTES NFR BLD: 25.8 % (ref 22–41)
MCH RBC QN AUTO: 26.8 PG (ref 27–33)
MCHC RBC AUTO-ENTMCNC: 33.4 G/DL (ref 33.6–35)
MCV RBC AUTO: 80.2 FL (ref 81.4–97.8)
MONOCYTES # BLD AUTO: 0.5 K/UL (ref 0–0.85)
MONOCYTES NFR BLD AUTO: 5.3 % (ref 0–13.4)
NEUTROPHILS # BLD AUTO: 6.19 K/UL (ref 2–7.15)
NEUTROPHILS NFR BLD: 65.2 % (ref 44–72)
NRBC # BLD AUTO: 0 K/UL
NRBC BLD-RTO: 0 /100 WBC
PLATELET # BLD AUTO: 348 K/UL (ref 164–446)
PMV BLD AUTO: 10.9 FL (ref 9–12.9)
RBC # BLD AUTO: 5.11 M/UL (ref 4.2–5.4)
WBC # BLD AUTO: 9.5 K/UL (ref 4.8–10.8)

## 2019-02-27 PROCEDURE — 36415 COLL VENOUS BLD VENIPUNCTURE: CPT

## 2019-02-27 PROCEDURE — 85025 COMPLETE CBC W/AUTO DIFF WBC: CPT

## 2019-03-01 ENCOUNTER — PHYSICAL THERAPY (OUTPATIENT)
Dept: PHYSICAL THERAPY | Facility: MEDICAL CENTER | Age: 29
End: 2019-03-01
Attending: ORTHOPAEDIC SURGERY
Payer: COMMERCIAL

## 2019-03-01 DIAGNOSIS — M25.561 RIGHT KNEE PAIN, UNSPECIFIED CHRONICITY: ICD-10-CM

## 2019-03-01 DIAGNOSIS — S83.241A OTHER TEAR OF MEDIAL MENISCUS, CURRENT INJURY, RIGHT KNEE, INITIAL ENCOUNTER: ICD-10-CM

## 2019-03-01 DIAGNOSIS — S83.281A OTHER TEAR OF LATERAL MENISCUS, CURRENT INJURY, RIGHT KNEE, INITIAL ENCOUNTER: ICD-10-CM

## 2019-03-01 PROCEDURE — 97014 ELECTRIC STIMULATION THERAPY: CPT

## 2019-03-01 PROCEDURE — 97110 THERAPEUTIC EXERCISES: CPT

## 2019-03-01 NOTE — OP THERAPY DAILY TREATMENT
Outpatient Physical Therapy  DAILY TREATMENT     [facility]  [address]  [place] [address]  Phone:  [phone]  Fax:  [phone]    Date: 03/01/2019    Patient: Tonia Xavier  YOB: 1990  MRN: [id number]     Time Calculation  Start time: 0345  Stop time: 0431 Time Calculation (min): 46 minutes     Chief Complaint: post op knee pain  Visit #: 10    SUBJECTIVE:  Pt states she has had her knee lock up and pivot if she doesn't have her brace on. The brace and sleeve give her a rash. Pt is going to [place] next week to get a 2nd opinion about her knee and she's planning on having ACL reconstructed for a 3rd time in May.     OBJECTIVE:      Therapeutic Exercises (CPT 43160):     1. Bike , 6 minutes    2. 4 inch step downs/ups    3. Minisquats with pink tband behind knee, 3 x 10    4. Ball bridge with HS curls, 3 x 10    5. Plank with hip extension      Therapeutic Exercise Summary: Access Code: [email or web address]   URL: [email or web address]   Date: 03/01/2019   Prepared by: Kaylynn Garrido     Exercises  · Standing Terminal Knee Extension with Resistance - 10 reps - 3 sets - 1x daily - 7x weekly  · Step Up - 10 reps - 3 sets - 1x daily - 7x weekly  · Plank with Hip Extension - 10 reps - 3 sets - 1x daily - 7x weekly  · Sit to Stand with Hands on Knees - 10 reps - 3 sets - 1x daily - 7x weekly      Therapeutic Treatments and Modalities:     1. E Stim Unattended (CPT 47365), russian stim 10/10 with ball behind knee    Time-based treatments/modalities:  Therapeutic exercise minutes (CPT 25735): 30 minutes         ASSESSMENT:   Pt continues to have weakness and instability that limits some activities.     PLAN/RECOMMENDATIONS:   Plan for treatment: therapy treatment to continue next visit.  Planned interventions for next visit: E-stim unattended (CPT 50518), manual therapy (CPT 51429) and therapeutic exercise (CPT 97874).

## 2019-03-13 ENCOUNTER — OFFICE VISIT (OUTPATIENT)
Dept: URGENT CARE | Facility: PHYSICIAN GROUP | Age: 29
End: 2019-03-13
Payer: COMMERCIAL

## 2019-03-13 VITALS
BODY MASS INDEX: 29.25 KG/M2 | RESPIRATION RATE: 16 BRPM | HEART RATE: 79 BPM | TEMPERATURE: 96.6 F | SYSTOLIC BLOOD PRESSURE: 112 MMHG | HEIGHT: 66 IN | OXYGEN SATURATION: 96 % | DIASTOLIC BLOOD PRESSURE: 66 MMHG | WEIGHT: 182 LBS

## 2019-03-13 DIAGNOSIS — A08.4 VIRAL GASTROENTERITIS: ICD-10-CM

## 2019-03-13 PROCEDURE — 99214 OFFICE O/P EST MOD 30 MIN: CPT | Performed by: FAMILY MEDICINE

## 2019-03-13 RX ORDER — DICYCLOMINE HYDROCHLORIDE 10 MG/1
10 CAPSULE ORAL
Qty: 30 CAP | Refills: 0 | Status: SHIPPED | OUTPATIENT
Start: 2019-03-13 | End: 2020-05-07 | Stop reason: SDUPTHER

## 2019-03-13 RX ORDER — ONDANSETRON 4 MG/1
4 TABLET, ORALLY DISINTEGRATING ORAL EVERY 8 HOURS PRN
Qty: 10 TAB | Refills: 0 | Status: SHIPPED | OUTPATIENT
Start: 2019-03-13 | End: 2019-09-09

## 2019-03-13 RX ORDER — CHOLECALCIFEROL (VITAMIN D3) 25 MCG
1 CAPSULE ORAL
Refills: 2 | COMMUNITY
Start: 2019-02-11 | End: 2020-09-15

## 2019-03-13 ASSESSMENT — ENCOUNTER SYMPTOMS
NAUSEA: 1
FEVER: 1
VOMITING: 1
DIARRHEA: 1
ABDOMINAL PAIN: 1
CHILLS: 1

## 2019-03-13 NOTE — PATIENT INSTRUCTIONS
Viral Gastroenteritis, Adult  Viral gastroenteritis is also known as the stomach flu. This condition is caused by various viruses. These viruses can be passed from person to person very easily (are very contagious). This condition may affect your stomach, small intestine, and large intestine. It can cause sudden watery diarrhea, fever, and vomiting.  Diarrhea and vomiting can make you feel weak and cause you to become dehydrated. You may not be able to keep fluids down. Dehydration can make you tired and thirsty, cause you to have a dry mouth, and decrease how often you urinate. Older adults and people with other diseases or a weak immune system are at higher risk for dehydration.  It is important to replace the fluids that you lose from diarrhea and vomiting. If you become severely dehydrated, you may need to get fluids through an IV tube.  What are the causes?  Gastroenteritis is caused by various viruses, including rotavirus and norovirus. Norovirus is the most common cause in adults.  You can get sick by eating food, drinking water, or touching a surface contaminated with one of these viruses. You can also get sick from sharing utensils or other personal items with an infected person.  What increases the risk?  This condition is more likely to develop in people:  · Who have a weak defense system (immune system).  · Who live with one or more children who are younger than 2 years old.  · Who live in a nursing home.  · Who go on cruise ships.  What are the signs or symptoms?  Symptoms of this condition start suddenly 1-2 days after exposure to a virus. Symptoms may last a few days or as long as a week. The most common symptoms are watery diarrhea and vomiting. Other symptoms include:  · Fever.  · Headache.  · Fatigue.  · Pain in the abdomen.  · Chills.  · Weakness.  · Nausea.  · Muscle aches.  · Loss of appetite.  How is this diagnosed?  This condition is diagnosed with a medical history and physical exam. You may  also have a stool test to check for viruses or other infections.  How is this treated?  This condition typically goes away on its own. The focus of treatment is to restore lost fluids (rehydration). Your health care provider may recommend that you take an oral rehydration solution (ORS) to replace important salts and minerals (electrolytes) in your body. Severe cases of this condition may require giving fluids through an IV tube.  Treatment may also include medicine to help with your symptoms.  Follow these instructions at home:  Follow instructions from your health care provider about how to care for yourself at home.  Eating and drinking  Follow these recommendations as told by your health care provider:  · Take an ORS. This is a drink that is sold at pharmacies and retail stores.  · Drink clear fluids in small amounts as you are able. Clear fluids include water, ice chips, diluted fruit juice, and low-calorie sports drinks.  · Eat bland, easy-to-digest foods in small amounts as you are able. These foods include bananas, applesauce, rice, lean meats, toast, and crackers.  · Avoid fluids that contain a lot of sugar or caffeine, such as energy drinks, sports drinks, and soda.  · Avoid alcohol.  · Avoid spicy or fatty foods.  General instructions  · Drink enough fluid to keep your urine clear or pale yellow.  · Wash your hands often. If soap and water are not available, use hand .  · Make sure that all people in your household wash their hands well and often.  · Take over-the-counter and prescription medicines only as told by your health care provider.  · Rest at home while you recover.  · Watch your condition for any changes.  · Take a warm bath to relieve any burning or pain from frequent diarrhea episodes.  · Keep all follow-up visits as told by your health care provider. This is important.  Contact a health care provider if:  · You cannot keep fluids down.  · Your symptoms get worse.  · You have new  symptoms.  · You feel light-headed or dizzy.  · You have muscle cramps.  Get help right away if:  · You have chest pain.  · You feel extremely weak or you faint.  · You see blood in your vomit.  · Your vomit looks like coffee grounds.  · You have bloody or black stools or stools that look like tar.  · You have a severe headache, a stiff neck, or both.  · You have a rash.  · You have severe pain, cramping, or bloating in your abdomen.  · You have trouble breathing or you are breathing very quickly.  · Your heart is beating very quickly.  · Your skin feels cold and clammy.  · You feel confused.  · You have pain when you urinate.  · You have signs of dehydration, such as:  ¨ Dark urine, very little urine, or no urine.  ¨ Cracked lips.  ¨ Dry mouth.  ¨ Sunken eyes.  ¨ Sleepiness.  ¨ Weakness.  This information is not intended to replace advice given to you by your health care provider. Make sure you discuss any questions you have with your health care provider.  Document Released: 12/18/2006 Document Revised: 05/31/2017 Document Reviewed: 08/23/2016  [employer] Interactive Patient Education © 2017 [employer]

## 2019-03-13 NOTE — LETTER
March 13, 2019         Patient: Tonia Xavier   YOB: 1990   Date of Visit: 3/13/2019           To Whom it May Concern:    Tonia Xavier was seen in my clinic on 3/13/2019. She may return to work on 3/15/2019..    If you have any questions or concerns, please don't hesitate to call.        Sincerely,           James Cabrales M.D.  Electronically Signed

## 2019-03-13 NOTE — PROGRESS NOTES
"Subjective:   Tonia Xavier is a 28 y.o. female who presents for Nausea/Vomiting/Diarrhea (x5days )        Nausea   This is a new problem. The current episode started in the past 7 days. The problem occurs constantly. The problem has been gradually worsening. Associated symptoms include abdominal pain, chills, a fever, nausea and vomiting.     Review of Systems   Constitutional: Positive for chills and fever.   Gastrointestinal: Positive for abdominal pain, diarrhea, nausea and vomiting.     Allergies   Allergen Reactions   • Benzoin Hives     Rash       Objective:   /66   Pulse 79   Temp 35.9 °C (96.6 °F) (Temporal)   Resp 16   Ht 1.676 m (5' 6\")   Wt 82.6 kg (182 lb)   SpO2 96%   BMI 29.38 kg/m²   Physical Exam   Constitutional: She is oriented to person, place, and time. She appears well-developed and well-nourished. No distress.   HENT:   Head: Normocephalic and atraumatic.   Eyes: Pupils are equal, round, and reactive to light. Conjunctivae and EOM are normal.   Cardiovascular: Normal rate and regular rhythm.    No murmur heard.  Pulmonary/Chest: Effort normal and breath sounds normal. No respiratory distress.   Abdominal: Soft. She exhibits distension. There is tenderness. There is no rebound and no guarding.   Neurological: She is alert and oriented to person, place, and time. She has normal reflexes. No sensory deficit.   Skin: Skin is warm and dry.   Psychiatric: She has a normal mood and affect.         Assessment/Plan:   1. Viral gastroenteritis  - ondansetron (ZOFRAN ODT) 4 MG TABLET DISPERSIBLE; Take 1 Tab by mouth every 8 hours as needed.  Dispense: 10 Tab; Refill: 0  - dicyclomine (BENTYL) 10 MG Cap; Take 1 Cap by mouth 4 Times a Day,Before Meals and at Bedtime.  Dispense: 30 Cap; Refill: 0    Other orders  - VITAMIN D HIGH POTENCY 1000 units Cap; Take 1 Cap by mouth every day.; Refill: 2    Differential diagnosis, natural history, supportive care, and indications for " immediate follow-up discussed.

## 2019-03-15 ENCOUNTER — APPOINTMENT (OUTPATIENT)
Dept: PHYSICAL THERAPY | Facility: MEDICAL CENTER | Age: 29
End: 2019-03-15
Attending: ORTHOPAEDIC SURGERY
Payer: COMMERCIAL

## 2019-03-18 ENCOUNTER — HOSPITAL ENCOUNTER (OUTPATIENT)
Dept: LAB | Facility: MEDICAL CENTER | Age: 29
End: 2019-03-18
Attending: PHYSICIAN ASSISTANT
Payer: COMMERCIAL

## 2019-03-18 LAB
ALBUMIN SERPL BCP-MCNC: 3.9 G/DL (ref 3.2–4.9)
ALBUMIN/GLOB SERPL: 1.4 G/DL
ALP SERPL-CCNC: 79 U/L (ref 30–99)
ALT SERPL-CCNC: 17 U/L (ref 2–50)
ANION GAP SERPL CALC-SCNC: 9 MMOL/L (ref 0–11.9)
AST SERPL-CCNC: 17 U/L (ref 12–45)
BASOPHILS # BLD AUTO: 0.6 % (ref 0–1.8)
BASOPHILS # BLD: 0.06 K/UL (ref 0–0.12)
BILIRUB SERPL-MCNC: 0.5 MG/DL (ref 0.1–1.5)
BUN SERPL-MCNC: 8 MG/DL (ref 8–22)
CALCIUM SERPL-MCNC: 8.9 MG/DL (ref 8.5–10.5)
CHLORIDE SERPL-SCNC: 105 MMOL/L (ref 96–112)
CO2 SERPL-SCNC: 26 MMOL/L (ref 20–33)
CREAT SERPL-MCNC: 0.66 MG/DL (ref 0.5–1.4)
EOSINOPHIL # BLD AUTO: 1.06 K/UL (ref 0–0.51)
EOSINOPHIL NFR BLD: 11.2 % (ref 0–6.9)
ERYTHROCYTE [DISTWIDTH] IN BLOOD BY AUTOMATED COUNT: 39.8 FL (ref 35.9–50)
GLOBULIN SER CALC-MCNC: 2.7 G/DL (ref 1.9–3.5)
GLUCOSE SERPL-MCNC: 83 MG/DL (ref 65–99)
HCT VFR BLD AUTO: 41.6 % (ref 37–47)
HGB BLD-MCNC: 13.6 G/DL (ref 12–16)
IMM GRANULOCYTES # BLD AUTO: 0.04 K/UL (ref 0–0.11)
IMM GRANULOCYTES NFR BLD AUTO: 0.4 % (ref 0–0.9)
LYMPHOCYTES # BLD AUTO: 3.35 K/UL (ref 1–4.8)
LYMPHOCYTES NFR BLD: 35.4 % (ref 22–41)
MCH RBC QN AUTO: 26.8 PG (ref 27–33)
MCHC RBC AUTO-ENTMCNC: 32.7 G/DL (ref 33.6–35)
MCV RBC AUTO: 82.1 FL (ref 81.4–97.8)
MONOCYTES # BLD AUTO: 0.46 K/UL (ref 0–0.85)
MONOCYTES NFR BLD AUTO: 4.9 % (ref 0–13.4)
NEUTROPHILS # BLD AUTO: 4.49 K/UL (ref 2–7.15)
NEUTROPHILS NFR BLD: 47.5 % (ref 44–72)
NRBC # BLD AUTO: 0 K/UL
NRBC BLD-RTO: 0 /100 WBC
PLATELET # BLD AUTO: 342 K/UL (ref 164–446)
PMV BLD AUTO: 11.6 FL (ref 9–12.9)
POTASSIUM SERPL-SCNC: 3.4 MMOL/L (ref 3.6–5.5)
PROT SERPL-MCNC: 6.6 G/DL (ref 6–8.2)
RBC # BLD AUTO: 5.07 M/UL (ref 4.2–5.4)
SODIUM SERPL-SCNC: 140 MMOL/L (ref 135–145)
T4 FREE SERPL-MCNC: 0.89 NG/DL (ref 0.53–1.43)
TSH SERPL DL<=0.005 MIU/L-ACNC: 0.78 UIU/ML (ref 0.38–5.33)
WBC # BLD AUTO: 9.5 K/UL (ref 4.8–10.8)

## 2019-03-18 PROCEDURE — 84439 ASSAY OF FREE THYROXINE: CPT

## 2019-03-18 PROCEDURE — 36415 COLL VENOUS BLD VENIPUNCTURE: CPT

## 2019-03-18 PROCEDURE — 85025 COMPLETE CBC W/AUTO DIFF WBC: CPT

## 2019-03-18 PROCEDURE — 84443 ASSAY THYROID STIM HORMONE: CPT

## 2019-03-18 PROCEDURE — 80053 COMPREHEN METABOLIC PANEL: CPT

## 2019-03-21 NOTE — OP THERAPY DAILY TREATMENT
Outpatient Physical Therapy  DAILY TREATMENT     [facility] Outpatient Physical Therapy  [address]  [place] [address]  Phone:  [phone]  Fax:  [phone]    Date: 03/22/2019    Patient: Tonia Xavier  YOB: 1990  MRN: [id number]     Time Calculation  Start time: 1315  Stop time: 1400 Time Calculation (min): 45 minutes     Chief Complaint: No chief complaint on file.    Visit #: 11    SUBJECTIVE:  Feels weak going up and down stairs, trying to build strength up    OBJECTIVE:  Current objective measures:           Therapeutic Exercises (CPT 17291):     1. Bike , 10minutes L 7    2. Ball bridge SL, 2 x 5 reps    3. Minisquats with pink tband behind knee, 3 x 10    4. Ball bridge with HS curls, 3 x 10    6. Weighted stool scoot , 4 x 50 feet on carpet 10 lb weight    7. Leg pressDL/SL, 60lbs/30lb 2 x 15 each      Therapeutic Exercise Summary: Access Code: [email or web address]   URL: [email or web address]   Date: 03/01/2019   Prepared by: Kaylynn Garrido     Exercises  · Standing Terminal Knee Extension with Resistance - 10 reps - 3 sets - 1x daily - 7x weekly  · Step Up - 10 reps - 3 sets - 1x daily - 7x weekly  · Plank with Hip Extension - 10 reps - 3 sets - 1x daily - 7x weekly  · Sit to Stand with Hands on Knees - 10 reps - 3 sets - 1x daily - 7x weekly      Therapeutic Treatments and Modalities:     1. Manual Therapy (CPT 44919), 5 min tib-femoral joint OP to increase extension    Time-based treatments/modalities:  Manual therapy minutes (CPT 65700): 5 minutes  Therapeutic exercise minutes (CPT 65266): 40 minutes         ASSESSMENT:   Response to treatment: posterior chain weakness right > left.  Increased femoral adduction/ir with leg press/squat but corrected with cueing from Tband ER/ABD during movt.  Discussed importance of strengthening/ROM at home and possibly joining a gym.      PLAN/RECOMMENDATIONS:   Plan for treatment: therapy treatment to continue  next visit.  Planned interventions for next visit: continue with current treatment.

## 2019-03-22 ENCOUNTER — HOSPITAL ENCOUNTER (OUTPATIENT)
Dept: RADIOLOGY | Facility: MEDICAL CENTER | Age: 29
End: 2019-03-22
Attending: PHYSICIAN ASSISTANT
Payer: COMMERCIAL

## 2019-03-22 ENCOUNTER — PHYSICAL THERAPY (OUTPATIENT)
Dept: PHYSICAL THERAPY | Facility: MEDICAL CENTER | Age: 29
End: 2019-03-22
Attending: ORTHOPAEDIC SURGERY
Payer: COMMERCIAL

## 2019-03-22 ENCOUNTER — HOSPITAL ENCOUNTER (OUTPATIENT)
Dept: LAB | Facility: MEDICAL CENTER | Age: 29
End: 2019-03-22
Attending: INTERNAL MEDICINE
Payer: COMMERCIAL

## 2019-03-22 DIAGNOSIS — R19.7 DIARRHEA OF PRESUMED INFECTIOUS ORIGIN: ICD-10-CM

## 2019-03-22 DIAGNOSIS — R10.13 ABDOMINAL PAIN, EPIGASTRIC: ICD-10-CM

## 2019-03-22 DIAGNOSIS — S83.281A OTHER TEAR OF LATERAL MENISCUS, CURRENT INJURY, RIGHT KNEE, INITIAL ENCOUNTER: ICD-10-CM

## 2019-03-22 DIAGNOSIS — M25.561 RIGHT KNEE PAIN, UNSPECIFIED CHRONICITY: ICD-10-CM

## 2019-03-22 DIAGNOSIS — R11.2 NAUSEA AND VOMITING, INTRACTABILITY OF VOMITING NOT SPECIFIED, UNSPECIFIED VOMITING TYPE: ICD-10-CM

## 2019-03-22 DIAGNOSIS — S83.241A OTHER TEAR OF MEDIAL MENISCUS, CURRENT INJURY, RIGHT KNEE, INITIAL ENCOUNTER: ICD-10-CM

## 2019-03-22 PROCEDURE — 76700 US EXAM ABDOM COMPLETE: CPT

## 2019-03-22 PROCEDURE — 36415 COLL VENOUS BLD VENIPUNCTURE: CPT

## 2019-03-22 PROCEDURE — 83516 IMMUNOASSAY NONANTIBODY: CPT

## 2019-03-22 PROCEDURE — 97110 THERAPEUTIC EXERCISES: CPT

## 2019-03-25 LAB — GLIADIN PEPTIDE+TTG IGA+IGG SER QL IA: 12 UNITS (ref 0–19)

## 2019-04-10 ENCOUNTER — HOSPITAL ENCOUNTER (OUTPATIENT)
Dept: LAB | Facility: MEDICAL CENTER | Age: 29
End: 2019-04-10
Attending: FAMILY MEDICINE
Payer: COMMERCIAL

## 2019-04-10 LAB
EST. AVERAGE GLUCOSE BLD GHB EST-MCNC: 117 MG/DL
HBA1C MFR BLD: 5.7 % (ref 0–5.6)

## 2019-04-10 PROCEDURE — 36415 COLL VENOUS BLD VENIPUNCTURE: CPT

## 2019-04-10 PROCEDURE — 83036 HEMOGLOBIN GLYCOSYLATED A1C: CPT

## 2019-04-10 PROCEDURE — 80061 LIPID PANEL: CPT

## 2019-04-11 LAB
CHOLEST SERPL-MCNC: 137 MG/DL (ref 100–199)
FASTING STATUS PATIENT QL REPORTED: NORMAL
HDLC SERPL-MCNC: 34 MG/DL
LDLC SERPL CALC-MCNC: 53 MG/DL
TRIGL SERPL-MCNC: 252 MG/DL (ref 0–149)

## 2019-05-20 ENCOUNTER — HOSPITAL ENCOUNTER (OUTPATIENT)
Dept: LAB | Facility: MEDICAL CENTER | Age: 29
End: 2019-05-20
Attending: PHYSICIAN ASSISTANT
Payer: COMMERCIAL

## 2019-05-20 PROCEDURE — 81376 HLA II TYPING 1 LOCUS LR: CPT | Mod: 91

## 2019-05-20 PROCEDURE — 36415 COLL VENOUS BLD VENIPUNCTURE: CPT

## 2019-05-20 PROCEDURE — 81383 HLA II TYPING 1 ALLELE HR: CPT

## 2019-05-24 LAB
CELIAS HLA INTERP Q0449: ABNORMAL
HLA CELIAC SPEC Q0445: ABNORMAL
HLA-DQA1*05:01 QL: NEGATIVE
HLA-DQB1*02:01 QL: POSITIVE
HLA-DQB1*03:02 QL: POSITIVE

## 2019-07-30 ENCOUNTER — OCCUPATIONAL MEDICINE (OUTPATIENT)
Dept: URGENT CARE | Facility: CLINIC | Age: 29
End: 2019-07-30
Payer: COMMERCIAL

## 2019-07-30 ENCOUNTER — NON-PROVIDER VISIT (OUTPATIENT)
Dept: OCCUPATIONAL MEDICINE | Facility: CLINIC | Age: 29
End: 2019-07-30
Payer: COMMERCIAL

## 2019-07-30 VITALS
WEIGHT: 182 LBS | BODY MASS INDEX: 29.25 KG/M2 | SYSTOLIC BLOOD PRESSURE: 114 MMHG | HEART RATE: 103 BPM | OXYGEN SATURATION: 98 % | RESPIRATION RATE: 15 BRPM | DIASTOLIC BLOOD PRESSURE: 76 MMHG | HEIGHT: 66 IN | TEMPERATURE: 97.6 F

## 2019-07-30 DIAGNOSIS — Z02.1 PRE-EMPLOYMENT DRUG SCREENING: ICD-10-CM

## 2019-07-30 DIAGNOSIS — Z02.83 ENCOUNTER FOR DRUG SCREENING: ICD-10-CM

## 2019-07-30 DIAGNOSIS — S86.912A: ICD-10-CM

## 2019-07-30 LAB
AMP AMPHETAMINE: NORMAL
BAR BARBITURATES: NORMAL
BREATH ALCOHOL COMMENT: NORMAL
BZO BENZODIAZEPINES: NORMAL
COC COCAINE: NORMAL
INT CON NEG: NORMAL
INT CON POS: NORMAL
MDMA ECSTASY: NORMAL
MET METHAMPHETAMINES: NORMAL
MTD METHADONE: NORMAL
OPI OPIATES: NORMAL
OXY OXYCODONE: NORMAL
PCP PHENCYCLIDINE: NORMAL
POC BREATHALIZER: 0 PERCENT (ref 0–0.01)
POC URINE DRUG SCREEN OCDRS: NEGATIVE
THC: NORMAL

## 2019-07-30 PROCEDURE — 80305 DRUG TEST PRSMV DIR OPT OBS: CPT | Performed by: PREVENTIVE MEDICINE

## 2019-07-30 PROCEDURE — 99214 OFFICE O/P EST MOD 30 MIN: CPT | Mod: 29 | Performed by: NURSE PRACTITIONER

## 2019-07-30 PROCEDURE — 82075 ASSAY OF BREATH ETHANOL: CPT | Performed by: PREVENTIVE MEDICINE

## 2019-07-30 RX ORDER — FERROUS SULFATE 325(65) MG
TABLET ORAL
COMMUNITY
Start: 2018-11-19 | End: 2019-09-12

## 2019-07-30 RX ORDER — NORGESTIMATE AND ETHINYL ESTRADIOL 0.25-0.035
KIT ORAL
COMMUNITY
Start: 2018-08-17 | End: 2019-09-12

## 2019-07-30 RX ORDER — DICYCLOMINE HCL 20 MG
TABLET ORAL
COMMUNITY
Start: 2018-11-19 | End: 2019-09-12

## 2019-07-30 RX ORDER — NORGESTIMATE AND ETHINYL ESTRADIOL 0.25-0.035
KIT ORAL
COMMUNITY
Start: 2018-11-19 | End: 2019-09-12

## 2019-07-30 RX ORDER — ONDANSETRON 4 MG/1
TABLET, FILM COATED ORAL
COMMUNITY
Start: 2018-11-19 | End: 2024-01-26

## 2019-07-30 RX ORDER — CYCLOBENZAPRINE HCL 10 MG
TABLET ORAL
COMMUNITY
Start: 2018-11-19 | End: 2019-09-12

## 2019-07-30 RX ORDER — FEXOFENADINE HCL 180 MG/1
TABLET ORAL
COMMUNITY
Start: 2018-11-19 | End: 2024-01-04

## 2019-07-30 RX ORDER — VALACYCLOVIR HYDROCHLORIDE 500 MG/1
500 TABLET, FILM COATED ORAL
COMMUNITY
Start: 2018-09-14 | End: 2020-01-13

## 2019-07-30 RX ORDER — VALACYCLOVIR HYDROCHLORIDE 500 MG/1
TABLET, FILM COATED ORAL
COMMUNITY
Start: 2018-11-19 | End: 2019-09-12

## 2019-07-30 RX ORDER — IBUPROFEN 800 MG/1
800 TABLET ORAL
COMMUNITY
End: 2019-09-12

## 2019-07-30 NOTE — LETTER
[facility] [place] [address] [place] [address]  Phone:  [phone] - Fax:  [phone]   [facility] Progress Report and Disability Certification  Date of Service: 7/30/2019   No Show:  No  Date / Time of Next Visit: 8/6/2019@10:40 AM   Claim Information   Patient Name: Tonia Xavier  Claim Number:     Employer: [employer]  Date of Injury: 7/29/2019     Insurer / TPA: [employer]  ID / SSN:     Occupation: MA-PAR  Diagnosis: The encounter diagnosis was Lower extremity tendon strain, left, initial encounter.    Medical Information   Related to Industrial Injury? 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 She states she landed awkwardly on her on her left leg to catch herself and felt pain in her left knee. She states the pain is present in the back of the knee on the left side. She states the pain gets worse through the day as she is walking most of her day. She tried Aleve last night but this tends to upset her stomach. She has iced and elevated with some relief. She denies a second job. Denies any previous injury to left knee.   Objective Findings: Left knee- TTP left posterolateral knee, in the area of the distal biceps femoris tendon. No effusion, STS, or ecchymosis present. No laxity on exam. FROM. Normal gait.   Pre-Existing Condition(s):     Assessment:   Initial Visit    Status: Additional Care Required  Permanent Disability:No    Plan:      Diagnostics:      Comments:  Hinged knee brace   RICE   OTC tylenol as needed for pain   Work restrictions   FV in one week     Disability Information   Status: Released to Restricted Duty    From:  7/30/2019  Through: 8/6/2019 Restrictions are: Temporary   Physical Restrictions   Sitting:    Standing:  < or = to 2 hrs/day Stooping:    Bending:      Squatting:    Walking:  <  or = to 4 hrs/day Climbing:    Pushing:      Pulling:    Other:    Reaching Above Shoulder (L):   Reaching Above Shoulder (R):       Reaching Below Shoulder (L):    Reaching Below Shoulder (R):      Not to exceed Weight Limits   Carrying(hrs):   Weight Limit(lb): < or = to 10 pounds Lifting(hrs):   Weight  Limit(lb): < or = to 10 pounds   Comments: Clerical/desk work ok    Repetitive Actions   Hands: i.e. Fine Manipulations from Grasping:     Feet: i.e. Operating Foot Controls:     Driving / Operate Machinery:     Physician Name: JAY Esquivel Physician Signature: MARYAN Cordero e-Signature: Dr. Elmo Rivers, Medical Director   Clinic Name / Location: [address] [address] Clinic Phone Number: Dept: [phone]   Appointment Time: 4:45 Pm Visit Start Time: 5:10 PM   Check-In Time:  4:42 Pm Visit Discharge Time:  6:22 PM   Original-Treating Physician or Chiropractor    Page 2-Insurer/TPA    Page 3-Employer    Page 4-Employee

## 2019-07-30 NOTE — LETTER
"EMPLOYEE’S CLAIM FOR COMPENSATION/ REPORT OF INITIAL TREATMENT  FORM C-4    EMPLOYEE’S CLAIM - PROVIDE ALL INFORMATION REQUESTED   First Name  Tonia Last Name  Duc Birthdate                    1990                Sex  female Claim Number   Home Address  [address] Age  29 y.o. 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 (if applicable)  CLEANING THE EXAM ROOM    How did this injury or occupational disease occur? (Be specific an answer in detail. Use additional sheet if necessary)  AS I WAS TURNING TO START WIPE DOWN BP CUFFS MY R FOOT MADE ME TRIP ON THE STEP STOOL ATTACHED TO THE EXAM TABLE. TO NOT FALL I STEPPED HARD AND FAST ON MY L FOOT   If you believe that you have an occupational disease, when did you first have knowledge of the disability and it relationship to your employment?  N/A Witnesses to the Accident  N/A      Nature of Injury or Occupational Disease  Sprain  Part(s) of Body Injured or Affected  Knee (L), Foot (R),     I certify that the above is true and correct to the best of my knowledge and that I " have provided this information in order to obtain the benefits of [place]  I hereby authorize any physician, chiropractor, surgeon, practitioner, or other person, any hospital, including [facility], any medical service organization, any insurance company, or other institution or organization to release to each other, any medical or other information, including benefits paid or payable, pertinent to this injury or disease, except information relative to diagnosis, treatment and/or counseling for AIDS, psychological conditions, alcohol or controlled substances, for which I must give specific authorization.  A Photostat of this authorization shall be as valid as the original.     Date   Place   Employee’s Signature   THIS REPORT MUST BE COMPLETED AND MAILED WITHIN 3 WORKING DAYS OF TREATMENT   Place  [facility]  Name of Facility  [facility]   Date  7/30/2019 Diagnosis  (S86.912A) Lower extremity tendon strain, left, initial encounter Is there evidence the injured employee was under the influence of alcohol and/or another controlled substance at the time of accident?   Hour  5:10 PM Description of Injury or Disease  The encounter diagnosis was Lower extremity tendon strain, left, initial encounter. No   Treatment  Hinged knee brace  RICE  OTC tylenol as needed for pain  Work restrictions  FV in one week  Have you advised the patient to remain off work five days or more? No   X-Ray Findings      If Yes   From Date  To Date      From information given by the employee, together with medical evidence, can you directly connect this injury or occupational disease as job incurred?  Yes If No Full Duty  No Modified Duty  Yes   Is additional medical care by a physician indicated?  Yes If Modified Duty, Specify any Limitations / Restrictions      Do you know of any previous  "injury or disease contributing to this condition or occupational disease?                            No   Date  7/30/2019 Print Doctor’s Name JAY Esquivel I certify the employer’s copy of  this form was mailed on:   Address  [address] Insurer’s Use Only     [place] Zip  [address]    Provider’s Tax ID Number  [id number] Telephone  Dept: [phone]        e-SignWHMARYAN MEDINA   e-Signature: Dr. Elmo Rivers, Medical Director Degree  APRN        ORIGINAL-TREATING PHYSICIAN OR CHIROPRACTOR    PAGE 2-INSURER/TPA    PAGE 3-EMPLOYER    PAGE 4-EMPLOYEE             Form C-4 (rev10/07)              BRIEF DESCRIPTION OF RIGHTS AND BENEFITS  (Pursuant to [place] 616[place])    Notice of Injury or Occupational Disease (Incident Report Form C-1): If an injury or occupational disease (OD) arises out of and in the  course of employment, you must provide written notice to your employer as soon as practicable, but no later than 7 days after the accident or  OD. Your employer shall maintain a sufficient supply of the required forms.    Claim for Compensation (Form C-4): If medical treatment is sought, the form C-4 is available at the place of initial treatment. A completed  \"Claim for Compensation\" (Form C-4) must be filed within 90 days after an accident or OD. The treating physician or chiropractor must,  within 3 working days after treatment, complete and mail to the employer, the employer's insurer and third-party , the Claim for  Compensation.    Medical Treatment: If you require medical treatment for your on-the-job injury or OD, you may be required to select a physician or  chiropractor from a list provided by your workers’ compensation insurer, if it has contracted with an Organization for Managed Care (MCO) or  Preferred Provider Organization (PPO) or providers of health care. If your employer has not entered into a contract with an MCO or PPO, you  may select a " physician or chiropractor from the Panel of Physicians and Chiropractors. Any medical costs related to your industrial injury or  OD will be paid by your insurer.    Temporary Total Disability (TTD): If your doctor has certified that you are unable to work for a period of at least 5 consecutive days, or 5  cumulative days in a 20-day period, or places restrictions on you that your employer does not accommodate, you may be entitled to TTD  compensation.    Temporary Partial Disability (TPD): If the wage you receive upon reemployment is less than the compensation for TTD to which you are  entitled, the insurer may be required to pay you TPD compensation to make up the difference. TPD can only be paid for a maximum of 24  months.    Permanent Partial Disability (PPD): When your medical condition is stable and there is an indication of a PPD as a result of your injury or  OD, within 30 days, your insurer must arrange for an evaluation by a rating physician or chiropractor to determine the degree of your PPD. The  amount of your PPD award depends on the date of injury, the results of the PPD evaluation and your age and wage.    Permanent Total Disability (PTD): If you are medically certified by a treating physician or chiropractor as permanently and totally disabled  and have been granted a PTD status by your insurer, you are entitled to receive monthly benefits not to exceed 66 2/3% of your average  monthly wage. The amount of your PTD payments is subject to reduction if you previously received a PPD award.    Vocational Rehabilitation Services: You may be eligible for vocational rehabilitation services if you are unable to return to the job due to a  permanent physical impairment or permanent restrictions as a result of your injury or occupational disease.    Transportation and Per Yoandy Reimbursement: You may be eligible for travel expenses and per yoandy associated with medical treatment.    Reopening: You may be able  to reopen your claim if your condition worsens after claim closure.    Appeal Process: If you disagree with a written determination issued by the insurer or the insurer does not respond to your request, you may  appeal to the Department of Administration, , by following the instructions contained in your determination letter. You must  appeal the determination within 70 days from the date of the determination letter at [address], [address], [place], [place]  [address], or [address], [place], [place] [address]. If you disagree with the  decision, you may appeal to the  Department of Administration, . You must file your appeal within 30 days from the date of the  decision  letter at [address], [address], [place], [place] [address], or [address], [place], [place] [address]. If you  disagree with a decision of an , you may file a petition for judicial review with the District Court. You must do so within 30  days of the Appeal Officer’s decision. You may be represented by an  at your own expense or you may contact the [facility] for possible  representation.    [place]  for Injured Workers (NAIW): If you disagree with a  decision, you may request that NAIW represent you  without charge at an  Hearing. For information regarding denial of benefits, you may contact the [facility] at: [address], [address], [place] [address], [phone], or [address], [place] [address], [phone]    To File a Complaint with the Division: If you wish to file a complaint with the  of the Division of Industrial Relations (DIR),  please contact the Workers’ Compensation Section, [address], [address], [place], [place] [address], telephone [phone], or  [address], [address],  [place], [place] [address], telephone [phone].    For assistance with Workers’ Compensation Issues: you may contact the Office of the Governor Consumer Health Assistance, [address] [place] [address], [address], [place], [place] [address], Toll Free 1-266.952.5696, Web site: http://[employer].[facility].nv., E-mail  [email or web address].[email or web address].                                                                                                                                                                                                                                   __________________________________________________________________                                                                   _________________                Employee Name / Signature                                                                                                                                                       Date                                                                                                                                                                                                     D-2 (rev. 10/07)

## 2019-07-31 NOTE — PROGRESS NOTES
"Subjective:      Tonia Xavier is a 29 y.o. 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 She states she landed awkwardly on her on her left leg to catch herself and felt pain in her left knee. She states the pain is present in the back of the knee on the left side. She states the pain gets worse through the day as she is walking most of her day. She tried Aleve last night but this tends to upset her stomach. She has iced and elevated with some relief. She denies a second job. Denies any previous injury to left knee.     HPI    Review of Systems   Musculoskeletal: Positive for joint pain.   All other systems reviewed and are negative.    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 Vital signs are normal. She appears well-developed and well-nourished.   HENT:   Head: Normocephalic and atraumatic.   Eyes: Pupils are equal, round, and reactive to light. EOM are normal.   Neck: Normal range of motion.   Cardiovascular: Normal rate and regular rhythm.    Pulmonary/Chest: Effort normal.   Musculoskeletal: Normal range of motion.        Left knee: She exhibits normal range of motion, no swelling and no effusion. Tenderness found.        Legs:  Neurological: She is alert and oriented to person, place, and time.   Skin: Skin is warm and dry. Capillary refill takes less than 2 seconds.   Psychiatric: She has a " normal mood and affect. Her speech is normal and behavior is normal. Thought content normal.   Vitals reviewed.      Left knee- TTP left posterolateral knee, in the area of the distal biceps femoris tendon. No effusion, STS, or ecchymosis present. No laxity on exam. FROM. Normal gait.       Assessment/Plan:     1. Lower extremity tendon strain, left, initial encounter    Hinged knee brace   RICE   OTC tylenol as needed for pain   Work restrictions   FV in one week

## 2019-08-06 ENCOUNTER — OCCUPATIONAL MEDICINE (OUTPATIENT)
Dept: URGENT CARE | Facility: CLINIC | Age: 29
End: 2019-08-06
Payer: COMMERCIAL

## 2019-08-06 ENCOUNTER — TELEPHONE (OUTPATIENT)
Dept: SCHEDULING | Facility: IMAGING CENTER | Age: 29
End: 2019-08-06

## 2019-08-06 ENCOUNTER — HOSPITAL ENCOUNTER (OUTPATIENT)
Dept: LAB | Facility: MEDICAL CENTER | Age: 29
End: 2019-08-06
Payer: COMMERCIAL

## 2019-08-06 VITALS
BODY MASS INDEX: 30.05 KG/M2 | HEART RATE: 83 BPM | RESPIRATION RATE: 16 BRPM | HEIGHT: 66 IN | OXYGEN SATURATION: 100 % | DIASTOLIC BLOOD PRESSURE: 78 MMHG | TEMPERATURE: 98.3 F | SYSTOLIC BLOOD PRESSURE: 114 MMHG | WEIGHT: 187 LBS

## 2019-08-06 DIAGNOSIS — S83.92XA SPRAIN OF LEFT KNEE, UNSPECIFIED LIGAMENT, INITIAL ENCOUNTER: ICD-10-CM

## 2019-08-06 LAB
BDY FAT % MEASURED: 33.4 %
BP DIAS: 68 MMHG
BP SYS: 117 MMHG
CHOLEST SERPL-MCNC: 152 MG/DL (ref 100–199)
DIABETES HTDIA: NO
EVENT NAME HTEVT: NORMAL
FASTING HTFAS: YES
GLUCOSE SERPL-MCNC: 94 MG/DL (ref 65–99)
HDLC SERPL-MCNC: 39 MG/DL
HYPERTENSION HTHYP: NO
LDLC SERPL CALC-MCNC: 66 MG/DL
SCREENING LOC CITY HTCIT: NORMAL
SCREENING LOC STATE HTSTA: NORMAL
SCREENING LOCATION HTLOC: NORMAL
SMOKING HTSMO: NO
SUBSCRIBER ID HTSID: NORMAL
TRIGL SERPL-MCNC: 234 MG/DL (ref 0–149)

## 2019-08-06 PROCEDURE — S5190 WELLNESS ASSESSMENT BY NONPH: HCPCS

## 2019-08-06 PROCEDURE — 36415 COLL VENOUS BLD VENIPUNCTURE: CPT

## 2019-08-06 PROCEDURE — 80061 LIPID PANEL: CPT

## 2019-08-06 PROCEDURE — 82947 ASSAY GLUCOSE BLOOD QUANT: CPT

## 2019-08-06 PROCEDURE — 99213 OFFICE O/P EST LOW 20 MIN: CPT | Mod: 29 | Performed by: PHYSICIAN ASSISTANT

## 2019-08-06 ASSESSMENT — ENCOUNTER SYMPTOMS
SHORTNESS OF BREATH: 0
FEVER: 0
DIARRHEA: 0
VOMITING: 0
CHILLS: 0
ABDOMINAL PAIN: 0
DIZZINESS: 0

## 2019-08-06 NOTE — LETTER
[facility] [address] [place] [address]  Phone:  [phone] - Fax:  [phone]   [facility] Progress Report and Disability Certification  Date of Service: 8/6/2019   No Show:  No  Date / Time of Next Visit: 8/13/2019 @ @PM   Claim Information   Patient Name: Tonia Xavier  Claim Number:     Employer: [employer]  Date of Injury: 7/29/2019     Insurer / TPA: Workers Choice  ID / SSN:     Occupation: MA-PAR  Diagnosis: The encounter diagnosis was Sprain of left knee, unspecified ligament, initial encounter.    Medical Information   Related to Industrial Injury? 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 She states she landed awkwardly on her on her left leg to catch herself and felt pain in her left knee. She states the pain is present in the back of the knee on the left side. She states the pain gets worse through the day as she is walking most of her day.No prior injury to the knee. She returns to urgent care today for follow up and reports no improvement in the pain. She never had any swelling or bruising of the area. She has been taking OTC tylenol as needed for pain with some relief. The knee brace she was provided at her initial visit she fells is too tight and she hasn't been wearing it.    Objective Findings: Musculoskeletal: Normal range of motion.        Left knee: She exhibits normal range of motion, no swelling, no effusion, no ecchymosis, no LCL laxity and no MCL laxity. Tenderness found. Lateral joint line tenderness noted.   Slight TTP over left posterior lateral aspect of knee.     Pre-Existing Condition(s): None    Assessment:   Condition Same    Status: Additional Care Required  Permanent Disability:No    Plan:      Diagnostics:      Comments:       Disability Information   Status: Released to  Restricted Duty    From:  8/6/2019  Through: 8/13/2019 Restrictions are:     Physical Restrictions   Sitting:    Standing:  < or = to 2 hrs/day Stooping:    Bending:      Squatting:    Walking:  < or = to 4 hrs/day Climbing:    Pushing:      Pulling:    Other:    Reaching Above Shoulder (L):   Reaching Above Shoulder (R):       Reaching Below Shoulder (L):    Reaching Below Shoulder (R):      Not to exceed Weight Limits   Carrying(hrs):   Weight Limit(lb): < or = to 10 pounds Lifting(hrs):   Weight  Limit(lb): < or = to 10 pounds   Comments: Patient will get larger knee brace from [employer], wear full time while at work  Icing 2-3 times daily  OTC tylenol as needed for pain  RTC in 1 week for follow up    Repetitive Actions   Hands: i.e. Fine Manipulations from Grasping:     Feet: i.e. Operating Foot Controls:     Driving / Operate Machinery:     Physician Name: Carmelina Martin P.A.-C. Physician Signature: CARMELINA Vogel P.A.-C. e-Signature: Dr. Elmo Rivers, Medical Director   Clinic Name / Location: [address] [place] [address], [place] [address] Clinic Phone Number: Dept: [phone]   Appointment Time: 10:45 Am Visit Start Time: 10:47 AM   Check-In Time:  9:59 Am Visit Discharge Time:  11:38 AM   Original-Treating Physician or Chiropractor    Page 2-Insurer/TPA    Page 3-Employer    Page 4-Employee

## 2019-08-06 NOTE — PROGRESS NOTES
"Subjective:      Tonia Xavier is a 29 y.o. 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 She states she landed awkwardly on her on her left leg to catch herself and felt pain in her left knee. She states the pain is present in the back of the knee on the left side. She states the pain gets worse through the day as she is walking most of her day.No prior injury to the knee. She returns to urgent care today for follow up and reports no improvement in the pain. She never had any swelling or bruising of the area. She has been taking OTC tylenol as needed for pain with some relief. The knee brace she was provided at her initial visit she fells is too tight and she hasn't been wearing it.      Knee Injury   Pertinent negatives include no abdominal pain, chest pain, chills, congestion, fever, rash or vomiting.       Review of Systems   Constitutional: Negative for chills and fever.   HENT: Negative for congestion.    Respiratory: Negative for shortness of breath.    Cardiovascular: Negative for chest pain.   Gastrointestinal: Negative for abdominal pain, diarrhea and vomiting.   Genitourinary: Negative.    Musculoskeletal:        + left knee pain   Skin: Negative for rash.   Neurological: Negative for dizziness.        Objective:     /78 (BP Location: Right arm)   Pulse 83   Temp 36.8 °C (98.3 °F) (Temporal)   Resp 16   Ht 1.676 m (5' 6\")   Wt 84.8 kg (187 lb)   SpO2 100%   BMI 30.18 kg/m²      Physical Exam   Constitutional: She is oriented to person, place, and time. She appears well-developed and well-nourished. No distress.   HENT:   Head: Normocephalic and atraumatic.   Eyes: Pupils are equal, round, and reactive to light. Conjunctivae are normal.   Neck: Normal range of motion.   Cardiovascular: Normal " rate.   Pulmonary/Chest: Effort normal.   Musculoskeletal: Normal range of motion.        Left knee: She exhibits normal range of motion, no swelling, no effusion, no ecchymosis, no LCL laxity and no MCL laxity. Tenderness found. Lateral joint line tenderness noted.   Slight TTP over left posterior lateral aspect of knee.    Neurological: She is alert and oriented to person, place, and time.   Skin: Skin is warm and dry. She is not diaphoretic.   Psychiatric: She has a normal mood and affect. Her behavior is normal.   Nursing note and vitals reviewed.              Assessment/Plan:     1. Sprain of left knee, unspecified ligament, initial encounter    Patient will get larger knee brace from [email or web address], wear full time while at work  Icing 2-3 times daily  OTC tylenol as needed for pain  RTC in 1 week for follow up

## 2019-08-12 ENCOUNTER — TELEPHONE (OUTPATIENT)
Dept: OCCUPATIONAL MEDICINE | Facility: CLINIC | Age: 29
End: 2019-08-12

## 2019-08-13 ENCOUNTER — OCCUPATIONAL MEDICINE (OUTPATIENT)
Dept: OCCUPATIONAL MEDICINE | Facility: CLINIC | Age: 29
End: 2019-08-13
Payer: COMMERCIAL

## 2019-08-13 ENCOUNTER — APPOINTMENT (OUTPATIENT)
Dept: RADIOLOGY | Facility: IMAGING CENTER | Age: 29
End: 2019-08-13
Attending: NURSE PRACTITIONER
Payer: COMMERCIAL

## 2019-08-13 VITALS
HEART RATE: 85 BPM | WEIGHT: 187 LBS | TEMPERATURE: 98.6 F | RESPIRATION RATE: 14 BRPM | HEIGHT: 66 IN | BODY MASS INDEX: 30.05 KG/M2 | SYSTOLIC BLOOD PRESSURE: 118 MMHG | DIASTOLIC BLOOD PRESSURE: 78 MMHG | OXYGEN SATURATION: 100 %

## 2019-08-13 DIAGNOSIS — S83.92XD SPRAIN OF LEFT KNEE, UNSPECIFIED LIGAMENT, SUBSEQUENT ENCOUNTER: ICD-10-CM

## 2019-08-13 PROCEDURE — 99213 OFFICE O/P EST LOW 20 MIN: CPT | Mod: 29 | Performed by: NURSE PRACTITIONER

## 2019-08-13 PROCEDURE — 73564 X-RAY EXAM KNEE 4 OR MORE: CPT | Mod: TC,LT | Performed by: FAMILY MEDICINE

## 2019-08-13 ASSESSMENT — ENCOUNTER SYMPTOMS
RESPIRATORY NEGATIVE: 1
NEUROLOGICAL NEGATIVE: 1
CONSTITUTIONAL NEGATIVE: 1
CARDIOVASCULAR NEGATIVE: 1
MYALGIAS: 1

## 2019-08-13 ASSESSMENT — PAIN SCALES - GENERAL: PAINLEVEL: NO PAIN

## 2019-08-13 NOTE — PROGRESS NOTES
"Subjective:      Tonia Xavier is a 29 y.o. 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 She states she landed awkwardly on her on her left leg to catch herself and felt pain in her left knee. Pt states still has pain behind the left knee and pain at the bottom of the heel. Pain is described as sharp stabbing pain that stays in the heel. Behind the knee constant achy and unstable feels like it's going to give out when walking. Neg bruise, numbness, and tingling. Taking Tylenol for pain which is ineffective. Has been using ice with some relief. Unable to get with [facility] to change her knee brace, was provided with a sleeve. Has not had an X-ray on the left knee at this time. Discussed plan of care with patient.      HPI    Review of Systems   Constitutional: Negative.    Respiratory: Negative.    Cardiovascular: Negative.    Musculoskeletal: Positive for joint pain and myalgias.   Skin: Negative.    Neurological: Negative.         ROS: All systems were reviewed on intake form, form was reviewed and signed. See scanned documents in media. Pertinent positives and negatives included in HPI.    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 She appears well-developed and well-nourished.   Cardiovascular: Normal rate and regular rhythm.   Pulmonary/Chest: Effort normal. "   Musculoskeletal: Normal range of motion. She exhibits tenderness. She exhibits no edema or deformity.        Left knee: She exhibits LCL laxity and bony tenderness. She exhibits normal range of motion, no deformity and no erythema. Tenderness found.   Neurological: She is alert and oriented to person, place, and time.   Skin: Skin is warm and dry. Capillary refill takes less than 2 seconds.   Psychiatric: She has a normal mood and affect. Her behavior is normal.       Left knee:   FROM, no edema, no effusion, no ecchymosis  Pos LCL laxity  Neg MCL laxity  Moderate Tenderness at the lateral joint line   Mild TTP over left posterior lateral aspect of knee  Distal neurovascular intact  Lachman's neg    Left heel:  No edema, ecchymosis, or erythema  No deformities noted  FROM  Moderate tenderness with palpation  Distal pulse 2+    Knee X-Ray 8/13/19:  FINDINGS:    BONE MINERALIZATION: Normal.  JOINTS: Preserved. No erosions.  FRACTURE: None.  DISLOCATION: None.  SOFT TISSUES: No mass.    Impression      No acute osseous abnormality.           Assessment/Plan:     1. Sprain of left knee, unspecified ligament, subsequent encounter    - DX-KNEE COMPLETE 4+ LEFT; Future  - REFERRAL TO RADIOLOGY    Follow-up in 3 weeks   Continue to wear knee brace as tolerated   MRI ordered   Continue with OTC Tylenol   Continue with RICE 2-3 times daily

## 2019-08-13 NOTE — LETTER
[address],   [address] [place] [address]  Phone:  [phone] - Fax:  [phone]   [facility] Progress Report and Disability Certification  Date of Service: 8/13/2019   No Show:  No  Date / Time of Next Visit: 9/3/2019 @ 2:15 PM   Claim Information   Patient Name: Tonia Xavier  Claim Number:     Employer: [employer]  Date of Injury: 7/29/2019     Insurer / TPA: [employer]  ID / SSN:     Occupation: MA-PAR  Diagnosis: The encounter diagnosis was Sprain of left knee, unspecified ligament, subsequent encounter.    Medical Information   Related to Industrial Injury? 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 She states she landed awkwardly on her on her left leg to catch herself and felt pain in her left knee. Pt states still has pain behind the left knee and pain at the bottom of the heel. Pain is described as sharp stabbing pain that stays in the heel. Behind the knee constant achy and unstable feels like it's going to give out when walking. Neg bruise, numbness, and tingling. Taking Tylenol for pain which is ineffective. Has been using ice with some relief. Unable to get with [facility] to change her knee brace, was provided with a sleeve. Has not had an X-ray on the left knee at this time. Discussed plan of care with patient.    Objective Findings: Left knee:   FROM, no edema, no effusion, no ecchymosis  Pos LCL laxity  Neg MCL laxity  Moderate Tenderness at the lateral joint line   Mild TTP over left posterior lateral aspect of knee  Distal neurovascular intact  Lachman's neg    Left heel:  No edema, ecchymosis, or erythema  No deformities noted  FROM  Moderate tenderness with palpation  Distal pulse 2+    Knee X-Ray 8/13/19:  FINDINGS:    BONE MINERALIZATION: Normal.  JOINTS: Preserved. No  erosions.  FRACTURE: None.  DISLOCATION: None.  SOFT TISSUES: No mass.    Impression      No acute osseous abnormality.       Pre-Existing Condition(s):     Assessment:   Condition Same    Status: Additional Care Required  Permanent Disability:No    Plan:      Diagnostics: MRI    Comments:  Follow-up in 3 weeks  Continue to wear knee brace as tolerated  MRI ordered  Continue with OTC Tylenol  Continue with RICE 2-3 times daily     Disability Information   Status: Released to Restricted Duty    From:  8/13/2019  Through: 9/3/2019 Restrictions are: Temporary   Physical Restrictions   Sitting:    Standing:  < or = to 2 hrs/day Stooping:    Bending:      Squatting:    Walking:  < or = to 4 hrs/day Climbing:    Pushing:      Pulling:    Other:    Reaching Above Shoulder (L):   Reaching Above Shoulder (R):       Reaching Below Shoulder (L):    Reaching Below Shoulder (R):      Not to exceed Weight Limits   Carrying(hrs):   Weight Limit(lb): < or = to 10 pounds Lifting(hrs):   Weight  Limit(lb): < or = to 10 pounds   Comments:      Repetitive Actions   Hands: i.e. Fine Manipulations from Grasping:     Feet: i.e. Operating Foot Controls:     Driving / Operate Machinery:     Physician Name: JAY Omer Physician Signature: HEIDI Celestin e-Signature: Dr. Elmo Rivers, Medical Director   Clinic Name / Location: [address],   [address]  [place] [address] Clinic Phone Number: Dept: [phone]   Appointment Time: 2:00 Pm Visit Start Time: 1:56 PM   Check-In Time:  1:50 Pm Visit Discharge Time:  2:49 PM   Original-Treating Physician or Chiropractor    Page 2-Insurer/TPA    Page 3-Employer    Page 4-Employee

## 2019-08-20 ENCOUNTER — HOSPITAL ENCOUNTER (OUTPATIENT)
Dept: RADIOLOGY | Facility: MEDICAL CENTER | Age: 29
End: 2019-08-20
Attending: NURSE PRACTITIONER
Payer: COMMERCIAL

## 2019-08-20 DIAGNOSIS — S83.92XD SPRAIN OF LEFT KNEE, UNSPECIFIED LIGAMENT, SUBSEQUENT ENCOUNTER: ICD-10-CM

## 2019-08-20 PROCEDURE — 73721 MRI JNT OF LWR EXTRE W/O DYE: CPT | Mod: LT

## 2019-08-30 ENCOUNTER — TELEPHONE (OUTPATIENT)
Dept: OCCUPATIONAL MEDICINE | Facility: CLINIC | Age: 29
End: 2019-08-30

## 2019-09-03 ENCOUNTER — OCCUPATIONAL MEDICINE (OUTPATIENT)
Dept: OCCUPATIONAL MEDICINE | Facility: CLINIC | Age: 29
End: 2019-09-03
Payer: COMMERCIAL

## 2019-09-03 VITALS
BODY MASS INDEX: 30.05 KG/M2 | OXYGEN SATURATION: 98 % | DIASTOLIC BLOOD PRESSURE: 60 MMHG | SYSTOLIC BLOOD PRESSURE: 118 MMHG | HEART RATE: 97 BPM | WEIGHT: 187 LBS | HEIGHT: 66 IN

## 2019-09-03 DIAGNOSIS — S83.92XD SPRAIN OF LEFT KNEE, UNSPECIFIED LIGAMENT, SUBSEQUENT ENCOUNTER: ICD-10-CM

## 2019-09-03 PROCEDURE — 99213 OFFICE O/P EST LOW 20 MIN: CPT | Mod: 29 | Performed by: NURSE PRACTITIONER

## 2019-09-03 ASSESSMENT — ENCOUNTER SYMPTOMS
MYALGIAS: 1
CONSTITUTIONAL NEGATIVE: 1
PSYCHIATRIC NEGATIVE: 1
NEUROLOGICAL NEGATIVE: 1
CARDIOVASCULAR NEGATIVE: 1
RESPIRATORY NEGATIVE: 1

## 2019-09-03 NOTE — LETTER
[address],   [address] [place] [address]  Phone:  [phone] - Fax:  [phone]   [facility] Progress Report and Disability Certification  Date of Service: 9/3/2019   No Show:  No  Date / Time of Next Visit: 9/17/2019 @ 8:30 am   Claim Information   Patient Name: Tonia Xavier  Claim Number:     Employer: [employer]  Date of Injury: 7/29/2019     Insurer / TPA: [employer]  ID / SSN:     Occupation: MA-PAR  Diagnosis: The encounter diagnosis was Sprain of left knee, unspecified ligament, subsequent encounter.    Medical Information   Related to Industrial Injury? 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 She states she landed awkwardly on her on her left leg to catch herself and felt pain in her left knee. Patient states overall feeling better. Work restrictions have been helped. Patient is not really having to take anything for pain. Patient states that the brace has significantly improved symptoms. Patient has continued to ice as needed with good relief. Heel pain isn't as frequent, but does get occasional pain. MRI results reviewed. Denies numbness, tingling, and joint instability.  Plan of care discussed with patient.   Objective Findings: Left knee:   FROM, no edema, no effusion, no ecchymosis  Neg LCL laxity  Neg MCL laxity  Mild tenderness at the lateral joint line   Mild TTP over left posterior lateral aspect of knee  Distal neurovascular intact  Lachman's neg     Left heel:  No edema, ecchymosis, or erythema  No deformities noted  FROM  Mild tenderness with palpation  Distal pulse 2+       MRI knee 8/20/19:  Impression      1.  No ligamentous or meniscal injury appreciated.    2.  Strain of the lateral head of the gastrocnemius muscle       Pre-Existing Condition(s):     Assessment:    Condition Improved    Status: Additional Care Required  Permanent Disability:No    Plan:      Diagnostics:      Comments:  Follow-up in 2 weeks   Continue to wear knee brace as tolerated, wean at home  Continue with OTC Tylenol   Continue with RICE 2-3 times daily       Disability Information   Status: Released to Restricted Duty    From:  9/3/2019  Through: 9/17/2019 Restrictions are: Temporary   Physical Restrictions   Sitting:    Standing:  < or = to 2 hrs/day Stooping:    Bending:      Squatting:    Walking:  < or = to 4 hrs/day Climbing:    Pushing:      Pulling:    Other:    Reaching Above Shoulder (L):   Reaching Above Shoulder (R):       Reaching Below Shoulder (L):    Reaching Below Shoulder (R):      Not to exceed Weight Limits   Carrying(hrs):   Weight Limit(lb): < or = to 10 pounds Lifting(hrs):   Weight  Limit(lb): < or = to 10 pounds   Comments:      Repetitive Actions   Hands: i.e. Fine Manipulations from Grasping:     Feet: i.e. Operating Foot Controls:     Driving / Operate Machinery:     Physician Name: JAY Omer Physician Signature: HEIDI Celestin e-Signature: Dr. Elmo Rivers, Medical Director   Clinic Name / Location: [address],   [address] [place] [id number] Clinic Phone Number: Dept: [phone]   Appointment Time: 2:15 Pm Visit Start Time: 2:08 PM   Check-In Time:  2:06 Pm Visit Discharge Time:  2:27 pm   Original-Treating Physician or Chiropractor    Page 2-Insurer/TPA    Page 3-Employer    Page 4-Employee

## 2019-09-03 NOTE — PROGRESS NOTES
"Subjective:      Tonia Xavier is a 29 y.o. 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 She states she landed awkwardly on her on her left leg to catch herself and felt pain in her left knee. Patient states overall feeling better. Work restrictions have been helped. Patient is not really having to take anything for pain. Patient states that the brace has significantly improved symptoms. Patient has continued to ice as needed with good relief. Heel pain isn't as frequent, but does get occasional pain. MRI results reviewed. Denies numbness, tingling, and joint instability.  Plan of care discussed with patient.     HPI    Review of Systems   Constitutional: Negative.    Respiratory: Negative.    Cardiovascular: Negative.    Musculoskeletal: Positive for myalgias. Negative for joint pain.   Skin: Negative.    Neurological: Negative.    Psychiatric/Behavioral: Negative.         SOCHX: Works as a [employer] at [email or web address]  FH: No pertinent family history to this problem.         Objective:     /60   Pulse 97   Ht 1.676 m (5' 6\")   Wt 84.8 kg (187 lb)   SpO2 98%   BMI 30.18 kg/m²      Physical Exam   Constitutional: She is oriented to person, place, and time. She appears well-developed and well-nourished.   Cardiovascular: Normal rate and regular rhythm.   Pulmonary/Chest: Effort normal.   Musculoskeletal: She exhibits tenderness. She exhibits no edema or deformity.        Left knee: She exhibits normal range of motion, no swelling, no ecchymosis, no deformity, no erythema, no LCL laxity, no bony tenderness, normal meniscus and no MCL laxity. Tenderness found. Lateral joint line tenderness noted.   Neurological: She is alert and oriented to person, place, and time.   Skin: Skin is warm and dry. Capillary refill takes less than 2 seconds. "   Psychiatric: She has a normal mood and affect. Her behavior is normal.       Left knee:   FROM, no edema, no effusion, no ecchymosis  Neg LCL laxity  Neg MCL laxity  Mild tenderness at the lateral joint line   Mild TTP over left posterior lateral aspect of knee  Distal neurovascular intact  Lachman's neg     Left heel:  No edema, ecchymosis, or erythema  No deformities noted  FROM  Mild tenderness with palpation  Distal pulse 2+       MRI knee 8/20/19:  Impression      1.  No ligamentous or meniscal injury appreciated.    2.  Strain of the lateral head of the gastrocnemius muscle           Assessment/Plan:     1. Sprain of left knee, unspecified ligament, subsequent encounter    Follow-up in 2 weeks   Continue to wear knee brace as tolerated, wean at home  Continue with OTC Tylenol   Continue with RICE 2-3 times daily

## 2019-09-07 PROBLEM — R19.4 BOWEL HABIT CHANGES: Status: ACTIVE | Noted: 2018-11-08

## 2019-09-07 PROBLEM — N83.209 OVARIAN CYST: Status: ACTIVE | Noted: 2019-09-07

## 2019-09-07 PROBLEM — B00.1 RECURRENT COLD SORES: Status: ACTIVE | Noted: 2019-09-07

## 2019-09-07 PROBLEM — B00.9 HSV INFECTION: Status: ACTIVE | Noted: 2019-09-07

## 2019-09-09 ENCOUNTER — OFFICE VISIT (OUTPATIENT)
Dept: HEALTH INFORMATION MANAGEMENT | Facility: MEDICAL CENTER | Age: 29
End: 2019-09-09
Payer: COMMERCIAL

## 2019-09-09 VITALS
WEIGHT: 188.9 LBS | BODY MASS INDEX: 31.47 KG/M2 | HEART RATE: 76 BPM | OXYGEN SATURATION: 95 % | HEIGHT: 65 IN | SYSTOLIC BLOOD PRESSURE: 112 MMHG | DIASTOLIC BLOOD PRESSURE: 70 MMHG

## 2019-09-09 DIAGNOSIS — E66.01 MORBID (SEVERE) OBESITY DUE TO EXCESS CALORIES (HCC): ICD-10-CM

## 2019-09-09 DIAGNOSIS — E66.9 OBESITY (BMI 30-39.9): ICD-10-CM

## 2019-09-09 DIAGNOSIS — R53.82 CHRONIC FATIGUE: ICD-10-CM

## 2019-09-09 DIAGNOSIS — E55.9 VITAMIN D DEFICIENCY: ICD-10-CM

## 2019-09-09 DIAGNOSIS — F41.9 ANXIETY: ICD-10-CM

## 2019-09-09 DIAGNOSIS — R63.5 WEIGHT GAIN: ICD-10-CM

## 2019-09-09 DIAGNOSIS — E87.6 HYPOKALEMIA: ICD-10-CM

## 2019-09-09 DIAGNOSIS — R73.9 HYPERGLYCEMIA: ICD-10-CM

## 2019-09-09 DIAGNOSIS — E78.1 HYPERTRIGLYCERIDEMIA: ICD-10-CM

## 2019-09-09 DIAGNOSIS — R63.2 BINGE EATING: ICD-10-CM

## 2019-09-09 PROBLEM — R73.01 IFG (IMPAIRED FASTING GLUCOSE): Status: ACTIVE | Noted: 2019-09-09

## 2019-09-09 PROBLEM — E78.5 HYPERLIPIDEMIA LDL GOAL <100: Status: ACTIVE | Noted: 2019-09-09

## 2019-09-09 PROBLEM — Z86.19 HISTORY OF COLD SORES: Status: ACTIVE | Noted: 2019-09-09

## 2019-09-09 PROBLEM — Z56.0 OUT OF WORK: Status: RESOLVED | Noted: 2018-02-06 | Resolved: 2019-09-09

## 2019-09-09 PROCEDURE — 99205 OFFICE O/P NEW HI 60 MIN: CPT | Mod: 25 | Performed by: INTERNAL MEDICINE

## 2019-09-09 PROCEDURE — 97802 MEDICAL NUTRITION INDIV IN: CPT | Performed by: DIETITIAN, REGISTERED

## 2019-09-09 PROCEDURE — 93000 ELECTROCARDIOGRAM COMPLETE: CPT | Performed by: INTERNAL MEDICINE

## 2019-09-09 NOTE — PROGRESS NOTES
"9/9/2019     Tonia Xavier 29 y.o.        Time in/out: 11:22-11:45 am     Anthropometrics/Objective  Vitals:    09/09/19 1025   BP: 112/70   Weight: 85.7 kg (188 lb 14.4 oz)   Height: 1.651 m (5' 5\")     BMI: Body mass index is 31.43 kg/m².  Stated Goal Weight: 165 lbs   See comprehensive patient history form for further information     Subjective:  Pt is here today for the initial screening visit for the medical weight management program.   - skips breakfast daily, dinner some nights  - snacking in the evening  - eats lunch out ([employer], [place]) would like to learn to cook/prepare meals better and bring with her to work  - snacks on lemon cake, donut, ice cream, watermelon,  - caramel frapp, vanilla iced latte for caitlin  - wants to focus on food changes first     See HIP Medical Questionnaire in media for more detailed diet history     Nutrition Diagnosis (PES Statement)  · Obesity related to excessive energy intake and inadequate energy expenditure as evidenced by BMI >30     Client history:  Condition(s) associated with a diagnosis or treatment / Pertinent Medical Hx: hypertriglyceridemia, hyperglycemia     Biochemical data, medical test and procedures  Lab Results   Component Value Date/Time    HBA1C 5.7 (H) 04/10/2019 07:49 AM     No results found for: POCGLUCOSE  Lab Results   Component Value Date/Time    CHOLSTRLTOT 152 08/06/2019 08:11 AM    LDL 66 08/06/2019 08:11 AM    HDL 39 (A) 08/06/2019 08:11 AM    TRIGLYCERIDE 234 (H) 08/06/2019 08:11 AM         Nutrition Intervention  Nutrition Prescription  Recommended Daily Kcals: 1797-9287 Kcal based on REE of 1757   Recommended Daily Protein: 100g or ~30% of kcals      Meal Plan Recommendation   Calorie controlled, high protein, low carb diet    with 0-1 meal replacements per day  1-2 snacks; 1 oz protein + non-starchy veggies or 1 fruit      Comprehensive Nutrition Education Instruction or training leading to in-depth nutrition related " knowledge about:   Benefits to following meal plan, Combine carb, protein and fat at each meal, Meal timing and spacing, Portion control, Sweets and alcohol in moderation.   Handouts provided regarding topics discussed:   - LCD Plan   - MyPlate   - Snack list with fruit   - Meal ideas   - MyFitnessPal How-To    - Meal replacement/protein shake ideas     Monitoring & Evaluation Plan    Behavioral-Environmental:  Behavior: Keep a food journal and bring to next appointment   Physical activity: Did not discuss today     Food / Nutrient Intake:  Food intake: Follow meal plan as discussed. Avoid concentrated sweets and processed carbs. Use the plate method for portion control and macronutrient balance. Limit carbs/starch to up to 1.2 cup per meal. Snacks should be 1oz protein + ns veggies or fruit. Fruit once per day.     Fluid intake: Consume at least 64 oz water per day. Avoid all sweetened beverages. Limit coffee to 1 cup a day (ideally no cream or sugar). Limit or avoid alcohol as discussed with Dr. Zabala.     Physical Signs / Symptoms:  Weight loss towards BMI < 30   Lipid profiles WNL and Weight change 1-2 lbs a week     Assessment Notes:  Today I oriented Tonia to Dr. Fink's Medical Weight Management program. Encouraged a higher protein, lower carbohydrate, and calorie controlled meal plan consisting of 3 meals and 1-2 snacks per day with optional use of meal replacements. Encouraged patient to track their food/beverage intake on My fitness Pal or utilize a written food journal.  We discussed how to build protein into each meal and snack, incorporating 1/2 plate non-starchy vegetable twice daily, optional 1/2 cup of complex carbohydrate at meals if desired, and avoiding refined carbohydrates and simple sugars. Reviewed snack guidelines to include 1 oz protein and optional non-starchy vegetable or 1 serving of fruit.      She will be aiming for <1700kcal/d on My Fitness pal and will also be looking at  the macronutrient feature and aiming for 100g or less of carbohydrate and 100g or more of protein per day per Dr. Fink recommendations (or 30% or less of CHO and 30% or more of protein from calories). Tonia's first goal is to work on not skipping breakfast. Second, she is going to pack lunch next week 1-2 days for work instead of eating out. Offered her meal replacement kelli however she declined. She may decide to use protein shakes/meal replacements in the future.     Goals:  1) Don't skip breakfast  2) Pack lunch 2 days next week for work  3) Track intake     F/U: 2 week w/ RD and 4-6 weeks with MD

## 2019-09-09 NOTE — PROGRESS NOTES
Bariatric Medicine H&P  Chief Complaint   Patient presents with   • Weight Gain       Referred by:  Dylon López,*    History of Present Illness:   Tonia Xavier is a 29 y.o.  female who presents for weight management and to help address co-morbidities caused by overweight, as below.    The patient wants to feel better and get healthy.  She is concerned about her elevated triglyceride and fasting glucose levels in the past.  She tried phentermine, but did not really feel she learned what to eat and what to avoid so she lost interest because she stopped losing weight.  She admits she does not like to cook, wants to know what to cook to eat healthier.  She has tracked her intake in the past with my fitness pal, would like to resume.    Brief Diet History (see also RD notes):  AM: Skips  Lunch: Fast food or lean cuisine  Dinner: Fast food or meat with starch  Snacks: Fruit, yogurt, snack bars  Drinks: Soda    Possible celiac disease: The patient has had a work-up with gastroenterology, had a equivocal findings after biopsy and blood work but was told to limit gluten intake  VDD: Currently on vitamin D daily  Anxiety: Well-controlled on SSRI  HLD/TG: Has not been on statin or fenofibrate  I FG: Has not been on metformin    Behavior-Related History:  Binge eating screen: Positive, especially at night  H/o abuse: Negative     Exercise:   None.  Likes biking     Review of Systems   Positive for fatigue, diarrhea, nausea or vomiting, insomnia, vertigo  Sleep apnea screen: Negative  All other ROS were reviewed with patient today and are negative.      PMH/PSH:  I have reviewed the patient's medical, social and family history, allergies, and medications today.  Prior records reviewed.  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    HENT: No facial plethora.  No Cushingoid features.  No scalloped tongue.  No dental erosions.  No swollen parotids.  Head: Normocephalic.   Eyes: EOM are normal. Pupils are equal, round, and reactive to light. No periorbital edema.  No lateral thinning of eyebrows.  No vertical nystagmus.  Neck: Normal range of motion. Neck supple. No thyromegaly present. No buffalo hump.  Cardiovascular: Normal rate and regular rhythm.  No murmur heard.  Pulmonary/Chest: Effort normal and breath sounds normal. No wheezes.   Abdominal: Soft. Bowel sounds are normal. No pannus.  No ascites.  No hepatosplenomegaly.   Musculoskeletal: Normal range of motion. No edema.   Neurological: Alert and oriented to person, place, and time. Normal reflexes. No cranial nerve deficit. No muscle weakness.  Gait normal.   Skin: Warm and dry. Not diaphoretic. No hirsuitism.  No acanthosis nigricans.  Not excessively dry, scaly.  No acne.  No bruising/ecchymosis.  No hyperpigmentation.  No xanthomas or acrochordon.    Psychiatric: Mood, memory, affect and judgment normal.     Laboratory:   Prior labs reviewed.  EKG: Sinus rhythm, rate 73, no ST-T changes.  Corrected QT 0.439  Ordered and reviewed by me today.    Dietitian Assessment: I have reviewed the Dietitian's assessment related to this encounter.       ASSESSMENT/PLAN:  Body mass index is 31.43 kg/m².   Obesity Stage ([place]) 1; Class 1    1. Weight gain     2. Obesity (BMI 30-39.9)     3. Vitamin D deficiency     4. Anxiety     5. Hypertriglyceridemia     6. Hyperglycemia  HEMOGLOBIN A1C   7. Hypokalemia  Basic Metabolic Panel   8. Binge eating     9. Chronic fatigue         Given the patient's food recall, her intake of " refined carbohydrates is very high, especially as a proportion of her total daily intake.  Start tracking, work on CHO reduction.  Avoid meal skipping, so start stimulus narrowing.  Anxiety currently controlled.  Consider metformin given hyperglycemia but update labs as above.  Continue vitamin D supplementation.  Consider anti-obesity medication given binge eating and weight gain.  Monitor fatigue levels, should improve with above measures.    The patient and I have discussed at length and agree to the following recommendations, which are all addressing the above diagnoses:    Weight Goal: 5% wt loss at one month after start (pt goal weight is 165 lb)  Diet:     MR: Travis major for breakfast daily  High Protein/Low Carb Meals and 2 snacks between meals daily  >100 g protein, <100 g total carbs daily, around 1600 kcal per day to start  64+ oz water per day  Avoid skipping meals and excessive snacking instead  Track daily intake with My Fitness Pal, bring to next visit  Physical Activity: Consider biking program  Risk level for moderate/vigorous exercise program:   Low  New Rx:   Consider metformin  Consider other anti-obesity medication pending course  Avoid phentermine given anxiety even though anxiety not worse with last phentermine rx 2 yrs ago  Behavior change:   Meal planning, prep  Follow-up: one month with MD, 2 wks with RD    Face to face time spent 60 minutes,  with >50% of time devoted to one on one counseling on weight management issues, as documented above.      Patient's body mass index is 31.43 kg/m². Exercise and nutrition counseling were performed at this visit.        Thank you for your referral!

## 2019-09-12 ENCOUNTER — OFFICE VISIT (OUTPATIENT)
Dept: MEDICAL GROUP | Facility: MEDICAL CENTER | Age: 29
End: 2019-09-12
Payer: COMMERCIAL

## 2019-09-12 VITALS
BODY MASS INDEX: 30.22 KG/M2 | OXYGEN SATURATION: 96 % | HEART RATE: 88 BPM | HEIGHT: 66 IN | DIASTOLIC BLOOD PRESSURE: 64 MMHG | WEIGHT: 188 LBS | SYSTOLIC BLOOD PRESSURE: 110 MMHG | TEMPERATURE: 97 F

## 2019-09-12 DIAGNOSIS — B00.9 HSV INFECTION: ICD-10-CM

## 2019-09-12 DIAGNOSIS — Z86.2 HISTORY OF ANEMIA: ICD-10-CM

## 2019-09-12 DIAGNOSIS — Z86.69 H/O BELL'S PALSY: ICD-10-CM

## 2019-09-12 DIAGNOSIS — F41.9 ANXIETY: ICD-10-CM

## 2019-09-12 DIAGNOSIS — N83.209 CYST OF OVARY, UNSPECIFIED LATERALITY: ICD-10-CM

## 2019-09-12 DIAGNOSIS — E55.9 VITAMIN D DEFICIENCY: ICD-10-CM

## 2019-09-12 DIAGNOSIS — E66.9 OBESITY (BMI 35.0-39.9 WITHOUT COMORBIDITY): ICD-10-CM

## 2019-09-12 DIAGNOSIS — E78.5 HYPERLIPIDEMIA LDL GOAL <100: ICD-10-CM

## 2019-09-12 DIAGNOSIS — R73.01 IFG (IMPAIRED FASTING GLUCOSE): ICD-10-CM

## 2019-09-12 DIAGNOSIS — Z00.00 ANNUAL PHYSICAL EXAM: ICD-10-CM

## 2019-09-12 DIAGNOSIS — Z23 NEED FOR INFLUENZA VACCINATION: ICD-10-CM

## 2019-09-12 PROCEDURE — 99395 PREV VISIT EST AGE 18-39: CPT | Mod: 25 | Performed by: NURSE PRACTITIONER

## 2019-09-12 PROCEDURE — 90686 IIV4 VACC NO PRSV 0.5 ML IM: CPT | Performed by: NURSE PRACTITIONER

## 2019-09-12 PROCEDURE — 90471 IMMUNIZATION ADMIN: CPT | Performed by: NURSE PRACTITIONER

## 2019-09-12 RX ORDER — CHOLECALCIFEROL (VITAMIN D3) 125 MCG
500 CAPSULE ORAL DAILY
Qty: 30 TAB | Refills: 0
Start: 2019-09-12 | End: 2020-06-25

## 2019-09-12 ASSESSMENT — PATIENT HEALTH QUESTIONNAIRE - PHQ9: CLINICAL INTERPRETATION OF PHQ2 SCORE: 0

## 2019-09-12 NOTE — ASSESSMENT & PLAN NOTE
Hx of bell's palsy in 2011.  Was on rt side of face.  That was with pg.  Lasted for 4 mo.  Recently having some tingling on rt side of face again.  wiil refer to neuro.

## 2019-09-12 NOTE — ASSESSMENT & PLAN NOTE
She is seeing Dr Galarza for wt loss.  She has a1c of 5.7.  Provider is going to recheck based on wt loss.

## 2019-09-12 NOTE — PROGRESS NOTES
Subjective:      Chase Xavier is a 29 y.o. female who presents with establish care and PE.          HPI  Seen to establish care and PE.  seh is feeling well.  She is due updated lab.  Just had a1c and LP.  She will need alb/cr ratio, d, cbc.  Has a hx of fe def.      H/O Bell's palsy  Hx of bell's palsy in 2011.  Was on rt side of face.  That was with pg.  Lasted for 4 mo.  Recently having some tingling on rt side of face again.  wiil refer to neuro.    Anxiety  Stable on zoloft.      HSV infection  Stable on valtrex as needed.  Has cold sores.  Occur only occas.     IFG (impaired fasting glucose)  She is seeing Dr Galarza for wt loss.  She has a1c of 5.7.  Provider is going to recheck based on wt loss.     Vitamin D deficiency  Hx of low d. On otc supplement.  Will recheck lab    Ovarian cyst  Last nov she was seen for abd pain.  They did a CT abd and abd us with UC.  That is where they found to cyst.  Now pain resolved and no issues. Followed by Dr Flora Quispe, [facility]    Hyperlipidemia LDL goal <100  Her recent LP shows elevted trg.  HDL is chronically low.  LDL is good.  Goal is <100.  LDL is 66.  She is on healthy diet and exercises regulalry.     Patient Active Problem List    Diagnosis Date Noted   • H/O Mark's palsy 09/12/2019   • Obesity (BMI 35.0-39.9 without comorbidity) ([facility]) 09/12/2019   • Hyperlipidemia LDL goal <100 09/09/2019   • IFG (impaired fasting glucose) 09/09/2019   • HSV infection 09/07/2019   • Ovarian cyst 09/07/2019   • Vitamin D deficiency 02/01/2018   • Obesity (BMI 30-39.9) 01/11/2018   • Anxiety 01/11/2018     Current Outpatient Medications   Medication Sig Dispense Refill   • cyanocobalamin (VITAMIN B-12) 500 MCG Tab Take 1 Tab by mouth every day. 30 Tab 0   • valACYclovir (VALTREX) 500 MG Tab Take 500 mg by mouth every 24 hours as needed.     • sertraline (ZOLOFT) 50 MG Tab TAKE 1 TABLET BY MOUTH EVERY DAY.     • ondansetron (ZOFRAN) 4 MG Tab tablet ZOFRAN 4 MG TABS     •  fexofenadine (ALLEGRA) 180 MG tablet 24HR ALLERGY RELIEF TABS     • VITAMIN D HIGH POTENCY 1000 units Cap Take 1 Cap by mouth every day.  2   • dicyclomine (BENTYL) 10 MG Cap Take 1 Cap by mouth 4 Times a Day,Before Meals and at Bedtime. 30 Cap 0   • cyclobenzaprine (FLEXERIL) 10 MG Tab Take 10 mg by mouth 3 times a day as needed.     • FEMYNOR 0.25-35 MG-MCG per tablet Take 1 Tab by mouth every day.  3     No current facility-administered medications for this visit.      Benzoin  Social History     Socioeconomic History   • Marital status:      Spouse name: Not on file   • Number of children: Not on file   • Years of education: Not on file   • Highest education level: Not on file   Occupational History   • Not on file   Social Needs   • Financial resource strain: Not on file   • Food insecurity:     Worry: Not on file     Inability: Not on file   • Transportation needs:     Medical: Not on file     Non-medical: Not on file   Tobacco Use   • Smoking status: Never Smoker   • Smokeless tobacco: Never Used   Substance and Sexual Activity   • Alcohol use: No   • Drug use: No   • Sexual activity: Yes   Lifestyle   • Physical activity:     Days per week: Not on file     Minutes per session: Not on file   • Stress: Not on file   Relationships   • Social connections:     Talks on phone: Not on file     Gets together: Not on file     Attends Zoroastrian service: Not on file     Active member of club or organization: Not on file     Attends meetings of clubs or organizations: Not on file     Relationship status: Not on file   • Intimate partner violence:     Fear of current or ex partner: Not on file     Emotionally abused: Not on file     Physically abused: Not on file     Forced sexual activity: Not on file   Other Topics Concern   • Not on file   Social History Narrative   • Not on file     Past Medical History:   Diagnosis Date   • Anxiety    • HSV infection 9/7/2019   • Hyperlipidemia LDL goal <100 9/9/2019   • IFG  "(impaired fasting glucose) 9/9/2019   • Obesity (BMI 30-39.9) 1/11/2018   • Ovarian cyst 11/2018    left side   • Vitamin D deficiency 2/1/2018         ROS  Review of Systems   Constitutional: Negative.  Negative for fever, chills, weight loss, malaise/fatigue and diaphoresis.   HENT: Negative.  Negative for hearing loss, ear pain, nosebleeds, congestion, sore throat, neck pain, tinnitus and ear discharge.    Eyes: Negative.  Negative for blurred vision, double vision, photophobia, pain, discharge and redness.   Respiratory: Negative.  Negative for cough, hemoptysis, sputum production, shortness of breath, wheezing and stridor.    Cardiovascular: Negative.  Negative for chest pain, palpitations, orthopnea, claudication, leg swelling and PND.   Gastrointestinal: Negative.  Negative for heartburn, vomiting, abdominal pain, diarrhea, constipation, blood in stool and melena. occas nausea - tx w/zofran  Genitourinary: Negative.  Negative for dysuria, urgency, frequency, incontinence, hematuria and flank pain.   Musculoskeletal: Negative.  Negative for myalgias, back pain, joint pain and falls.   Skin: Negative.  Negative for itching and rash.   Neurological: Negative.  Negative for dizziness, tremors, sensory change, speech change, focal weakness, seizures, loss of consciousness, weakness and headaches.   Endo/Heme/Allergies: Negative.  Negative for environmental allergies and polydipsia. Does not bruise/bleed easily. occas liteheadedness.    Psychiatric/Behavioral: Negative.  Negative for depression, suicidal ideas, hallucinations, memory loss and substance abuse. The patient is not nervous/anxious and does not have insomnia.    All other systems reviewed and are negative.     Objective:     /64 (BP Location: Right arm, Patient Position: Sitting)   Pulse 88   Temp 36.1 °C (97 °F) (Temporal)   Ht 1.676 m (5' 6\")   Wt 85.3 kg (188 lb)   LMP 07/15/2019   SpO2 96%   BMI 30.34 kg/m²      Physical Exam  Physical " Exam   Vitals reviewed.  Constitutional: oriented to person, place, and time. appears well-developed and well-nourished. No distress.   HENT: Head: Normocephalic and atraumatic. Bilateral tympanic membranes wnl w/o bulging.  Right Ear: External ear normal. Left Ear: External ear normal. Nose: Nose normal.  Mouth/Throat: Oropharynx is clear and moist. No oropharyngeal exudate. joshua tm wnl. Eyes: Conjunctivae and EOM are normal. Pupils are equal, round, and reactive to light. Right eye exhibits no discharge. Left eye exhibits no discharge. No scleral icterus.    Neck: Normal range of motion. Neck supple. No JVD present.   Cardiovascular: Normal rate, regular rhythm, normal heart sounds and intact distal pulses.  Exam reveals no gallop and no friction rub.  No murmur heard.  No carotid bruits   Pulmonary/Chest: Effort normal and breath sounds normal. No stridor. No respiratory distress. no wheezes or rales. exhibits no tenderness.   Abdominal: Soft. Bowel sounds are normal. exhibits no distension and no mass. No tenderness. no rebound and no guarding.   Musculoskeletal: Normal range of motion. exhibits no edema or tenderness.  joshua pedal pulses 2+.  Lymphadenopathy:  no cervical or supraclavicular adenopathy.   Neurological: alert and oriented to person, place, and time. has normal reflexes. displays normal reflexes.  cranial nerve deficit WITH drooping rt side of face d/t bells palsy. exhibits normal muscle tone. Coordination normal.  PERRLA.    Skin: Skin is warm and dry. No rash noted. no diaphoresis. No erythema. No pallor.   Psychiatric: normal mood and affect. behavior is normal.               Assessment/Plan:     1. Annual physical exam      all is stable.  due for updated lab.  do lab and f/u with pt with results.  then f/u yearly.  call for lab slip   2. H/O Bell's palsy  REFERRAL TO NEUROLOGY    rt facial tingling reoccurring.  refer to neuro for eval.  if sx worsen before seeing neuro will f/u here   3.  History of anemia  CBC WITH DIFFERENTIAL    hx of anemia.  will recheck cbc.  f/u with pt with results.  then f/u yearly or sooner if lab abn   4. Hyperlipidemia LDL goal <100      last LP done recently with elevated trg and low HDL.  LDL at goal.  will continue to monitor.  plan recheck lab 1 yr.  call for lab slip.  not on meds   5. IFG (impaired fasting glucose)  MICROALBUMIN CREAT RATIO URINE    last a1c 5.7.  on healthy diet and exercise.  followed by weight loss clinic w/[facility].  will do updated alb/cr ratio   6. Vitamin D deficiency  VITAMIN D,25 HYDROXY    stable on otc supplement.  will do updated lab.  f/u with pt with results.     7. Anxiety      stable on zoloft.    8. HSV infection      stable with occas need for valtrex   9. Cyst of ovary, unspecified laterality      sx resolved.  followed by gyn   10. Obesity (BMI 35.0-39.9 without comorbidity)  Patient identified as having weight management issue.  Appropriate orders and counseling given.    followed by dr swanson for wt loss   11. Need for influenza vaccination  Flu Quad Inj >3 Year Pre-Filled PF

## 2019-09-12 NOTE — ASSESSMENT & PLAN NOTE
Last nov she was seen for abd pain.  They did a CT abd and abd us with UC.  That is where they found to cyst.  Now pain resolved and no issues. Followed by Dr Flora Quispe, [facility]

## 2019-09-12 NOTE — ASSESSMENT & PLAN NOTE
Her recent LP shows elevted trg.  HDL is chronically low.  LDL is good.  Goal is <100.  LDL is 66.  She is on healthy diet and exercises regulalry.

## 2019-09-16 ENCOUNTER — TELEPHONE (OUTPATIENT)
Dept: OCCUPATIONAL MEDICINE | Facility: CLINIC | Age: 29
End: 2019-09-16

## 2019-09-17 ENCOUNTER — OCCUPATIONAL MEDICINE (OUTPATIENT)
Dept: OCCUPATIONAL MEDICINE | Facility: CLINIC | Age: 29
End: 2019-09-17
Payer: COMMERCIAL

## 2019-09-17 VITALS
TEMPERATURE: 97.4 F | SYSTOLIC BLOOD PRESSURE: 102 MMHG | BODY MASS INDEX: 30.34 KG/M2 | WEIGHT: 188 LBS | DIASTOLIC BLOOD PRESSURE: 72 MMHG

## 2019-09-17 DIAGNOSIS — E55.9 VITAMIN D DEFICIENCY: ICD-10-CM

## 2019-09-17 DIAGNOSIS — S83.92XD SPRAIN OF LEFT KNEE, UNSPECIFIED LIGAMENT, SUBSEQUENT ENCOUNTER: ICD-10-CM

## 2019-09-17 PROCEDURE — 99213 OFFICE O/P EST LOW 20 MIN: CPT | Mod: 29 | Performed by: NURSE PRACTITIONER

## 2019-09-17 ASSESSMENT — ENCOUNTER SYMPTOMS
MYALGIAS: 1
PSYCHIATRIC NEGATIVE: 1
CARDIOVASCULAR NEGATIVE: 1
CONSTITUTIONAL NEGATIVE: 1
RESPIRATORY NEGATIVE: 1
NEUROLOGICAL NEGATIVE: 1

## 2019-09-17 NOTE — LETTER
[address],   [address] [place] [address]  Phone:  [phone] - Fax:  [phone]   [facility] Progress Report and Disability Certification  Date of Service: 9/17/2019   No Show:  No  Date / Time of Next Visit:   Discharged/MMI  Released to Full Duty   Claim Information   Patient Name: Tonia Xavier  Claim Number:     Employer: [employer]  Date of Injury: 7/29/2019     Insurer / TPA: [employer]  ID / SSN:     Occupation: MA-PAR  Diagnosis: The encounter diagnosis was Sprain of left knee, unspecified ligament, subsequent encounter.    Medical Information   Related to Industrial Injury? 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 She states she landed awkwardly on her on her left leg to catch herself and felt pain in her left knee. Patient states that overall feeling improved. Patient states that she occasionally will get a slight twinge 2-3 times a week. Patient denies weakness, laxity, or pain. Will release to full duty.    Objective Findings: Left knee:   FROM, no edema, no effusion, no ecchymosis  Neg LCL laxity  Neg MCL laxity  Mild tenderness at the lateral joint line   Mild TTP over left posterior lateral aspect of knee  Distal neurovascular intact  Lachman's neg     Left heel:  No edema, ecchymosis, or erythema  No deformities noted  FROM  Mild tenderness with palpation  Distal pulse 2+        MRI knee 8/20/19:  Impression                       1.  No ligamentous or meniscal injury appreciated.     2.  Strain of the lateral head of the gastrocnemius muscle   Pre-Existing Condition(s):     Assessment:   Condition Improved    Status: Discharged /  MMI  Permanent Disability:No    Plan:      Diagnostics:      Comments:  Discharged/MMI  Released to Full Duty    Disability Information   Status:  Released to Full Duty    From:     Through:   Restrictions are:     Physical Restrictions   Sitting:    Standing:    Stooping:    Bending:      Squatting:    Walking:    Climbing:    Pushing:      Pulling:    Other:    Reaching Above Shoulder (L):   Reaching Above Shoulder (R):       Reaching Below Shoulder (L):    Reaching Below Shoulder (R):      Not to exceed Weight Limits   Carrying(hrs):   Weight Limit(lb):   Lifting(hrs):   Weight  Limit(lb):     Comments:      Repetitive Actions   Hands: i.e. Fine Manipulations from Grasping:     Feet: i.e. Operating Foot Controls:     Driving / Operate Machinery:     Physician Name: JAY Omer Physician Signature: HEIDI Celestin e-Signature: Dr. Elmo Rivers, Medical Director   Clinic Name / Location: [address],   [address] [place] [id number] Clinic Phone Number: Dept: [phone]   Appointment Time: 8:30 Am Visit Start Time: 8:48 AM   Check-In Time:  8:23 Am Visit Discharge Time:  8:57 AN   Original-Treating Physician or Chiropractor    Page 2-Insurer/TPA    Page 3-Employer    Page 4-Employee

## 2019-09-17 NOTE — PROGRESS NOTES
Subjective:      Chase Xavier is a 29 y.o. 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 She states she landed awkwardly on her on her left leg to catch herself and felt pain in her left knee. Patient states that overall feeling improved. Patient states that she occasionally will get a slight twinge 2-3 times a week. Patient denies weakness, laxity, or pain. Will release to full duty.      HPI    Review of Systems   Constitutional: Negative.    Respiratory: Negative.    Cardiovascular: Negative.    Musculoskeletal: Positive for myalgias.   Skin: Negative.    Neurological: Negative.    Psychiatric/Behavioral: Negative.         SOCHX: Works as a MA-PAR at [employer]  FH: No pertinent family history to this problem.         Objective:     /72   Temp 36.3 °C (97.4 °F)   Wt 85.3 kg (188 lb)   BMI 30.34 kg/m²      Physical Exam   Constitutional: She is oriented to person, place, and time. She appears well-developed and well-nourished.   Cardiovascular: Normal rate and regular rhythm.   Pulmonary/Chest: Effort normal.   Musculoskeletal: Normal range of motion. She exhibits no edema, tenderness or deformity.   Neurological: She is alert and oriented to person, place, and time.   Skin: Skin is warm and dry. Capillary refill takes less than 2 seconds.   Psychiatric: She has a normal mood and affect. Her behavior is normal.       Left knee:   FROM, no edema, no effusion, no ecchymosis  Neg LCL laxity  Neg MCL laxity  Mild tenderness at the lateral joint line   Mild TTP over left posterior lateral aspect of knee  Distal neurovascular intact  Lachman's neg     Left heel:  No edema, ecchymosis, or erythema  No deformities noted  FROM  Mild tenderness with palpation  Distal pulse 2+        MRI knee 8/20/19:  Impression                       1.  No ligamentous or meniscal injury  appreciated.     2.  Strain of the lateral head of the gastrocnemius muscle       Assessment/Plan:     1. Sprain of left knee, unspecified ligament, subsequent encounter    Discharged/MMI   Released to Full Duty

## 2019-09-19 RX ORDER — NORGESTIMATE AND ETHINYL ESTRADIOL 0.25-0.035
1 KIT ORAL
Qty: 84 TAB | Refills: 3 | Status: SHIPPED | OUTPATIENT
Start: 2019-09-19 | End: 2020-08-28

## 2019-09-19 RX ORDER — CHOLECALCIFEROL (VITAMIN D3) 25 MCG
CAPSULE ORAL
Qty: 90 CAP | Refills: 3 | Status: SHIPPED | OUTPATIENT
Start: 2019-09-19 | End: 2019-10-14

## 2019-09-23 ENCOUNTER — NON-PROVIDER VISIT (OUTPATIENT)
Dept: HEALTH INFORMATION MANAGEMENT | Facility: MEDICAL CENTER | Age: 29
End: 2019-09-23
Payer: COMMERCIAL

## 2019-09-23 VITALS — BODY MASS INDEX: 29.96 KG/M2 | HEIGHT: 66 IN | WEIGHT: 186.4 LBS

## 2019-09-23 DIAGNOSIS — E66.01 MORBID (SEVERE) OBESITY DUE TO EXCESS CALORIES (HCC): ICD-10-CM

## 2019-09-23 PROCEDURE — 97803 MED NUTRITION INDIV SUBSEQ: CPT | Performed by: DIETITIAN, REGISTERED

## 2019-09-23 NOTE — PROGRESS NOTES
"Nutrition Reassess: Medical Weight Management   Today's date: 9/23/2019  Referring Provider: No ref. provider found      Time: in/out 10:05-10:19 am   Visit#: 2    Subjective:  - Started tracking intake, finds this helpful, not consistent every day, but now more aware of what she is eating  - Started cooking more at home, packing a lunch, less eating out, when she eats out she chooses small portion from kids menu  - Not skipping breakfast, using Premier Protein shakes  - Having cravings, wondering what to do about this      Diet history:   Breakfast - premier protein  Snack - plain greek yogurt + berries  Lunch - Lean Cuisine or ground turkey + veggies (homemade)   Snack - nutrigrain bar, orange or apple   Dinner - pork chops, corn, salad w/ dressing     Anthropometrics/Objective  Today's weight:    Vitals:    09/23/19 1023   Weight: 84.6 kg (186 lb 6.4 oz)   Height: 1.676 m (5' 6\")     BMI:  Body mass index is 30.09 kg/m².  Starting weight/date 188.9 lbs (9/9/19)      Total weight change : -2.5 lbs            Meal Plan:   High protein low carb diet with 0-1 meal replacements per day     ReAssesment/Notes:  Chase has begun making changes to her diet and lifestyle. She is no longer skipping breakfast, using a meal replacement, cooking at home more and overall more aware of her habits and food choices. She has been having a lot of cravings lately - encouraged her to \"wait 5\" and then respond. Discussed assessing emotional vs physical hunger, alternatives to responding to emotional hunger such as walking, sipping tea etc, trying to have a piece of fruit when having a craving or a small portion controlled item such as 3 alex kisses.     Follow-up: 1 month     "

## 2019-10-09 ENCOUNTER — APPOINTMENT (OUTPATIENT)
Dept: HEALTH INFORMATION MANAGEMENT | Facility: MEDICAL CENTER | Age: 29
End: 2019-10-09
Payer: COMMERCIAL

## 2019-10-12 ENCOUNTER — HOSPITAL ENCOUNTER (OUTPATIENT)
Dept: LAB | Facility: MEDICAL CENTER | Age: 29
End: 2019-10-12
Attending: NURSE PRACTITIONER
Payer: COMMERCIAL

## 2019-10-12 ENCOUNTER — HOSPITAL ENCOUNTER (OUTPATIENT)
Dept: LAB | Facility: MEDICAL CENTER | Age: 29
End: 2019-10-12
Attending: INTERNAL MEDICINE
Payer: COMMERCIAL

## 2019-10-12 DIAGNOSIS — E87.6 HYPOKALEMIA: ICD-10-CM

## 2019-10-12 DIAGNOSIS — Z86.2 HISTORY OF ANEMIA: ICD-10-CM

## 2019-10-12 DIAGNOSIS — R73.01 IFG (IMPAIRED FASTING GLUCOSE): ICD-10-CM

## 2019-10-12 DIAGNOSIS — R73.9 HYPERGLYCEMIA: ICD-10-CM

## 2019-10-12 DIAGNOSIS — E55.9 VITAMIN D DEFICIENCY: ICD-10-CM

## 2019-10-12 LAB
25(OH)D3 SERPL-MCNC: 35 NG/ML (ref 30–100)
ANION GAP SERPL CALC-SCNC: 8 MMOL/L (ref 0–11.9)
BASOPHILS # BLD AUTO: 0.5 % (ref 0–1.8)
BASOPHILS # BLD: 0.06 K/UL (ref 0–0.12)
BUN SERPL-MCNC: 12 MG/DL (ref 8–22)
CALCIUM SERPL-MCNC: 8.6 MG/DL (ref 8.5–10.5)
CHLORIDE SERPL-SCNC: 108 MMOL/L (ref 96–112)
CO2 SERPL-SCNC: 24 MMOL/L (ref 20–33)
CREAT SERPL-MCNC: 0.71 MG/DL (ref 0.5–1.4)
CREAT UR-MCNC: 105.5 MG/DL
EOSINOPHIL # BLD AUTO: 1.13 K/UL (ref 0–0.51)
EOSINOPHIL NFR BLD: 9.5 % (ref 0–6.9)
ERYTHROCYTE [DISTWIDTH] IN BLOOD BY AUTOMATED COUNT: 38 FL (ref 35.9–50)
EST. AVERAGE GLUCOSE BLD GHB EST-MCNC: 100 MG/DL
FASTING STATUS PATIENT QL REPORTED: NORMAL
GLUCOSE SERPL-MCNC: 83 MG/DL (ref 65–99)
HBA1C MFR BLD: 5.1 % (ref 0–5.6)
HCT VFR BLD AUTO: 40.6 % (ref 37–47)
HGB BLD-MCNC: 13.4 G/DL (ref 12–16)
IMM GRANULOCYTES # BLD AUTO: 0.05 K/UL (ref 0–0.11)
IMM GRANULOCYTES NFR BLD AUTO: 0.4 % (ref 0–0.9)
LYMPHOCYTES # BLD AUTO: 3.19 K/UL (ref 1–4.8)
LYMPHOCYTES NFR BLD: 26.8 % (ref 22–41)
MCH RBC QN AUTO: 26.9 PG (ref 27–33)
MCHC RBC AUTO-ENTMCNC: 33 G/DL (ref 33.6–35)
MCV RBC AUTO: 81.4 FL (ref 81.4–97.8)
MICROALBUMIN UR-MCNC: <0.7 MG/DL
MICROALBUMIN/CREAT UR: NORMAL MG/G (ref 0–30)
MONOCYTES # BLD AUTO: 0.59 K/UL (ref 0–0.85)
MONOCYTES NFR BLD AUTO: 5 % (ref 0–13.4)
NEUTROPHILS # BLD AUTO: 6.89 K/UL (ref 2–7.15)
NEUTROPHILS NFR BLD: 57.8 % (ref 44–72)
NRBC # BLD AUTO: 0 K/UL
NRBC BLD-RTO: 0 /100 WBC
PLATELET # BLD AUTO: 379 K/UL (ref 164–446)
PMV BLD AUTO: 11.7 FL (ref 9–12.9)
POTASSIUM SERPL-SCNC: 3.8 MMOL/L (ref 3.6–5.5)
RBC # BLD AUTO: 4.99 M/UL (ref 4.2–5.4)
SODIUM SERPL-SCNC: 140 MMOL/L (ref 135–145)
WBC # BLD AUTO: 11.9 K/UL (ref 4.8–10.8)

## 2019-10-12 PROCEDURE — 83036 HEMOGLOBIN GLYCOSYLATED A1C: CPT

## 2019-10-12 PROCEDURE — 85025 COMPLETE CBC W/AUTO DIFF WBC: CPT

## 2019-10-12 PROCEDURE — 36415 COLL VENOUS BLD VENIPUNCTURE: CPT

## 2019-10-12 PROCEDURE — 80048 BASIC METABOLIC PNL TOTAL CA: CPT

## 2019-10-12 PROCEDURE — 82306 VITAMIN D 25 HYDROXY: CPT

## 2019-10-12 PROCEDURE — 82043 UR ALBUMIN QUANTITATIVE: CPT

## 2019-10-12 PROCEDURE — 82570 ASSAY OF URINE CREATININE: CPT

## 2019-10-13 ENCOUNTER — TELEPHONE (OUTPATIENT)
Dept: MEDICAL GROUP | Facility: MEDICAL CENTER | Age: 29
End: 2019-10-13

## 2019-10-13 NOTE — TELEPHONE ENCOUNTER
Please let pt know that the vitamin d and alb/cr ratio is wnl.  CBC shows elevated wbc and eosinophils.  Eosinophils may be from allergies.  Has she been ill recently?

## 2019-10-14 ENCOUNTER — OFFICE VISIT (OUTPATIENT)
Dept: HEALTH INFORMATION MANAGEMENT | Facility: MEDICAL CENTER | Age: 29
End: 2019-10-14
Payer: COMMERCIAL

## 2019-10-14 VITALS
SYSTOLIC BLOOD PRESSURE: 116 MMHG | HEART RATE: 93 BPM | DIASTOLIC BLOOD PRESSURE: 68 MMHG | WEIGHT: 186 LBS | BODY MASS INDEX: 30.99 KG/M2 | HEIGHT: 65 IN | OXYGEN SATURATION: 96 %

## 2019-10-14 DIAGNOSIS — E66.9 OBESITY (BMI 35.0-39.9 WITHOUT COMORBIDITY): ICD-10-CM

## 2019-10-14 DIAGNOSIS — E55.9 VITAMIN D DEFICIENCY: ICD-10-CM

## 2019-10-14 DIAGNOSIS — Z86.39 HISTORY OF IRON DEFICIENCY: ICD-10-CM

## 2019-10-14 DIAGNOSIS — E78.5 HYPERLIPIDEMIA LDL GOAL <100: ICD-10-CM

## 2019-10-14 DIAGNOSIS — R73.01 IFG (IMPAIRED FASTING GLUCOSE): ICD-10-CM

## 2019-10-14 PROCEDURE — 99214 OFFICE O/P EST MOD 30 MIN: CPT | Performed by: INTERNAL MEDICINE

## 2019-10-14 NOTE — PROGRESS NOTES
Bariatric Medicine Follow Up  Chief Complaint   Patient presents with   • Weight Gain       History of Present Illness:   Tonia Xavier is a 29 y.o. female who presents for weight management follow-up and to help address co-morbidities caused by overweight, as below.    During the patient's last visit, the following were discussed and recommended:  Weight Goal: 5% wt loss at one month after start (pt goal weight is 165 lb)  Diet:     MR: Travis crespoke for breakfast daily  High Protein/Low Carb Meals and 2 snacks between meals daily  >100 g protein, <100 g total carbs daily, around 1600 kcal per day to start  64+ oz water per day  Avoid skipping meals and excessive snacking instead  Track daily intake with My Fitness Pal, bring to next visit  Physical Activity: Consider biking program  Risk level for moderate/vigorous exercise program:   Low  New Rx:   Consider metformin  Consider other anti-obesity medication pending course  Avoid phentermine given anxiety even though anxiety not worse with last phentermine rx 2 yrs ago  Behavior change:   Meal planning, prep  Follow-up: one month with MD, 2 wks with RD    The patient is doing well.  Since she met the dietitian, she is consistently having Premier protein for breakfast, yogurt midmorning, salad for lunch and eating more balanced dinner.  She has reduced her carbohydrate intake, trying not to eat after 8 PM.  However, she still gets some cravings after dinner.  She tries to wait 5 minutes but still feels she wants to eat something else, although she admits these feelings have diminished in the last month.    Not always tracking but aiming for 1600 kcal per day.    History of iron deficiency: Recent CBC normal, but patient wishes to pursue iron studies due to some fatigue  I FG: A1c normal, fasting glucose normal.  Has not been on metformin in the past.  VDD: Continues daily vitamin D supplement    Exercise:   None.  Considering biking.     Review of Systems  "  Denies lightheadedness, worsening fatigue or insomnia.  Hungry at times as above.  Anxiety controlled.  All other ROS were reviewed and are otherwise unchanged from my previous visit with patient.    Physical Exam:    /68 (BP Location: Left arm, Patient Position: Sitting, BP Cuff Size: Large adult)   Pulse 93   Ht 1.651 m (5' 5\")   Wt 84.4 kg (186 lb)   SpO2 96%   BMI 30.95 kg/m²   Waist Measurement   Waist: 37.75 inch/inches  Weight change since last visit: -3 lb (-3 lb total)  Waist Circum change since last visit: -1.25 in (is 1.25 in total)    Constitutional: Oriented to person, place, and time and well-developed, well-nourished, and in no distress.    Head: Normocephalic.   Musculoskeletal: Normal range of motion. No edema.   Neurological: Alert and oriented to person, place, and time. No muscle weakness.  Gait normal.   Skin: Warm and dry. Not diaphoretic.   Psychiatric: Mood, memory, affect and judgment normal.     Laboratory:   Recent labs reviewed.      Dietitian Assessment: None today    ASSESSMENT/PLAN:  Body mass index is 30.95 kg/m².    Obesity Stage ([place]): 1; Class 1    1. Obesity (BMI 35.0-39.9 without comorbidity) (HCC)     2. IFG (impaired fasting glucose)     3. Hyperlipidemia LDL goal <100     4. Vitamin D deficiency     5. History of iron deficiency  IRON/TOTAL IRON BIND     The patient is doing well.  She is responded to better timing of meals, stimulus narrowing, tracking intake.  Although A1c normal, given evening cravings, will try low-dose metformin in setting of I FG history.  Consider exercise program.  Iron studies given some fatigue and history of iron deficiency anemia.  Monitor triglyceride level, add omega-3 fatty acids if still elevated on recheck, but suspect will have improved with CHO reduction.  Continue vitamin D supplement.    The patient and I have discussed at length and agree to the following recommendations, which are all addressing the above " diagnoses:    Weight Goal: 3-5% wt loss each month (pt goal weight is 165 lb)  Diet: Tracking around 1600 kcal per day  PP every morning  Trying to bring her lunch or have a salad with protein  Eating dinner earlier than 8 PM  If craving after dinner, half a protein bar or small protein snack  Continue to avoid skipping meals and snacks as she is now doing  Physical Activity: Consider biking program as previously discussed  Risk level for moderate/vigorous exercise program: Low  New Rx: Start low-dose metformin 500 q. dinner to help with evening cravings  Behavior change: Set goals for exercise  Follow-up: 1 mo    Patient's body mass index is 30.95 kg/m². Exercise and nutrition counseling were performed at this visit.

## 2019-10-26 ENCOUNTER — HOSPITAL ENCOUNTER (OUTPATIENT)
Dept: LAB | Facility: MEDICAL CENTER | Age: 29
End: 2019-10-26
Attending: INTERNAL MEDICINE
Payer: COMMERCIAL

## 2019-10-26 DIAGNOSIS — Z86.39 HISTORY OF IRON DEFICIENCY: ICD-10-CM

## 2019-10-26 LAB
IRON SATN MFR SERPL: 8 % (ref 15–55)
IRON SERPL-MCNC: 42 UG/DL (ref 40–170)
TIBC SERPL-MCNC: 505 UG/DL (ref 250–450)

## 2019-10-26 PROCEDURE — 36415 COLL VENOUS BLD VENIPUNCTURE: CPT

## 2019-10-26 PROCEDURE — 83540 ASSAY OF IRON: CPT

## 2019-10-26 PROCEDURE — 83550 IRON BINDING TEST: CPT

## 2019-10-28 DIAGNOSIS — D50.8 OTHER IRON DEFICIENCY ANEMIA: ICD-10-CM

## 2019-11-04 ENCOUNTER — OFFICE VISIT (OUTPATIENT)
Dept: URGENT CARE | Facility: PHYSICIAN GROUP | Age: 29
End: 2019-11-04
Payer: COMMERCIAL

## 2019-11-04 VITALS
SYSTOLIC BLOOD PRESSURE: 100 MMHG | DIASTOLIC BLOOD PRESSURE: 62 MMHG | TEMPERATURE: 97.7 F | BODY MASS INDEX: 30.82 KG/M2 | HEIGHT: 65 IN | OXYGEN SATURATION: 98 % | WEIGHT: 185 LBS | RESPIRATION RATE: 22 BRPM | HEART RATE: 114 BPM

## 2019-11-04 DIAGNOSIS — R10.84 GENERALIZED ABDOMINAL PAIN: ICD-10-CM

## 2019-11-04 PROCEDURE — 99214 OFFICE O/P EST MOD 30 MIN: CPT | Performed by: NURSE PRACTITIONER

## 2019-11-04 RX ORDER — HYOSCYAMINE SULFATE 0.12 MG/5ML
0.12 LIQUID ORAL ONCE
Status: COMPLETED | OUTPATIENT
Start: 2019-11-04 | End: 2019-11-04

## 2019-11-04 RX ADMIN — HYOSCYAMINE SULFATE 0.12 MG: 0.12 LIQUID ORAL at 19:11

## 2019-11-04 ASSESSMENT — ENCOUNTER SYMPTOMS
ABDOMINAL PAIN: 1
VOMITING: 1
FEVER: 0
DIZZINESS: 0
DIARRHEA: 1
ANOREXIA: 1
CHILLS: 1
FATIGUE: 1
NAUSEA: 1

## 2019-11-04 NOTE — LETTER
November 4, 2019         Patient: Tonia Xavier   YOB: 1990   Date of Visit: 11/4/2019           To Whom it May Concern:    Tonia Xavier was seen in my clinic on 11/4/2019. She may return to work on 11/6/2019    If you have any questions or concerns, please don't hesitate to call.        Sincerely,           WOJCIECH Mckay.  Electronically Signed

## 2019-11-05 NOTE — PROGRESS NOTES
Subjective:      Chase Xavier is a 29 y.o. female who presents with Nausea (nausea, diarrhea, vomiting x1 day)            Nausea   This is a new problem. The current episode started yesterday. The problem occurs intermittently. The problem has been gradually worsening. Associated symptoms include abdominal pain, anorexia, chills, fatigue, nausea and vomiting. Pertinent negatives include no fever. Associated symptoms comments: She states she has had ongoing abdominal pain, diarrhea, vomiting.  Is following up with GI tomorrow.  Has a history of left ovarian cyst.  Has also been taking iron supplementation for iron deficiency anemia.  Notes stool to be very dark in color. No blood, coffee grounds emesis noted. No mucus in stool. No fever. Notes chills. The symptoms are aggravated by eating. She has tried nothing for the symptoms.       Review of Systems   Constitutional: Positive for chills, fatigue and malaise/fatigue. Negative for fever.   Gastrointestinal: Positive for abdominal pain, anorexia, diarrhea, nausea and vomiting.   Neurological: Negative for dizziness.     Past Medical History:   Diagnosis Date   • Anxiety    • HSV infection 9/7/2019   • Hyperlipidemia LDL goal <100 9/9/2019   • IFG (impaired fasting glucose) 9/9/2019   • Obesity (BMI 30-39.9) 1/11/2018   • Ovarian cyst 11/2018    left side   • Vitamin D deficiency 2/1/2018      Past Surgical History:   Procedure Laterality Date   • KNEE ARTHROSCOPY Right 11/13/2018    Procedure: KNEE ARTHROSCOPY;  Surgeon: Dexter Pimentel M.D.;  Location: [facility];  Service: Orthopedics   • SYNOVECTOMY Right 11/13/2018    Procedure: SYNOVECTOMY;  Surgeon: Dexter Pimentel M.D.;  Location: [facility];  Service: Orthopedics   • MEDIAL MENISCECTOMY Right 11/13/2018    Procedure: MEDIAL MENISCECTOMY-  PARTIAL, AND PARTIAL LATERAL;  Surgeon: Dxeter Pimentel M.D.;  Location: [facility];  Service: Orthopedics   • CHONDROPLASTY   2018    Procedure: CHONDROPLASTY;  Surgeon: Dexter Pimentel M.D.;  Location: [facility];  Service: Orthopedics   • HARDWARE REMOVAL ORTHO  2018    Procedure: HARDWARE REMOVAL ORTHO;  Surgeon: Dexter Pimentel M.D.;  Location: [facility];  Service: Orthopedics   • BONE GRAFT  2018    Procedure: BONE GRAFT;  Surgeon: Dexter Pimentel M.D.;  Location: [facility];  Service: Orthopedics   • REPEAT C SECTION  2017    Procedure: REPEAT C SECTION;  Surgeon: Asmita Bolivar M.D.;  [facility];  Service: Obstetrics   • REPEAT C SECTION  10/19/2015    Procedure: REPEAT C SECTION;  Surgeon: Mary Randolph M.D.;  [facility]: [facility];  Service:    • ACL RECONSTRUCTION SCOPE  10/2/2014    Performed by Dexter Pimentel M.D. at [facility]   • KNEE ARTHROSCOPY  2014    Performed by Dexter Pimentel M.D. at [facility]   • HARDWARE REMOVAL ORTHO  2014    Performed by Dexter Pimentel M.D. at [facility]   • BONE GRAFT  2014    Performed by Dexter Pimentel M.D. at [facility]   • PB  DELIVERY ONLY     • ACL RECONSTRUCTION Right 2006      Social History     Socioeconomic History   • Marital status:      Spouse name: Not on file   • Number of children: Not on file   • Years of education: Not on file   • Highest education level: Not on file   Occupational History   • Not on file   Social Needs   • Financial resource strain: Not on file   • Food insecurity:     Worry: Not on file     Inability: Not on file   • Transportation needs:     Medical: Not on file     Non-medical: Not on file   Tobacco Use   • Smoking status: Never Smoker   • Smokeless tobacco: Never Used   Substance and Sexual Activity   • Alcohol use: No   • Drug use: No   • Sexual activity: Yes   Lifestyle   • Physical activity:     Days per week: Not on file     Minutes per session: Not on file   • Stress: Not on  "file   Relationships   • Social connections:     Talks on phone: Not on file     Gets together: Not on file     Attends [employer] service: Not on file     Active member of club or organization: Not on file     Attends meetings of clubs or organizations: Not on file     Relationship status: Not on file   • Intimate partner violence:     Fear of current or ex partner: Not on file     Emotionally abused: Not on file     Physically abused: Not on file     Forced sexual activity: Not on file   Other Topics Concern   • Not on file   Social History Narrative   • Not on file    Allergies: Benzoin         Objective:     /62   Pulse (!) 114   Temp 36.5 °C (97.7 °F)   Resp (!) 22   Ht 1.651 m (5' 5\")   Wt 83.9 kg (185 lb)   SpO2 98%   BMI 30.79 kg/m²      Physical Exam  Vitals signs reviewed.   Constitutional:       Appearance: Normal appearance.   HENT:      Head: Normocephalic and atraumatic.      Mouth/Throat:      Mouth: Mucous membranes are moist.   Cardiovascular:      Rate and Rhythm: Normal rate and regular rhythm.      Heart sounds: Normal heart sounds.   Pulmonary:      Effort: Pulmonary effort is normal.      Breath sounds: Normal breath sounds.   Abdominal:      General: Abdomen is flat. Bowel sounds are normal.      Palpations: Abdomen is soft.      Tenderness: There is generalized tenderness. There is no guarding. Negative signs include Willingham's sign.   Musculoskeletal: Normal range of motion.   Skin:     General: Skin is warm.   Neurological:      Mental Status: She is alert and oriented to person, place, and time.   Psychiatric:         Mood and Affect: Mood normal.         Behavior: Behavior normal.         Thought Content: Thought content normal.         Judgment: Judgment normal.                  Assessment/Plan:       1. Generalized abdominal pain  - hyoscyamine (LEVSIN) oral elixir 0.125 mg  Instructed patient to report to ER for worsening symptoms throughout the night. Stop taking ferrous " sulfate as they may be contributing to symptoms. Follow up with GI tomorrow as planned.     Patient was agreeable to plan     Supportive care, differential diagnoses, and indications for immediate follow-up discussed with patient.    Pathogenesis of diagnosis discussed including typical length and natural progression. Patient expresses understanding and agrees to plan.    Instructed patient to return to clinic for worsening symptoms or symptoms that persist for 7 to 10 days    Work note provided     Please note that this dictation was created using voice recognition software. I have made every reasonable attempt to correct obvious errors, but I expect that there are errors of grammar and possibly content that I did not discover before finalizing the note.

## 2019-11-06 NOTE — TELEPHONE ENCOUNTER
Was the patient seen in the last year in this department? Yes LOV: 10/14/19 FV 11/11/19    Does patient have an active prescription for medications requested? Yes    Received Request Via: Pharmacy

## 2019-11-11 ENCOUNTER — OFFICE VISIT (OUTPATIENT)
Dept: HEALTH INFORMATION MANAGEMENT | Facility: MEDICAL CENTER | Age: 29
End: 2019-11-11
Payer: COMMERCIAL

## 2019-11-11 ENCOUNTER — HOSPITAL ENCOUNTER (OUTPATIENT)
Dept: LAB | Facility: MEDICAL CENTER | Age: 29
End: 2019-11-11
Attending: INTERNAL MEDICINE
Payer: COMMERCIAL

## 2019-11-11 VITALS
HEART RATE: 83 BPM | BODY MASS INDEX: 30.84 KG/M2 | WEIGHT: 185.1 LBS | SYSTOLIC BLOOD PRESSURE: 118 MMHG | OXYGEN SATURATION: 91 % | HEIGHT: 65 IN | DIASTOLIC BLOOD PRESSURE: 78 MMHG

## 2019-11-11 DIAGNOSIS — E78.5 HYPERLIPIDEMIA LDL GOAL <100: ICD-10-CM

## 2019-11-11 DIAGNOSIS — E55.9 VITAMIN D DEFICIENCY: ICD-10-CM

## 2019-11-11 DIAGNOSIS — E66.9 OBESITY (BMI 35.0-39.9 WITHOUT COMORBIDITY): ICD-10-CM

## 2019-11-11 DIAGNOSIS — R10.84 GENERALIZED ABDOMINAL PAIN: ICD-10-CM

## 2019-11-11 DIAGNOSIS — M25.561 CHRONIC PAIN OF RIGHT KNEE: ICD-10-CM

## 2019-11-11 DIAGNOSIS — G89.29 CHRONIC PAIN OF RIGHT KNEE: ICD-10-CM

## 2019-11-11 DIAGNOSIS — K90.0 CELIAC DISEASE: ICD-10-CM

## 2019-11-11 DIAGNOSIS — R73.01 IFG (IMPAIRED FASTING GLUCOSE): ICD-10-CM

## 2019-11-11 DIAGNOSIS — E66.01 MORBID (SEVERE) OBESITY DUE TO EXCESS CALORIES (HCC): ICD-10-CM

## 2019-11-11 LAB
25(OH)D3 SERPL-MCNC: 35 NG/ML (ref 30–100)
FOLATE SERPL-MCNC: 11.5 NG/ML
INR PPP: 1.03 (ref 0.87–1.13)
PROTHROMBIN TIME: 13.7 SEC (ref 12–14.6)
VIT B12 SERPL-MCNC: 812 PG/ML (ref 211–911)

## 2019-11-11 PROCEDURE — 82380 ASSAY OF CAROTENE: CPT

## 2019-11-11 PROCEDURE — 97803 MED NUTRITION INDIV SUBSEQ: CPT | Performed by: DIETITIAN, REGISTERED

## 2019-11-11 PROCEDURE — 84590 ASSAY OF VITAMIN A: CPT

## 2019-11-11 PROCEDURE — 99214 OFFICE O/P EST MOD 30 MIN: CPT | Performed by: INTERNAL MEDICINE

## 2019-11-11 PROCEDURE — 82525 ASSAY OF COPPER: CPT

## 2019-11-11 PROCEDURE — 82607 VITAMIN B-12: CPT

## 2019-11-11 PROCEDURE — 82306 VITAMIN D 25 HYDROXY: CPT

## 2019-11-11 PROCEDURE — 82746 ASSAY OF FOLIC ACID SERUM: CPT

## 2019-11-11 PROCEDURE — 36415 COLL VENOUS BLD VENIPUNCTURE: CPT

## 2019-11-11 PROCEDURE — 84630 ASSAY OF ZINC: CPT

## 2019-11-11 PROCEDURE — 84446 ASSAY OF VITAMIN E: CPT

## 2019-11-11 PROCEDURE — 85610 PROTHROMBIN TIME: CPT

## 2019-11-11 NOTE — PROGRESS NOTES
Nutrition Reassess: Medical Weight Management   Today's date: 11/11/2019  Referring Provider: No ref. provider found      Time: in/out 8:20-8:43 AM  Visit#: 3    Subjective:  - pt was told by GI doctor to be on GF keto diet  - was diagnosed with Celiac Disease in March 2019  - pt does not know which foods have gluten or what to eat  - has stopped tracking  - has PP as MR at breakfast    Anthropometrics/Objective  Today's weight:  185.1 lb  BMI:  30.8  Starting weight/date 188.9 lb     Total weight change : -3.8 lb            Meal Plan:   High protein low carb diet with 0-1 meal replacements per day     ReAssesment/Notes:  Chase is unsure of what to eat and which foods contain gluten. We reviewed the gluten free diet including which foods are safe choices, questionable choices, and foods to avoid with gluten. Encouraged Chase to read the ingredients on food labels to check for gluten. Reviewed hidden gluten exposure in foods as well as cross contamination. Briefly reviewed My Plate to see where most gluten foods fit on the plate in the starches/carbs section. She will start tracking again on My Fitness Pal. Next visit suggest reviewing her food journal and discussing heart healthy fats.     Follow-up: 1 month with ISHMAEL/MD

## 2019-11-11 NOTE — PROGRESS NOTES
Bariatric Medicine Follow Up  Chief Complaint   Patient presents with   • Weight Gain       History of Present Illness:   Tonia Xavier is a 29 y.o. female who presents for weight management follow-up and to help address co-morbidities caused by overweight, as below.    During the patient's last visit, the following were discussed and recommended:  Weight Goal: 3-5% wt loss each month (pt goal weight is 165 lb)  Diet: Tracking around 1600 kcal per day  PP every morning  Trying to bring her lunch or have a salad with protein  Eating dinner earlier than 8 PM  If craving after dinner, half a protein bar or small protein snack  Continue to avoid skipping meals and snacks as she is now doing  Physical Activity: Consider biking program as previously discussed  Risk level for moderate/vigorous exercise program: Low  New Rx: Start low-dose metformin 500 q. dinner to help with evening cravings  Behavior change: Set goals for exercise    Having abd pain, generalized, radiating to  her back.  None today, but felt abdominal pain over the weekend and last week.  Trying to watch gluten d/t celiac.  Seeing GI provider.  She was advised to have a gluten-free, ketogenic diet.  She is not sure what to eat.    Tolerates PP fine.  Metformin helps with satiety but did not take last wk d/t abd pain.  Abdominal pain now resolved.  She is wondering if she can resume metformin, because she did like the effect when she took it.    IFG: Briefly stop metformin.  No recent fasting glucose.  HLD/TG: Last TG elevated 8/2016, not on fenofibrate or omega-3 fatty acids  VDD: on daily Vit D supp  [place]:  On iron    Exercise:   Walking 20 min 2/wk     Review of Systems   Denies abdominal pain currently.  Has had nausea, vomiting, diarrhea when she has abdominal pain.  Denies fevers chills or sweats.  All other ROS were reviewed and are otherwise unchanged from my previous visit with patient.    Physical Exam:    /78 (BP Location: Left  "arm, Patient Position: Sitting, BP Cuff Size: Adult)   Pulse 83   Ht 1.651 m (5' 5\")   Wt 84 kg (185 lb 1.6 oz)   SpO2 91%   BMI 30.80 kg/m²   Waist Measurement   Waist: 39.5 inch/inches  Weight change since last visit: -1 lb (-4 lb total)  Waist Circum change since last visit: +1.75 in (+0.5 in total)    Constitutional: Oriented to person, place, and time and well-developed, well-nourished, and in no distress.    Head: Normocephalic.   Musculoskeletal: Normal range of motion. No edema.   Neurological: Alert and oriented to person, place, and time. No muscle weakness.  Gait normal.   Skin: Warm and dry. Not diaphoretic.   Psychiatric: Mood, memory, affect and judgment normal.     Laboratory:   Recent labs reviewed.      Dietitian Assessment: I have reviewed the Dietitian's assessment related to this encounter.     ASSESSMENT/PLAN:  Body mass index is 30.8 kg/m².    Obesity Stage ([place]): 1; Class 1    1. Obesity (BMI 35.0-39.9 without comorbidity) (HCC)     2. IFG (impaired fasting glucose)     3. Hyperlipidemia LDL goal <100     4. Vitamin D deficiency     5. Generalized abdominal pain     6. Chronic pain of right knee     7. Celiac disease       Although the patient has begun to make some positive changes towards better eating habits, celiac disease and abdominal pain have interfered with her being able to take medication consistently and knowing which foods to eat.  Will focus on a ketogenic type diet per GI suggestion, but include plant-based fats as well.  Resume metformin, track to see if symptoms related to that versus food choices.  Continue to monitor triglycerides, vitamin D, fasting glucose as she progresses through the program.  Increase activity level.    The patient and I have discussed at length and agree to the following recommendations, which are all addressing the above diagnoses:    Weight Goal: 3-5% wt loss each month (pt goal weight is 165 lb)  Diet: Gluten-free, ketogenic diet per GI " suggestions  Incorporate plant-based fats to reduce saturated fat intake on ketogenic diet  Track intake, less than 1600 kcal per day  Physical Activity:  Walking 2 x 20 min  Starting [employer] later this year with Ortho clearance given chronic knee pain  New Rx: Resume metformin 500 mg at dinner to help with cravings  Discontinue if abdominal pain worsens  Side Effects: consent in chart  Behavior change: Increase activity level  Follow-up: 1 mo    Patient's body mass index is 30.8 kg/m². Exercise and nutrition counseling were performed at this visit.

## 2019-11-13 ENCOUNTER — HOSPITAL ENCOUNTER (OUTPATIENT)
Dept: LAB | Facility: MEDICAL CENTER | Age: 29
End: 2019-11-13
Attending: INTERNAL MEDICINE
Payer: COMMERCIAL

## 2019-11-13 LAB
COPPER SERPL-MCNC: 166.5 UG/DL (ref 80–155)
MISCELLANEOUS LAB RESULT MISCLAB: ABNORMAL

## 2019-11-13 PROCEDURE — 84630 ASSAY OF ZINC: CPT

## 2019-11-13 PROCEDURE — 36415 COLL VENOUS BLD VENIPUNCTURE: CPT

## 2019-11-14 LAB
A-TOCOPHEROL VIT E SERPL-MCNC: 11.1 MG/L (ref 5.5–18)
ANNOTATION COMMENT IMP: NORMAL
BETA+GAMMA TOCOPHEROL SERPL-MCNC: 1.4 MG/L (ref 0–6)
RETINYL PALMITATE SERPL-MCNC: <0.02 MG/L (ref 0–0.1)
VIT A SERPL-MCNC: 0.62 MG/L (ref 0.3–1.2)

## 2019-11-15 LAB — ZINC BLD-MCNC: 569.8 UG/DL (ref 440–860)

## 2019-11-22 ENCOUNTER — HOSPITAL ENCOUNTER (OUTPATIENT)
Dept: RADIOLOGY | Facility: MEDICAL CENTER | Age: 29
End: 2019-11-22
Attending: INTERNAL MEDICINE
Payer: COMMERCIAL

## 2019-11-22 DIAGNOSIS — R19.7 DIARRHEA, UNSPECIFIED TYPE: ICD-10-CM

## 2019-11-22 DIAGNOSIS — M85.80 OSTEOPENIA, UNSPECIFIED LOCATION: ICD-10-CM

## 2019-11-22 DIAGNOSIS — K90.0 CELIAC DISEASE: ICD-10-CM

## 2019-11-22 DIAGNOSIS — E61.1 IRON DEFICIENCY: ICD-10-CM

## 2019-11-22 DIAGNOSIS — R10.13 EPIGASTRIC PAIN: ICD-10-CM

## 2019-11-22 PROCEDURE — 77080 DXA BONE DENSITY AXIAL: CPT

## 2019-11-27 RX ORDER — VALACYCLOVIR HYDROCHLORIDE 1 G/1
1000 TABLET, FILM COATED ORAL DAILY
Qty: 90 TAB | Refills: 1 | Status: SHIPPED | OUTPATIENT
Start: 2019-11-27 | End: 2020-06-16

## 2019-12-03 ENCOUNTER — OFFICE VISIT (OUTPATIENT)
Dept: MEDICAL GROUP | Facility: MEDICAL CENTER | Age: 29
End: 2019-12-03
Payer: COMMERCIAL

## 2019-12-03 VITALS
DIASTOLIC BLOOD PRESSURE: 70 MMHG | BODY MASS INDEX: 31.49 KG/M2 | TEMPERATURE: 96.4 F | WEIGHT: 189 LBS | SYSTOLIC BLOOD PRESSURE: 102 MMHG | HEART RATE: 97 BPM | OXYGEN SATURATION: 94 % | HEIGHT: 65 IN

## 2019-12-03 DIAGNOSIS — Z23 NEED FOR PNEUMOCOCCAL VACCINATION: ICD-10-CM

## 2019-12-03 DIAGNOSIS — R79.0 ABNORMAL BLOOD LEVEL OF COPPER: ICD-10-CM

## 2019-12-03 DIAGNOSIS — D50.8 OTHER IRON DEFICIENCY ANEMIA: ICD-10-CM

## 2019-12-03 DIAGNOSIS — R21 RASH: ICD-10-CM

## 2019-12-03 DIAGNOSIS — E67.1 CAROTENEMIA: ICD-10-CM

## 2019-12-03 PROCEDURE — 90471 IMMUNIZATION ADMIN: CPT | Performed by: NURSE PRACTITIONER

## 2019-12-03 PROCEDURE — 99214 OFFICE O/P EST MOD 30 MIN: CPT | Mod: 25 | Performed by: NURSE PRACTITIONER

## 2019-12-03 PROCEDURE — 90670 PCV13 VACCINE IM: CPT | Performed by: NURSE PRACTITIONER

## 2019-12-03 RX ORDER — KETOCONAZOLE 20 MG/G
1 CREAM TOPICAL 2 TIMES DAILY
Qty: 2 TUBE | Refills: 1 | Status: SHIPPED | OUTPATIENT
Start: 2019-12-03 | End: 2020-01-13

## 2019-12-03 NOTE — PROGRESS NOTES
Subjective:     Tonia Xavier is a 29 y.o. female who presents with rash.    HPI:   Seen in f/u for rash.  Sx started on sunday.  + itching.  Fine red rash both lower legs.  No new products.   She has been seeing GI for abd pain for awhile.  GI did lab.  Showed normal zinc, PT, D, folate B12, VIT A, VITAMIN E.  HER carotene was sl low and copper high.  She was referred back to nutrition for help with correcting def.   The sx of low copper are consistent with her abd pain.   Review of info on copper abn assoc it with abd pain.    They also did dexa scan.  It was normal.  Not on ca++ but will start.  Will in d from 1000 units to 2000 units daily.  She has an fe def.  She recently has been having heavier menses.  Will see ob this week.  When she started fe she had more abd prob. She had lab in oct that showed fe def.  She will retry fe qod.  Reviewed dexa scan with pt.  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 2 Tube 1   • valacyclovir (VALTREX) 1 GM Tab Take 1 Tab by mouth every day. 90 Tab 1   • FEMYNOR 0.25-35 MG-MCG per tablet Take 1 Tab by mouth every day. 84 Tab 3   • cyanocobalamin (VITAMIN B-12) 500 MCG Tab Take 1 Tab by mouth every day. 30 Tab 0   • valACYclovir (VALTREX) 500 MG Tab Take 500 mg by mouth every 24 hours  "as needed.     • sertraline (ZOLOFT) 50 MG Tab TAKE 1 TABLET BY MOUTH EVERY DAY.     • ondansetron (ZOFRAN) 4 MG Tab tablet ZOFRAN 4 MG TABS     • fexofenadine (ALLEGRA) 180 MG tablet 24HR ALLERGY RELIEF TABS     • VITAMIN D HIGH POTENCY 1000 units Cap Take 1 Cap by mouth every day.  2   • dicyclomine (BENTYL) 10 MG Cap Take 1 Cap by mouth 4 Times a Day,Before Meals and at Bedtime. 30 Cap 0   • cyclobenzaprine (FLEXERIL) 10 MG Tab Take 10 mg by mouth 3 times a day as needed.       No current facility-administered medications for this visit.        Allergies   Allergen Reactions   • Benzoin Hives     Rash        ROS  Constitutional: Negative. Negative for fever, chills, weight loss, malaise/fatigue and diaphoresis.   HENT: Negative. Negative for hearing loss, ear pain, nosebleeds, congestion, sore throat, neck pain, tinnitus and ear discharge.   Respiratory: Negative. Negative for cough, hemoptysis, sputum production, shortness of breath, wheezing and stridor.   Cardiovascular: Negative. Negative for chest pain, palpitations, orthopnea, claudication, leg swelling and PND.   Gastrointestinal: Denies nausea, vomiting, diarrhea, constipation, heartburn, melena or hematochezia.  Genitourinary: Denies dysuria, hematuria, urinary incontinence, frequency or urgency.        Objective:     /70 (BP Location: Right arm, Patient Position: Sitting)   Pulse 97   Temp (!) 35.8 °C (96.4 °F) (Temporal)   Ht 1.651 m (5' 5\")   Wt 85.7 kg (189 lb)   SpO2 94%  Body mass index is 31.45 kg/m².    Physical Exam:  Vitals reviewed.  Constitutional: Oriented to person, place, and time. appears well-developed and well-nourished. No distress.   Cardiovascular: Normal rate, regular rhythm, normal heart sounds and intact distal pulses. Exam reveals no gallop and no friction rub. No murmur heard. No carotid bruits.   Pulmonary/Chest: Effort normal and breath sounds normal. No stridor. No respiratory distress. no wheezes or rales. " exhibits no tenderness.   Musculoskeletal: Normal range of motion. exhibits no edema. joshua pedal pulses 2+.  Lymphadenopathy: No cervical or supraclavicular adenopathy.   Neurological: Alert and oriented to person, place, and time. exhibits normal muscle tone.  Skin: Skin is warm and dry. No diaphoresis.  Fine macular erythematous rash d/t dg joshua lower legs  Psychiatric: Normal mood and affect. Behavior is normal.      Assessment and Plan:     The following treatment plan was discussed:    1. Rash  ketoconazole (NIZORAL) 2 % Cream    try ketoconazole cream.  if not improved refer derm and try clobetasol   2. Other iron deficiency anemia      retry fe qod.  recheck lab 3 mo.  f/u for review   3. Abnormal blood level of copper      copper elevated.  will see nutritionist.  call for lab slip in 3 mo.  will recheck lab.  f/u for lab review   4. Carotenemia      carotene was low.  will see nutritionist.  call for lab slip in 3 mo.  recheck lab and f/u for review   5. Need for pneumococcal vaccination  Pneumococcal Conjugate Vaccine 13-Valent         Followup: Return in about 3 months (around 3/3/2020).

## 2019-12-09 DIAGNOSIS — M79.10 MYALGIA: ICD-10-CM

## 2019-12-09 DIAGNOSIS — M25.50 ARTHRALGIA, UNSPECIFIED JOINT: ICD-10-CM

## 2019-12-12 ENCOUNTER — HOSPITAL ENCOUNTER (OUTPATIENT)
Dept: RADIOLOGY | Facility: MEDICAL CENTER | Age: 29
End: 2019-12-12
Attending: OBSTETRICS & GYNECOLOGY
Payer: COMMERCIAL

## 2019-12-12 DIAGNOSIS — R10.2 PELVIC PAIN IN FEMALE: ICD-10-CM

## 2019-12-12 DIAGNOSIS — Z87.42 HISTORY OF OVARIAN CYST: ICD-10-CM

## 2019-12-12 DIAGNOSIS — N92.0 MENORRHAGIA WITH REGULAR CYCLE: ICD-10-CM

## 2019-12-12 PROCEDURE — 76830 TRANSVAGINAL US NON-OB: CPT

## 2019-12-14 ENCOUNTER — HOSPITAL ENCOUNTER (OUTPATIENT)
Dept: LAB | Facility: MEDICAL CENTER | Age: 29
End: 2019-12-14
Attending: NURSE PRACTITIONER
Payer: COMMERCIAL

## 2019-12-14 DIAGNOSIS — M25.50 ARTHRALGIA, UNSPECIFIED JOINT: ICD-10-CM

## 2019-12-14 DIAGNOSIS — M79.10 MYALGIA: ICD-10-CM

## 2019-12-14 LAB
ALBUMIN SERPL BCP-MCNC: 3.8 G/DL (ref 3.2–4.9)
ALBUMIN/GLOB SERPL: 1.1 G/DL
ALP SERPL-CCNC: 68 U/L (ref 30–99)
ALT SERPL-CCNC: 9 U/L (ref 2–50)
ANION GAP SERPL CALC-SCNC: 10 MMOL/L (ref 0–11.9)
AST SERPL-CCNC: 13 U/L (ref 12–45)
BASOPHILS # BLD AUTO: 0.4 % (ref 0–1.8)
BASOPHILS # BLD: 0.04 K/UL (ref 0–0.12)
BILIRUB SERPL-MCNC: 0.4 MG/DL (ref 0.1–1.5)
BUN SERPL-MCNC: 12 MG/DL (ref 8–22)
CALCIUM SERPL-MCNC: 8.8 MG/DL (ref 8.5–10.5)
CHLORIDE SERPL-SCNC: 105 MMOL/L (ref 96–112)
CO2 SERPL-SCNC: 23 MMOL/L (ref 20–33)
CREAT SERPL-MCNC: 0.63 MG/DL (ref 0.5–1.4)
CRP SERPL HS-MCNC: 1.39 MG/DL (ref 0–0.75)
EOSINOPHIL # BLD AUTO: 0.21 K/UL (ref 0–0.51)
EOSINOPHIL NFR BLD: 2.3 % (ref 0–6.9)
ERYTHROCYTE [DISTWIDTH] IN BLOOD BY AUTOMATED COUNT: 40.9 FL (ref 35.9–50)
ERYTHROCYTE [SEDIMENTATION RATE] IN BLOOD BY WESTERGREN METHOD: 12 MM/HOUR (ref 0–20)
FASTING STATUS PATIENT QL REPORTED: NORMAL
GLOBULIN SER CALC-MCNC: 3.4 G/DL (ref 1.9–3.5)
GLUCOSE SERPL-MCNC: 127 MG/DL (ref 65–99)
HCT VFR BLD AUTO: 38.9 % (ref 37–47)
HGB BLD-MCNC: 13 G/DL (ref 12–16)
IMM GRANULOCYTES # BLD AUTO: 0.03 K/UL (ref 0–0.11)
IMM GRANULOCYTES NFR BLD AUTO: 0.3 % (ref 0–0.9)
LYMPHOCYTES # BLD AUTO: 2.65 K/UL (ref 1–4.8)
LYMPHOCYTES NFR BLD: 29.6 % (ref 22–41)
MCH RBC QN AUTO: 26.3 PG (ref 27–33)
MCHC RBC AUTO-ENTMCNC: 33.4 G/DL (ref 33.6–35)
MCV RBC AUTO: 78.7 FL (ref 81.4–97.8)
MONOCYTES # BLD AUTO: 0.48 K/UL (ref 0–0.85)
MONOCYTES NFR BLD AUTO: 5.4 % (ref 0–13.4)
NEUTROPHILS # BLD AUTO: 5.53 K/UL (ref 2–7.15)
NEUTROPHILS NFR BLD: 62 % (ref 44–72)
NRBC # BLD AUTO: 0 K/UL
NRBC BLD-RTO: 0 /100 WBC
PLATELET # BLD AUTO: 404 K/UL (ref 164–446)
PMV BLD AUTO: 11.2 FL (ref 9–12.9)
POTASSIUM SERPL-SCNC: 3.8 MMOL/L (ref 3.6–5.5)
PROT SERPL-MCNC: 7.2 G/DL (ref 6–8.2)
RBC # BLD AUTO: 4.94 M/UL (ref 4.2–5.4)
SODIUM SERPL-SCNC: 138 MMOL/L (ref 135–145)
T4 FREE SERPL-MCNC: 0.74 NG/DL (ref 0.53–1.43)
TSH SERPL DL<=0.005 MIU/L-ACNC: 0.58 UIU/ML (ref 0.38–5.33)
WBC # BLD AUTO: 8.9 K/UL (ref 4.8–10.8)

## 2019-12-14 PROCEDURE — 84443 ASSAY THYROID STIM HORMONE: CPT

## 2019-12-14 PROCEDURE — 86038 ANTINUCLEAR ANTIBODIES: CPT

## 2019-12-14 PROCEDURE — 85025 COMPLETE CBC W/AUTO DIFF WBC: CPT

## 2019-12-14 PROCEDURE — 80053 COMPREHEN METABOLIC PANEL: CPT

## 2019-12-14 PROCEDURE — 84439 ASSAY OF FREE THYROXINE: CPT

## 2019-12-14 PROCEDURE — 85652 RBC SED RATE AUTOMATED: CPT

## 2019-12-14 PROCEDURE — 86140 C-REACTIVE PROTEIN: CPT

## 2019-12-14 PROCEDURE — 36415 COLL VENOUS BLD VENIPUNCTURE: CPT

## 2019-12-16 ENCOUNTER — TELEPHONE (OUTPATIENT)
Dept: MEDICAL GROUP | Facility: MEDICAL CENTER | Age: 29
End: 2019-12-16

## 2019-12-16 DIAGNOSIS — R73.9 HYPERGLYCEMIA: ICD-10-CM

## 2019-12-16 LAB — NUCLEAR IGG SER QL IA: NORMAL

## 2019-12-16 NOTE — TELEPHONE ENCOUNTER
Please let pt know that the CBC shows minor rbc changes but o/w wnl.  ESR, TSH, T4, TRISTIN, GFR IS WNL  CRP is high.  That is a lab test for inflammation.  Her glucose is also elevated.  Would like to do a1c to check for DM.  i will order that.  If elevated she needs to come in and be seen

## 2019-12-17 ENCOUNTER — TELEPHONE (OUTPATIENT)
Dept: PHYSICAL THERAPY | Facility: MEDICAL CENTER | Age: 29
End: 2019-12-17

## 2019-12-17 NOTE — OP THERAPY DISCHARGE SUMMARY
Outpatient Physical Therapy  DISCHARGE SUMMARY NOTE      [facility]  [address]  [place] [address]  Phone:  [phone]  Fax:  [phone]    Date of Visit: 12/17/2019    Patient: Chase Xavier  YOB: 1990  MRN: [id number]     Referring Provider: Dexter Pimentel M.D.   Referring Diagnosis S83.281A (ICD-10-CM) - Other tear of lateral meniscus, current injury, right knee, initial encounter   M25.561 (ICD-10-CM) - Pain in right knee   S83.241A (ICD-10-CM) - Other tear of medial meniscus, current injury, right knee, initial encounter        Physical Therapy Occurrence Codes    Date of onset of impairment:  11/13/18   Date physical therapy care plan established or reviewed:  11/29/18   Date physical therapy treatment started:  11/29/18          Functional Assessment Used        Your patient is being discharged from Physical Therapy with the following comments:   · Patient has failed to schedule or reschedule follow-up visits    Comments:  Late PT chart discharge.  Pt was seen 1 year ago with initial PT eval on 11/12/18 and last visit 3/22/19.  She did not schedule any additional follow ups and with it being longer than 30 days since her last apt, her PT chart will be discharged.    Recommendations:  Discharge PT chart.    Jesi Martin PT, MSPT    Date: 12/17/2019

## 2019-12-19 DIAGNOSIS — M25.532 LEFT WRIST PAIN: ICD-10-CM

## 2019-12-19 NOTE — TELEPHONE ENCOUNTER
Since her TRISTIN is negative it is unlikely that she has RA or SLE.  She does have some inflammation going on though.  That could be from her abd pain or other sources.

## 2019-12-23 ENCOUNTER — HOSPITAL ENCOUNTER (OUTPATIENT)
Dept: RADIOLOGY | Facility: MEDICAL CENTER | Age: 29
End: 2019-12-23
Attending: NURSE PRACTITIONER
Payer: COMMERCIAL

## 2019-12-23 ENCOUNTER — HOSPITAL ENCOUNTER (OUTPATIENT)
Dept: LAB | Facility: MEDICAL CENTER | Age: 29
End: 2019-12-23
Attending: NURSE PRACTITIONER
Payer: COMMERCIAL

## 2019-12-23 DIAGNOSIS — R73.9 HYPERGLYCEMIA: ICD-10-CM

## 2019-12-23 DIAGNOSIS — M25.532 LEFT WRIST PAIN: ICD-10-CM

## 2019-12-23 PROCEDURE — 83036 HEMOGLOBIN GLYCOSYLATED A1C: CPT

## 2019-12-23 PROCEDURE — 73110 X-RAY EXAM OF WRIST: CPT | Mod: LT

## 2019-12-23 PROCEDURE — 36415 COLL VENOUS BLD VENIPUNCTURE: CPT

## 2019-12-24 ENCOUNTER — TELEPHONE (OUTPATIENT)
Dept: MEDICAL GROUP | Facility: MEDICAL CENTER | Age: 29
End: 2019-12-24

## 2019-12-24 DIAGNOSIS — M25.532 LEFT WRIST PAIN: ICD-10-CM

## 2019-12-24 LAB
EST. AVERAGE GLUCOSE BLD GHB EST-MCNC: 120 MG/DL
HBA1C MFR BLD: 5.8 % (ref 0–5.6)

## 2019-12-24 NOTE — TELEPHONE ENCOUNTER
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  She needs to see ortho.  Is she ok with me placing referral.

## 2020-01-07 ENCOUNTER — OFFICE VISIT (OUTPATIENT)
Dept: MEDICAL GROUP | Facility: MEDICAL CENTER | Age: 30
End: 2020-01-07
Payer: COMMERCIAL

## 2020-01-07 VITALS
BODY MASS INDEX: 32.15 KG/M2 | OXYGEN SATURATION: 97 % | DIASTOLIC BLOOD PRESSURE: 60 MMHG | HEIGHT: 65 IN | WEIGHT: 193 LBS | HEART RATE: 84 BPM | SYSTOLIC BLOOD PRESSURE: 98 MMHG | TEMPERATURE: 97.2 F

## 2020-01-07 DIAGNOSIS — R73.01 IFG (IMPAIRED FASTING GLUCOSE): ICD-10-CM

## 2020-01-07 DIAGNOSIS — M25.50 ARTHRALGIA, UNSPECIFIED JOINT: ICD-10-CM

## 2020-01-07 DIAGNOSIS — J06.9 VIRAL URI WITH COUGH: ICD-10-CM

## 2020-01-07 DIAGNOSIS — D50.8 OTHER IRON DEFICIENCY ANEMIA: ICD-10-CM

## 2020-01-07 PROCEDURE — 99214 OFFICE O/P EST MOD 30 MIN: CPT | Performed by: NURSE PRACTITIONER

## 2020-01-07 ASSESSMENT — PATIENT HEALTH QUESTIONNAIRE - PHQ9: CLINICAL INTERPRETATION OF PHQ2 SCORE: 0

## 2020-01-07 NOTE — PROGRESS NOTES
Subjective:     Tonia Xavier is a 29 y.o. female who presents with sore throat.    HPI:   Seen in f/u for sore throat.  Started one wk ago.  Hurts to swallow.  Feeling warm in am.  Coughing up green secretions in am.  No known fevers.  No SOB or wheezing.  Having left ear pressure.  Not using anything for it.    Reviewed lab with pt.  a1c is up from 5.1 last time to 5.8.  She hasn't been on as healthy diet and exercising like she was.  She is going to f/u with weight clinic upstairs soon to get back in shape.  GFR, TRISTIN, TSH, T4, ESR is wnl  CRP is elevated.    CMP is wnl except glucose is elevated.  She was having more joint pain but that has resolved.  No FH of SLE or RA.    CBC shows abn rbc.  She has a hx of fe def.  She has been on otc fe for last 2 mo.  Her last fe studies in oct were abn.      Patient Active Problem List    Diagnosis Date Noted   • Generalized abdominal pain 11/11/2019   • Chronic pain of right knee 11/11/2019   • Celiac disease 11/11/2019   • History of iron deficiency 10/14/2019   • H/O Mark's palsy 09/12/2019   • Obesity (BMI 35.0-39.9 without comorbidity) (HCC) 09/12/2019   • Hyperlipidemia LDL goal <100 09/09/2019   • IFG (impaired fasting glucose) 09/09/2019   • HSV infection 09/07/2019   • Ovarian cyst 09/07/2019   • Vitamin D deficiency 02/01/2018   • Anxiety 01/11/2018       Current medicines (including changes today)  Current Outpatient Medications   Medication Sig Dispense Refill   • ketoconazole (NIZORAL) 2 % Cream Apply 1 Application to affected area(s) 2 times a day. 2 Tube 1   • valacyclovir (VALTREX) 1 GM Tab Take 1 Tab by mouth every day. 90 Tab 1   • FEMYNOR 0.25-35 MG-MCG per tablet Take 1 Tab by mouth every day. 84 Tab 3   • cyanocobalamin (VITAMIN B-12) 500 MCG Tab Take 1 Tab by mouth every day. 30 Tab 0   • valACYclovir (VALTREX) 500 MG Tab Take 500 mg by mouth every 24 hours as needed.     • sertraline (ZOLOFT) 50 MG Tab TAKE 1 TABLET BY MOUTH EVERY DAY.    "  • ondansetron (ZOFRAN) 4 MG Tab tablet ZOFRAN 4 MG TABS     • fexofenadine (ALLEGRA) 180 MG tablet 24HR ALLERGY RELIEF TABS     • VITAMIN D HIGH POTENCY 1000 units Cap Take 1 Cap by mouth every day.  2   • dicyclomine (BENTYL) 10 MG Cap Take 1 Cap by mouth 4 Times a Day,Before Meals and at Bedtime. 30 Cap 0   • cyclobenzaprine (FLEXERIL) 10 MG Tab Take 10 mg by mouth 3 times a day as needed.       No current facility-administered medications for this visit.        Allergies   Allergen Reactions   • Benzoin Hives     Rash        ROS  Constitutional: Negative. Negative for fever, chills, weight loss, malaise/fatigue and diaphoresis.   HENT: Negative. Negative for hearing loss, ear pain, nosebleeds, congestion, sore throat, neck pain, tinnitus and ear discharge.   Respiratory: Negative. Negative for cough, hemoptysis, sputum production, shortness of breath, wheezing and stridor.   Cardiovascular: Negative. Negative for chest pain, palpitations, orthopnea, claudication, leg swelling and PND.   Gastrointestinal: Denies nausea, vomiting, diarrhea, constipation, heartburn, melena or hematochezia.  Genitourinary: Denies dysuria, hematuria, urinary incontinence, frequency or urgency.        Objective:     BP (!) 98/60 (BP Location: Right arm, Patient Position: Sitting)   Pulse 84   Temp 36.2 °C (97.2 °F) (Temporal)   Ht 1.651 m (5' 5\")   Wt 87.5 kg (193 lb)   SpO2 97%  Body mass index is 32.12 kg/m².    Physical Exam:  Vitals reviewed.  Constitutional: Oriented to person, place, and time. appears well-developed and well-nourished. No distress.   Cardiovascular: Normal rate, regular rhythm, normal heart sounds and intact distal pulses. Exam reveals no gallop and no friction rub. No murmur heard. No carotid bruits.   Pulmonary/Chest: Effort normal and breath sounds normal. No stridor. No respiratory distress. no wheezes or rales. exhibits no tenderness.   Musculoskeletal: Normal range of motion. exhibits no edema. joshua " pedal pulses 2+.  Lymphadenopathy: No cervical or supraclavicular adenopathy.   Neurological: Alert and oriented to person, place, and time. exhibits normal muscle tone.  Skin: Skin is warm and dry. No diaphoresis.   Psychiatric: Normal mood and affect. Behavior is normal.      Assessment and Plan:     The following treatment plan was discussed:    1. Viral URI with cough      call if sx not improved or worsen over next wk.  will call in antibx if + thick yellow secretions.   2. IFG (impaired fasting glucose)      a1c is up.  se will f/u with [facility] for diet and weight control.  f/u yearly or sooner if lab abn   3. Other iron deficiency anemia      continue otc fe.  recheck fe studies 1 mo.  f/u w/pt with results.     4. Arthralgia, unspecified joint      call if sx reoccur and will redo TRISTIN.  all sx resolved now and TRISTIN neg.  did have + CRP.           Followup: Return in about 1 year (around 1/7/2021).

## 2020-01-13 ENCOUNTER — OFFICE VISIT (OUTPATIENT)
Dept: HEALTH INFORMATION MANAGEMENT | Facility: MEDICAL CENTER | Age: 30
End: 2020-01-13
Payer: COMMERCIAL

## 2020-01-13 VITALS
HEART RATE: 83 BPM | SYSTOLIC BLOOD PRESSURE: 116 MMHG | WEIGHT: 190.7 LBS | BODY MASS INDEX: 31.77 KG/M2 | HEIGHT: 65 IN | OXYGEN SATURATION: 95 % | DIASTOLIC BLOOD PRESSURE: 78 MMHG

## 2020-01-13 DIAGNOSIS — E66.01 MORBID (SEVERE) OBESITY DUE TO EXCESS CALORIES (HCC): ICD-10-CM

## 2020-01-13 DIAGNOSIS — E66.9 OBESITY (BMI 35.0-39.9 WITHOUT COMORBIDITY): ICD-10-CM

## 2020-01-13 DIAGNOSIS — E55.9 VITAMIN D DEFICIENCY: ICD-10-CM

## 2020-01-13 DIAGNOSIS — R10.84 GENERALIZED ABDOMINAL PAIN: ICD-10-CM

## 2020-01-13 DIAGNOSIS — R73.01 IFG (IMPAIRED FASTING GLUCOSE): ICD-10-CM

## 2020-01-13 PROCEDURE — 97803 MED NUTRITION INDIV SUBSEQ: CPT | Performed by: DIETITIAN, REGISTERED

## 2020-01-13 PROCEDURE — 99214 OFFICE O/P EST MOD 30 MIN: CPT | Performed by: INTERNAL MEDICINE

## 2020-01-13 RX ORDER — PHENTERMINE HYDROCHLORIDE 37.5 MG/1
18 TABLET ORAL
Qty: 30 TAB | Refills: 0 | Status: SHIPPED | OUTPATIENT
Start: 2020-01-13 | End: 2020-02-10

## 2020-01-13 RX ORDER — TOPIRAMATE 25 MG/1
25 TABLET ORAL DAILY
Qty: 30 TAB | Refills: 1 | Status: SHIPPED | OUTPATIENT
Start: 2020-01-13 | End: 2020-02-04

## 2020-01-13 ASSESSMENT — PATIENT HEALTH QUESTIONNAIRE - PHQ9: CLINICAL INTERPRETATION OF PHQ2 SCORE: 0

## 2020-01-13 NOTE — PROGRESS NOTES
"Bariatric Medicine Follow Up  Chief Complaint   Patient presents with   • Weight Gain       History of Present Illness:   Tonia Xavier is a 29 y.o. female who presents for weight management follow-up and to help address co-morbidities caused by overweight, as below.    During the patient's last visit, the following were discussed and recommended:  Weight Goal: 3-5% wt loss each month (pt goal weight is 165 lb)  Diet: Gluten-free, ketogenic diet per GI suggestions  Incorporate plant-based fats to reduce saturated fat intake on ketogenic diet  Track intake, less than 1600 kcal per day  Physical Activity:  Walking 2 x 20 min  Starting [employer] later this year with Ortho clearance given chronic knee pain  New Rx: Resume metformin 500 mg at dinner to help with cravings  Discontinue if abdominal pain worsens  Side Effects: consent in chart  Behavior change: Increase activity level    She is having keto/gluten-free diet, has gained weight, but is having less GI upset. She would like to add antiobesity medication.    Not tracking but having less fast food.  Eating kale salads for lunch.  PP in am.    Abd pain: resolved but did not tolerate metformin  IFG:   Not on metformin  Anxiety: Continues daily SSRI    Exercise:   Walking 2 x 20 min  Starting [employer] later this year with Ortho clearance given chronic knee pain       Review of Systems   Denies significant anxiety, feels it is well controlled.  Denies excess insomnia, change in BMs.  All other ROS were reviewed and are otherwise unchanged from my previous visit with patient.    Physical Exam:    /78 (BP Location: Left arm, Patient Position: Sitting, BP Cuff Size: Large adult)   Pulse 83   Ht 1.651 m (5' 5\")   Wt 86.5 kg (190 lb 11.2 oz)   SpO2 95%   BMI 31.73 kg/m²   Waist Measurement   Waist: 38.5 inch/inches  Weight change since last visit: +5.6 lb (+2 lb total)  Waist Circum change since last visit: -1 in (-1.5 in " total)    Constitutional: Oriented to person, place, and time and well-developed, well-nourished, and in no distress.    Head: Normocephalic.   Musculoskeletal: Normal range of motion. No edema.   Neurological: Alert and oriented to person, place, and time. No muscle weakness.  Gait normal.   Skin: Warm and dry. Not diaphoretic.   Psychiatric: Mood, memory, affect and judgment normal.     Laboratory:   Recent labs reviewed.      Dietitian Assessment: I have reviewed the Dietitian's assessment related to this encounter.     ASSESSMENT/PLAN:  Body mass index is 31.73 kg/m².    Obesity Stage ([place]):  1; Class 1    1. Obesity (BMI 35.0-39.9 without comorbidity) (HCC)     2. Generalized abdominal pain     3. IFG (impaired fasting glucose)     4. Vitamin D deficiency       Although the patient has gained weight since her last visit, she has lost waist circumference, fat mass percent and has gained muscle mass.  She has begun to make some positive lifestyle change.  Will add anti-obesity medication.  Did not tolerate metformin.  Abdominal pain otherwise resolved on change in diet.  Continue vitamin D supplement.    The patient and I have discussed at length and agree to the following recommendations, which are all addressing the above diagnoses:    Weight Goal: 3-5% wt loss each month (pt goal weight is 165 lb)  Diet: PP every morning  Kale salad for lunch  Continues gluten-free, keto diet per GI recommendations  Physical Activity:  Walking 2 x 20 min  Starting [employer] later this year with Ortho clearance given chronic knee pain  Rx: start phentermine 18 mg/topiramate 25 mg every morning  Watch for anxiety  Pt on OCPs, discussed consequences of conception while on topiramate  Consider liraglutide if antiobesity meds not tolerated  Side Effects: consent in chart  Behavior change: Increase activity level  Resume tracking if no weight loss starting anti-obesity medication  Follow-up: 1 mo    Patient's body mass  index is 31.73 kg/m². Exercise and nutrition counseling were performed at this visit.

## 2020-01-13 NOTE — PROGRESS NOTES
"Nutrition Reassess: Medical Weight Management   Today's date: 1/13/2020  Referring Provider: No ref. provider found      Time: in/out 8:18-8:40 AM  Visit#: 4    Subjective:  - following a gluten-free, keto diet  - uses PP for breakfast  - has snack cravings at night   - currently not tracking on [employer]itness Pal  - pt reports she has a carotene deficiency   - walking for exercise  - starting weight loss medication (phentermine and Topamax)    Diet history:   Breakfast - GF Cheerios with Lactaid milk  Snack - 0  Lunch - Kale Salad (Poppy seed prepared salad)  Snack - 0  Dinner - Grilled chicken with non-starchy veggies    Anthropometrics/Objective  Today's weight:    Vitals:    01/13/20 0803   BP: 116/78   Weight: 86.5 kg (190 lb 11.2 oz)   Height: 1.651 m (5' 5\")     BMI:  Body mass index is 31.73 kg/m².  Starting weight/date 188.9 lb  (09/09/19)    Total weight change : + 1.8 lb            Meal Plan:   High protein low carb diet with 0-1 meal replacements per day    ReAssesment/Notes: Chase is feeling better and having less stomach pain since following her gluten-free keto diet. Since she is following the keto diet, we reviewed the AHA fats handout.  Suggested to include more of the monounsaturated fats and polyunsaturated fats, limit the saturated fats and avoid the hydrogenated oils and trans fats. Reminded Chase to include protein at all meals. As pt reported that she is deficient in carotene, provided her a handout with food sources of Vitamin A. Suggested for Chase to substitute her cookies and crackers as her nighttime snack with 1 oz of protein and either a non-starchy veggie or piece of fruit. Pt set her goals to eat healthier snacks at night and to track her intake on My Fitness Pal. Next visit suggest to review mindful eating.     Follow-up: 1 month with ISHMAEL/MD  "

## 2020-01-31 ENCOUNTER — OFFICE VISIT (OUTPATIENT)
Dept: NEUROLOGY | Facility: MEDICAL CENTER | Age: 30
End: 2020-01-31
Payer: COMMERCIAL

## 2020-01-31 VITALS
OXYGEN SATURATION: 97 % | HEIGHT: 65 IN | BODY MASS INDEX: 29.99 KG/M2 | DIASTOLIC BLOOD PRESSURE: 70 MMHG | WEIGHT: 180 LBS | SYSTOLIC BLOOD PRESSURE: 116 MMHG | HEART RATE: 70 BPM

## 2020-01-31 DIAGNOSIS — Z86.69 H/O BELL'S PALSY: Primary | ICD-10-CM

## 2020-01-31 PROCEDURE — 99205 OFFICE O/P NEW HI 60 MIN: CPT | Performed by: PSYCHIATRY & NEUROLOGY

## 2020-01-31 ASSESSMENT — ENCOUNTER SYMPTOMS
DEPRESSION: 0
SENSORY CHANGE: 0
LOSS OF CONSCIOUSNESS: 0
HEADACHES: 1
TINGLING: 0
MEMORY LOSS: 0

## 2020-02-01 NOTE — PROGRESS NOTES
"Subjective:      Chase Xavier is a 29 y.o. female who presents from the office of JAMIE Hinojosa, for consultation, with a history of a transient worsening of right facial weakness lasting several months in late 2019.    HPI    The patient is a pleasant 29-year-old right-handed female who suffered from a right facial palsy, presumed a Bell's palsy, in March, 2011 during her first pregnancy.  Subsequent pregnancies were not associated with worsening symptoms.  At the time there was a sudden right facial weakness, she could not close her eye, there was drooling out of the right side of her mouth, slurred speech, etc.  There was also pain though there was no rash or other sensory distortion.  She denied ipsilateral hyperacusis or change in taste.  Though she sought immediate attention, given the pregnancy, imaging was never done and she was not treated.  Symptoms improved, though she was still left with some very mild right facial weakness, she had to be careful about chewing so that food would stay inside her mouth, it tended to pooch into the right cheek.    In July of last year, the right cheek felt \"heavier\" than it normally would.  The symptoms fluctuated, she denied pain, difficulty with eye closure ipsilaterally, rash, changes in speech or swallowing, etc.  Over the next several months things would slowly improve, though the course was fluctuating, and she is now back to her baseline for the last 2 months.    During this time she was also having some issues with recurrent cold sores, typically the cold sore attacks would last maybe 2 weeks, she would take valacyclovir 5 mg, twice daily, cold sores would resolve.  Historically these eruptions were never associated with any facial distortions on the right.  This time the sores would simply reemerge days after she would complete treatment.  Since November of last year, she has stayed on valacyclovir 1000 mg daily, eruptions of her cold sores subsequently " "stopped; it was at about this time that the right facial symptoms had returned to baseline.    No directed work-up has been performed so far.  She obviously has not required treatment for the symptoms.    She has a history of headaches, she describes non-migrainous pain that is quite often right occipital, they can last for a couple of days, at times moderate intensity, over-the-counter analgesics usually provided best benefit.  These are not associated with any change in her right facial symptoms.  Otherwise, she has no history of diabetes, hypertension, autoimmune disease, MS, seizure, migraine, CVA, liver or kidney disease, malignancy, blood dyscrasia, or neurodegenerative disease.  She has not undergone any recent periodontal procedures.    Both her parents are alive and well, all 3 sons as well.  Her menses are regular, she is now on birth control.    She does not smoke or drink.  She works as a medical assistant.  She is on benzoyl, Valtrex, Topamax, Adipex, Zoloft, birth control, Flexeril, the rest as per the electronic health record, noncontributory from my standpoint.    Review of Systems   Constitutional: Negative for malaise/fatigue.   Skin: Negative for rash.   Neurological: Positive for headaches. Negative for tingling, sensory change and loss of consciousness.   Psychiatric/Behavioral: Negative for depression and memory loss.   All other systems reviewed and are negative.       Objective:     /70 (BP Location: Left arm, Patient Position: Sitting, BP Cuff Size: Adult)   Pulse 70   Ht 1.651 m (5' 5\")   Wt 81.6 kg (180 lb)   SpO2 97%   BMI 29.95 kg/m²      Physical Exam    She appears in no acute distress.  Vital signs are stable.  There is no malar rash or temporal tenderness.  The neck is supple, range of motion is full.  Jaime and Sadler are nonlateralizing, air and bone conduction ratios maintained bilaterally.  Cardiac evaluation is unremarkable.    Cognition is intact, there is no aphasia, " apraxia, or inattention.    PERRLA/EOMI, funduscopic exam is benign with sharp disc margins.  Visual fields are full.  There is a mild flattening of the right nasolabial fold, lip apposition slightly curtailed on the right though jaw crunch is intact.  Eye closure is symmetric.  Sensory exam is intact to temperature and light touch bilaterally.  The tongue and uvula are midline, shoulder shrug and head rotation are intact and symmetric.    Musculoskeletal exam reveals normal tone throughout, there is no tremor, asterixis, or drift.  Strength is 5/5 in all 4 extremities.  Reflexes are present throughout, there are no asymmetries and none are dropped.  Both toes are downgoing.    She stands easily, arm swing is symmetric, gait is of normal station.  There is no appendicular dystaxia, repetitive movements and fine motor control are intact and symmetric with normal amplitude and frequencies throughout.    Sensory exam is intact to vibration and temperature, Romberg is absent.    Assessment/Plan:     1. H/O Bell's palsy  I suspect the transient facial weakness had more to do with a simple immune activation, possibly secondary to the recurrent HSV flareups.  There was never complete resolution and she had some residual right facial droop, and thus any type of superimposed viral process can make symptoms feel worse.  Still, facial paralysis from Bell's palsy is a little unusual in young adults, so a recurrence suggesting underlying structural pathology can be seen, especially with pain, in MS.  Thus, MRI imaging of the brain with and without contrast is indicated for thoroughness.  We will call her with the results.    The rationale for all this was reviewed.  I have a very low level of suspicion that we are going to find something abnormal, but if we do, further work-up might include CSF studies, blood work, etc.    She was told that her symptoms will likely fluctuate over the course of time depending on superimposed  toxic metabolic insult, stress, etc.  They will always return to baseline, they should never be associated with new symptoms including such things as ipsilateral sensory loss, visual loss, swallowing difficulties, or focal motor or sensory distortions below the level of the neck.  In those circumstances, all bets are off, a different kind of work-up would be entailed.    - Renal Function Panel; Future  - MR-BRAIN-WITH & W/O; Future    Time: 60-minute spent face-to-face for exam, review, discussion, and education, of this over 50% of the time spent counseling and coordinating care.

## 2020-02-03 ENCOUNTER — HOSPITAL ENCOUNTER (OUTPATIENT)
Dept: LAB | Facility: MEDICAL CENTER | Age: 30
End: 2020-02-03
Attending: PSYCHIATRY & NEUROLOGY
Payer: COMMERCIAL

## 2020-02-03 DIAGNOSIS — Z86.69 H/O BELL'S PALSY: ICD-10-CM

## 2020-02-03 LAB
ALBUMIN SERPL BCP-MCNC: 4.1 G/DL (ref 3.2–4.9)
BUN SERPL-MCNC: 14 MG/DL (ref 8–22)
CALCIUM SERPL-MCNC: 9.3 MG/DL (ref 8.5–10.5)
CHLORIDE SERPL-SCNC: 106 MMOL/L (ref 96–112)
CO2 SERPL-SCNC: 25 MMOL/L (ref 20–33)
CREAT SERPL-MCNC: 0.81 MG/DL (ref 0.5–1.4)
GLUCOSE SERPL-MCNC: 88 MG/DL (ref 65–99)
PHOSPHATE SERPL-MCNC: 3.2 MG/DL (ref 2.5–4.5)
POTASSIUM SERPL-SCNC: 4 MMOL/L (ref 3.6–5.5)
SODIUM SERPL-SCNC: 139 MMOL/L (ref 135–145)

## 2020-02-03 PROCEDURE — 36415 COLL VENOUS BLD VENIPUNCTURE: CPT

## 2020-02-03 PROCEDURE — 80069 RENAL FUNCTION PANEL: CPT

## 2020-02-04 RX ORDER — TOPIRAMATE 25 MG/1
25 TABLET ORAL DAILY
Qty: 30 TAB | Refills: 1 | Status: SHIPPED | OUTPATIENT
Start: 2020-02-04 | End: 2020-02-10

## 2020-02-04 NOTE — TELEPHONE ENCOUNTER
Received request via: Pharmacy    Was the patient seen in the last year in this department? Yes     lov   1/13/20  fv     2/10/20    Does the patient have an active prescription (recently filled or refills available) for medication(s) requested? yes   topiramate (TOPAMAX) 25 MG Tab

## 2020-02-08 ENCOUNTER — APPOINTMENT (OUTPATIENT)
Dept: RADIOLOGY | Facility: MEDICAL CENTER | Age: 30
End: 2020-02-08
Attending: PSYCHIATRY & NEUROLOGY
Payer: COMMERCIAL

## 2020-02-10 ENCOUNTER — OFFICE VISIT (OUTPATIENT)
Dept: HEALTH INFORMATION MANAGEMENT | Facility: MEDICAL CENTER | Age: 30
End: 2020-02-10
Payer: COMMERCIAL

## 2020-02-10 VITALS
SYSTOLIC BLOOD PRESSURE: 116 MMHG | HEART RATE: 93 BPM | OXYGEN SATURATION: 96 % | DIASTOLIC BLOOD PRESSURE: 68 MMHG | WEIGHT: 193 LBS | BODY MASS INDEX: 32.15 KG/M2 | HEIGHT: 65 IN

## 2020-02-10 DIAGNOSIS — E55.9 VITAMIN D DEFICIENCY: ICD-10-CM

## 2020-02-10 DIAGNOSIS — R73.01 IFG (IMPAIRED FASTING GLUCOSE): ICD-10-CM

## 2020-02-10 DIAGNOSIS — E66.01 MORBID (SEVERE) OBESITY DUE TO EXCESS CALORIES (HCC): ICD-10-CM

## 2020-02-10 DIAGNOSIS — E66.9 OBESITY (BMI 35.0-39.9 WITHOUT COMORBIDITY): ICD-10-CM

## 2020-02-10 DIAGNOSIS — K90.0 CELIAC DISEASE: ICD-10-CM

## 2020-02-10 DIAGNOSIS — R63.5 WEIGHT GAIN: ICD-10-CM

## 2020-02-10 DIAGNOSIS — F41.9 ANXIETY: ICD-10-CM

## 2020-02-10 PROCEDURE — 97803 MED NUTRITION INDIV SUBSEQ: CPT | Performed by: DIETITIAN, REGISTERED

## 2020-02-10 PROCEDURE — 99214 OFFICE O/P EST MOD 30 MIN: CPT | Performed by: INTERNAL MEDICINE

## 2020-02-10 RX ORDER — TOPIRAMATE 25 MG/1
25 TABLET ORAL 2 TIMES DAILY
Qty: 60 TAB | Refills: 1 | Status: SHIPPED | OUTPATIENT
Start: 2020-02-10 | End: 2020-05-07

## 2020-02-10 ASSESSMENT — PATIENT HEALTH QUESTIONNAIRE - PHQ9: CLINICAL INTERPRETATION OF PHQ2 SCORE: 0

## 2020-02-10 NOTE — PROGRESS NOTES
Nutrition Reassess: Medical Weight Management   Today's date: 2/10/2020  Referring Provider: No ref. provider found      Time: in/out 7:48-8:07 AM  Visit#: 5    Subjective:  - stopped tracking about a week ago (was tracking for 2-3 weeks after last visit)  - taking topamax  - has not been snacking at night  - grocery shopping and meal prepping more  - dining out for dinner ~3 times per week  - has been getting a [employer] Vanilla Late at work  - drinks mostly water and aloe vera juice    Diet history:   Breakfast - Premier Protein or GF cheerios with Lactaid milk  Snack - 0  Lunch - Sweet kale salad with grilled chicken  Snack - 0  Dinner - [place] (2 chicken legs, mexican rice, Hi-C drink)    Anthropometrics/Objective  Today's weight:  There were no vitals filed for this visit.  BMI:  There is no height or weight on file to calculate BMI.  Starting weight/date 188.9 lb   Total weight change : + 4.1 lb            Meal Plan:   High protein low carb diet with 0-1 meal replacements per day    ReAssesment/Notes:  Chase has been grocery shopping and meal prepping more which she thinks has helped her to make healthier food choices. She is eating more consistently throughout the day and snacking less at night. We reviewed the Dining Out tips handout to help Chase to make more healthy choices while dining out, ensuring that the meals Chase chooses still align with her health goals. Provided her with the Grocery Game Plan handout to help her plan ahead with meal planning and creating a grocery list. Chase set her goal to start tracking again on My Fitness Pal. Next visit suggest to review food journal and mindful eating.     Follow-up: 1 month ISHMAEL/MD

## 2020-02-10 NOTE — PROGRESS NOTES
"Bariatric Medicine Follow Up  Chief Complaint   Patient presents with   • Weight Gain       History of Present Illness:   Tonia Xavier is a 29 y.o. female who presents for weight management follow-up and to help address co-morbidities caused by overweight, as below.    During the patient's last visit, the following were discussed and recommended:  Weight Goal: 3-5% wt loss each month (pt goal weight is 165 lb)  Diet: PP every morning  Kale salad for lunch  Continues gluten-free, keto diet per GI recommendations  Physical Activity:  Walking 2 x 20 min  Starting [employer] later this year with Ortho clearance given chronic knee pain  Rx: start phentermine 18 mg/topiramate 25 mg every morning  Watch for anxiety  Pt on OCPs, discussed consequences of conception while on topiramate  Consider liraglutide if antiobesity meds not tolerated  Side Effects: consent in chart  Behavior change: Increase activity level  Resume tracking if no weight loss starting anti-obesity medication    She is getting discouraged, gaining wt.   She continues to have 1 PP in am, tracking.  Stopped this wk once off phentermine, was making her anxiety worse.  She also stopped topiramate but thinks she would tolerate it alone.    She has tried to maintain less night snacking, which has been her goal.  She is eating salads for lunch, ketogenic/gluten free meals per GI.    IFG:  Most recent fasting glucose normal  VDD:  Continues daily Vit D supp  Anxiety:  Controlled on Zoloft  Celiac dz:  As above, no worsening sx    Exercise:   Walking 3 d/wk     Review of Systems   Denies lightheaded off phentermine.  Did have dry mouth, anxiety, lightheaded while on phentermine.  All other ROS were reviewed and are otherwise unchanged from my previous visit with patient.    Physical Exam:    /68 (BP Location: Left arm, Patient Position: Sitting, BP Cuff Size: Large adult)   Pulse 93   Ht 1.651 m (5' 5\")   Wt 87.5 kg (193 lb)   SpO2 " 96%   BMI 32.12 kg/m²   Waist Measurement   Waist: 38.25 inch/inches  Weight change since last visit:  2 lb (4 lb total)  Waist Circum change since last visit:  -0.25 in (-0.75 in total)    Constitutional: Oriented to person, place, and time and well-developed, well-nourished, and in no distress.    Head: Normocephalic.   Musculoskeletal: Normal range of motion. No edema.   Neurological: Alert and oriented to person, place, and time. No muscle weakness.  Gait normal.   Skin: Warm and dry. Not diaphoretic.   Psychiatric: Mood, memory, affect and judgment normal.     Laboratory:   Recent labs reviewed.      Dietitian Assessment: I have reviewed the Dietitian's assessment related to this encounter.     ASSESSMENT/PLAN:  Body mass index is 32.12 kg/m².    Obesity Stage ([place]):  1; Class 1    1. Obesity (BMI 35.0-39.9 without comorbidity) (HCC)     2. IFG (impaired fasting glucose)     3. Vitamin D deficiency     4. Anxiety     5. Celiac disease     6. Weight gain       Although the patient has had some weight gain since her last visit, she has increased her activity level, increase skeletal muscle mass and decreased her waist circumference.  Focus again on tracking, continue stimulus narrowing and a gluten-free/lower carb diet as per gastroenterology recommendations given her celiac disease.  Avoid phentermine given anxiety but continue Topamax alone.  Consider also addition of second anti-obesity medication pending course as discussed today.  Continue vitamin D supplement.    The patient and I have discussed at length and agree to the following recommendations, which are all addressing the above diagnoses:    Weight Goal: 3-5% wt loss each month (pt goal weight is 165 lb)  Diet: PP every morning  Kale salad for lunch  Continues gluten-free, keto diet per GI recommendations  Reduce night snacking, as she is working on  Physical Activity: Walking 20 minutes, goal is every other day  Rx: Topiramate 25 bid (increase  from qd)  Consider Saxenda or Contrave, pt to consider  Did not tolerate phentermine  Side Effects: consent in chart  Behavior change: Increase activity level  Follow-up: 1 month    Patient's body mass index is 32.12 kg/m². Exercise and nutrition counseling were performed at this visit.

## 2020-02-21 ENCOUNTER — HOSPITAL ENCOUNTER (OUTPATIENT)
Dept: RADIOLOGY | Facility: MEDICAL CENTER | Age: 30
End: 2020-02-21
Attending: PSYCHIATRY & NEUROLOGY
Payer: COMMERCIAL

## 2020-02-21 DIAGNOSIS — Z86.69 H/O BELL'S PALSY: ICD-10-CM

## 2020-02-21 PROCEDURE — 700117 HCHG RX CONTRAST REV CODE 255: Performed by: PSYCHIATRY & NEUROLOGY

## 2020-02-21 PROCEDURE — A9576 INJ PROHANCE MULTIPACK: HCPCS | Performed by: PSYCHIATRY & NEUROLOGY

## 2020-02-21 PROCEDURE — 70553 MRI BRAIN STEM W/O & W/DYE: CPT

## 2020-02-21 RX ADMIN — GADOTERIDOL 18 ML: 279.3 INJECTION, SOLUTION INTRAVENOUS at 11:19

## 2020-05-07 ENCOUNTER — OFFICE VISIT (OUTPATIENT)
Dept: MEDICAL GROUP | Facility: MEDICAL CENTER | Age: 30
End: 2020-05-07
Payer: COMMERCIAL

## 2020-05-07 VITALS
HEART RATE: 91 BPM | OXYGEN SATURATION: 94 % | SYSTOLIC BLOOD PRESSURE: 112 MMHG | DIASTOLIC BLOOD PRESSURE: 78 MMHG | BODY MASS INDEX: 33.26 KG/M2 | TEMPERATURE: 97.8 F | HEIGHT: 65 IN | WEIGHT: 199.6 LBS

## 2020-05-07 DIAGNOSIS — R00.2 HEART PALPITATIONS: ICD-10-CM

## 2020-05-07 DIAGNOSIS — M54.40 ACUTE LEFT-SIDED LOW BACK PAIN WITH SCIATICA, SCIATICA LATERALITY UNSPECIFIED: ICD-10-CM

## 2020-05-07 DIAGNOSIS — R10.84 GENERALIZED ABDOMINAL PAIN: ICD-10-CM

## 2020-05-07 PROCEDURE — 99214 OFFICE O/P EST MOD 30 MIN: CPT | Performed by: NURSE PRACTITIONER

## 2020-05-07 PROCEDURE — 93000 ELECTROCARDIOGRAM COMPLETE: CPT | Performed by: NURSE PRACTITIONER

## 2020-05-07 RX ORDER — DICYCLOMINE HYDROCHLORIDE 10 MG/1
10 CAPSULE ORAL
Qty: 30 CAP | Refills: 3 | Status: SHIPPED | OUTPATIENT
Start: 2020-05-07 | End: 2021-08-03

## 2020-05-07 RX ORDER — GABAPENTIN 100 MG/1
100 CAPSULE ORAL 3 TIMES DAILY
Qty: 45 CAP | Refills: 1 | Status: SHIPPED | OUTPATIENT
Start: 2020-05-07 | End: 2020-05-14

## 2020-05-07 ASSESSMENT — FIBROSIS 4 INDEX: FIB4 SCORE: 0.31

## 2020-05-07 NOTE — PROGRESS NOTES
Subjective:     Tonia Xavier is a 30 y.o. female who presents with LBP.    HPI:   Seen in f/u for LBP.  Has had sx long term. Usually goes away quickly.  This time has been going on for awhile and not getting better.  Pain mostly on left side. No leg pain or numbness.  No saddle anesthesia.  No bowel and bladder dysfunction.  Using muscle relaxer.  Sleeping ok.  Pain usually more during day.  Had xray lumbar in 2014 with si joint arthritis.  No hx of injury.  She is having a fluttering in her throat.  Been going on for several day.  No chest pain, SOB or dizziness.  Not occurred in past.  Drinking fluids and eat approp.   She uses bentyl for general abd discomfort.  Uses occas.  Needs refill.       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 45 Cap 1   • dicyclomine (BENTYL) 10 MG Cap Take 1 Cap by mouth 4 Times a Day,Before Meals and at Bedtime. 30 Cap 3   • valacyclovir (VALTREX) 1 GM Tab Take 1 Tab by mouth every day. 90 Tab 1   • FEMYNOR 0.25-35 MG-MCG per tablet Take 1 Tab by mouth every day. 84 Tab 3   • cyanocobalamin (VITAMIN B-12) 500 MCG Tab Take 1 Tab by mouth every day. 30 Tab 0   • sertraline (ZOLOFT) 50 MG Tab TAKE 1 TABLET BY MOUTH EVERY DAY.     • ondansetron (ZOFRAN) 4 MG Tab tablet ZOFRAN 4 MG TABS     •  "fexofenadine (ALLEGRA) 180 MG tablet 24HR ALLERGY RELIEF TABS     • VITAMIN D HIGH POTENCY 1000 units Cap Take 1 Cap by mouth every day.  2   • cyclobenzaprine (FLEXERIL) 10 MG Tab Take 10 mg by mouth 3 times a day as needed.       No current facility-administered medications for this visit.        Allergies   Allergen Reactions   • Phentermine      Worsening anxiety, excessive dry mouth, lightheaded   • Benzoin Hives     Rash        ROS  Constitutional: Negative. Negative for fever, chills, weight loss, malaise/fatigue and diaphoresis.   HENT: Negative. Negative for hearing loss, ear pain, nosebleeds, congestion, sore throat, neck pain, tinnitus and ear discharge.   Respiratory: Negative. Negative for cough, hemoptysis, sputum production, shortness of breath, wheezing and stridor.   Cardiovascular: Negative. Negative for chest pain and PND.   Gastrointestinal: Denies nausea, vomiting, diarrhea, constipation, heartburn, melena or hematochezia.  Genitourinary: Denies dysuria, hematuria, urinary incontinence, frequency or urgency.        Objective:     /78   Pulse 91   Temp 36.6 °C (97.8 °F) (Temporal)   Ht 1.651 m (5' 5\")   Wt 90.5 kg (199 lb 9.6 oz)   SpO2 94%  Body mass index is 33.22 kg/m².    Physical Exam:  Physical Exam   Vitals reviewed.  Constitutional: oriented to person, place, and time. appears well-developed and well-nourished. No distress.   Cardiovascular: Normal rate, regular rhythm, normal heart sounds and intact distal pulses.  Exam reveals no gallop and no friction rub.  No murmur heard.  No carotid bruits.   Pulmonary/Chest: Effort normal and breath sounds normal. No stridor. No respiratory distress. no wheezes or rales. exhibits no tenderness.   Musculoskeletal: Normal range of motion. exhibits no edema. joshua pedal pulses 2+.  Lymphadenopathy: no cervical or supraclavicular adenopathy.   Abd:  No CVAT,  Soft,  Bs noted in all quadrants.  No HSM.  No abdominal tenderness.  Neurological: " alert and oriented to person, place, and time. exhibits normal muscle tone. Coordination normal.   Skin: Skin is warm and dry. no diaphoresis.   Psychiatric: normal mood and affect. behavior is normal.   EKG in office read by me:  NSR, KRISS.        Assessment and Plan:     The following treatment plan was discussed:    1. Acute left-sided low back pain with sciatica, sciatica laterality unspecified  DX-LUMBAR SPINE-2 OR 3 VIEWS    gabapentin (NEURONTIN) 100 MG Cap    try neurontin in addition to flexeril.  check lumbar xray.  f/u w/pt with results.     2. Heart palpitations  EKG - Clinic Performed    EKG WNL, NSR.  continue to monitor.  consider event monitor if sx persist.    3. Generalized abdominal pain  dicyclomine (BENTYL) 10 MG Cap    chronic sx.  uses bentyl prn.  refilled med.          Followup: Return if symptoms worsen or fail to improve.

## 2020-05-08 ENCOUNTER — HOSPITAL ENCOUNTER (OUTPATIENT)
Dept: RADIOLOGY | Facility: MEDICAL CENTER | Age: 30
End: 2020-05-08
Attending: NURSE PRACTITIONER
Payer: COMMERCIAL

## 2020-05-08 DIAGNOSIS — M54.40 ACUTE LEFT-SIDED LOW BACK PAIN WITH SCIATICA, SCIATICA LATERALITY UNSPECIFIED: ICD-10-CM

## 2020-05-08 PROCEDURE — 72100 X-RAY EXAM L-S SPINE 2/3 VWS: CPT

## 2020-05-12 ENCOUNTER — TELEPHONE (OUTPATIENT)
Dept: MEDICAL GROUP | Facility: MEDICAL CENTER | Age: 30
End: 2020-05-12

## 2020-05-12 NOTE — TELEPHONE ENCOUNTER
Please let pt know that the lumbar xray is wnl.  Let me know if pain continues and we can consider PT.

## 2020-05-14 DIAGNOSIS — M54.40 ACUTE LEFT-SIDED LOW BACK PAIN WITH SCIATICA, SCIATICA LATERALITY UNSPECIFIED: ICD-10-CM

## 2020-05-14 RX ORDER — GABAPENTIN 100 MG/1
CAPSULE ORAL
Qty: 270 CAP | Refills: 3 | Status: SHIPPED | OUTPATIENT
Start: 2020-05-14 | End: 2020-09-15

## 2020-05-21 NOTE — OP THERAPY EVALUATION
Outpatient Physical Therapy  INITIAL EVALUATION    [facility] [place]  [address]  [place] [address]  Phone:  [phone]  Fax:  [phone]    Date of Evaluation: 05/22/2020    Patient: Chase Xavier  YOB: 1990  MRN: [id number]     Referring Provider: LIZ Maldonado  [address] [facility]  [address]  [place] [address]   Referring Diagnosis Radiculopathy, cervical region [M54.12]     Time Calculation                   Chief Complaint: No chief complaint on file.    {No diagnosis found. (Refresh or delete this SmartLink)}      Subjective:   History of Present Illness:     Mechanism of injury:  Tonia Xavier is a 30 y.o. female that presents to therapy with ***. She states that symptoms came on with *** onset. Her pain is located ***    Aggravating factors:   Relieving factors:    ADL limitations:        Past Medical History:   Diagnosis Date   • Anxiety    • HSV infection 9/7/2019   • Hyperlipidemia LDL goal <100 9/9/2019   • IFG (impaired fasting glucose) 9/9/2019   • Obesity (BMI 30-39.9) 1/11/2018   • Ovarian cyst 11/2018    left side   • Vitamin D deficiency 2/1/2018     Past Surgical History:   Procedure Laterality Date   • KNEE ARTHROSCOPY Right 11/13/2018    Procedure: KNEE ARTHROSCOPY;  Surgeon: Dexter Pimentel M.D.;  Location: [facility];  Service: Orthopedics   • SYNOVECTOMY Right 11/13/2018    Procedure: SYNOVECTOMY;  Surgeon: Dexter Pimentel M.D.;  Location: [facility];  Service: Orthopedics   • MEDIAL MENISCECTOMY Right 11/13/2018    Procedure: MEDIAL MENISCECTOMY-  PARTIAL, AND PARTIAL LATERAL;  Surgeon: Dexter Pimentel M.D.;  Location: [facility];  Service: Orthopedics   • CHONDROPLASTY  11/13/2018    Procedure: CHONDROPLASTY;  Surgeon: Dexter Pimentel M.D.;  Location: [facility];  Service: Orthopedics   • HARDWARE REMOVAL ORTHO  11/13/2018    Procedure: HARDWARE REMOVAL ORTHO;   Surgeon: Dexter Pimentel M.D.;  Location: [facility];  Service: Orthopedics   • BONE GRAFT  2018    Procedure: BONE GRAFT;  Surgeon: Dexter Pimentel M.D.;  Location: [facility];  Service: Orthopedics   • REPEAT C SECTION  2017    Procedure: REPEAT C SECTION;  Surgeon: Asmita Bolivar M.D.;  [facility];  Service: Obstetrics   • REPEAT C SECTION  10/19/2015    Procedure: REPEAT C SECTION;  Surgeon: Mary Randolph M.D.;  [facility]: [facility];  Service:    • ACL RECONSTRUCTION SCOPE  10/2/2014    Performed by Dexter Pimentel M.D. at [facility]   • KNEE ARTHROSCOPY  2014    Performed by Dexter Pimentel M.D. at [facility]   • HARDWARE REMOVAL ORTHO  2014    Performed by Dexter Pimentel M.D. at [facility]   • BONE GRAFT  2014    Performed by Dexter Pimentel M.D. at [facility]   • PB  DELIVERY ONLY     • ACL RECONSTRUCTION Right 2006     Social History     Tobacco Use   • Smoking status: Never Smoker   • Smokeless tobacco: Never Used   Substance Use Topics   • Alcohol use: No     Family and Occupational History     Socioeconomic History   • Marital status:      Spouse name: Not on file   • Number of children: Not on file   • Years of education: Not on file   • Highest education level: Not on file   Occupational History   • Not on file       Objective      Therapeutic Exercises (CPT 58577):       Therapeutic Exercise Summary: HEP instruction/performance and development. Handout provided and exercises located below:  ***      Time-based treatments/modalities:           Assessment, Response and Plan:   Assessment details:  Tonia Xavier is a 30 y.o. female with signs and symptoms consistent with ***.She requires skilled physical therapy intervention to decrease pain, increase range of motion, increase functional mobility, improve ADL completion and establish a home  exercise program.  Goals:   Short Term Goals:   1. Patient will be Independent with prescribed Home Exercise Program (HEP) and will be able to demonstrate exercises without cues for improved overall symptoms/activity tolerance.   2. Pt will improve ***      Long Term Goals:    3. Pt will improve ***  4. Pt will improve NDI score to less than *** indicative of improved function and reduced perceived disability.  5.Pt will improve DASH score to less than *** indicative of improved function and reduced perceived disability.    Plan:   Planned therapy interventions:  Therapeutic Exercise (CPT 78111), Therapeutic Activities (CPT 63973), Manual Therapy (CPT 33970), Neuromuscular Re-education (CPT 81535), E Stim Unattended (CPT 23172) and Gait Training (CPT 28579)  Discussed with:  Patient      Functional Assessment Used        Referring provider co-signature:  I have reviewed this plan of care and my co-signature certifies the need for services.    Physician Signature: ________________________________ Date: ______________

## 2020-05-22 ENCOUNTER — PHYSICAL THERAPY (OUTPATIENT)
Dept: PHYSICAL THERAPY | Facility: REHABILITATION | Age: 30
End: 2020-05-22
Attending: PHYSICIAN ASSISTANT
Payer: COMMERCIAL

## 2020-05-22 DIAGNOSIS — M54.12 RADICULOPATHY, CERVICAL: ICD-10-CM

## 2020-05-22 DIAGNOSIS — M54.16 LUMBAR RADICULOPATHY: ICD-10-CM

## 2020-05-22 PROCEDURE — 97161 PT EVAL LOW COMPLEX 20 MIN: CPT

## 2020-05-22 PROCEDURE — 97110 THERAPEUTIC EXERCISES: CPT

## 2020-05-22 ASSESSMENT — ENCOUNTER SYMPTOMS
PAIN SCALE AT LOWEST: 4
PAIN SCALE: 6
QUALITY: SHOOTING
QUALITY: SHARP
QUALITY: ACHING
PAIN SCALE AT HIGHEST: 7
PAIN TIMING: CONSTANT

## 2020-05-22 NOTE — Clinical Note
May 22, 2020    MARIALUISA Maldonado  [address] [facility] [address]  [place] [address]    Patient: Chase Xavier   YOB: 1990   Date of Visit: 5/22/2020       Dear Marialuisa Maldonado  [address] [facility] [address]  [place] [address]  [phone]    The attached plan of care is being sent to you because your patient’s medical reimbursement requires that you certify the plan of care. Your signature is required to allow uninterrupted insurance coverage.      You may indicate your approval by signing below and faxing this form back to us at Dept Fax: [phone].    Please call Dept: [phone] with any questions or concerns.    Thank you for this referral,        João Hawkins, PT, DPT  [facility] OUTPATIENT PHYSICAL THERAPY [address] [id number] [phone]    Payer: Payor: [employer] / Plan: [facility] 058 / Product Type: *No Product type* /                                                                         Date:     Dear João Hawkins, PT, DPT,     Re: Ms. Tonia Xavier, MRN:[id number]    I certify that I have reviewed the attached plan of care and it is medically necessary for Ms. Tonia Xavier (1990) who is under my care.          ______________________________________                    _________________  Provider name and credentials                                           Date and time                                                                                           Specialty Plan of Care 05/22/20   Effective from: 5/22/2020  Effective to: 7/3/2020    Plan ID: [id number]           Participants     Name Type Comments Contact Info    MARIALUISA Maldonado Provider  [phone]    João Hawkins PT, DPT PT        Evaluation/Progress Summary     Author: João Hawkins PT, DPT Status: Sign when Signing Visit Last edited: 5/22/2020  3:30 PM         Outpatient Physical Therapy  INITIAL EVALUATION    [facility]  Outpatient Physical Therapy [place] [address]  [place] [address]  Phone:  [phone]  Fax:  [phone]    Date of Evaluation: 2020    Patient: Chase Xavier  YOB: 1990  MRN: [id number]     Referring Provider: LIZ Maldonado  [address] [facility]  [address]  [place] [address]   Referring Diagnosis Radiculopathy, cervical region [M54.12]     Time Calculation    Start time: 1530  Stop time: 1630 Time Calculation (min): 60 minutes         Chief Complaint: back problem, hand numbness     Visit Diagnoses     ICD-10-CM   1. Radiculopathy, cervical  M54.12   2. Lumbar radiculopathy  M54.16     Subjective:   History of Present Illness:     Mechanism of injury:  Tonia Xavier is a 30 y.o. female that presents to therapy with low back pain for at least 10 years, and R hand and arm numbness and tingling. She states that symptoms came on with insidious onset. Her pain is located along her low back on both sides with more pain being on her left with sometimes the pain descending towards her buttocks but does not descend further. Her numbness and tingling is along the back of her R hand and can ascend towards her elbow.     Aggravating factors: laying down at night (numbness), bending, sitting down for longer periods of time. >1 hour.     Relieving factors: laying on stomach, cold compress, muscle relaxer    ADL limitations: difficulty with sitting prolonged.    Pain:     Current pain ratin    At best pain ratin    At worst pain ratin    Quality:  Shooting, sharp and aching    Pain timing:  Constant      Past Medical History:   Diagnosis Date   • Anxiety    • HSV infection 2019   • Hyperlipidemia LDL goal <100 2019   • IFG (impaired fasting glucose) 2019   • Obesity (BMI 30-39.9) 2018   • Ovarian cyst 2018    left side   • Vitamin D deficiency 2018     Past Surgical History:   Procedure Laterality Date   • KNEE ARTHROSCOPY Right  2018    Procedure: KNEE ARTHROSCOPY;  Surgeon: Dexter Pimentel M.D.;  Location: [facility];  Service: Orthopedics   • SYNOVECTOMY Right 2018    Procedure: SYNOVECTOMY;  Surgeon: Dexter Pimentel M.D.;  Location: [facility];  Service: Orthopedics   • MEDIAL MENISCECTOMY Right 2018    Procedure: MEDIAL MENISCECTOMY-  PARTIAL, AND PARTIAL LATERAL;  Surgeon: Dexter Pimentel M.D.;  Location: [facility];  Service: Orthopedics   • CHONDROPLASTY  2018    Procedure: CHONDROPLASTY;  Surgeon: Dexter Pimentel M.D.;  Location: [facility];  Service: Orthopedics   • HARDWARE REMOVAL ORTHO  2018    Procedure: HARDWARE REMOVAL ORTHO;  Surgeon: Dexter Pimentel M.D.;  Location: [facility];  Service: Orthopedics   • BONE GRAFT  2018    Procedure: BONE GRAFT;  Surgeon: Dexter Pimentel M.D.;  Location: [facility];  Service: Orthopedics   • REPEAT C SECTION  2017    Procedure: REPEAT C SECTION;  Surgeon: Asmita Bolivar M.D.;  [facility]: [facility];  Service: Obstetrics   • REPEAT C SECTION  10/19/2015    Procedure: REPEAT C SECTION;  Surgeon: Mary Randolph M.D.;  [facility]: [facility];  Service:    • ACL RECONSTRUCTION SCOPE  10/2/2014    Performed by Dexter Pimentel M.D. at [facility]   • KNEE ARTHROSCOPY  2014    Performed by Dexter Pimentel M.D. at [facility]   • HARDWARE REMOVAL ORTHO  2014    Performed by Dexter Pimentel M.D. at [facility]   • BONE GRAFT  2014    Performed by Dexter Pimentel M.D. at [facility]   • PB  DELIVERY ONLY     • ACL RECONSTRUCTION Right 2006     Social History     Tobacco Use   • Smoking status: Never Smoker   • Smokeless tobacco: Never Used   Substance Use Topics   • Alcohol use: No     Family and Occupational History     Socioeconomic History   • Marital status:      Spouse name: Not on file   •  Number of children: Not on file   • Years of education: Not on file   • Highest education level: Not on file   Occupational History   • Not on file       Objective     Shoulder Screen    Shoulder active range of motion within functional limits.  Shoulder strength within functional limits.  Shoulder joint mobility within functional limits.  Hip Screen   Hip range of motion within functional limits.  Hip strength within functional limits with the following exceptions: Left hip flexion, external rotation, abduction 4+/5 compared to R 5/5  Hip joint mobility within functional limits    Neurological Testing     Myotome testing   Lumbar (left)   L2 (hip flexors): 4+  L3 (knee extensors): 5  L4 (ankle dorsiflexors): 5  L5 (great toe extension): 5  S1 (ankle plantar flexors): 5    Lumbar (right)   All right lumbar myotomes within normal limits    Dermatome testing   Lumbar (left)   All left lumbar dermatomes intact    Lumbar (right)   All right lumbar dermatomes intact    Active Range of Motion     Cervical spine: All cervical active range of motion within functional limits    Lumbar   Flexion: decreased  Extension: decreased  Left lateral flexion: within functional limits  Right lateral flexion: within functional limits  Left rotation: within functional limits  Right rotation: within functional limits    Passive Range of Motion     Cervical spine: All cervical passive range of motion within functional limits    Strength:      Abdominals   Lower abdominals: Able to maintain neutral statically    Back   Flexion: 4+  Extension: 4+    Left Hip   Planes of Motion   Flexion: 4+  Extension: 5  Abduction: 4+  Adduction: 5    Right Hip   Planes of Motion   Flexion: 5  Extension: 5  Abduction: 5  Adduction: 5    Left Knee   Flexion: 5  Extension: 5    Right Knee   Flexion: 5  Extension: 5    Left Ankle/Foot   Dorsiflexion: 5  Plantar flexion: 5    Right Ankle/Foot   Dorsiflexion: 5  Plantar flexion: 5    Tests     Left Pelvic  Girdle/Sacrum   Positive: sacral rotation and sacral thrust.     Left Hip   Negative SI compression and SI distraction.   SLR: SLR test positive: positive for back pain.     Nicholas LumbarTest   Static tests   Lying prone: decreased symptoms of 5-6/10  Lying prone on elbows: decreased symptoms of 5-6/10    Lying repeated motions:   Repeat extension in lying     Symptoms during testing: no effect    Symptoms after testing: no effect    Mechanical response: no effect        Therapeutic Exercises (CPT 37378):       Therapeutic Exercise Summary: HEP instruction/performance and development. Handout provided and exercises located below:  Access Code: [email or web address]   URL: [email or web address]   Date: 05/22/2020   Prepared by: João Hawkins     Exercises  Static Prone on Elbows - 120 hold - 3x daily  Prone Press Up on Elbows - 10 reps  Prone Press Up - 10 reps      Time-based treatments/modalities:    Physical Therapy Timed Treatment Charges  Therapeutic exercise minutes (CPT 89818): 15 minutes      Assessment, Response and Plan:   Assessment details:  Tonia Xavier is a 30 y.o. female with signs and symptoms consistent with lumbar radicular pain. During evaluation she noted to have numbness in her UE about 3 weeks ago so less attention and time were spent evaluating that issue. A trial of skilled physical therapy intervention to decrease pain, increase range of motion, increase functional mobility, improve ADL completion and establish a home exercise program is warranted.   Goals:   Short Term Goals:   1. Patient will be Independent with prescribed Home Exercise Program (HEP) and will be able to demonstrate exercises without cues for improved overall symptoms/activity tolerance.   2. Pt will improve ability to sit for longer than an hour as needed as part of ADLS without increased low back pain.   Short term goal time span:  2-4 weeks      Long Term Goals:    3. Pt will improve ability to bend forward to pick  up light objects <15lbs without increased pain.   4. Pt will improve LBDI score to less than 23% indicative of improved function and reduced perceived disability.  Long term goal time span:  4-6 weeks    Plan:   Planned therapy interventions:  Therapeutic Exercise (CPT 68271), Therapeutic Activities (CPT 27398), Manual Therapy (CPT 24589), Neuromuscular Re-education (CPT 83499) and E Stim Unattended (CPT 29474)  Frequency:  1x week  Duration in weeks:  6  Discussed with:  Patient      Functional Assessment Used  PT Functional Assessment Tool Used: LBDI  PT Functional Assessment Score: 32%     Referring provider co-signature:  I have reviewed this plan of care and my co-signature certifies the need for services.    Physician Signature: ________________________________ Date: ______________                      Updated Participants     Name Type Comments Contact Info    LIZ Maldonado Provider  [phone]    Signature pending    João Hawkins, PT, DPT PT

## 2020-05-22 NOTE — OP THERAPY EVALUATION
Outpatient Physical Therapy  INITIAL EVALUATION    [facility] [place]  [address]  [place] [address]  Phone:  [phone]  Fax:  [phone]    Date of Evaluation: 2020    Patient: Chase Xavier  YOB: 1990  MRN: [id number]     Referring Provider: LIZ Maldonado  [address]  [address]  [place] [address]   Referring Diagnosis Radiculopathy, cervical region [M54.12]     Time Calculation    Start time: 1530  Stop time: 1630 Time Calculation (min): 60 minutes         Chief Complaint: back problem, hand numbness     Visit Diagnoses     ICD-10-CM   1. Radiculopathy, cervical  M54.12   2. Lumbar radiculopathy  M54.16     Subjective:   History of Present Illness:     Mechanism of injury:  Tonia Xavier is a 30 y.o. female that presents to therapy with low back pain for at least 10 years, and R hand and arm numbness and tingling. She states that symptoms came on with insidious onset. Her pain is located along her low back on both sides with more pain being on her left with sometimes the pain descending towards her buttocks but does not descend further. Her numbness and tingling is along the back of her R hand and can ascend towards her elbow.     Aggravating factors: laying down at night (numbness), bending, sitting down for longer periods of time. >1 hour.     Relieving factors: laying on stomach, cold compress, muscle relaxer    ADL limitations: difficulty with sitting prolonged.    Pain:     Current pain ratin    At best pain ratin    At worst pain ratin    Quality:  Shooting, sharp and aching    Pain timing:  Constant      Past Medical History:   Diagnosis Date   • Anxiety    • HSV infection 2019   • Hyperlipidemia LDL goal <100 2019   • IFG (impaired fasting glucose) 2019   • Obesity (BMI 30-39.9) 2018   • Ovarian cyst 2018    left side   • Vitamin D deficiency 2018     Past Surgical History:    Procedure Laterality Date   • KNEE ARTHROSCOPY Right 2018    Procedure: KNEE ARTHROSCOPY;  Surgeon: Dexter Pimentel M.D.;  Location: [facility];  Service: Orthopedics   • SYNOVECTOMY Right 2018    Procedure: SYNOVECTOMY;  Surgeon: Dexter Pimentel M.D.;  Location: [facility];  Service: Orthopedics   • MEDIAL MENISCECTOMY Right 2018    Procedure: MEDIAL MENISCECTOMY-  PARTIAL, AND PARTIAL LATERAL;  Surgeon: Dexter Pimentel M.D.;  Location: [facility];  Service: Orthopedics   • CHONDROPLASTY  2018    Procedure: CHONDROPLASTY;  Surgeon: Dexter Pimentel M.D.;  Location: [facility];  Service: Orthopedics   • HARDWARE REMOVAL ORTHO  2018    Procedure: HARDWARE REMOVAL ORTHO;  Surgeon: Dexter Pimentel M.D.;  Location: [facility];  Service: Orthopedics   • BONE GRAFT  2018    Procedure: BONE GRAFT;  Surgeon: Dexter Pimentel M.D.;  Location: [facility];  Service: Orthopedics   • REPEAT C SECTION  2017    Procedure: REPEAT C SECTION;  Surgeon: Asmita Bolivar M.D.;  Location: [facility];  Service: Obstetrics   • REPEAT C SECTION  10/19/2015    Procedure: REPEAT C SECTION;  Surgeon: Mary Randolph M.D.;  Location: [facility];  Service:    • ACL RECONSTRUCTION SCOPE  10/2/2014    Performed by Dexter Pimentel M.D. at [facility]   • KNEE ARTHROSCOPY  2014    Performed by Dexter Pimentel M.D. at [facility]   • HARDWARE REMOVAL ORTHO  2014    Performed by Dexter Pimentel M.D. at [facility]   • BONE GRAFT  2014    Performed by Dexter Pimentel M.D. at [facility]   • PB  DELIVERY ONLY     • ACL RECONSTRUCTION Right 2006     Social History     Tobacco Use   • Smoking status: Never Smoker   • Smokeless tobacco: Never Used   Substance Use Topics   • Alcohol use: No     Family and Occupational History     Socioeconomic History   •  Marital status:      Spouse name: Not on file   • Number of children: Not on file   • Years of education: Not on file   • Highest education level: Not on file   Occupational History   • Not on file       Objective     Shoulder Screen    Shoulder active range of motion within functional limits.  Shoulder strength within functional limits.  Shoulder joint mobility within functional limits.  Hip Screen   Hip range of motion within functional limits.  Hip strength within functional limits with the following exceptions: Left hip flexion, external rotation, abduction 4+/5 compared to R 5/5  Hip joint mobility within functional limits    Neurological Testing     Myotome testing   Lumbar (left)   L2 (hip flexors): 4+  L3 (knee extensors): 5  L4 (ankle dorsiflexors): 5  L5 (great toe extension): 5  S1 (ankle plantar flexors): 5    Lumbar (right)   All right lumbar myotomes within normal limits    Dermatome testing   Lumbar (left)   All left lumbar dermatomes intact    Lumbar (right)   All right lumbar dermatomes intact    Active Range of Motion     Cervical spine: All cervical active range of motion within functional limits    Lumbar   Flexion: decreased  Extension: decreased  Left lateral flexion: within functional limits  Right lateral flexion: within functional limits  Left rotation: within functional limits  Right rotation: within functional limits    Passive Range of Motion     Cervical spine: All cervical passive range of motion within functional limits    Strength:      Abdominals   Lower abdominals: Able to maintain neutral statically    Back   Flexion: 4+  Extension: 4+    Left Hip   Planes of Motion   Flexion: 4+  Extension: 5  Abduction: 4+  Adduction: 5    Right Hip   Planes of Motion   Flexion: 5  Extension: 5  Abduction: 5  Adduction: 5    Left Knee   Flexion: 5  Extension: 5    Right Knee   Flexion: 5  Extension: 5    Left Ankle/Foot   Dorsiflexion: 5  Plantar flexion: 5    Right Ankle/Foot    Dorsiflexion: 5  Plantar flexion: 5    Tests     Left Pelvic Girdle/Sacrum   Positive: sacral rotation and sacral thrust.     Left Hip   Negative SI compression and SI distraction.   SLR: SLR test positive: positive for back pain.     Nicholas LumbarTest   Static tests   Lying prone: decreased symptoms of 5-6/10  Lying prone on elbows: decreased symptoms of 5-6/10    Lying repeated motions:   Repeat extension in lying     Symptoms during testing: no effect    Symptoms after testing: no effect    Mechanical response: no effect        Therapeutic Exercises (CPT 76929):       Therapeutic Exercise Summary: HEP instruction/performance and development. Handout provided and exercises located below:  Access Code: [email or web address]   URL: [email or web address]   Date: 05/22/2020   Prepared by: João Hawkins     Exercises  Static Prone on Elbows - 120 hold - 3x daily  Prone Press Up on Elbows - 10 reps  Prone Press Up - 10 reps      Time-based treatments/modalities:    Physical Therapy Timed Treatment Charges  Therapeutic exercise minutes (CPT 45470): 15 minutes      Assessment, Response and Plan:   Assessment details:  Tonia Xavier is a 30 y.o. female with signs and symptoms consistent with lumbar radicular pain. During evaluation she noted to have numbness in her UE about 3 weeks ago so less attention and time were spent evaluating that issue. A trial of skilled physical therapy intervention to decrease pain, increase range of motion, increase functional mobility, improve ADL completion and establish a home exercise program is warranted.   Goals:   Short Term Goals:   1. Patient will be Independent with prescribed Home Exercise Program (HEP) and will be able to demonstrate exercises without cues for improved overall symptoms/activity tolerance.   2. Pt will improve ability to sit for longer than an hour as needed as part of ADLS without increased low back pain.   Short term goal time span:  2-4 weeks      Long  Term Goals:    3. Pt will improve ability to bend forward to  light objects <15lbs without increased pain.   4. Pt will improve LBDI score to less than 23% indicative of improved function and reduced perceived disability.  Long term goal time span:  4-6 weeks    Plan:   Planned therapy interventions:  Therapeutic Exercise (CPT 06481), Therapeutic Activities (CPT 28973), Manual Therapy (CPT 71488), Neuromuscular Re-education (CPT 10682) and E Stim Unattended (CPT 06357)  Frequency:  1x week  Duration in weeks:  6  Discussed with:  Patient      Functional Assessment Used  PT Functional Assessment Tool Used: LBDI  PT Functional Assessment Score: 32%     Referring provider co-signature:  I have reviewed this plan of care and my co-signature certifies the need for services.    Physician Signature: ________________________________ Date: ______________

## 2020-05-28 ENCOUNTER — PHYSICAL THERAPY (OUTPATIENT)
Dept: PHYSICAL THERAPY | Facility: REHABILITATION | Age: 30
End: 2020-05-28
Attending: PHYSICIAN ASSISTANT
Payer: COMMERCIAL

## 2020-05-28 DIAGNOSIS — M54.16 LUMBAR RADICULOPATHY: ICD-10-CM

## 2020-05-28 DIAGNOSIS — M54.12 RADICULOPATHY, CERVICAL: ICD-10-CM

## 2020-05-28 PROCEDURE — 97140 MANUAL THERAPY 1/> REGIONS: CPT

## 2020-05-28 PROCEDURE — 97014 ELECTRIC STIMULATION THERAPY: CPT

## 2020-05-28 PROCEDURE — 97110 THERAPEUTIC EXERCISES: CPT

## 2020-05-28 NOTE — OP THERAPY DAILY TREATMENT
Outpatient Physical Therapy  DAILY TREATMENT     [facility] [address], [address]  [facility] [address]  Phone:  [phone]  Fax:  [phone]    Date: 05/28/2020    Patient: Chase Xavier  YOB: 1990  MRN: [id number]     Time Calculation    Start time: 0400  Stop time: 0445 Time Calculation (min): 45 minutes     Chief Complaint: back problem, arm/neck problem    Visit #: 2    SUBJECTIVE:  Reports pain in low back is a little bit better since last visit. Notes that she had numbness and tingling 2 nights since last week.    OBJECTIVE:  Current objective measures: reduction in pain with prone positioning, no N/T reported during session.           Therapeutic Exercises (CPT 05321):     1. Prone positioning propped, 3min    2. Supine pelvit tilt, 2min, pain with anterior tild reported    3. SKTC, 30sec each    4. Single leg kick backs in quadruped, x 10 each    5. Prayer stretch, 20 sec x 3      Therapeutic Exercise Summary: HEP instruction/performance and development. Handout provided and exercises located below:  Access Code: [email or web address]   URL: [email or web address]   Date: 05/28/2020   Prepared by: João Hawkins     Exercises  Static Prone on Elbows - 120 hold - 3x daily  Median Nerve Flossing - 12 reps - 2x daily  Sidelying Thoracic Rotation with Open Book - 12 reps - 2 sets - 3x weekly  Prone Press Up on Elbows - 10 reps    Therapeutic Treatments and Modalities:     1. E Stim Unattended (CPT 94345), IFC and CP to lumbar spine x 15 min in prone    Time-based treatments/modalities:    Physical Therapy Timed Treatment Charges  Therapeutic exercise minutes (CPT 29917): 30 minutes      Pain rating (1-10) before treatment:  3  Pain rating (1-10) after treatment:  2    ASSESSMENT:   Response to treatment: Symptoms non specific and directional preference not identified during appt. Pt will continue with prone extension exercises and progression to occur within  coming weeks as tolerated.     PLAN/RECOMMENDATIONS:   Plan for treatment: therapy treatment to continue next visit.  Planned interventions for next visit: continue with current treatment.

## 2020-06-05 ENCOUNTER — PHYSICAL THERAPY (OUTPATIENT)
Dept: PHYSICAL THERAPY | Facility: REHABILITATION | Age: 30
End: 2020-06-05
Attending: PHYSICIAN ASSISTANT
Payer: COMMERCIAL

## 2020-06-05 DIAGNOSIS — M54.16 LUMBAR RADICULOPATHY: ICD-10-CM

## 2020-06-05 DIAGNOSIS — M54.12 RADICULOPATHY, CERVICAL: ICD-10-CM

## 2020-06-05 PROCEDURE — 97110 THERAPEUTIC EXERCISES: CPT

## 2020-06-05 NOTE — OP THERAPY DAILY TREATMENT
Outpatient Physical Therapy  DAILY TREATMENT     [facility] [address], [address]  [facility] [address]  Phone:  [phone]  Fax:  [phone]    Date: 06/05/2020    Patient: Chase Xavier  YOB: 1990  MRN: [id number]     Time Calculation    Start time: 1535  Stop time: 1605 Time Calculation (min): 30 minutes     Chief Complaint: back problem, arm/neck problem    Visit #: 3    SUBJECTIVE:  Has not changed her exercises since last visit. Notes that she has no pain today has been doing fine. Had some pain with sitting at a meeting for 2 hours yesterday.      OBJECTIVE:  Current objective measures:30 mmhg     reduction in pain with prone positioning, no N/T reported during session.           Therapeutic Exercises (CPT 09638):     1. Prone positioning propped, 3min    2. Supine pelvit tilt, 2min, pain with anterior tild reported    3. SKTC, 30sec each    4. Single leg kick backs in quadruped, x 10 each    5. Prayer stretch, 20 sec x 3      Therapeutic Exercise Summary: HEP instruction/performance and development. Handout provided and exercises located below:  Access Code: [email or web address]   URL: [email or web address]   Date: 05/28/2020   Prepared by: João Hawkins     Exercises  Static Prone on Elbows - 120 hold - 3x daily  Median Nerve Flossing - 12 reps - 2x daily  Sidelying Thoracic Rotation with Open Book - 12 reps - 2 sets - 3x weekly  Prone Press Up on Elbows - 10 reps    Therapeutic Treatments and Modalities:     1. E Stim Unattended (CPT 95941), IFC and CP to lumbar spine x 15 min in prone    Time-based treatments/modalities:    Physical Therapy Timed Treatment Charges  Therapeutic exercise minutes (CPT 25656): 30 minutes    Pain rating (1-10) before treatment:  0  Pain rating (1-10) after treatment:  0    ASSESSMENT:   Response to treatment: no specific pain brought on with treatment. Pt to continue HEP with progressions taught at last visit.      PLAN/RECOMMENDATIONS:   Plan for treatment: therapy treatment to continue next visit.  Planned interventions for next visit: continue with current treatment.

## 2020-06-11 ENCOUNTER — NON-PROVIDER VISIT (OUTPATIENT)
Dept: HEALTH INFORMATION MANAGEMENT | Facility: MEDICAL CENTER | Age: 30
End: 2020-06-11
Payer: COMMERCIAL

## 2020-06-11 ENCOUNTER — HOSPITAL ENCOUNTER (OUTPATIENT)
Dept: LAB | Facility: MEDICAL CENTER | Age: 30
End: 2020-06-11
Payer: COMMERCIAL

## 2020-06-11 DIAGNOSIS — E66.01 MORBID (SEVERE) OBESITY DUE TO EXCESS CALORIES (HCC): ICD-10-CM

## 2020-06-11 LAB
BDY FAT % MEASURED: 31 %
BP DIAS: 56 MMHG
BP SYS: 107 MMHG
CHOLEST SERPL-MCNC: 150 MG/DL (ref 100–199)
DIABETES HTDIA: NO
EVENT NAME HTEVT: NORMAL
FASTING HTFAS: NO
GLUCOSE SERPL-MCNC: 81 MG/DL (ref 65–99)
HDLC SERPL-MCNC: 38 MG/DL
HYPERTENSION HTHYP: NO
LDLC SERPL CALC-MCNC: 68 MG/DL
SCREENING LOC CITY HTCIT: NORMAL
SCREENING LOC STATE HTSTA: NORMAL
SCREENING LOCATION HTLOC: NORMAL
SMOKING HTSMO: NO
SUBSCRIBER ID HTSID: NORMAL
TRIGL SERPL-MCNC: 218 MG/DL (ref 0–149)

## 2020-06-11 PROCEDURE — 80061 LIPID PANEL: CPT

## 2020-06-11 PROCEDURE — 36415 COLL VENOUS BLD VENIPUNCTURE: CPT

## 2020-06-11 PROCEDURE — 97803 MED NUTRITION INDIV SUBSEQ: CPT | Mod: 95,CR | Performed by: DIETITIAN, REGISTERED

## 2020-06-11 PROCEDURE — 82947 ASSAY GLUCOSE BLOOD QUANT: CPT

## 2020-06-11 PROCEDURE — S5190 WELLNESS ASSESSMENT BY NONPH: HCPCS

## 2020-06-11 NOTE — PROGRESS NOTES
This encounter was conducted via Zoom .   Verbal consent was obtained. Patient's identity was verified.    Nutrition Reassess: Medical Weight Management   Today's date: 6/11/2020  Referring Provider: No ref. provider found      Time: in/out 10:58-10:22  Visit#: 6    Subjective:  - has been tracking again   - wanted to follow up on diet changes  - used to stop by fast food/[employer] for breakfast   - has celiac - does gluten free; dx 6-9 months ago  - uses PP shake for breakfast  - snacks: peanut butter and celery aor fruits  - lunch = sweet kale salad, ground turkey with NS veggies nargis lettuce wrap, tuna, or PP shake  - no more fast food   - does not drink soda   - walks 4-5x a week .5-1hr each time  - 3 weeks has been making new changes      Anthropometrics/Objective  Today's weight:  There were no vitals filed for this visit.  BMI:  There is no height or weight on file to calculate BMI.  Starting weight/date 188 lbs    Current weight: 195 lbs (stated)   Total weight change : +7 lbs            Meal Plan:   High protein, lower carb with 1-2 meal replacements per day     ReAssesment/Notes:    Chase has been doing well working on her health goals towards weight loss. She has been making great changes to her diet to incorporate healthier food options. She states she has been making changes but has gained weight. Let her know that she may still have inflammation from the gluten intolerance that can be contributing to partial difficulty of weight loss. We discussed having a protein as our main snack and adding NS veggie or fruits as needed. Discussed portion sizes for different macro groups as well as making sure to track 'hidden' sources likes dressings, prep methods, or even meat choices. Encouraged Chase to reach out with any questions in the meantime. Sent Chase updated Snacks list as well.    Follow-up: 4-6 weeks RD/MD

## 2020-06-12 ENCOUNTER — PHYSICAL THERAPY (OUTPATIENT)
Dept: PHYSICAL THERAPY | Facility: REHABILITATION | Age: 30
End: 2020-06-12
Attending: PHYSICIAN ASSISTANT
Payer: COMMERCIAL

## 2020-06-12 DIAGNOSIS — M54.12 RADICULOPATHY, CERVICAL: ICD-10-CM

## 2020-06-12 DIAGNOSIS — M54.16 LUMBAR RADICULOPATHY: ICD-10-CM

## 2020-06-12 PROCEDURE — 97014 ELECTRIC STIMULATION THERAPY: CPT

## 2020-06-12 PROCEDURE — 97110 THERAPEUTIC EXERCISES: CPT

## 2020-06-12 NOTE — OP THERAPY DAILY TREATMENT
Outpatient Physical Therapy  DAILY TREATMENT     [facility] [place]  [address], [address]  [facility] [address]  Phone:  [phone]  Fax:  [phone]    Date: 06/12/2020    Patient: Chase Xavier  YOB: 1990  MRN: [id number]     Time Calculation    Start time: 1500  Stop time: 1545 Time Calculation (min): 45 minutes     Chief Complaint: back problem, arm/neck problem    Visit #: 4    SUBJECTIVE:  No pain today chronic recurrence has not occurred within the last week. Has not needed to sit down for very long but does a meeting next week where she will need to.      OBJECTIVE:  Current objective measures:30 mmhg     reduction in pain with prone positioning, no N/T reported during session.           Therapeutic Exercises (CPT 99582):     1. Prone positioning propped, 3min    2. Supine pelvit tilt, 2min, pain with anterior tilt reported    3. SKTC, 30sec each    4. Prone ball back extension holds, 10sec x 10    5. Bridges, 15sec x10    Therapeutic Treatments and Modalities:     1. E Stim Unattended (CPT 70500), IFC and CP to lumbar spine x 15 min in prone    Time-based treatments/modalities:    Physical Therapy Timed Treatment Charges  Therapeutic exercise minutes (CPT 19659): 30 minutes    Pain rating (1-10) before treatment:  0  Pain rating (1-10) after treatment:  0    ASSESSMENT:   Response to treatment: no specific pain brought on with treatment. Discal pathology vs chronic strain suspected based on flexion being inciting factor. Pt to f/u next week.    PLAN/RECOMMENDATIONS:   Plan for treatment: therapy treatment to continue next visit.  Planned interventions for next visit: continue with current treatment.

## 2020-06-14 ENCOUNTER — TELEPHONE (OUTPATIENT)
Dept: MEDICAL GROUP | Facility: MEDICAL CENTER | Age: 30
End: 2020-06-14

## 2020-06-14 NOTE — TELEPHONE ENCOUNTER
Please let pt know that the LP shows that her trg are high and HDL is low.  LDL is wnl.  Needs to improve low carb diet and inc exercise.

## 2020-06-15 NOTE — TELEPHONE ENCOUNTER
Pt notified. Pt states that she is seeing weight management, has worked on her diet and is exercising already. She is wondering what else she can do, is there a medication she can take?    Pt states that gabapentin is not working. She would like instructions on how to wean off the medication.

## 2020-06-16 RX ORDER — VALACYCLOVIR HYDROCHLORIDE 1 G/1
TABLET, FILM COATED ORAL
Qty: 90 TAB | Refills: 3 | Status: SHIPPED | OUTPATIENT
Start: 2020-06-16 | End: 2021-07-12

## 2020-06-19 ENCOUNTER — APPOINTMENT (OUTPATIENT)
Dept: PHYSICAL THERAPY | Facility: REHABILITATION | Age: 30
End: 2020-06-19
Attending: PHYSICIAN ASSISTANT
Payer: COMMERCIAL

## 2020-06-19 NOTE — OP THERAPY DAILY TREATMENT
Outpatient Physical Therapy  DAILY TREATMENT     [facility] [address], [address]  [facility] [address]  Phone:  [phone]  Fax:  [phone]    Date: 06/19/2020    Patient: Chase Xavier  YOB: 1990  MRN: [id number]     Time Calculation                   Chief Complaint: No chief complaint on file.    Visit #: 5    SUBJECTIVE:      OBJECTIVE:  Current objective measures:           Therapeutic Exercises (CPT 86669):     1. Prone positioning propped, 3min    2. Supine pelvit tilt, 2min, pain with anterior tilt reported    3. SKTC, 30sec each    4. Prone ball back extension holds, 10sec x 10    5. Bridges, 15sec x10    Therapeutic Treatments and Modalities:     1. E Stim Unattended (CPT 29812), IFC and CP to lumbar spine x 15 min in prone    Time-based treatments/modalities:             ASSESSMENT:   Response to treatment:     PLAN/RECOMMENDATIONS:   Plan for treatment: therapy treatment to continue next visit.  Planned interventions for next visit: continue with current treatment.

## 2020-06-25 ENCOUNTER — OFFICE VISIT (OUTPATIENT)
Dept: HEALTH INFORMATION MANAGEMENT | Facility: MEDICAL CENTER | Age: 30
End: 2020-06-25
Payer: COMMERCIAL

## 2020-06-25 VITALS
DIASTOLIC BLOOD PRESSURE: 70 MMHG | BODY MASS INDEX: 32.69 KG/M2 | SYSTOLIC BLOOD PRESSURE: 112 MMHG | HEIGHT: 65 IN | OXYGEN SATURATION: 94 % | WEIGHT: 196.2 LBS | HEART RATE: 102 BPM

## 2020-06-25 DIAGNOSIS — E66.9 OBESITY (BMI 35.0-39.9 WITHOUT COMORBIDITY): ICD-10-CM

## 2020-06-25 DIAGNOSIS — R63.5 WEIGHT GAIN: ICD-10-CM

## 2020-06-25 DIAGNOSIS — E78.5 HYPERLIPIDEMIA LDL GOAL <100: ICD-10-CM

## 2020-06-25 DIAGNOSIS — F41.9 ANXIETY: ICD-10-CM

## 2020-06-25 DIAGNOSIS — R73.01 IMPAIRED FASTING GLUCOSE: ICD-10-CM

## 2020-06-25 DIAGNOSIS — E66.01 MORBID (SEVERE) OBESITY DUE TO EXCESS CALORIES (HCC): ICD-10-CM

## 2020-06-25 DIAGNOSIS — E55.9 VITAMIN D DEFICIENCY: ICD-10-CM

## 2020-06-25 PROCEDURE — 97803 MED NUTRITION INDIV SUBSEQ: CPT | Performed by: DIETITIAN, REGISTERED

## 2020-06-25 PROCEDURE — 99214 OFFICE O/P EST MOD 30 MIN: CPT | Performed by: INTERNAL MEDICINE

## 2020-06-25 ASSESSMENT — FIBROSIS 4 INDEX: FIB4 SCORE: 0.31

## 2020-06-25 NOTE — PROGRESS NOTES
"Bariatric Medicine Follow Up  Chief Complaint   Patient presents with   • Weight Gain       History of Present Illness:   Tonia Xavier is a 30 y.o. female who presents for weight management follow-up and to help address co-morbidities caused by overweight, as below.    During the patient's last visit, the following were discussed and recommended:  Weight Goal: 3-5% wt loss each month (pt goal weight is 165 lb)  Diet: PP every morning  Kale salad for lunch  Continues gluten-free, keto diet per GI recommendations  Reduce night snacking, as she is working on  Physical Activity: Walking 20 minutes, goal is every other day  Rx: Topiramate 25 bid (increase from qd)  Consider Saxenda or Contrave, pt to consider  Did not tolerate phentermine  Side Effects: consent in chart  Behavior change: Increase activity level    She feels she is eating healthier, more active.  Not talking topiramate, does not recall why she stopped taking it or how much of a benefit she got from it while she was on it.  Not having soda, [employer].  Stopped [employer]itness [employer], was consuming 1500 kcal/d.  Celiac symptoms better, less GI upset.    She really wants to try different antiobesity medication.    I FG: Not on glucose lowering agent  HLD/TG:  TG still elevated.  VDD: Continues daily vitamin D supplement  Anxiety: Mood stable on sertraline    Exercise:   Walking 20 minutes daily     Review of Systems   Pt denies lightheadedness, weakness, worsening fatigue, excessive dry mouth, mood changes, paresthesias.  All other ROS were reviewed and are otherwise unchanged from my previous visit with patient.    Physical Exam:    /70 (BP Location: Left arm, Patient Position: Sitting, BP Cuff Size: Large adult)   Pulse (!) 102   Ht 1.651 m (5' 5\")   Wt 89 kg (196 lb 3.2 oz)   SpO2 94%   BMI 32.65 kg/m²   Waist Measurement   Waist: 40.5 inch/inches  Weight change since last visit:  +3 lb     Constitutional: Oriented to person, place, and " time and well-developed, well-nourished, and in no distress.    Head: Normocephalic.   Musculoskeletal: Normal range of motion. No edema.   Neurological: Alert and oriented to person, place, and time. No muscle weakness.  Gait normal.   Skin: Warm and dry. Not diaphoretic.   Psychiatric: Mood, memory, affect and judgment normal.     Laboratory:   Recent labs reviewed.      Dietitian Assessment: I have reviewed the Dietitian's assessment related to this encounter.     ASSESSMENT/PLAN:  Body mass index is 32.65 kg/m².    Obesity Stage ([facility]): 1; Class 1    1. Obesity (BMI 35.0-39.9 without comorbidity) (HCC)     2. IFG (impaired fasting glucose)     3. Vitamin D deficiency     4. Anxiety     5. Hyperlipidemia LDL goal <100     6. Weight gain       The patient has had some weight gain since her last visit, despite improving her meal choices, exercising more.  She has gained some muscle mass.  Will add different anti-obesity medication, see if this provides benefit towards further weight loss.  Continue to monitor fasting glucose, vitamin D, lipids.    The patient and I have discussed at length and agree to the following recommendations, which are all addressing the above diagnoses:    Weight Goal: 3-5% wt loss each month (pt goal weight is 165 lb)  Diet: Gluten-free, has celiac disease  Reduce total kcal to 1400 kcal/d, reducing refined carbs but having more vegetables  Physical Activity: Walking daily, set further goals this month  Rx: Did not tolerate phentermine, stopped Topamax on her own  Start Contrave  Consider metformin given I FG  Side Effects: consent in chart  Behavior change: Consistency with above changes  Follow-up: 1 mo    Patient's body mass index is 32.65 kg/m². Exercise and nutrition counseling were performed at this visit.

## 2020-06-26 ENCOUNTER — APPOINTMENT (OUTPATIENT)
Dept: PHYSICAL THERAPY | Facility: REHABILITATION | Age: 30
End: 2020-06-26
Attending: PHYSICIAN ASSISTANT
Payer: COMMERCIAL

## 2020-06-26 NOTE — PROGRESS NOTES
"Nutrition Reassess: Medical Weight Management   Today's date: 6/25/2020  Referring Provider: No ref. provider found      Time: in/out 4:09-4:25  Visit#: 7    Subjective:  - has been having proteins at snacks  - sometimes tracks - wants to get more regimented  - states her triglycerides were still high  - wants to work on her carb intake  - has been making more direct food changes for the past month or so   - has PP shake for B, sometimes L if busy at work  -     Diet history:   Breakfast - shake  Snack - string cheese, HB egg, cheese, almonds and apple   Lunch - salad with dressing and protein  Snack - same  Dinner - tuna with dean, tomato, onion, broccoli, chicken, sometimes grain    Anthropometrics/Objective  Today's weight:    Vitals:    06/25/20 1551   BP: 112/70   Weight: 89 kg (196 lb 3.2 oz)   Height: 1.651 m (5' 5\")     BMI:  Body mass index is 32.65 kg/m².  Starting weight/date 188 lbs     Total weight change : + 8 lbs            Meal Plan:   High protein, lower carb with 1-2 meal replacements per day     ReAssesment/Notes:    Chase has been working on her health goals towards weight loss. She has been doing well with her food choices. Notes her triglycerides are still high and was advised to lower her carb intake. Through current recall it appears her carb intake is minimal. Let her know that it may be due to her food changes still being so new. Willl continue to follow up with this. At previous visit as well it appears Chase avoids carbs on her plate most of the time. Let her know it is okay to have them in the fistful amount we discussed.   She currently walks most days, hikes at least 1x a week and does at home workouts 3x a week. Encouraged her to do some strength training/HIIT to help build muscle mass as well to increase REE. She agree this would be of benefit. Will work on these goals for next visit.      Goals:  1. Lets get back to tracking! Especially for symptoms   2. Strength training/HIIT 3x a " week    Follow-up: 4-6 weeks

## 2020-07-03 ENCOUNTER — HOSPITAL ENCOUNTER (OUTPATIENT)
Dept: RADIOLOGY | Facility: MEDICAL CENTER | Age: 30
End: 2020-07-03
Attending: FAMILY MEDICINE
Payer: COMMERCIAL

## 2020-07-03 ENCOUNTER — OFFICE VISIT (OUTPATIENT)
Dept: URGENT CARE | Facility: PHYSICIAN GROUP | Age: 30
End: 2020-07-03
Payer: COMMERCIAL

## 2020-07-03 VITALS
OXYGEN SATURATION: 96 % | DIASTOLIC BLOOD PRESSURE: 76 MMHG | BODY MASS INDEX: 31.82 KG/M2 | SYSTOLIC BLOOD PRESSURE: 100 MMHG | RESPIRATION RATE: 16 BRPM | HEIGHT: 65 IN | HEART RATE: 83 BPM | TEMPERATURE: 97.7 F | WEIGHT: 191 LBS

## 2020-07-03 DIAGNOSIS — S83.91XA SPRAIN OF RIGHT KNEE, UNSPECIFIED LIGAMENT, INITIAL ENCOUNTER: ICD-10-CM

## 2020-07-03 PROCEDURE — 99214 OFFICE O/P EST MOD 30 MIN: CPT | Performed by: FAMILY MEDICINE

## 2020-07-03 PROCEDURE — 73562 X-RAY EXAM OF KNEE 3: CPT | Mod: RT

## 2020-07-03 RX ORDER — NAPROXEN 500 MG/1
500 TABLET ORAL 2 TIMES DAILY WITH MEALS
Qty: 20 TAB | Refills: 0 | Status: SHIPPED | OUTPATIENT
Start: 2020-07-03 | End: 2020-07-13

## 2020-07-03 RX ORDER — KETOROLAC TROMETHAMINE 30 MG/ML
60 INJECTION, SOLUTION INTRAMUSCULAR; INTRAVENOUS ONCE
Status: COMPLETED | OUTPATIENT
Start: 2020-07-03 | End: 2020-07-03

## 2020-07-03 RX ADMIN — KETOROLAC TROMETHAMINE 60 MG: 30 INJECTION, SOLUTION INTRAMUSCULAR; INTRAVENOUS at 17:53

## 2020-07-03 ASSESSMENT — ENCOUNTER SYMPTOMS
NAUSEA: 0
COUGH: 0
FEVER: 0
DIZZINESS: 0
CHILLS: 0
MYALGIAS: 0
SORE THROAT: 0
SHORTNESS OF BREATH: 0
VOMITING: 0

## 2020-07-03 ASSESSMENT — PAIN SCALES - GENERAL: PAINLEVEL: 7=MODERATE-SEVERE PAIN

## 2020-07-03 ASSESSMENT — FIBROSIS 4 INDEX: FIB4 SCORE: 0.31

## 2020-07-04 NOTE — PATIENT INSTRUCTIONS
Knee Sprain    A knee sprain is a stretch or tear in a knee ligament. Knee ligaments are bands of tissue that connect bones in the knee to each other.  Follow these instructions at home:  If you have a splint or brace:  · Wear the splint or brace as told by your doctor. Remove it only as told by your doctor.  · Loosen the splint or brace if your toes tingle, get numb, or turn cold and blue.  · Keep the splint or brace clean.  · If the splint or brace is not waterproof:  ? Do not let it get wet.  ? Cover it with a watertight covering when you take a bath or a shower.  If you have a cast:  · Do not stick anything inside the cast to scratch your skin.  · Check the skin around the cast every day. Tell your doctor about any concerns.  · You may put lotion on dry skin around the edges of the cast. Do not put lotion on the skin underneath the cast.  · Keep the cast clean.  · If the cast is not waterproof:  ? Do not let it get wet.  ? Cover it with a watertight covering when you take a bath or a shower.  Managing pain, stiffness, and swelling    · Gently move your toes often to avoid stiffness and to lessen swelling.  · Raise (elevate) the injured area above the level of your heart while you are sitting or lying down.  · Take over-the-counter and prescription medicines only as told by your doctor.  · If directed, put ice on the injured area.  ? If you have a removable splint or brace, remove it as told by your doctor.  ? Put ice in a plastic bag.  ? Place a towel between your skin and the bag or between your cast and the bag.  ? Leave the ice on for 20 minutes, 2-3 times a day.  General instructions  · Do exercises as told by your doctor.  · Keep all follow-up visits as told by your doctor. This is important.  Contact a doctor if:  · You have pain that gets worse.  · The cast, brace, or splint does not fit right.  · The cast, brace, or splint gets damaged.  Get help right away if:  · You cannot lean on your knee to stand or  walk.  · You cannot move the injured area.  · You knee montez or you have pain after you walk only a few steps.  · You have very bad pain, swelling, or numbness below the cast, brace, or splint.  Summary  · A knee sprain is a stretch or tear in a band (ligament) that connects your knee bones to each other.  · You may need to wear a splint, brace, or cast to help your knee get better.  · Contact your doctor if you have very bad pain, swelling, or numbness, or if you cannot walk.  This information is not intended to replace advice given to you by your health care provider. Make sure you discuss any questions you have with your health care provider.  Document Released: 12/06/2010 Document Revised: 04/10/2020 Document Reviewed: 09/05/2017  Elsevier Patient Education © 2020 [employer]

## 2020-07-04 NOTE — PROGRESS NOTES
Subjective:   Tonia Xavier is a 30 y.o. female who presents for Knee Pain (R  knee injury, x4 days)        30-year-old female presents to urgent care with a chief complaint of right knee pain over the past 4 days.  Patient denies specific traumatic injury and specifically denies recollection of a blunt traumatic injury to the right knee.  The patient recalls an active kickboxing class which correlated to the onset of right knee pain.  The patient mentions the kickboxing class required repetitive kicking and twisting on the right knee.  The patient has a history of a known ACL deficient right knee and has been functionally coping with the ACL deficiency and has been awaiting eventual consideration of surgical ACL reconstruction and has been followed by orthopedic surgery.  Patient complains of right lateral knee pain which is worse with ambulation and attempts at flexing and extending the knee.  Patient denies specific loss of function or range of motion in the knee.  Complains of minimal swelling in the knee.  The patient has been wearing a knee brace with medial and lateral support which has improved instability in the knee and allowed functional ambulatory status.  Patient has an upcoming appointment with the orthopedic surgeon and at this time is primarily concerned with pain management and for consideration of initial imaging of the right knee.    Knee Pain   Pertinent negatives include no chest pain, chills, coughing, fever, myalgias, nausea, rash, sore throat or vomiting. The symptoms are aggravated by twisting and walking. She has tried rest (Knee brace) for the symptoms. The treatment provided mild relief.     PMH:  has a past medical history of Anxiety, HSV infection (9/7/2019), Hyperlipidemia LDL goal <100 (9/9/2019), IFG (impaired fasting glucose) (9/9/2019), Obesity (BMI 30-39.9) (1/11/2018), Ovarian cyst (11/2018), and Vitamin D deficiency (2/1/2018). She also has no past medical history of  Encounter for long-term (current) use of other medications.  MEDS:   Current Outpatient Medications:   •  naproxen (NAPROSYN) 500 MG Tab, Take 1 Tab by mouth 2 times a day, with meals for 10 days., Disp: 20 Tab, Rfl: 0  •  Naltrexone-buPROPion HCl ER 8-90 MG TABLET SR 12 HR, Take 8-90 mg by mouth See Admin Instructions. Week 1 one tab po QD Week 2 one tab po BID Week 3 two tab po QAM, one tab po QPM Week 4 two tab po BID, Disp: 120 Tab, Rfl: 0  •  valacyclovir (VALTREX) 1 GM Tab, TAKE 1 TABLET BY MOUTH EVERY DAY, Disp: 90 Tab, Rfl: 3  •  gabapentin (NEURONTIN) 100 MG Cap, TAKE 1 CAPSULE BY MOUTH THREE TIMES A DAY, Disp: 270 Cap, Rfl: 3  •  dicyclomine (BENTYL) 10 MG Cap, Take 1 Cap by mouth 4 Times a Day,Before Meals and at Bedtime., Disp: 30 Cap, Rfl: 3  •  FEMYNOR 0.25-35 MG-MCG per tablet, Take 1 Tab by mouth every day., Disp: 84 Tab, Rfl: 3  •  sertraline (ZOLOFT) 50 MG Tab, TAKE 1 TABLET BY MOUTH EVERY DAY., Disp: , Rfl:   •  ondansetron (ZOFRAN) 4 MG Tab tablet, ZOFRAN 4 MG TABS, Disp: , Rfl:   •  fexofenadine (ALLEGRA) 180 MG tablet, 24HR ALLERGY RELIEF TABS, Disp: , Rfl:   •  VITAMIN D HIGH POTENCY 1000 units Cap, Take 1 Cap by mouth every day., Disp: , Rfl: 2  •  cyclobenzaprine (FLEXERIL) 10 MG Tab, Take 10 mg by mouth 3 times a day as needed., Disp: , Rfl:     Current Facility-Administered Medications:   •  ketorolac (TORADOL) injection 60 mg, 60 mg, Intramuscular, Once, Young Dinh M.D.  ALLERGIES:   Allergies   Allergen Reactions   • Phentermine      Worsening anxiety, excessive dry mouth, lightheaded   • Benzoin Hives     Rash      SURGHX:   Past Surgical History:   Procedure Laterality Date   • KNEE ARTHROSCOPY Right 11/13/2018    Procedure: KNEE ARTHROSCOPY;  Surgeon: Dexter Pimentel M.D.;  Location: SURGERY [facility];  Service: Orthopedics   • SYNOVECTOMY Right 11/13/2018    Procedure: SYNOVECTOMY;  Surgeon: Dexter Pimentel M.D.;  Location: SURGERY [facility];  Service: Orthopedics   •  MEDIAL MENISCECTOMY Right 2018    Procedure: MEDIAL MENISCECTOMY-  PARTIAL, AND PARTIAL LATERAL;  Surgeon: Dexter Pimentel M.D.;  Location: [facility];  Service: Orthopedics   • CHONDROPLASTY  2018    Procedure: CHONDROPLASTY;  Surgeon: Dexter Pimentel M.D.;  Location: [facility];  Service: Orthopedics   • HARDWARE REMOVAL ORTHO  2018    Procedure: HARDWARE REMOVAL ORTHO;  Surgeon: Dexter Pimentel M.D.;  Location: [facility];  Service: Orthopedics   • BONE GRAFT  2018    Procedure: BONE GRAFT;  Surgeon: Dexter Pimentel M.D.;  Location: [facility];  Service: Orthopedics   • REPEAT C SECTION  2017    Procedure: REPEAT C SECTION;  Surgeon: Asmita Bolivar M.D.;  Location: LABOR AND DELIVERY;  Service: Obstetrics   • REPEAT C SECTION  10/19/2015    Procedure: REPEAT C SECTION;  Surgeon: Mary Randolph M.D.;  Location: LABOR AND DELIVERY;  Service:    • ACL RECONSTRUCTION SCOPE  10/2/2014    Performed by Dexter Pimentel M.D. at [facility]   • KNEE ARTHROSCOPY  2014    Performed by Dexter Pimentel M.D. at [facility]   • HARDWARE REMOVAL ORTHO  2014    Performed by Dexter Pimentel M.D. at [facility]   • BONE GRAFT  2014    Performed by Dexter Pimentel M.D. at [facility]   • PB  DELIVERY ONLY     • ACL RECONSTRUCTION Right 2006     SOCHX:  reports that she has never smoked. She has never used smokeless tobacco. She reports that she does not drink alcohol or use drugs.  FH:   Family History   Problem Relation Age of Onset   • Diabetes Other    • Hypertension Other    • Diabetes Maternal Grandmother         DM w/ESRD   • Diabetes Maternal Grandfather    • Cancer Maternal Grandfather         prostate, esophageal   • Diabetes Paternal Grandmother         DM on dialysis     Review of Systems   Constitutional: Negative for chills and fever.   HENT: Negative for sore throat.   "  Respiratory: Negative for cough and shortness of breath.    Cardiovascular: Negative for chest pain.   Gastrointestinal: Negative for nausea and vomiting.   Musculoskeletal: Negative for myalgias.   Skin: Negative for rash.   Neurological: Negative for dizziness.        Objective:   /76   Pulse 83   Temp 36.5 °C (97.7 °F) (Temporal)   Resp 16   Ht 1.651 m (5' 5\")   Wt 86.6 kg (191 lb)   SpO2 96%   BMI 31.78 kg/m²   Physical Exam  Vitals signs and nursing note reviewed.   Constitutional:       General: She is not in acute distress.     Appearance: Normal appearance. She is well-developed.   HENT:      Head: Normocephalic and atraumatic.      Right Ear: External ear normal.      Left Ear: External ear normal.      Nose: Nose normal.      Mouth/Throat:      Mouth: Mucous membranes are moist.   Eyes:      Conjunctiva/sclera: Conjunctivae normal.   Cardiovascular:      Rate and Rhythm: Normal rate.   Pulmonary:      Effort: Pulmonary effort is normal. No respiratory distress.      Breath sounds: Normal breath sounds.   Abdominal:      General: There is no distension.   Musculoskeletal: Normal range of motion.      Right knee: She exhibits swelling. She exhibits normal range of motion, no effusion, no ecchymosis, no deformity, no erythema, normal alignment, normal patellar mobility and no bony tenderness. Tenderness found. Lateral joint line and LCL (provocative varus stress limted by guarding) tenderness noted.      Comments: Postive anterior drawer test with ACL laxity noted, posterior drawer negative   Skin:     General: Skin is warm and dry.   Neurological:      General: No focal deficit present.      Mental Status: She is alert and oriented to person, place, and time. Mental status is at baseline.      Gait: Gait (gait at baseline) normal.   Psychiatric:         Judgment: Judgment normal.     DX-KNEE 3 VIEWS RIGHT   Order: [id number]   Status: Final result Visible to patient: No (not released) Dx: " Sprain of right knee, unspecified lig...   Details    Reading Physician  Reading Date  Result Priority    Kunal Mckinney M.D.  [phone]  7/3/2020         Narrative & Impression           7/3/2020 6:09 PM   HISTORY/REASON FOR EXAM: Right knee injury after sprain 1 week ago.   TECHNIQUE/EXAM DESCRIPTION AND NUMBER OF VIEWS: 3 views of the RIGHT knee.   COMPARISON: None   FINDINGS:   The alignment of the knee is within normal limits.   There is no definite joint effusion.   There is no evidence of displaced fracture or dislocation.   Bone tunnel is present in the distal femur and proximal tibia consistent with ACL repair.   There is no focal swelling.   IMPRESSION:   1. No acute right knee fracture, dislocation, or joint effusion.   2. Postoperative changes of the right knee.               Last Resulted: 07/03/20 6:30 PM                 Assessment/Plan:   1. Sprain of right knee, unspecified ligament, initial encounter  - DX-KNEE 3 VIEWS RIGHT; Future  - ketorolac (TORADOL) injection 60 mg  - naproxen (NAPROSYN) 500 MG Tab; Take 1 Tab by mouth 2 times a day, with meals for 10 days.  Dispense: 20 Tab; Refill: 0        Advise ice a needed, continue knee brace, follow up with orthopedic surgery as scheduled.        Discussed close monitoring, return precautions, and supportive measures of maintaining adequate fluid hydration and caloric intake, relative rest and symptom management as needed for pain and/or fever.    Differential diagnosis, natural history, supportive care, and indications for immediate follow-up discussed.     Advised the patient to follow-up with the primary care physician for recheck, reevaluation, and consideration of further management.    Please note that this dictation was created using voice recognition software. I have worked with consultants from the vendor as well as technical experts from [employer] to optimize the interface. I have made every reasonable attempt to correct obvious  errors, but I expect that there are errors of grammar and possibly content that I did not discover before finalizing the note.

## 2020-07-14 ENCOUNTER — HOSPITAL ENCOUNTER (OUTPATIENT)
Dept: RADIOLOGY | Facility: MEDICAL CENTER | Age: 30
End: 2020-07-14
Attending: ORTHOPAEDIC SURGERY
Payer: COMMERCIAL

## 2020-07-14 DIAGNOSIS — Z98.890 HISTORY OF RECONSTRUCTION OF ANTERIOR CRUCIATE LIGAMENT TEAR: ICD-10-CM

## 2020-07-14 DIAGNOSIS — M25.561 RIGHT KNEE PAIN, UNSPECIFIED CHRONICITY: ICD-10-CM

## 2020-07-14 PROCEDURE — 73721 MRI JNT OF LWR EXTRE W/O DYE: CPT | Mod: RT

## 2020-08-12 ENCOUNTER — NON-PROVIDER VISIT (OUTPATIENT)
Dept: HEALTH INFORMATION MANAGEMENT | Facility: MEDICAL CENTER | Age: 30
End: 2020-08-12
Payer: COMMERCIAL

## 2020-08-12 VITALS
SYSTOLIC BLOOD PRESSURE: 118 MMHG | WEIGHT: 188.8 LBS | BODY MASS INDEX: 31.46 KG/M2 | DIASTOLIC BLOOD PRESSURE: 78 MMHG | OXYGEN SATURATION: 95 % | HEIGHT: 65 IN | HEART RATE: 83 BPM

## 2020-08-12 DIAGNOSIS — E66.9 OBESITY (BMI 35.0-39.9 WITHOUT COMORBIDITY): ICD-10-CM

## 2020-08-12 DIAGNOSIS — E66.01 MORBID (SEVERE) OBESITY DUE TO EXCESS CALORIES (HCC): ICD-10-CM

## 2020-08-12 DIAGNOSIS — K90.0 CELIAC DISEASE: ICD-10-CM

## 2020-08-12 DIAGNOSIS — R73.01 IMPAIRED FASTING GLUCOSE: ICD-10-CM

## 2020-08-12 DIAGNOSIS — Z71.3 DIETARY COUNSELING AND SURVEILLANCE: ICD-10-CM

## 2020-08-12 DIAGNOSIS — F41.9 ANXIETY: ICD-10-CM

## 2020-08-12 DIAGNOSIS — E55.9 VITAMIN D DEFICIENCY: ICD-10-CM

## 2020-08-12 DIAGNOSIS — E78.5 HYPERLIPIDEMIA LDL GOAL <100: ICD-10-CM

## 2020-08-12 DIAGNOSIS — R63.5 WEIGHT GAIN: ICD-10-CM

## 2020-08-12 PROCEDURE — 99213 OFFICE O/P EST LOW 20 MIN: CPT | Mod: 25 | Performed by: INTERNAL MEDICINE

## 2020-08-12 PROCEDURE — 97803 MED NUTRITION INDIV SUBSEQ: CPT | Performed by: DIETITIAN, REGISTERED

## 2020-08-12 PROCEDURE — 99401 PREV MED CNSL INDIV APPRX 15: CPT | Performed by: INTERNAL MEDICINE

## 2020-08-12 ASSESSMENT — PATIENT HEALTH QUESTIONNAIRE - PHQ9: CLINICAL INTERPRETATION OF PHQ2 SCORE: 0

## 2020-08-12 ASSESSMENT — FIBROSIS 4 INDEX: FIB4 SCORE: 0.31

## 2020-08-12 NOTE — PROGRESS NOTES
"Bariatric Medicine Follow Up  Chief Complaint   Patient presents with   • Weight Gain       History of Present Illness:   Tonia Xavier is a 30 y.o. female who presents for follow-up to intensive behavioral modification of overweight and to help address co-morbidities caused by overweight, as below.    Obesity Stage/Class: 1/1  During the patient's last visit, the following were discussed and recommended:  Weight Goal: 3-5% wt loss each month (pt goal weight is 165 lb)  Diet: Gluten-free, has celiac disease  Reduce total kcal to 1400 kcal/d, reducing refined carbs but having more vegetables  Physical Activity: Walking daily, set further goals this month  Rx: Did not tolerate phentermine, stopped Topamax on her own  Start Contrave  Consider metformin given I FG  Side Effects: consent in chart  Behavior change: Consistency with above changes      Challenges:  Financial constraints for purchase of antiobesity meds  Dietary adherence:  Fair  PPqam  She changed to keto diet  No longer tracking calories  Exercise:  Walking 2 mi/d x 3 d/wk  Knee injury after kickboxing    AntiObesity Medication use: None  Did not start Contrave  Medication efficacy/tolerance/side effects: N/A  Medication compliance: N/A    Status of comorbid conditions/other medical diagnoses:  IFG:  Uncontrolled cravings, not on glucose lowering agent  She would like to try metformin  HLD: Controlled, not on statin or fenofibrate  VDD: Controlled, on daily vitamin D supplement  Anxiety: Controlled, on SSRI    Review of Systems   Pt denies lightheadedness, weakness, worsening fatigue, excessive dry mouth, mood changes, paresthesias.  All other ROS were reviewed and are otherwise unchanged from my previous visit with patient.    Physical Exam:    /78 (BP Location: Left arm, Patient Position: Sitting, BP Cuff Size: Large adult)   Pulse 83   Ht 1.651 m (5' 5\")   Wt 85.6 kg (188 lb 12.8 oz)   SpO2 95%   BMI 31.42 kg/m²   Waist " Measurement   Waist: 38.75 inch/inches  Weight change since last visit: -8 lb (0 lb total)  Waist Circum change since last visit: -2 in (0 in total)    Constitutional: Oriented to person, place, and time and well-developed, well-nourished, and in no distress.    Head: Normocephalic.   Musculoskeletal: Normal range of motion. No edema.   Neurological: Alert and oriented to person, place, and time. No muscle weakness.  Gait normal.   Skin: Warm and dry. Not diaphoretic.   Psychiatric: Mood, memory, affect and judgment normal.     Laboratory:   Recent labs reviewed.      Dietitian Assessment: I have reviewed the Dietitian's assessment related to this encounter.     ASSESSMENT  30 y.o.  female presents for intensive lifestyle intervention for treatment of obesity and co-morbid conditions.  Obesity Stage ([place]) 1; Class 1    1. IFG (impaired fasting glucose)     2. Hyperlipidemia LDL goal <100     3. Vitamin D deficiency     4. Anxiety     5. Obesity (BMI 35.0-39.9 without comorbidity) (HCC)         Patient has begun to make good progress since significant weight gain 2 months ago.  She is opted for a ketogenic diet.  Declines use of anti-obesity medication at this time, but will start metformin given I FG.  Monitor lipids, vitamin D, anxiety, currently controlled.    PLAN  Weight Goal: 5% wt loss at one month after start (pt goal weight is 165 lb)  Dietary intervention:    MR:  PP qam  High Protein/Low Carb whole food meals and 2 snacks between meals daily  >100 g protein, <100 g total carbs daily, <1400 kcal/d   Choose more plant based fats instead of saturated fats  Track daily intake with My Fitness Pal, bring to next visit  64+ oz water per day  Physical Activity: Walking, biking 3-4 times per week x30 minutes each  Labs: N/A  Rx changes:   Financial constraints so declines anti-obesity medications  Behavior modification:   Increase activity as tolerated given knee injury  Surgical considerations: Pending knee  surgery  Follow-up: one month with MD, weekly to biweekly for preventive obesity counseling    Patient's body mass index is 31.42 kg/m². Exercise and nutrition counseling were performed at this visit.        >>>>>>>>>>>>>      PREVENTIVE SERVICES COUNSELING FOR OBESITY NOTE    O:  Body mass index is 31.42 kg/m²., see also vitals flowsheet for today  Pt struggling with:  Stress management  Cravings  Keeping food diary/tracking consumption  Meal planning  Meal prep  Taking medications as prescribed  A:  Dietary counseling and surveillance for obesity  Z71.3, E66.9, BMI Code: Z68.31  P:  Counseled pt on reduced kcal, reduced refined CHO whole food meal plan  Advised exercise: biking, walking  Discussed strategies of self-monitoring with:   food diary  cognitive restructuring  stress reduction  stimulus control  Meal pre-planning  Environmental management  Time: 14 min

## 2020-08-13 NOTE — PROGRESS NOTES
"Nutrition Reassess: Medical Weight Management   Today's date: 8/13/2020  Referring Provider: No ref. provider found      Time: in/out 1:00-1:30  Visit#: 8    Subjective:  - interested in keto  - wants to decrease less healthy food choices in the house  - has started tracking again  - wants to know about net carbs  - wants to know how to track for keto   - tracking with 1400 kcal/day    Anthropometrics/Objective  Vitals 8/12/2020   WEIGHT 188.8   HEIGHT 5' 5\"   BMI 31.42 kg/m2     Starting weight/date 188 lbs     Total weight change : 0 lbs (-8 since last visit)            Meal Plan:   High protein, lower carb with 0-1 meal replacements per day     ReAssesment/Notes:    Chase has been working on her health goals toward weight loss. She has started a modified keto diet which she finds works well for her. Finds it to be sustainable currently. Wanted to know how to count net carbs, how to identify added sugars. At this visit we reviewed the nutrition fact label and how to identify pertinent info found on the label. Answered Chase's questions at this time.  At next visit recommend reviewing fats handout.     Follow-up: 4-6 weeks    "

## 2020-09-03 DIAGNOSIS — E55.9 VITAMIN D DEFICIENCY: ICD-10-CM

## 2020-09-03 RX ORDER — MELATONIN
Qty: 90 TAB | Refills: 0 | Status: SHIPPED | OUTPATIENT
Start: 2020-09-03 | End: 2021-03-05

## 2020-09-03 NOTE — TELEPHONE ENCOUNTER
Received request via: Pharmacy    Was the patient seen in the last year in this department? Yes  Future Appointments       Provider Department [place]    9/15/2020 4:00 PM Sangita Zabala M.D. [facility]    9/17/2020 4:00 PM Shaista Gottlieb R.D. [facility]          Does the patient have an active prescription (recently filled or refills available) for medication(s) requested? No

## 2020-09-15 ENCOUNTER — TELEMEDICINE (OUTPATIENT)
Dept: HEALTH INFORMATION MANAGEMENT | Facility: MEDICAL CENTER | Age: 30
End: 2020-09-15
Payer: COMMERCIAL

## 2020-09-15 VITALS — WEIGHT: 180 LBS | HEIGHT: 65 IN | BODY MASS INDEX: 29.99 KG/M2

## 2020-09-15 DIAGNOSIS — F41.9 ANXIETY: ICD-10-CM

## 2020-09-15 DIAGNOSIS — E55.9 VITAMIN D DEFICIENCY: ICD-10-CM

## 2020-09-15 DIAGNOSIS — R73.01 IMPAIRED FASTING GLUCOSE: ICD-10-CM

## 2020-09-15 DIAGNOSIS — R63.5 WEIGHT GAIN: ICD-10-CM

## 2020-09-15 DIAGNOSIS — E78.5 HYPERLIPIDEMIA LDL GOAL <100: ICD-10-CM

## 2020-09-15 DIAGNOSIS — E66.9 OBESITY (BMI 35.0-39.9 WITHOUT COMORBIDITY): ICD-10-CM

## 2020-09-15 PROCEDURE — 99213 OFFICE O/P EST LOW 20 MIN: CPT | Mod: 95,CR | Performed by: INTERNAL MEDICINE

## 2020-09-15 ASSESSMENT — FIBROSIS 4 INDEX: FIB4 SCORE: 0.31

## 2020-09-15 NOTE — PROGRESS NOTES
This evaluation was conducted via Zoom using secure and encrypted videoconferencing technology. The patient was in a private location in the state of [place].    The patient's identity was confirmed and verbal consent was obtained for this virtual visit.      Bariatric Medicine Follow Up  Chief Complaint   Patient presents with   • Weight Gain       History of Present Illness:   Tonia Xavier is a 30 y.o. female who presents for follow-up to intensive behavioral modification of overweight and to help address co-morbidities caused by overweight, as below.    Obesity Stage/Class: 1/1  During the patient's last visit, the following were discussed and recommended:  Weight Goal: 5% wt loss at one month after start (pt goal weight is 165 lb)  Dietary intervention:    MR:  PP qam  High Protein/Low Carb whole food meals and 2 snacks between meals daily  >100 g protein, <100 g total carbs daily, <1400 kcal/d   Choose more plant based fats instead of saturated fats  Track daily intake with My Fitness Pal, bring to next visit  64+ oz water per day  Physical Activity: Walking, biking 3-4 times per week x30 minutes each  Labs: N/A  Rx changes:   Financial constraints so declines anti-obesity medications  Behavior modification:   Increase activity as tolerated given knee injury  Surgical considerations: Pending knee surgery      Challenges:  none  Dietary adherence:  Keto  Cravings under control, 1200 kcal/d  Easy to follow, feels she is doing well  Exercise:  Walking during L break    AntiObesity Medication use: none, cannot afford  Stopped metformin d/t GI upset  Medication efficacy/tolerance/side effects: n/a  Medication compliance: n/a    Status of comorbid conditions/other medical diagnoses:  IFG:  Controlled, no longer on metformin  HLD:  Controlled, not on statin  VDD:  Controlled on daily Vit D supp  Anxiety:  Well controlled on sertraline       Review of Systems   Pt denies lightheadedness, weakness, worsening  "fatigue, excessive dry mouth, mood changes, paresthesias.  All other ROS were reviewed and are otherwise unchanged from my previous visit with patient.    Physical Exam:    Ht 1.651 m (5' 5\")   Wt 81.6 kg (180 lb)   BMI 29.95 kg/m²       Last wt:  189 lb  Weight change since last visit:  -9 lb    Constitutional: Oriented to person, place, and time and well-developed, well-nourished, and in no distress.    Head: Normocephalic.   Psychiatric: Mood, memory, affect and judgment normal.     Laboratory:   Recent labs reviewed.      Dietitian Assessment: n/a    ASSESSMENT  30 y.o.  female presents for intensive lifestyle intervention for treatment of obesity and co-morbid conditions.  Obesity Stage ([place]) 1; Class 1    1. Weight gain     2. IFG (impaired fasting glucose)     3. Hyperlipidemia LDL goal <100     4. Vitamin D deficiency     5. Anxiety     6. Obesity (BMI 35.0-39.9 without comorbidity) (HCC)         Pt doing well on ketogenic diet, wants to continue same.  Continue to monitor fasting glucose, lipids, Vit D, mood, currently controlled.  Resume higher activity level after knee surgery next month.    PLAN  Weight Goal:  165 lb  Dietary intervention:    MR:  1 PP qam  Consider second PP perioperatively instead of comfort foods, if less activity  High Protein/Low Carb whole food meals and 2 snacks between meals daily  >100 g protein, <100 g total carbs daily, 1200 kcal/d   Track daily intake with My Fitness Pal, bring to next visit  64+ oz water per day  Physical Activity:  walking  Labs:  n/a  Rx changes:   none.  Behavior modification:   As above  Surgical considerations:  n/a  Follow-up: 3 month with MD    Patient's body mass index is 29.95 kg/m². Exercise and nutrition counseling were performed at this visit.        "

## 2020-09-21 ENCOUNTER — IMMUNIZATION (OUTPATIENT)
Dept: OCCUPATIONAL MEDICINE | Facility: CLINIC | Age: 30
End: 2020-09-21

## 2020-09-21 DIAGNOSIS — Z23 NEED FOR VACCINATION: ICD-10-CM

## 2020-09-21 PROCEDURE — 90686 IIV4 VACC NO PRSV 0.5 ML IM: CPT | Performed by: PREVENTIVE MEDICINE

## 2020-10-23 ENCOUNTER — PRE-ADMISSION TESTING (OUTPATIENT)
Dept: ADMISSIONS | Facility: MEDICAL CENTER | Age: 30
End: 2020-10-23
Attending: ORTHOPAEDIC SURGERY
Payer: COMMERCIAL

## 2020-10-23 VITALS — BODY MASS INDEX: 29.95 KG/M2 | HEIGHT: 65 IN

## 2020-10-23 RX ORDER — MONTELUKAST SODIUM 10 MG/1
TABLET ORAL
COMMUNITY
End: 2022-06-06 | Stop reason: SDUPTHER

## 2020-10-23 SDOH — HEALTH STABILITY: MENTAL HEALTH: HOW OFTEN DO YOU HAVE A DRINK CONTAINING ALCOHOL?: MONTHLY OR LESS

## 2020-10-23 SDOH — HEALTH STABILITY: MENTAL HEALTH: HOW MANY STANDARD DRINKS CONTAINING ALCOHOL DO YOU HAVE ON A TYPICAL DAY?: 1 OR 2

## 2020-10-23 NOTE — OR NURSING
Preadmit instructions and anesthesia fasting instructions given and emailed. Any patient questions addressed. Pt instructed to take these medications on day of surgery: Allegra and Montelukast

## 2020-10-27 ENCOUNTER — PRE-ADMISSION TESTING (OUTPATIENT)
Dept: ADMISSIONS | Facility: MEDICAL CENTER | Age: 30
End: 2020-10-27
Attending: ORTHOPAEDIC SURGERY
Payer: COMMERCIAL

## 2020-10-27 DIAGNOSIS — Z01.812 PRE-OPERATIVE LABORATORY EXAMINATION: ICD-10-CM

## 2020-10-27 LAB
COVID ORDER STATUS COVID19: NORMAL
SARS-COV-2 RNA RESP QL NAA+PROBE: NOTDETECTED
SPECIMEN SOURCE: NORMAL

## 2020-10-27 PROCEDURE — C9803 HOPD COVID-19 SPEC COLLECT: HCPCS

## 2020-10-28 NOTE — OR NURSING
Pt negative for preprocedural COVID screening questions. Instructed to wear a mask and have  wear a mask on DOS.

## 2020-10-29 ENCOUNTER — ANESTHESIA EVENT (OUTPATIENT)
Dept: SURGERY | Facility: MEDICAL CENTER | Age: 30
End: 2020-10-29
Payer: COMMERCIAL

## 2020-10-29 ENCOUNTER — HOSPITAL ENCOUNTER (OUTPATIENT)
Facility: MEDICAL CENTER | Age: 30
End: 2020-10-29
Attending: ORTHOPAEDIC SURGERY | Admitting: ORTHOPAEDIC SURGERY
Payer: COMMERCIAL

## 2020-10-29 ENCOUNTER — ANESTHESIA (OUTPATIENT)
Dept: SURGERY | Facility: MEDICAL CENTER | Age: 30
End: 2020-10-29
Payer: COMMERCIAL

## 2020-10-29 VITALS
OXYGEN SATURATION: 95 % | SYSTOLIC BLOOD PRESSURE: 114 MMHG | HEART RATE: 91 BPM | WEIGHT: 177.69 LBS | TEMPERATURE: 97.5 F | BODY MASS INDEX: 29.57 KG/M2 | DIASTOLIC BLOOD PRESSURE: 60 MMHG | RESPIRATION RATE: 16 BRPM

## 2020-10-29 DIAGNOSIS — G89.18 POSTOPERATIVE PAIN: ICD-10-CM

## 2020-10-29 LAB — HCG UR QL: NEGATIVE

## 2020-10-29 PROCEDURE — 700105 HCHG RX REV CODE 258: Performed by: ORTHOPAEDIC SURGERY

## 2020-10-29 PROCEDURE — 110372 HCHG SHELL REV 278: Performed by: ORTHOPAEDIC SURGERY

## 2020-10-29 PROCEDURE — 502000 HCHG MISC OR IMPLANTS RC 0278: Performed by: ORTHOPAEDIC SURGERY

## 2020-10-29 PROCEDURE — 502580 HCHG PACK, KNEE ARTHROSCOPY: Performed by: ORTHOPAEDIC SURGERY

## 2020-10-29 PROCEDURE — 160009 HCHG ANES TIME/MIN: Performed by: ORTHOPAEDIC SURGERY

## 2020-10-29 PROCEDURE — 160046 HCHG PACU - 1ST 60 MINS PHASE II: Performed by: ORTHOPAEDIC SURGERY

## 2020-10-29 PROCEDURE — 64447 NJX AA&/STRD FEMORAL NRV IMG: CPT | Performed by: ORTHOPAEDIC SURGERY

## 2020-10-29 PROCEDURE — 501838 HCHG SUTURE GENERAL: Performed by: ORTHOPAEDIC SURGERY

## 2020-10-29 PROCEDURE — 700101 HCHG RX REV CODE 250: Performed by: ANESTHESIOLOGY

## 2020-10-29 PROCEDURE — E0114 CRUTCH UNDERARM PAIR NO WOOD: HCPCS | Performed by: ORTHOPAEDIC SURGERY

## 2020-10-29 PROCEDURE — 160048 HCHG OR STATISTICAL LEVEL 1-5: Performed by: ORTHOPAEDIC SURGERY

## 2020-10-29 PROCEDURE — 500028 HCHG ARTHROWAND TURBOVAC 3.5/90 SUCT.: Performed by: ORTHOPAEDIC SURGERY

## 2020-10-29 PROCEDURE — A9270 NON-COVERED ITEM OR SERVICE: HCPCS | Performed by: ANESTHESIOLOGY

## 2020-10-29 PROCEDURE — 160035 HCHG PACU - 1ST 60 MINS PHASE I: Performed by: ORTHOPAEDIC SURGERY

## 2020-10-29 PROCEDURE — 81025 URINE PREGNANCY TEST: CPT

## 2020-10-29 PROCEDURE — 160029 HCHG SURGERY MINUTES - 1ST 30 MINS LEVEL 4: Performed by: ORTHOPAEDIC SURGERY

## 2020-10-29 PROCEDURE — 700111 HCHG RX REV CODE 636 W/ 250 OVERRIDE (IP): Performed by: ANESTHESIOLOGY

## 2020-10-29 PROCEDURE — 500881 HCHG PACK, EXTREMITY: Performed by: ORTHOPAEDIC SURGERY

## 2020-10-29 PROCEDURE — 160025 RECOVERY II MINUTES (STATS): Performed by: ORTHOPAEDIC SURGERY

## 2020-10-29 PROCEDURE — 700102 HCHG RX REV CODE 250 W/ 637 OVERRIDE(OP): Performed by: ANESTHESIOLOGY

## 2020-10-29 PROCEDURE — 700101 HCHG RX REV CODE 250: Performed by: ORTHOPAEDIC SURGERY

## 2020-10-29 PROCEDURE — 160041 HCHG SURGERY MINUTES - EA ADDL 1 MIN LEVEL 4: Performed by: ORTHOPAEDIC SURGERY

## 2020-10-29 PROCEDURE — 160002 HCHG RECOVERY MINUTES (STAT): Performed by: ORTHOPAEDIC SURGERY

## 2020-10-29 PROCEDURE — 160036 HCHG PACU - EA ADDL 30 MINS PHASE I: Performed by: ORTHOPAEDIC SURGERY

## 2020-10-29 DEVICE — BONE DOWEL 14MM FREEZE DRIED: Type: IMPLANTABLE DEVICE | Site: KNEE | Status: FUNCTIONAL

## 2020-10-29 DEVICE — BONE CHIPS 15CC FREEZE DRIED CANCELLOUS CRUSHED: Type: IMPLANTABLE DEVICE | Site: KNEE | Status: FUNCTIONAL

## 2020-10-29 RX ORDER — ONDANSETRON 2 MG/ML
INJECTION INTRAMUSCULAR; INTRAVENOUS PRN
Status: DISCONTINUED | OUTPATIENT
Start: 2020-10-29 | End: 2020-10-29 | Stop reason: SURG

## 2020-10-29 RX ORDER — ONDANSETRON 2 MG/ML
4 INJECTION INTRAMUSCULAR; INTRAVENOUS
Status: COMPLETED | OUTPATIENT
Start: 2020-10-29 | End: 2020-10-29

## 2020-10-29 RX ORDER — LABETALOL HYDROCHLORIDE 5 MG/ML
5 INJECTION, SOLUTION INTRAVENOUS
Status: DISCONTINUED | OUTPATIENT
Start: 2020-10-29 | End: 2020-10-29 | Stop reason: HOSPADM

## 2020-10-29 RX ORDER — CEFAZOLIN SODIUM 1 G/3ML
INJECTION, POWDER, FOR SOLUTION INTRAMUSCULAR; INTRAVENOUS PRN
Status: DISCONTINUED | OUTPATIENT
Start: 2020-10-29 | End: 2020-10-29 | Stop reason: SURG

## 2020-10-29 RX ORDER — ROPIVACAINE HYDROCHLORIDE 5 MG/ML
INJECTION, SOLUTION EPIDURAL; INFILTRATION; PERINEURAL
Status: COMPLETED | OUTPATIENT
Start: 2020-10-29 | End: 2020-10-29

## 2020-10-29 RX ORDER — OXYCODONE HCL 5 MG/5 ML
10 SOLUTION, ORAL ORAL
Status: COMPLETED | OUTPATIENT
Start: 2020-10-29 | End: 2020-10-29

## 2020-10-29 RX ORDER — KETAMINE HYDROCHLORIDE 50 MG/ML
INJECTION, SOLUTION INTRAMUSCULAR; INTRAVENOUS PRN
Status: DISCONTINUED | OUTPATIENT
Start: 2020-10-29 | End: 2020-10-29 | Stop reason: SURG

## 2020-10-29 RX ORDER — BUPIVACAINE HYDROCHLORIDE AND EPINEPHRINE 2.5; 5 MG/ML; UG/ML
INJECTION, SOLUTION EPIDURAL; INFILTRATION; INTRACAUDAL; PERINEURAL
Status: DISCONTINUED | OUTPATIENT
Start: 2020-10-29 | End: 2020-10-29 | Stop reason: HOSPADM

## 2020-10-29 RX ORDER — OXYCODONE HYDROCHLORIDE 5 MG/1
5 TABLET ORAL EVERY 6 HOURS PRN
Qty: 20 TAB | Refills: 0 | Status: SHIPPED | OUTPATIENT
Start: 2020-10-29 | End: 2020-11-03

## 2020-10-29 RX ORDER — HYDROMORPHONE HYDROCHLORIDE 1 MG/ML
0.4 INJECTION, SOLUTION INTRAMUSCULAR; INTRAVENOUS; SUBCUTANEOUS
Status: DISCONTINUED | OUTPATIENT
Start: 2020-10-29 | End: 2020-10-29 | Stop reason: HOSPADM

## 2020-10-29 RX ORDER — OXYCODONE HCL 10 MG/1
10 TABLET, FILM COATED, EXTENDED RELEASE ORAL ONCE
Status: COMPLETED | OUTPATIENT
Start: 2020-10-29 | End: 2020-10-29

## 2020-10-29 RX ORDER — MIDAZOLAM HYDROCHLORIDE 1 MG/ML
INJECTION INTRAMUSCULAR; INTRAVENOUS PRN
Status: DISCONTINUED | OUTPATIENT
Start: 2020-10-29 | End: 2020-10-29 | Stop reason: SURG

## 2020-10-29 RX ORDER — SODIUM CHLORIDE, SODIUM LACTATE, POTASSIUM CHLORIDE, CALCIUM CHLORIDE 600; 310; 30; 20 MG/100ML; MG/100ML; MG/100ML; MG/100ML
INJECTION, SOLUTION INTRAVENOUS CONTINUOUS
Status: DISCONTINUED | OUTPATIENT
Start: 2020-10-29 | End: 2020-10-29 | Stop reason: HOSPADM

## 2020-10-29 RX ORDER — OXYCODONE HCL 5 MG/5 ML
5 SOLUTION, ORAL ORAL
Status: COMPLETED | OUTPATIENT
Start: 2020-10-29 | End: 2020-10-29

## 2020-10-29 RX ORDER — HALOPERIDOL 5 MG/ML
1 INJECTION INTRAMUSCULAR
Status: DISCONTINUED | OUTPATIENT
Start: 2020-10-29 | End: 2020-10-29 | Stop reason: HOSPADM

## 2020-10-29 RX ORDER — HYDROMORPHONE HYDROCHLORIDE 2 MG/ML
INJECTION, SOLUTION INTRAMUSCULAR; INTRAVENOUS; SUBCUTANEOUS PRN
Status: DISCONTINUED | OUTPATIENT
Start: 2020-10-29 | End: 2020-10-29 | Stop reason: SURG

## 2020-10-29 RX ORDER — HYDROMORPHONE HYDROCHLORIDE 1 MG/ML
0.1 INJECTION, SOLUTION INTRAMUSCULAR; INTRAVENOUS; SUBCUTANEOUS
Status: DISCONTINUED | OUTPATIENT
Start: 2020-10-29 | End: 2020-10-29 | Stop reason: HOSPADM

## 2020-10-29 RX ORDER — LORAZEPAM 2 MG/ML
0.5 INJECTION INTRAMUSCULAR
Status: DISCONTINUED | OUTPATIENT
Start: 2020-10-29 | End: 2020-10-29 | Stop reason: HOSPADM

## 2020-10-29 RX ORDER — HYDROMORPHONE HYDROCHLORIDE 1 MG/ML
0.2 INJECTION, SOLUTION INTRAMUSCULAR; INTRAVENOUS; SUBCUTANEOUS
Status: DISCONTINUED | OUTPATIENT
Start: 2020-10-29 | End: 2020-10-29 | Stop reason: HOSPADM

## 2020-10-29 RX ORDER — DEXAMETHASONE SODIUM PHOSPHATE 4 MG/ML
INJECTION, SOLUTION INTRA-ARTICULAR; INTRALESIONAL; INTRAMUSCULAR; INTRAVENOUS; SOFT TISSUE
Status: COMPLETED | OUTPATIENT
Start: 2020-10-29 | End: 2020-10-29

## 2020-10-29 RX ORDER — CELECOXIB 200 MG/1
200 CAPSULE ORAL ONCE
Status: COMPLETED | OUTPATIENT
Start: 2020-10-29 | End: 2020-10-29

## 2020-10-29 RX ORDER — ACETAMINOPHEN 500 MG
1000 TABLET ORAL ONCE
Status: COMPLETED | OUTPATIENT
Start: 2020-10-29 | End: 2020-10-29

## 2020-10-29 RX ORDER — DIPHENHYDRAMINE HYDROCHLORIDE 50 MG/ML
12.5 INJECTION INTRAMUSCULAR; INTRAVENOUS
Status: DISCONTINUED | OUTPATIENT
Start: 2020-10-29 | End: 2020-10-29 | Stop reason: HOSPADM

## 2020-10-29 RX ORDER — HYDRALAZINE HYDROCHLORIDE 20 MG/ML
5 INJECTION INTRAMUSCULAR; INTRAVENOUS
Status: DISCONTINUED | OUTPATIENT
Start: 2020-10-29 | End: 2020-10-29 | Stop reason: HOSPADM

## 2020-10-29 RX ORDER — DEXAMETHASONE SODIUM PHOSPHATE 4 MG/ML
INJECTION, SOLUTION INTRA-ARTICULAR; INTRALESIONAL; INTRAMUSCULAR; INTRAVENOUS; SOFT TISSUE PRN
Status: DISCONTINUED | OUTPATIENT
Start: 2020-10-29 | End: 2020-10-29 | Stop reason: SURG

## 2020-10-29 RX ADMIN — LIDOCAINE HYDROCHLORIDE 0.5 ML: 10 INJECTION, SOLUTION INFILTRATION; PERINEURAL at 11:33

## 2020-10-29 RX ADMIN — OXYCODONE HYDROCHLORIDE 5 MG: 5 SOLUTION ORAL at 15:13

## 2020-10-29 RX ADMIN — ROPIVACAINE HYDROCHLORIDE 30 ML: 5 INJECTION, SOLUTION EPIDURAL; INFILTRATION; PERINEURAL at 13:07

## 2020-10-29 RX ADMIN — ONDANSETRON 4 MG: 2 INJECTION INTRAMUSCULAR; INTRAVENOUS at 13:21

## 2020-10-29 RX ADMIN — DEXAMETHASONE SODIUM PHOSPHATE 6 MG: 4 INJECTION, SOLUTION INTRAMUSCULAR; INTRAVENOUS at 13:16

## 2020-10-29 RX ADMIN — EPHEDRINE SULFATE 5 MG: 50 INJECTION INTRAMUSCULAR; INTRAVENOUS; SUBCUTANEOUS at 13:44

## 2020-10-29 RX ADMIN — EPHEDRINE SULFATE 5 MG: 50 INJECTION INTRAMUSCULAR; INTRAVENOUS; SUBCUTANEOUS at 13:47

## 2020-10-29 RX ADMIN — CEFAZOLIN 2 G: 1 INJECTION, POWDER, FOR SOLUTION INTRAVENOUS at 13:10

## 2020-10-29 RX ADMIN — HYDROMORPHONE HYDROCHLORIDE 1 MG: 2 INJECTION, SOLUTION INTRAMUSCULAR; INTRAVENOUS; SUBCUTANEOUS at 13:10

## 2020-10-29 RX ADMIN — CELECOXIB 200 MG: 200 CAPSULE ORAL at 11:31

## 2020-10-29 RX ADMIN — WATER 15 ML: 100 IRRIGANT IRRIGATION at 11:33

## 2020-10-29 RX ADMIN — ACETAMINOPHEN 1000 MG: 500 TABLET, FILM COATED ORAL at 11:31

## 2020-10-29 RX ADMIN — EPHEDRINE SULFATE 5 MG: 50 INJECTION INTRAMUSCULAR; INTRAVENOUS; SUBCUTANEOUS at 13:41

## 2020-10-29 RX ADMIN — MIDAZOLAM HYDROCHLORIDE 2 MG: 1 INJECTION, SOLUTION INTRAMUSCULAR; INTRAVENOUS at 13:07

## 2020-10-29 RX ADMIN — ONDANSETRON 4 MG: 2 INJECTION INTRAMUSCULAR; INTRAVENOUS at 15:06

## 2020-10-29 RX ADMIN — EPHEDRINE SULFATE 5 MG: 50 INJECTION INTRAMUSCULAR; INTRAVENOUS; SUBCUTANEOUS at 13:38

## 2020-10-29 RX ADMIN — OXYCODONE HYDROCHLORIDE 10 MG: 10 TABLET, FILM COATED, EXTENDED RELEASE ORAL at 11:31

## 2020-10-29 RX ADMIN — DEXAMETHASONE SODIUM PHOSPHATE 2 MG: 4 INJECTION, SOLUTION INTRAMUSCULAR; INTRAVENOUS at 13:07

## 2020-10-29 RX ADMIN — PROPOFOL 150 MG: 10 INJECTION, EMULSION INTRAVENOUS at 13:16

## 2020-10-29 RX ADMIN — SODIUM CHLORIDE, POTASSIUM CHLORIDE, SODIUM LACTATE AND CALCIUM CHLORIDE: 600; 310; 30; 20 INJECTION, SOLUTION INTRAVENOUS at 11:39

## 2020-10-29 RX ADMIN — KETAMINE HYDROCHLORIDE 50 MG: 50 INJECTION INTRAMUSCULAR; INTRAVENOUS at 13:16

## 2020-10-29 ASSESSMENT — PAIN SCALES - GENERAL: PAIN_LEVEL: 3

## 2020-10-29 ASSESSMENT — FIBROSIS 4 INDEX: FIB4 SCORE: 0.31

## 2020-10-29 NOTE — OP REPORT
DATE OF SERVICE:  10/29/2020    PREOPERATIVE DIAGNOSIS:  Right knee cavitary defects of the femur and the   tibia secondary to multiple anterior cruciate ligament reconstructions.    POSTOPERATIVE DIAGNOSES:  1.  Cavitary defect with failure of bone graft material in the distal femur.  2.  Medial meniscus tear.  3.  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  4.  Retained graft loosened the soft tissues on the tibia.    PROCEDURES:  1.  Right knee diagnostic arthroscopy with arthroscopic-assisted curettage and   bone grafting of distal femoral bone defect/bone cyst secondary to failure of   ingrowth of Montage bone substitute.  2.  Right knee diagnostic arthroscopy with arthroscopic partial medial   meniscectomy.  3.  Right knee arthroscopic microfracture of the medial femoral condyle.  4.  Open hardware removal from the tibia.    SURGEON:  Dexter Pimentel MD    ASSISTANT:  Mallory Mckenzie PA-C    ANESTHESIA:  General and an adductor canal nerve block.    ANESTHESIOLOGIST:  Hung Trejo MD    IMPLANTS:  One Cloward bone dowel and allograft croutons.    COMPLICATIONS:  None.    DISPOSITION:  Stable to postanesthesia care unit.    INDICATIONS:  The patient has had multiple surgeries on the right knee.  She   has had continued instability of the knee.  The risks, benefits, alternatives,   limitations of revision surgery were discussed in detail.  She expressed   understanding and desired to proceed.    DESCRIPTION OF PROCEDURE:  The  patient and the correct operative extremity   were identified in the preoperative area.  The knee was marked.  She was   brought to the operating room and the correct operative extremity was again   confirmed.  She was placed supine on the OR table, she underwent an adductor   canal nerve block performed at my request for postop pain control.  She   underwent general anesthesia without complication.  Examination under   anesthesia showed 2+ Lachman, 2+ pivot shift, no  medial or lateral   instability.  The knee was prepped with alcohol and injected with 30 mL of 1%   lidocaine with epinephrine.  The knee was then prepped and draped in the usual   sterile fashion using ChloraPrep.  Diagnostic arthroscopy was then performed,   which showed some mild grade II chondromalacia of the patella and the   trochlea with mild lateral maltracking of the patella.  The notch showed a   chronic anterior cruciate ligament tear.  The PCL was intact.  The medial   compartment showed a radial tear of the medial meniscus.  Lateral compartment   showed an intact meniscus, intact cartilage.  There was grade IV chondral   defect of the medial femoral condyle visible in about 90 degrees of flexion   measuring about 1 cm medial to lateral and 4 mm anterior to posterior.  The   surrounding loose cartilage was debrided with the arthroscopic shaver and then   a microfracture awl was used to place 4 punch hole down to bleeding   subchondral bone performing a microfracture of that area, performed a partial   medial meniscectomy with the arthroscopic shaver, then address the ACL defect.    The Montage bone substitute that was placed into the distal femur previously   was probed and clearly had not ingrown at all.  It was just chalky worthless   substances certainly would not support drilling through so, just curetted that   all out, actually took a 9 and then a 10 mm acorn reamer to ream distally and   deep within the tunnel, it was only about a 10 mm tunnel, but more   superficial or more to the inside of the joint.  It was a large cavitary   defect, so we curetted all that out, then took some allograft croutons and   through a cannula impacted that within the femur and then expanded the medial   portal into a mini arthrotomy then passed a Cloward bone dowel into the notch   with some difficulty and then we were able to press that into the defect and   impacted that with a bone tamp and that smash the graft in  and brought it   relatively flush with the articular side of the lateral femoral condyle.    There was still some slight defects, but it was significantly improved from   where it was at before and we were pleased with how it had filled the defect.    We then removed any excess bony debris from the knee, withdrew the scope and   then reopened the tibial incision and dissected down until we found the large   clump within the subcutaneous tissues right next to tibia of the Montage bone   substitute and that foreign body, which measured about 2 cm2 was removed from   the tibia.  Then, palpated throughout the tibia where the tibial tunnel should   be and even reintroduced the ACL guide into the knee and brought up a guide   pin up to where the tunnel would be and there was no visible defect.  I could   not palpate the tunnel at all, so we left that alone, then copiously irrigated   the wound, closed the deep subQ and the tibial incision with Monocryl.    Benzoin and Steri-Strips were placed over the incision.  The portals were   closed with 3-0 nylon.  The knee was injected with 0.5% bupivacaine with   epinephrine.  Sterile dressings were applied.  The knee was loosely   overwrapped with an Ace wrap.  ANTONETTE stocking was placed.  The patient was then   allowed to awake from anesthesia, transferred to her hospital cart and taken   to the postanesthesia care unit in stable condition.  She tolerated the   procedure well.  There were no immediate complications.       ____________________________________     JUAQUIN RICHARDS MD RD / MILLIE    DD:  10/29/2020 14:47:04  DT:  10/29/2020 15:41:45    D#:  [id number]  Job#:  [id number]

## 2020-10-29 NOTE — DISCHARGE INSTRUCTIONS
ACTIVITY: Rest and take it easy for the first 24 hours.  A responsible adult is recommended to remain with you during that time.  It is normal to feel sleepy.  We encourage you to not do anything that requires balance, judgment or coordination.    MILD FLU-LIKE SYMPTOMS ARE NORMAL. YOU MAY EXPERIENCE GENERALIZED MUSCLE ACHES, THROAT IRRITATION, HEADACHE AND/OR SOME NAUSEA.    FOR 24 HOURS DO NOT:  Drive, operate machinery or run household appliances.  Drink beer or alcoholic beverages.   Make important decisions or sign legal documents.    SPECIAL INSTRUCTIONS: Non Weight Bearing for 24 hours then may weight bearing as tolerated.   Ice (20 minutes on/ 20 minutes off) and elevate extremity. Follow up in 10-14 days.    DIET: To avoid nausea, slowly advance diet as tolerated, avoiding spicy or greasy foods for the first day.  Add more substantial food to your diet according to your physician's instructions.  Babies can be fed formula or breast milk as soon as they are hungry.  INCREASE FLUIDS AND FIBER TO AVOID CONSTIPATION.    SURGICAL DRESSING/BATHING: May remove dressings Post op Day #2 (Friday) and Shower with wound uncovered.  Apply bandaids after shower.  Leave steri strips in place until first post op visit. Do not soak or submerge incisions for two weeks.    FOLLOW-UP APPOINTMENT:  A follow-up appointment should be arranged with your doctor in office in 10-14 days; or call to re-schedule.    You should CALL YOUR PHYSICIAN if you develop:  Fever greater than 101 degrees F.  Pain not relieved by medication, or persistent nausea or vomiting.  Excessive bleeding (blood soaking through dressing) or unexpected drainage from the wound.  Extreme redness or swelling around the incision site, drainage of pus or foul smelling drainage.  Inability to urinate or empty your bladder within 8 hours.  Problems with breathing or chest pain.    You should call 911 if you develop problems with breathing or chest pain.  If you  are unable to contact your doctor or surgical center, you should go to the nearest emergency room or urgent care center.  Physician's (Dr Griffiths) telephone #: [phone]    If any questions arise, call your doctor.  If your doctor is not available, please feel free to call the Surgical Center at [phone]. The Contact Center is open Monday through Friday 7AM to 5PM and may speak to a nurse at [phone], or toll free at [phone].     A registered nurse may call you a few days after your surgery to see how you are doing after your procedure.    MEDICATIONS: Resume taking daily medication.  Take prescribed pain medication with food.  If no medication is prescribed, you may take non-aspirin pain medication if needed.  PAIN MEDICATION CAN BE VERY CONSTIPATING.  Take a stool softener or laxative such as senokot, pericolace, or milk of magnesia if needed.    Prescription given for Oxycodone (sent to [facility] [place], [place])  Last pain medication given at Oxycodone 5mg at 3:15PM     If your physician has prescribed pain medication that includes Acetaminophen (Tylenol), do not take additional Acetaminophen (Tylenol) while taking the prescribed medication.      Peripheral Nerve Block Discharge Instructions from Same Day Surgery and Inpatient :    What to Expect - Lower Extremity  · The block may cause you to experience numbness and weakness in your hip and thigh, thigh and knee or calf and foot on the same side as your surgery  · Numbness, tingling and / or weakness are all normal. For some people, this may be an unpleasant sensation  · These issues will be resolved when the local anesthetic wears off   · You may experience numbness and tingling in your thigh on the same side as your surgery if the block medicine was injected at your groin area  · Numbness will make it difficult to walk  · You may have problems with balance and walking so be very careful   · Follow your surgeon's direction regarding weight  bearing on your surgical limb  · Be very careful with your numb limb  Precautions  · The numbness may affect your balance  · Be careful when walking or moving around  · Your leg may be weak: be very careful putting weight on it  · If your surgeon did not specify a time, you should not bear weight for 24 hours  · Be sure to ask for help when you need it  · It is better to have help than to fall and hurt yourself    Depression / Suicide Risk    As you are discharged from this [facility] facility, it is important to learn how to keep safe from harming yourself.    Recognize the warning signs:  · Abrupt changes in personality, positive or negative- including increase in energy   · Giving away possessions  · Change in eating patterns- significant weight changes-  positive or negative  · Change in sleeping patterns- unable to sleep or sleeping all the time   · Unwillingness or inability to communicate  · Depression  · Unusual sadness, discouragement and loneliness  · Talk of wanting to die  · Neglect of personal appearance   · Rebelliousness- reckless behavior  · Withdrawal from people/activities they love  · Confusion- inability to concentrate     If you or a loved one observes any of these behaviors or has concerns about self-harm, here's what you can do:  · Talk about it- your feelings and reasons for harming yourself  · Remove any means that you might use to hurt yourself (examples: pills, rope, extension cords, firearm)  · Get professional help from the community (Mental Health, Substance Abuse, psychological counseling)  · Do not be alone:Call your Safe Contact- someone whom you trust who will be there for you.  · Call your local CRISIS HOTLINE 682-6285 or 163-748-0676  · Call your local Children's Mobile Crisis Response Team [place] (490) 057-2270 or www.Wall[employer]  · Call the toll free National Suicide Prevention Hotlines   · National Suicide Prevention Lifeline 125-320-DQOH (8608)  National Hope  Line Network 800-SUICIDE (570-5283)    ·

## 2020-10-29 NOTE — ANESTHESIA PREPROCEDURE EVALUATION
Here we have a very pleasant 30 year old female visiting [facility] for a knee arthroscopy.     Relevant Problems   NEURO   (+) H/O Bell's palsy   (+) History of iron deficiency      Other   (+) Anxiety   (+) Generalized abdominal pain   (+) HSV infection   (+) IFG (impaired fasting glucose)   (+) Weight gain     Past Medical History     Date Comments   Anxiety [F41.9]     Ovarian cyst [N83.209] 2018 left side   Vitamin D deficiency [E55.9] 2018    Obesity (BMI 30-39.9) [E66.9] 2018    IFG (impaired fasting glucose) [R73.01] 2019    HSV infection [B00.9] 2019    Hyperlipidemia LDL goal <100 [E78.5] 2019       Surgical History    Past Surgical History     Laterality Last Occurrence Comments   ACL RECONSTRUCTION [81.45A] Right 2006    MD  DELIVERY ONLY [[id number]]      KNEE ARTHROSCOPY [80.26]  2014 Performed by Dexter Pimnetel M.D. at [facility]   HARDWARE REMOVAL ORTHO [78.60]  2014 Performed by Dexter Pimentel M.D. at [facility]   BONE GRAFT [77.70]  2014 Performed by Dexter Pimentel M.D. at [facility]   ACL RECONSTRUCTION SCOPE [81.45B]  10/2/2014 Performed by Dexter Pimentel M.D. at [facility]   REPEAT C SECTION [70.02]  10/19/2015 Procedure: REPEAT C SECTION;  Surgeon: Mary Randoplh M.D.;  Location: LABOR AND DELIVERY;  Service:    REPEAT C SECTION [70.02]  2017 Procedure: REPEAT C SECTION;  Surgeon: Asmita Bolivar M.D.;  Location: LABOR AND DELIVERY;  Service: Obstetrics   KNEE ARTHROSCOPY [80.26] Right 2018 Procedure: KNEE ARTHROSCOPY;  Surgeon: Dexter Pimentel M.D.;  Location: [facility];  Service: Orthopedics   SYNOVECTOMY [80.70] Right 2018 Procedure: SYNOVECTOMY;  Surgeon: Dexter Pimentel M.D.;  Location: [facility];  Service: Orthopedics   MEDIAL MENISCECTOMY [80.61] Right 2018 Procedure: MEDIAL MENISCECTOMY-  PARTIAL, AND PARTIAL LATERAL;  Surgeon: Dexter Pimentel,  M.D.;  Location: [facility];  Service: Orthopedics   CHONDROPLASTY [[id number]]  11/13/2018 Procedure: CHONDROPLASTY;  Surgeon: Dexter Pimentel M.D.;  Location: [facility];  Service: Orthopedics   HARDWARE REMOVAL ORTHO [78.60]  11/13/2018 Procedure: HARDWARE REMOVAL ORTHO;  Surgeon: Dexter Pimentel M.D.;  Location: [facility];  Service: Orthopedics   BONE GRAFT [77.70]  11/13/2018 Procedure: BONE GRAFT;  Surgeon: Dexter Pimentel M.D.;  Location: [facility];  Service: Orthopedics   Social History    Tobacco History    Smoking Status   Never Smoker   Smokeless Tobacco Use   Never Used       Physical Exam    Airway   Mallampati: II  TM distance: >3 FB  Neck ROM: full       Cardiovascular - normal exam  Rhythm: regular  Rate: normal  (-) murmur     Dental - normal exam           Pulmonary - normal exam  Breath sounds clear to auscultation     Abdominal    Neurological - normal exam                 Anesthesia Plan    ASA 2       Plan - general and peripheral nerve block     Peripheral nerve block will be post-op pain control  Airway plan will be LMA        Induction: intravenous    Postoperative Plan: Postoperative administration of opioids is intended.    Pertinent diagnostic labs and testing reviewed    Informed Consent:    Anesthetic plan and risks discussed with patient.    Use of blood products discussed with: patient whom consented to blood products.

## 2020-10-29 NOTE — OR NURSING
1436 Pt arrived from OR, s/p Right knee arthroscopy.  Report received from anesthesia and OR RN. Simple mask to 6L O2 in place, breath sounds clear bilaterally. Surgical site to Right knee covered in surgical dressing; clean, dry and intact. Ice applied to site. Cap refill <3, pedal pulse +2. SCDs in place and machine functional. Pt drowsy but arousing to voice; denies pain or nausea.   1450 Pt remains drowsy, denies pain or nausea.   1505 Pt states pain 4/10; tolerable. Pt c/o mild nausea, see MAR.  called and voicemail left with update on pt status.   1520 Nausea has improved. Pt pain 4/10 tolerable. Pt wishes for oral pain medication; see MAR.   1535 Pt states pain remains unchanged and tolerable. Dr Pimentel at bedside speaking to pt. Pt given IS for O2 Sats <90% on RA. Goal 2600, pt max 1000.   1545 Pt slightly nauseous; given cold towel to forehead and quease-ease. Pain tolerable.   1400 Pt states pain is controlled and tolerable, denies nausea. Pt tolerating PO fluids. Meets stage II criteria, Report to April, RN. Pt transferred to Phase II

## 2020-10-29 NOTE — PROGRESS NOTES
"1606 patient to stage 2  Patient settled in recliner chair post short transfer with NWB to RLE from [facility] - pt dressed with assist by CNA. Dressing CDI to R knee, +2 R pedal pulse with pink/warm toes and <3 sec cap refill. Pain rated as tolerable and nausea is \"better\".   1620 Pt up with crutches and NWB to RLE; demonstrated safe ambulation with crutches to BR. Pt able to void in BR.   1642 D/Buck to care of family post uneventful stay in PACU 2.      "

## 2020-10-29 NOTE — ANESTHESIA TIME REPORT
Anesthesia Start and Stop Event Times     Date Time Event    10/29/2020 1254 Ready for Procedure     1310 Anesthesia Start     1438 Anesthesia Stop        Responsible Staff  10/29/20    Name Role Begin End    Hung Trejo M.D. Anesth 1310 1438        Preop Diagnosis (Free Text):  Pre-op Diagnosis     TEAR LATERAL MENISCUS RIGHT KNEE, KNEE PAIN RIGHT, SPRAIN ANTERIOR CRUCIATE LIGAMENT RIGH TKNEE        Preop Diagnosis (Codes):    Post op Diagnosis  Anterior cruciate ligament complete tear, right, initial encounter      Premium Reason  Non-Premium    Comments:

## 2020-10-29 NOTE — ANESTHESIA PROCEDURE NOTES
Airway    Date/Time: 10/29/2020 1:17 PM  Performed by: Hung Trejo M.D.  Authorized by: Hung Trejo M.D.     Location:  OR  Urgency:  Elective  Difficult Airway: No    Indications for Airway Management:  Anesthesia      Spontaneous Ventilation: absent    Sedation Level:  Deep  Preoxygenated: Yes    Mask Difficulty Assessment:  0 - not attempted  Final Airway Type:  Supraglottic airway  Final Supraglottic Airway:  Standard LMA    SGA Size:  4  Number of Attempts at Approach:  1

## 2020-10-29 NOTE — ANESTHESIA PROCEDURE NOTES
Peripheral Block    Date/Time: 10/29/2020 1:07 PM  Performed by: Hung Trejo M.D.  Authorized by: Hung Trejo M.D.     Patient Location:  Pre-op  Start Time:  10/29/2020 1:07 PM  End Time:  10/29/2020 1:09 PM  Reason for Block: at surgeon's request and post-op pain management    patient identified, IV checked, site marked, risks and benefits discussed, surgical consent, monitors and equipment checked, pre-op evaluation and timeout performed    Patient Position:  Supine  Prep: ChloraPrep    Monitoring:  Heart rate, continuous pulse ox and cardiac monitor  Block Region:  Lower Extremity  Lower Extremity - Block Type:  Selective FEMORAL nerve block at the Adductor Canal    Laterality:  Right  Procedures: ultrasound guided  Image captured, interpreted and electronically stored.  Local Infiltration:  Lidocaine  Strength:  2 %  Dose:  3 ml  Block Type:  Single-shot  Needle Localization:  Ultrasound guidance  Injection Assessment:  Negative aspiration for heme, no paresthesia on injection, incremental injection and local visualized surrounding nerve on ultrasound  Evidence of intravascular injection: No

## 2020-10-29 NOTE — ANESTHESIA POSTPROCEDURE EVALUATION
Patient: Tonia Xavier    Procedure Summary     Date: 10/29/20 Room / Location:  OR 06 / SURGERY [facility]    Anesthesia Start: 1310 Anesthesia Stop: 1438    Procedures:       ARTHROSCOPY, KNEE (Right Knee)      MENISCECTOMY, KNEE, MEDIAL - PARTIAL (Right Knee)      REMOVAL, HARDWARE tibia  (Right Knee)      curetage and BONE GRAFT femoral cyst, microfracture (Right Knee) Diagnosis: (TEAR LATERAL MENISCUS RIGHT KNEE, KNEE PAIN RIGHT, SPRAIN ANTERIOR CRUCIATE LIGAMENT RIGH TKNEE)    Surgeon: Dexter Pimentel M.D. Responsible Provider: Hung Trejo M.D.    Anesthesia Type: general, peripheral nerve block ASA Status: 2          Final Anesthesia Type: general, peripheral nerve block  Last vitals  BP   Blood Pressure: 100/58    Temp   36.2 °C (97.2 °F)    Pulse   Pulse: 76   Resp   16    SpO2   98 %      Anesthesia Post Evaluation    Patient location during evaluation: PACU  Patient participation: complete - patient participated  Level of consciousness: awake and alert and awake  Pain score: 3    Airway patency: patent  Anesthetic complications: no  Cardiovascular status: hemodynamically stable  Respiratory status: acceptable  Hydration status: euvolemic    PONV: none           Nurse Pain Score: 0 (NPRS)          [facility]   Clinic Visit s Jun 12, 2017    Subjective:  C[place] Long is a 80 year old male who is seen as self referral for evaluation of acute right medial knee pain.    Symptoms began 3 weeks ago.  Reports insidious onset without acute precipitating event.  Reports right knee pain that is located medial with radiation absent.  Pain is 8/10 in maximal severity and 1/10 currently.  Symptoms are worse with driving, rolling on his right knee at night, stairs, and with twisting activities and better with nothing in particular.  Other treatment has consisted of ice and topical creams with minimal relief.  Denies any weakness, locking, popping, catching, clicking, bruising, or swelling of the right knee. Denies any previous right knee injuries/surgeries.     Patient's past medical, surgical, social, and family histories are reviewed today.  No significant pertinent past medical history  Past Medical History:   Diagnosis Date     Diverticulitis of colon 2011       Review of Systems:  Constitutional: NEGATIVE for fever, chills, or change in weight  Skin: NEGATIVE for worrisome rashes, moles, or lesions  Neuro: NEGATIVE for weakness of the right lower extremity  MSK: see HPI  Additional 10 point ROS is negative other than symptoms noted above and in HPI    Objective:  /62  Ht 6' (1.829 m)  Wt 180 lb (81.6 kg)  BMI 24.41 kg/m2  General: healthy, alert, and in no distress  Skin: no suspicious lesions or rashes  Psych: mentation appears normal and affect normal/bright  HEENT: no scleral icterus  CV: no pedal edema  Resp: normal respiratory effort without conversational dyspnea   Neuro: motor strength as noted below  Lymph: no palpable lymphadenopathy    MSK:    RIGHT KNEE  Inspection:    Normal alignment with no edema, erythema, or ecchymosis present  Palpation:    Not tender over the patella tendon, quadriceps insertion, lateral joint line, medial joint line, medial tibial plateau, lateral  tibial plateau, medial femoral condyle, lateral femoral condyle, or prepatellar bursa    No effusion is present    Patellofemoral crepitus is absent  Active Range of Motion:     00 extension to 1350 flexion    Strength:    Quadriceps 5/5; extensor mechanism intact  Special Tests:    Positive: None    Negative: Patellar grind, patellar apprehension, MCL/valgus stress (0 & 30 deg), ballottement, quadriceps active test, Mouna's, Apley's, single leg squatting, and Thessaly's    Contralateral knee: no overlying skin change, observable deformity, or effusion    Imaging:  Right knee x-rays (3 views) were ordered, independent visualization of images was performed, and interpreted in the office today  Impression:   1. Possible early narrowing of the medial/patellofemoral joints.  2. Minimal enthesophyte noted of the superior portion of the patella.  3. Negative for fracture, subluxation, or other acute osseous abnormalities.    ASSESSMENT:  1. Acute right medial knee pain - differential diagnoses includes medial mensicus degeneration/tear, knee osteoarthrosis, etc.    PLAN:  1. Activity modification as discussed, including limitation of activities that cause pain/discomfort.  2. Acetaminophen/ice as needed for improved pain control.  3. Formal physical therapy - exercises to include quadriceps/hamstring/calf stretching/strengthening with range of motion exercises, manual therapy, hip mobilizations, and gait/balance training with use of modalities as needed with home exercise prescription.  4. Call if interested in a right knee intra-articular corticosteroid injection for further treatment purposes.  5. Follow-up in 4-6 weeks if no improvement of symptoms for further evaluation/medical care.  If symptoms resolve completely, can follow-up as needed.  Consider MRI of the right knee without contrast, etc as deemed appropriate moving forward.  Istructed to contact our office should the condition evolve or worsen.    Patient's  conditions were thoroughly discussed during today's visit with greater than 50% of the visit spent counseling the patient with total time spent face-to-face with the patient being 15 minutes.    Shayan Mason DO, CAKOURTNEY  [facility]    Disclaimer: This note consists of symbols derived from keyboarding, dictation and/or voice recognition software. As a result, there may be errors in the script that have gone undetected. Please consider this when interpreting information found in this chart.

## 2020-11-30 ENCOUNTER — HOSPITAL ENCOUNTER (OUTPATIENT)
Dept: LAB | Facility: MEDICAL CENTER | Age: 30
End: 2020-11-30
Attending: EMERGENCY MEDICINE
Payer: COMMERCIAL

## 2020-12-01 ENCOUNTER — NURSE TRIAGE (OUTPATIENT)
Dept: HEALTH INFORMATION MANAGEMENT | Facility: OTHER | Age: 30
End: 2020-12-01

## 2020-12-01 NOTE — TELEPHONE ENCOUNTER
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 Asking what her next step is. Transferred to employee Covid line.

## 2020-12-03 ENCOUNTER — OFFICE VISIT (OUTPATIENT)
Dept: MEDICAL GROUP | Facility: MEDICAL CENTER | Age: 30
End: 2020-12-03
Payer: COMMERCIAL

## 2020-12-03 VITALS
TEMPERATURE: 97.3 F | HEART RATE: 71 BPM | WEIGHT: 180 LBS | BODY MASS INDEX: 29.99 KG/M2 | HEIGHT: 65 IN | SYSTOLIC BLOOD PRESSURE: 90 MMHG | OXYGEN SATURATION: 94 % | DIASTOLIC BLOOD PRESSURE: 68 MMHG

## 2020-12-03 DIAGNOSIS — N92.6 IRREGULAR MENSES: ICD-10-CM

## 2020-12-03 DIAGNOSIS — F41.1 GENERALIZED ANXIETY DISORDER: ICD-10-CM

## 2020-12-03 DIAGNOSIS — R00.2 HEART PALPITATIONS: ICD-10-CM

## 2020-12-03 LAB
INT CON NEG: NORMAL
INT CON POS: NORMAL
POC URINE PREGNANCY TEST: NORMAL

## 2020-12-03 PROCEDURE — 99214 OFFICE O/P EST MOD 30 MIN: CPT | Performed by: NURSE PRACTITIONER

## 2020-12-03 PROCEDURE — 81025 URINE PREGNANCY TEST: CPT | Performed by: NURSE PRACTITIONER

## 2020-12-03 RX ORDER — SERTRALINE HYDROCHLORIDE 100 MG/1
100 TABLET, FILM COATED ORAL DAILY
Qty: 30 TAB | Refills: 11 | Status: SHIPPED | OUTPATIENT
Start: 2020-12-03 | End: 2020-12-21

## 2020-12-03 ASSESSMENT — FIBROSIS 4 INDEX: FIB4 SCORE: 0.31

## 2020-12-04 NOTE — PROGRESS NOTES
Subjective:     Tonia Xavier is a 30 y.o. female who presents with palpitations.    HPI:   Seen in f/u for palpitations.  Sx started about 1 wk ago.  Lasting up to 3 min.  Occurring multiple times daily.  They are fluttering in her throat and sensation of difficult to breath.  She has checked her pulse during sensation and it is regular.  No chest pain or lightheadedness.    She does having some anxiety.  She is on zoloft for anxiety.  Not totally controlled on jthat.  No other changes.  No new meds.    She just had a small brown spotting for her last menses.  Never had before.  Not misssed any bcp.      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 30 Tab 11   • montelukast (SINGULAIR) 10 MG Tab      • Ferrous Sulfate (IRON PO) Take 64 mg by mouth every day.     • vitamin D (CHOLECALCIFEROL) 1000 Unit Tab TAKE 1 TABLET BY MOUTH EVERY DAY 90 Tab 0   • FEMYNOR 0.25-35 MG-MCG per tablet TAKE 1 TABLET BY MOUTH EVERY DAY 84 Tab 0   • valacyclovir (VALTREX) 1 GM Tab TAKE 1 TABLET BY MOUTH EVERY DAY 90 Tab 3   • dicyclomine (BENTYL) 10 MG Cap Take 1 Cap by mouth 4 Times a Day,Before Meals and at Bedtime. 30 Cap 3   • sertraline (ZOLOFT) 50 MG Tab TAKE 1 TABLET BY MOUTH EVERY DAY.     • ondansetron (ZOFRAN) 4 MG Tab tablet ZOFRAN 4 MG TABS     •  "fexofenadine (ALLEGRA) 180 MG tablet 24HR ALLERGY RELIEF TABS       No current facility-administered medications for this visit.        Allergies   Allergen Reactions   • Benzoin Hives     Rash    • Phentermine      Worsening anxiety, excessive dry mouth, lightheaded       ROS  Constitutional: Negative. Negative for fever, chills, weight loss, malaise/fatigue and diaphoresis.   HENT: Negative. Negative for hearing loss, ear pain, nosebleeds, congestion, sore throat, neck pain, tinnitus and ear discharge.   Respiratory: Negative. Negative for cough, hemoptysis, sputum production, shortness of breath, wheezing and stridor.   Cardiovascular: Negative. Negative for chest pain, palpitations, orthopnea, claudication, leg swelling and PND.   Gastrointestinal: Denies nausea, vomiting, diarrhea, constipation, heartburn, melena or hematochezia.  Genitourinary: Denies dysuria, hematuria, urinary incontinence, frequency or urgency.        Objective:     BP (!) 90/68 (BP Location: Right arm, Patient Position: Sitting)   Pulse 71   Temp 36.3 °C (97.3 °F) (Temporal)   Ht 1.651 m (5' 5\")   Wt 81.6 kg (180 lb)   SpO2 94%  Body mass index is 29.95 kg/m².    Physical Exam:  Vitals reviewed.  Constitutional: Oriented to person, place, and time. appears well-developed and well-nourished. No distress.   Cardiovascular: Normal rate, regular rhythm, normal heart sounds and intact distal pulses. Exam reveals no gallop and no friction rub. No murmur heard. No carotid bruits.   Pulmonary/Chest: Effort normal and breath sounds normal. No stridor. No respiratory distress. no wheezes or rales. exhibits no tenderness.   Musculoskeletal: Normal range of motion. exhibits no edema. joshua pedal pulses 2+.  Lymphadenopathy: No cervical or supraclavicular adenopathy.   Neurological: Alert and oriented to person, place, and time. exhibits normal muscle tone.  Skin: Skin is warm and dry. No diaphoresis.   Psychiatric: Normal mood and affect. Behavior " is normal.   EKG:  NSRKRISS.      Assessment and Plan:     The following treatment plan was discussed:    1. Heart palpitations  HOLTER - Cardiology Performed (48HR)    EKG - Clinic Performed    EKG in office read by me:  KRISS REVELES.  do event monitor.  f/u w/pt w/results.    2. Irregular menses  POCT Pregnancy    she is sched to f/u with her gyn soon.  will discuss with them.  pg test in office neg   3. Generalized anxiety disorder  sertraline (ZOLOFT) 100 MG Tab     not well controlled on 50 mg zoloft.  will inc to 100 mg daily.  f/u if sx not improved.         Followup: Return in about 4 weeks (around 12/31/2020).

## 2020-12-17 DIAGNOSIS — F41.1 GENERALIZED ANXIETY DISORDER: ICD-10-CM

## 2020-12-17 RX ORDER — NORGESTIMATE AND ETHINYL ESTRADIOL 0.25-0.035
KIT ORAL
Qty: 84 TAB | Refills: 0 | Status: SHIPPED | OUTPATIENT
Start: 2020-12-17 | End: 2021-03-05

## 2020-12-18 NOTE — TELEPHONE ENCOUNTER
Received request via: Pharmacy    Was the patient seen in the last year in this department? Yes    Does the patient have an active prescription (recently filled or refills available) for medication(s) requested? Yes. Requesting 90 day supply.

## 2020-12-20 DIAGNOSIS — Z23 NEED FOR VACCINATION: ICD-10-CM

## 2020-12-21 RX ORDER — SERTRALINE HYDROCHLORIDE 100 MG/1
TABLET, FILM COATED ORAL
Qty: 90 TAB | Refills: 0 | Status: SHIPPED | OUTPATIENT
Start: 2020-12-21 | End: 2021-02-02

## 2021-01-04 RX ORDER — CYCLOBENZAPRINE HCL 10 MG
TABLET ORAL
Qty: 30 TAB | Refills: 3 | Status: SHIPPED | OUTPATIENT
Start: 2021-01-04 | End: 2021-01-11 | Stop reason: SDUPTHER

## 2021-01-09 ENCOUNTER — HOSPITAL ENCOUNTER (OUTPATIENT)
Dept: LAB | Facility: MEDICAL CENTER | Age: 31
End: 2021-01-09
Attending: EMERGENCY MEDICINE
Payer: COMMERCIAL

## 2021-01-11 RX ORDER — CYCLOBENZAPRINE HCL 10 MG
10 TABLET ORAL DAILY
Qty: 30 TAB | Refills: 3 | Status: SHIPPED | OUTPATIENT
Start: 2021-01-11 | End: 2021-09-27 | Stop reason: SDUPTHER

## 2021-01-13 ENCOUNTER — APPOINTMENT (OUTPATIENT)
Dept: CARDIOLOGY | Facility: MEDICAL CENTER | Age: 31
End: 2021-01-13
Payer: COMMERCIAL

## 2021-01-13 ENCOUNTER — TELEPHONE (OUTPATIENT)
Dept: CARDIOLOGY | Facility: MEDICAL CENTER | Age: 31
End: 2021-01-13

## 2021-01-13 NOTE — TELEPHONE ENCOUNTER
Home enrollment completed for the 14 day Zio patch program per MATT Hinojosa.  Monitor to be mailed to patient by [email or web address].    >Currently pending EOS.

## 2021-01-18 ENCOUNTER — GYNECOLOGY VISIT (OUTPATIENT)
Dept: OBGYN | Facility: CLINIC | Age: 31
End: 2021-01-18
Payer: COMMERCIAL

## 2021-01-18 ENCOUNTER — HOSPITAL ENCOUNTER (OUTPATIENT)
Facility: MEDICAL CENTER | Age: 31
End: 2021-01-18
Attending: OBSTETRICS & GYNECOLOGY
Payer: COMMERCIAL

## 2021-01-18 VITALS — BODY MASS INDEX: 31.28 KG/M2 | DIASTOLIC BLOOD PRESSURE: 64 MMHG | SYSTOLIC BLOOD PRESSURE: 135 MMHG | WEIGHT: 188 LBS

## 2021-01-18 DIAGNOSIS — Z00.00 ANNUAL PHYSICAL EXAM: ICD-10-CM

## 2021-01-18 DIAGNOSIS — Z12.4 CERVICAL CANCER SCREENING: ICD-10-CM

## 2021-01-18 DIAGNOSIS — N94.10 DYSPAREUNIA, FEMALE: ICD-10-CM

## 2021-01-18 PROCEDURE — 87591 N.GONORRHOEAE DNA AMP PROB: CPT

## 2021-01-18 PROCEDURE — 87491 CHLMYD TRACH DNA AMP PROBE: CPT

## 2021-01-18 PROCEDURE — 88175 CYTOPATH C/V AUTO FLUID REDO: CPT

## 2021-01-18 PROCEDURE — 99395 PREV VISIT EST AGE 18-39: CPT | Performed by: OBSTETRICS & GYNECOLOGY

## 2021-01-18 ASSESSMENT — FIBROSIS 4 INDEX: FIB4 SCORE: 0.31

## 2021-01-18 NOTE — PROGRESS NOTES
Tonia Xavier is a 30 y.o.  female who presents for her Annual Gynecologic Exam      HPI Comments: Pt presents for her annual well woman exam. Would like to have another form of birth control other than pills due to hard time remembering to take them.     Anxiety - recently increased zoloft, feels better    Has pain with entry with sex, always been that way.     Review of Systems:   Pertinent positives documented in HPI and all other systems reviewed & are negative    OB History    Para Term  AB Living   3 3 2 1 0 3   SAB TAB Ectopic Molar Multiple Live Births   0 0 0 0 0 3       Past Gynecological History  Patient's last menstrual period was 2020 (exact date).  BC: OC and condoms  Menarche: 13  Menses: q 32 days, lasting 5days, using 4pads/tampons on heaviest day  Sexually active: yes  Number of lifetime sexual partners: 1, men  Sexually transmitted infections: denies  Pap: last , denies hx of abnormal  History of sexual abuse: denies  Fibroids?: denies  Ovarian Cysts?: YES, resolved       All PMH, PSH, allergies, social history and FH reviewed and updated today:  Past Medical History:   Diagnosis Date   • Anxiety    • HSV infection 2019   • Hyperlipidemia LDL goal <100 2019   • IFG (impaired fasting glucose) 2019   • Obesity (BMI 30-39.9) 2018   • Ovarian cyst 2018    left side   • Vitamin D deficiency 2018     Past Surgical History:   Procedure Laterality Date   • PB KNEE SCOPE,DIAGNOSTIC Right 10/29/2020    Procedure: ARTHROSCOPY, KNEE;  Surgeon: Dexter Pimentel M.D.;  Location: [facility];  Service: Orthopedics   • MEDIAL MENISCECTOMY Right 10/29/2020    Procedure: MENISCECTOMY, KNEE, MEDIAL - PARTIAL;  Surgeon: Dexter Pimentel M.D.;  Location: [facility];  Service: Orthopedics   • HARDWARE REMOVAL ORTHO Right 10/29/2020    Procedure: REMOVAL, HARDWARE tibia ;  Surgeon: Dexter Pimentel M.D.;  Location: [facility];  Service:  Orthopedics   • BONE GRAFT Right 10/29/2020    Procedure: curetage and BONE GRAFT femoral cyst, microfracture;  Surgeon: Dexter Pimentel M.D.;  Location: [facility];  Service: Orthopedics   • KNEE ARTHROSCOPY Right 2018    Procedure: KNEE ARTHROSCOPY;  Surgeon: Dexter Pimentel M.D.;  Location: [facility];  Service: Orthopedics   • SYNOVECTOMY Right 2018    Procedure: SYNOVECTOMY;  Surgeon: Dexter Pimentel M.D.;  Location: [facility];  Service: Orthopedics   • MEDIAL MENISCECTOMY Right 2018    Procedure: MEDIAL MENISCECTOMY-  PARTIAL, AND PARTIAL LATERAL;  Surgeon: Dexter Pimentel M.D.;  Location: [facility];  Service: Orthopedics   • CHONDROPLASTY  2018    Procedure: CHONDROPLASTY;  Surgeon: Dexter Pimentel M.D.;  Location: [facility];  Service: Orthopedics   • HARDWARE REMOVAL ORTHO  2018    Procedure: HARDWARE REMOVAL ORTHO;  Surgeon: Dexter Pimentel M.D.;  Location: [facility];  Service: Orthopedics   • BONE GRAFT  2018    Procedure: BONE GRAFT;  Surgeon: Dexter Pimentel M.D.;  Location: [facility];  Service: Orthopedics   • REPEAT C SECTION  2017    Procedure: REPEAT C SECTION;  Surgeon: Asmita Bolivar M.D.;  [facility];  Service: Obstetrics   • REPEAT C SECTION  10/19/2015    Procedure: REPEAT C SECTION;  Surgeon: Mary Randolph M.D.;  Location: [facility];  Service:    • ACL RECONSTRUCTION SCOPE  10/2/2014    Performed by Dexter Pimentel M.D. at [facility]   • KNEE ARTHROSCOPY  2014    Performed by Dexter Pimentel M.D. at [facility]   • HARDWARE REMOVAL ORTHO  2014    Performed by Dexter Pimentel M.D. at [facility]   • BONE GRAFT  2014    Performed by Dexter Pimentel M.D. at [facility]   • PB  DELIVERY ONLY     • ACL RECONSTRUCTION Right 2006     Medications:   Current Outpatient  Medications Ordered in Epic   Medication Sig Dispense Refill   • cyclobenzaprine (FLEXERIL) 10 mg Tab Take 1 Tab by mouth every day. CYCLOBENZAPRINE HCL 10 MG TABS 30 Tab 3   • sertraline (ZOLOFT) 100 MG Tab TAKE 1 TABLET BY MOUTH EVERY DAY 90 Tab 0   • FEMYNOR 0.25-35 MG-MCG per tablet TAKE 1 TABLET BY MOUTH EVERY DAY 84 Tab 0   • montelukast (SINGULAIR) 10 MG Tab      • Ferrous Sulfate (IRON PO) Take 64 mg by mouth every day.     • valacyclovir (VALTREX) 1 GM Tab TAKE 1 TABLET BY MOUTH EVERY DAY 90 Tab 3   • dicyclomine (BENTYL) 10 MG Cap Take 1 Cap by mouth 4 Times a Day,Before Meals and at Bedtime. 30 Cap 3   • ondansetron (ZOFRAN) 4 MG Tab tablet ZOFRAN 4 MG TABS     • fexofenadine (ALLEGRA) 180 MG tablet 24HR ALLERGY RELIEF TABS     • vitamin D (CHOLECALCIFEROL) 1000 Unit Tab TAKE 1 TABLET BY MOUTH EVERY DAY 90 Tab 0   • sertraline (ZOLOFT) 50 MG Tab TAKE 1 TABLET BY MOUTH EVERY DAY.       No current [facility]-ordered facility-administered medications on file.      Benzoin and Phentermine  Social History     Socioeconomic History   • Marital status:      Spouse name: Not on file   • Number of children: Not on file   • Years of education: Not on file   • Highest education level: Not on file   Occupational History   • Not on file   Social Needs   • Financial resource strain: Not on file   • Food insecurity     Worry: Not on file     Inability: Not on file   • Transportation needs     Medical: Not on file     Non-medical: Not on file   Tobacco Use   • Smoking status: Never Smoker   • Smokeless tobacco: Never Used   Substance and Sexual Activity   • Alcohol use: Yes     Frequency: Monthly or less     Drinks per session: 1 or 2   • Drug use: Yes     Types: Inhaled     Comment: Occ. marijuana   • Sexual activity: Yes     Partners: Male     Birth control/protection: Pill   Lifestyle   • Physical activity     Days per week: Not on file     Minutes per session: Not on file   • Stress: Not on file   Relationships   •  Social connections     Talks on phone: Not on file     Gets together: Not on file     Attends Faith service: Not on file     Active member of club or organization: Not on file     Attends meetings of clubs or organizations: Not on file     Relationship status: Not on file   • Intimate partner violence     Fear of current or ex partner: Not on file     Emotionally abused: Not on file     Physically abused: Not on file     Forced sexual activity: Not on file   Other Topics Concern   • Not on file   Social History Narrative   • Not on file     Family History   Problem Relation Age of Onset   • Diabetes Other    • Hypertension Other    • Diabetes Maternal Grandmother         DM w/ESRD   • Diabetes Maternal Grandfather    • Cancer Maternal Grandfather         prostate, esophageal   • Diabetes Paternal Grandmother         DM on dialysis          Objective:   Vital measurements:  /64 (BP Location: Left arm, Patient Position: Sitting, BP Cuff Size: Large adult)   Wt 85.3 kg (188 lb)   Body mass index is 31.28 kg/m². (Goal BM I>18 <25)    Physical Exam   Nursing note and vitals reviewed.  Constitutional: She is oriented to person, place, and time. She appears well-developed and well-nourished. No distress.     HEENT:   Head: Normocephalic and atraumatic.   Right Ear: External ear normal.   Left Ear: External ear normal.   Nose: Nose normal.   Eyes: Conjunctivae and EOM are normal. Pupils are equal, round, and reactive to light. No scleral icterus.     Neck: Normal range of motion. Neck supple. No tracheal deviation present. No thyromegaly present. No cervical or supraclavicular lymphadenopathy.    Pulmonary/Chest: Effort normal and breath sounds normal. No respiratory distress. She has no wheezes. She has no rales. She exhibits no tenderness.     Cardiovascular: Regular, rate and rhythm. No edema.    Breast:  Symmetrical, normal consistency without masses., No dimpling or skin changes, Normal nipples without  discharge, no axillary lymphadenopathy    Abdominal: Soft. Bowel sounds are normal. She exhibits no distension and no mass. No tenderness. She has no rebound and no guarding.     Genitourinary:  Pelvic exam was performed with patient supine.  External genitalia with no abnormal pigmentation, labial fusion, rash, tenderness, lesion or injury to the labia bilaterally.  BUS normal - tight perineal body, pain with palpation of the area   Vagina is pink and moist with no lesions, foul discharge, erythema, tenderness or bleeding. No foreign body around the vagina or signs of injury.   Cervix exhibits no motion tenderness, no discharge and no friability, no lesions.   Uterus is not deviated, not enlarged, not fixed and not tender.  Right adnexa displays no mass, no tenderness, slight fullness, about 4cm  Left adnexa displays no mass, no tenderness and no fullness.     Musculoskeletal: Normal range of motion. Non tender. She exhibits no edema and no tenderness.     Lymphadenopathy: She has no cervical or supraclavicular adenopathy.     Neurological: She is alert and oriented to person, place, and time. She exhibits normal muscle tone.     Skin: Skin is warm and dry. No rash noted. She is not diaphoretic. No erythema. No pallor.     Psychiatric: She has a normal mood and affect. Her behavior is normal. Judgment and thought content normal.        Assessment:     1. Annual physical exam     2. Cervical cancer screening  THINPREP RFLX HPV ASCUS W/CTNG   3. Dyspareunia, female           Plan:   Today we specifically addressed her birth control and the plan is return for Mirena IUD placement.     To start Kegel exercises and stretching of perineal body to help with dyspareunia    Pap and physical exam performed  Counseling: breast self exam, STD prevention, use and side effects of OCPs and adequate intake of calcium and vitamin D  Encouraged exercise and proper diet.  Weight bearing exercise daily to strengthen bones  Calcium  1200mg daily and vit D 800 IU daily, prenatal vitamins during childbearing years

## 2021-01-19 DIAGNOSIS — Z12.4 CERVICAL CANCER SCREENING: ICD-10-CM

## 2021-01-19 LAB
C TRACH DNA GENITAL QL NAA+PROBE: NEGATIVE
CYTOLOGY REG CYTOL: NORMAL
N GONORRHOEA DNA GENITAL QL NAA+PROBE: NEGATIVE
SPECIMEN SOURCE: NORMAL

## 2021-02-08 ENCOUNTER — TELEPHONE (OUTPATIENT)
Dept: MEDICAL GROUP | Facility: MEDICAL CENTER | Age: 31
End: 2021-02-08

## 2021-02-08 DIAGNOSIS — R00.2 HEART PALPITATIONS: ICD-10-CM

## 2021-02-08 NOTE — TELEPHONE ENCOUNTER
Please let pt know that the event monitor is wnl.  There were on concerning abn heart rhythms.  Did she have her sx when wearing the monitor?

## 2021-02-08 NOTE — TELEPHONE ENCOUNTER
pt informed-  Pt states there were time when she felt lightheaded. But was not as bad as first episode.  Pt states last time you increased her zoloft and she thinks that has helped with her sx.

## 2021-02-22 ENCOUNTER — GYNECOLOGY VISIT (OUTPATIENT)
Dept: OBGYN | Facility: CLINIC | Age: 31
End: 2021-02-22
Payer: COMMERCIAL

## 2021-02-22 VITALS — SYSTOLIC BLOOD PRESSURE: 120 MMHG | BODY MASS INDEX: 30.29 KG/M2 | WEIGHT: 182 LBS | DIASTOLIC BLOOD PRESSURE: 65 MMHG

## 2021-02-22 DIAGNOSIS — Z30.430 ENCOUNTER FOR INSERTION OF INTRAUTERINE CONTRACEPTIVE DEVICE (IUD): ICD-10-CM

## 2021-02-22 LAB
INT CON NEG: NORMAL
INT CON POS: NORMAL
POC URINE PREGNANCY TEST: NEGATIVE

## 2021-02-22 PROCEDURE — 81025 URINE PREGNANCY TEST: CPT | Performed by: OBSTETRICS & GYNECOLOGY

## 2021-02-22 PROCEDURE — 58300 INSERT INTRAUTERINE DEVICE: CPT | Performed by: OBSTETRICS & GYNECOLOGY

## 2021-02-22 ASSESSMENT — FIBROSIS 4 INDEX: FIB4 SCORE: 0.31

## 2021-02-22 NOTE — NON-PROVIDER
Pt here IUD procedure  Pt states no questions or concerns at this time.  Good# [phone]  Pharmacy verified

## 2021-02-22 NOTE — PROCEDURES
30 y.o.    Female presents here today for her IUD insertion:     No LMP recorded.      Today the patient is counseled on the risks of IUD insertion. I also discussed with the patient the risk of infection on insertion, and had asked the patient to remain on pelvic rest for one week following the insertion. We also discussed the risk of IUD expulsion, the risk of uterine perforation and IUD migration. If the IUD does migrate the patient may require a separate procedure such as a laparoscopy to retrieve the migrated IUD. I also discussed the 1% risk of pregnancy with IUD use. Also discussed with patient today increased risk of ectopic pregnancy with IUD use. Discussed specific side effects of ParaGard IUD which can be increased vaginal bleeding during menses or increased dysmenorrhea. Also discussed today the possibility that IUD may need to be removed secondary to bleeding profile or pain. Also discussed were the possibility that partner can feel the IUD during intercourse. I also discussed the side effects of Mirena which can be amenorrhea or dysfunctional uterine bleeding or spotting.  Patient had the opportunity to ask questions regarding insertion, risks and benefits, all questions are answered in their entirety.  Informed consent is signed.    Procedure note  Urine pregnancy test is negative, informed consent was previously signed  The bimanual exam is performed the uterus is noted to be 6 weeks in size and is mid position, though challenging due to body habitus  A speculum was inserted into the vagina, the cervix was cleansed with Betadine swabs x3  Tenaculum was placed at anterior lip of cervix  The uterus was sounded to a depth of 8 centimeters  The IUD is placed under sterile conditions, without complications  The strings trimmed to approximately 3 cm  Tenaculum was removed and was hemostatic  The patient tolerated the procedure well    Aftercare discussed. Patient is asked to refrain from coitus or  use backup method for 7 days after insertion. She is to return for fever, worsening pelvic pain, abdominal pain, syncope, unusually heavy vaginal bleeding, suspected expulsion, foul smelling vaginal discharge, or pregnancy-like symptoms. If the patient is comfortable she may also perform a digital self examination to check for the strings, although this is not required.    Patient is asked to follow up in 4 to 6 weeks for IUD check.

## 2021-03-05 ENCOUNTER — PRE-ADMISSION TESTING (OUTPATIENT)
Dept: ADMISSIONS | Facility: MEDICAL CENTER | Age: 31
End: 2021-03-05
Attending: ORTHOPAEDIC SURGERY
Payer: COMMERCIAL

## 2021-03-05 VITALS — BODY MASS INDEX: 29.32 KG/M2 | WEIGHT: 176 LBS | HEIGHT: 65 IN

## 2021-03-05 RX ORDER — IBUPROFEN 200 MG
400 TABLET ORAL EVERY 6 HOURS PRN
COMMUNITY
End: 2021-09-27

## 2021-03-05 ASSESSMENT — FIBROSIS 4 INDEX: FIB4 SCORE: 0.31

## 2021-03-05 NOTE — OR NURSING
"Preadmit appointment: \" Preparing for your Procedure information\" sheet given to patient with verbal and written instructions. Patient instructed to continue prescribed medications through the day before surgery, instructed to take the following medications the day of surgery per anesthesia protocol: FLEXERIL, ALLEGRA, SINGULAIR, ZOFRAN, ZOLOFT, VALTREX           Verbal and written instructions on self isolation prior to surgery and covid symptoms to watch for given to patient, pt advised to notify MD if any symptoms develop.    COVID TEST 03/18/21        "

## 2021-03-08 ENCOUNTER — APPOINTMENT (OUTPATIENT)
Dept: ADMISSIONS | Facility: MEDICAL CENTER | Age: 31
End: 2021-03-08
Attending: ORTHOPAEDIC SURGERY
Payer: COMMERCIAL

## 2021-03-08 DIAGNOSIS — Z01.812 PRE-OPERATIVE LABORATORY EXAMINATION: ICD-10-CM

## 2021-03-08 LAB
ERYTHROCYTE [DISTWIDTH] IN BLOOD BY AUTOMATED COUNT: 40.1 FL (ref 35.9–50)
HCT VFR BLD AUTO: 40 % (ref 37–47)
HGB BLD-MCNC: 13.2 G/DL (ref 12–16)
MCH RBC QN AUTO: 27.8 PG (ref 27–33)
MCHC RBC AUTO-ENTMCNC: 33 G/DL (ref 33.6–35)
MCV RBC AUTO: 84.2 FL (ref 81.4–97.8)
PLATELET # BLD AUTO: 308 K/UL (ref 164–446)
PMV BLD AUTO: 11.9 FL (ref 9–12.9)
RBC # BLD AUTO: 4.75 M/UL (ref 4.2–5.4)
WBC # BLD AUTO: 9.8 K/UL (ref 4.8–10.8)

## 2021-03-08 PROCEDURE — 85027 COMPLETE CBC AUTOMATED: CPT

## 2021-03-08 PROCEDURE — 36415 COLL VENOUS BLD VENIPUNCTURE: CPT

## 2021-03-09 RX ORDER — ACYCLOVIR 50 MG/G
1 CREAM TOPICAL
Qty: 5 G | Refills: 1 | Status: SHIPPED | OUTPATIENT
Start: 2021-03-09

## 2021-03-10 ENCOUNTER — PATIENT MESSAGE (OUTPATIENT)
Dept: OBGYN | Facility: CLINIC | Age: 31
End: 2021-03-10

## 2021-03-11 ENCOUNTER — TELEPHONE (OUTPATIENT)
Dept: PHYSICAL THERAPY | Facility: REHABILITATION | Age: 31
End: 2021-03-11

## 2021-03-18 ENCOUNTER — PRE-ADMISSION TESTING (OUTPATIENT)
Dept: ADMISSIONS | Facility: MEDICAL CENTER | Age: 31
End: 2021-03-18
Attending: ORTHOPAEDIC SURGERY
Payer: COMMERCIAL

## 2021-03-18 DIAGNOSIS — Z01.812 PRE-OPERATIVE LABORATORY EXAMINATION: ICD-10-CM

## 2021-03-18 PROCEDURE — U0003 INFECTIOUS AGENT DETECTION BY NUCLEIC ACID (DNA OR RNA); SEVERE ACUTE RESPIRATORY SYNDROME CORONAVIRUS 2 (SARS-COV-2) (CORONAVIRUS DISEASE [COVID-19]), AMPLIFIED PROBE TECHNIQUE, MAKING USE OF HIGH THROUGHPUT TECHNOLOGIES AS DESCRIBED BY CMS-2020-01-R: HCPCS

## 2021-03-18 PROCEDURE — U0005 INFEC AGEN DETEC AMPLI PROBE: HCPCS

## 2021-03-18 PROCEDURE — C9803 HOPD COVID-19 SPEC COLLECT: HCPCS

## 2021-03-18 RX ORDER — ACETAMINOPHEN AND CODEINE PHOSPHATE 120; 12 MG/5ML; MG/5ML
1 SOLUTION ORAL DAILY
Qty: 60 TABLET | Refills: 5 | Status: SHIPPED | OUTPATIENT
Start: 2021-03-18 | End: 2021-08-03

## 2021-03-22 ENCOUNTER — ANESTHESIA EVENT (OUTPATIENT)
Dept: SURGERY | Facility: MEDICAL CENTER | Age: 31
End: 2021-03-22
Payer: COMMERCIAL

## 2021-03-23 ENCOUNTER — ANESTHESIA (OUTPATIENT)
Dept: SURGERY | Facility: MEDICAL CENTER | Age: 31
End: 2021-03-23
Payer: COMMERCIAL

## 2021-03-23 ENCOUNTER — NURSE TRIAGE (OUTPATIENT)
Dept: HEALTH INFORMATION MANAGEMENT | Facility: OTHER | Age: 31
End: 2021-03-23

## 2021-03-23 ENCOUNTER — HOSPITAL ENCOUNTER (OUTPATIENT)
Facility: MEDICAL CENTER | Age: 31
End: 2021-03-23
Attending: ORTHOPAEDIC SURGERY | Admitting: ORTHOPAEDIC SURGERY
Payer: COMMERCIAL

## 2021-03-23 VITALS
SYSTOLIC BLOOD PRESSURE: 123 MMHG | HEART RATE: 113 BPM | DIASTOLIC BLOOD PRESSURE: 67 MMHG | OXYGEN SATURATION: 93 % | RESPIRATION RATE: 16 BRPM | TEMPERATURE: 97.3 F | BODY MASS INDEX: 31.29 KG/M2 | WEIGHT: 188.05 LBS

## 2021-03-23 DIAGNOSIS — Z86.39 HISTORY OF IRON DEFICIENCY: ICD-10-CM

## 2021-03-23 DIAGNOSIS — Z01.812 PRE-OPERATIVE LABORATORY EXAMINATION: ICD-10-CM

## 2021-03-23 LAB — HCG UR QL: NEGATIVE

## 2021-03-23 PROCEDURE — 700102 HCHG RX REV CODE 250 W/ 637 OVERRIDE(OP): Performed by: ANESTHESIOLOGY

## 2021-03-23 PROCEDURE — 500673 HCHG GUIDE PIN, ARTHRX NITINOL: Performed by: ORTHOPAEDIC SURGERY

## 2021-03-23 PROCEDURE — 160035 HCHG PACU - 1ST 60 MINS PHASE I: Performed by: ORTHOPAEDIC SURGERY

## 2021-03-23 PROCEDURE — 502000 HCHG MISC OR IMPLANTS RC 0278: Performed by: ORTHOPAEDIC SURGERY

## 2021-03-23 PROCEDURE — 160029 HCHG SURGERY MINUTES - 1ST 30 MINS LEVEL 4: Performed by: ORTHOPAEDIC SURGERY

## 2021-03-23 PROCEDURE — 160048 HCHG OR STATISTICAL LEVEL 1-5: Performed by: ORTHOPAEDIC SURGERY

## 2021-03-23 PROCEDURE — 64447 NJX AA&/STRD FEMORAL NRV IMG: CPT | Performed by: ORTHOPAEDIC SURGERY

## 2021-03-23 PROCEDURE — 700101 HCHG RX REV CODE 250: Performed by: ANESTHESIOLOGY

## 2021-03-23 PROCEDURE — C1713 ANCHOR/SCREW BN/BN,TIS/BN: HCPCS | Performed by: ORTHOPAEDIC SURGERY

## 2021-03-23 PROCEDURE — A9270 NON-COVERED ITEM OR SERVICE: HCPCS | Performed by: ANESTHESIOLOGY

## 2021-03-23 PROCEDURE — 160025 RECOVERY II MINUTES (STATS): Performed by: ORTHOPAEDIC SURGERY

## 2021-03-23 PROCEDURE — C1762 CONN TISS, HUMAN(INC FASCIA): HCPCS | Performed by: ORTHOPAEDIC SURGERY

## 2021-03-23 PROCEDURE — 700105 HCHG RX REV CODE 258: Performed by: ORTHOPAEDIC SURGERY

## 2021-03-23 PROCEDURE — 160046 HCHG PACU - 1ST 60 MINS PHASE II: Performed by: ORTHOPAEDIC SURGERY

## 2021-03-23 PROCEDURE — 160002 HCHG RECOVERY MINUTES (STAT): Performed by: ORTHOPAEDIC SURGERY

## 2021-03-23 PROCEDURE — 160041 HCHG SURGERY MINUTES - EA ADDL 1 MIN LEVEL 4: Performed by: ORTHOPAEDIC SURGERY

## 2021-03-23 PROCEDURE — 160036 HCHG PACU - EA ADDL 30 MINS PHASE I: Performed by: ORTHOPAEDIC SURGERY

## 2021-03-23 PROCEDURE — 81025 URINE PREGNANCY TEST: CPT

## 2021-03-23 PROCEDURE — 700111 HCHG RX REV CODE 636 W/ 250 OVERRIDE (IP): Performed by: ANESTHESIOLOGY

## 2021-03-23 PROCEDURE — 500028 HCHG ARTHROWAND TURBOVAC 3.5/90 SUCT.: Performed by: ORTHOPAEDIC SURGERY

## 2021-03-23 PROCEDURE — 502580 HCHG PACK, KNEE ARTHROSCOPY: Performed by: ORTHOPAEDIC SURGERY

## 2021-03-23 PROCEDURE — 700101 HCHG RX REV CODE 250: Performed by: ORTHOPAEDIC SURGERY

## 2021-03-23 PROCEDURE — C1769 GUIDE WIRE: HCPCS | Performed by: ORTHOPAEDIC SURGERY

## 2021-03-23 PROCEDURE — 160009 HCHG ANES TIME/MIN: Performed by: ORTHOPAEDIC SURGERY

## 2021-03-23 PROCEDURE — 501838 HCHG SUTURE GENERAL: Performed by: ORTHOPAEDIC SURGERY

## 2021-03-23 DEVICE — IMPLANTABLE DEVICE: Type: IMPLANTABLE DEVICE | Site: KNEE | Status: FUNCTIONAL

## 2021-03-23 DEVICE — ANCHOR SUTURE OMEGA PEEK OD3.9 MM 1 ARM KNOTLESS STERILE LATEX FREE DISPOSABLE: Type: IMPLANTABLE DEVICE | Site: KNEE | Status: FUNCTIONAL

## 2021-03-23 RX ORDER — LIDOCAINE HYDROCHLORIDE 20 MG/ML
INJECTION, SOLUTION EPIDURAL; INFILTRATION; INTRACAUDAL; PERINEURAL PRN
Status: DISCONTINUED | OUTPATIENT
Start: 2021-03-23 | End: 2021-03-23 | Stop reason: SURG

## 2021-03-23 RX ORDER — HYDROMORPHONE HYDROCHLORIDE 1 MG/ML
0.2 INJECTION, SOLUTION INTRAMUSCULAR; INTRAVENOUS; SUBCUTANEOUS
Status: DISCONTINUED | OUTPATIENT
Start: 2021-03-23 | End: 2021-03-23 | Stop reason: HOSPADM

## 2021-03-23 RX ORDER — ACETAMINOPHEN 500 MG
1000 TABLET ORAL ONCE
Status: COMPLETED | OUTPATIENT
Start: 2021-03-23 | End: 2021-03-23

## 2021-03-23 RX ORDER — DEXAMETHASONE SODIUM PHOSPHATE 4 MG/ML
INJECTION, SOLUTION INTRA-ARTICULAR; INTRALESIONAL; INTRAMUSCULAR; INTRAVENOUS; SOFT TISSUE PRN
Status: DISCONTINUED | OUTPATIENT
Start: 2021-03-23 | End: 2021-03-23 | Stop reason: SURG

## 2021-03-23 RX ORDER — CEFAZOLIN SODIUM 1 G/3ML
INJECTION, POWDER, FOR SOLUTION INTRAMUSCULAR; INTRAVENOUS PRN
Status: DISCONTINUED | OUTPATIENT
Start: 2021-03-23 | End: 2021-03-23 | Stop reason: SURG

## 2021-03-23 RX ORDER — KETAMINE HYDROCHLORIDE 50 MG/ML
INJECTION, SOLUTION INTRAMUSCULAR; INTRAVENOUS PRN
Status: DISCONTINUED | OUTPATIENT
Start: 2021-03-23 | End: 2021-03-23 | Stop reason: SURG

## 2021-03-23 RX ORDER — MEPERIDINE HYDROCHLORIDE 25 MG/ML
6.25 INJECTION INTRAMUSCULAR; INTRAVENOUS; SUBCUTANEOUS
Status: DISCONTINUED | OUTPATIENT
Start: 2021-03-23 | End: 2021-03-23 | Stop reason: HOSPADM

## 2021-03-23 RX ORDER — HYDROMORPHONE HYDROCHLORIDE 1 MG/ML
0.4 INJECTION, SOLUTION INTRAMUSCULAR; INTRAVENOUS; SUBCUTANEOUS
Status: DISCONTINUED | OUTPATIENT
Start: 2021-03-23 | End: 2021-03-23 | Stop reason: HOSPADM

## 2021-03-23 RX ORDER — HYDRALAZINE HYDROCHLORIDE 20 MG/ML
5 INJECTION INTRAMUSCULAR; INTRAVENOUS
Status: DISCONTINUED | OUTPATIENT
Start: 2021-03-23 | End: 2021-03-23 | Stop reason: HOSPADM

## 2021-03-23 RX ORDER — SODIUM CHLORIDE, SODIUM LACTATE, POTASSIUM CHLORIDE, CALCIUM CHLORIDE 600; 310; 30; 20 MG/100ML; MG/100ML; MG/100ML; MG/100ML
INJECTION, SOLUTION INTRAVENOUS CONTINUOUS
Status: DISCONTINUED | OUTPATIENT
Start: 2021-03-23 | End: 2021-03-23 | Stop reason: HOSPADM

## 2021-03-23 RX ORDER — MIDAZOLAM HYDROCHLORIDE 1 MG/ML
INJECTION INTRAMUSCULAR; INTRAVENOUS PRN
Status: DISCONTINUED | OUTPATIENT
Start: 2021-03-23 | End: 2021-03-23 | Stop reason: SURG

## 2021-03-23 RX ORDER — BUPIVACAINE HYDROCHLORIDE AND EPINEPHRINE 2.5; 5 MG/ML; UG/ML
INJECTION, SOLUTION EPIDURAL; INFILTRATION; INTRACAUDAL; PERINEURAL
Status: DISCONTINUED | OUTPATIENT
Start: 2021-03-23 | End: 2021-03-23 | Stop reason: HOSPADM

## 2021-03-23 RX ORDER — HYDROMORPHONE HYDROCHLORIDE 1 MG/ML
0.1 INJECTION, SOLUTION INTRAMUSCULAR; INTRAVENOUS; SUBCUTANEOUS
Status: DISCONTINUED | OUTPATIENT
Start: 2021-03-23 | End: 2021-03-23 | Stop reason: HOSPADM

## 2021-03-23 RX ORDER — METOCLOPRAMIDE HYDROCHLORIDE 5 MG/ML
INJECTION INTRAMUSCULAR; INTRAVENOUS PRN
Status: DISCONTINUED | OUTPATIENT
Start: 2021-03-23 | End: 2021-03-23 | Stop reason: SURG

## 2021-03-23 RX ORDER — SCOLOPAMINE TRANSDERMAL SYSTEM 1 MG/1
PATCH, EXTENDED RELEASE TRANSDERMAL PRN
Status: DISCONTINUED | OUTPATIENT
Start: 2021-03-23 | End: 2021-03-23 | Stop reason: SURG

## 2021-03-23 RX ORDER — SODIUM CHLORIDE, SODIUM LACTATE, POTASSIUM CHLORIDE, CALCIUM CHLORIDE 600; 310; 30; 20 MG/100ML; MG/100ML; MG/100ML; MG/100ML
INJECTION, SOLUTION INTRAVENOUS CONTINUOUS
Status: ACTIVE | OUTPATIENT
Start: 2021-03-23 | End: 2021-03-23

## 2021-03-23 RX ORDER — ONDANSETRON 2 MG/ML
INJECTION INTRAMUSCULAR; INTRAVENOUS PRN
Status: DISCONTINUED | OUTPATIENT
Start: 2021-03-23 | End: 2021-03-23 | Stop reason: SURG

## 2021-03-23 RX ORDER — BUPIVACAINE HYDROCHLORIDE AND EPINEPHRINE 2.5; 5 MG/ML; UG/ML
INJECTION, SOLUTION EPIDURAL; INFILTRATION; INTRACAUDAL; PERINEURAL PRN
Status: DISCONTINUED | OUTPATIENT
Start: 2021-03-23 | End: 2021-03-23 | Stop reason: SURG

## 2021-03-23 RX ORDER — KETOROLAC TROMETHAMINE 30 MG/ML
INJECTION, SOLUTION INTRAMUSCULAR; INTRAVENOUS PRN
Status: DISCONTINUED | OUTPATIENT
Start: 2021-03-23 | End: 2021-03-23 | Stop reason: SURG

## 2021-03-23 RX ORDER — SCOLOPAMINE TRANSDERMAL SYSTEM 1 MG/1
PATCH, EXTENDED RELEASE TRANSDERMAL
Status: COMPLETED
Start: 2021-03-23 | End: 2021-03-23

## 2021-03-23 RX ORDER — HYDROMORPHONE HYDROCHLORIDE 2 MG/ML
INJECTION, SOLUTION INTRAMUSCULAR; INTRAVENOUS; SUBCUTANEOUS PRN
Status: DISCONTINUED | OUTPATIENT
Start: 2021-03-23 | End: 2021-03-23 | Stop reason: SURG

## 2021-03-23 RX ORDER — ONDANSETRON 2 MG/ML
4 INJECTION INTRAMUSCULAR; INTRAVENOUS
Status: DISCONTINUED | OUTPATIENT
Start: 2021-03-23 | End: 2021-03-23 | Stop reason: HOSPADM

## 2021-03-23 RX ORDER — MIDAZOLAM HYDROCHLORIDE 1 MG/ML
1 INJECTION INTRAMUSCULAR; INTRAVENOUS
Status: DISCONTINUED | OUTPATIENT
Start: 2021-03-23 | End: 2021-03-23 | Stop reason: HOSPADM

## 2021-03-23 RX ORDER — DIPHENHYDRAMINE HYDROCHLORIDE 50 MG/ML
12.5 INJECTION INTRAMUSCULAR; INTRAVENOUS
Status: DISCONTINUED | OUTPATIENT
Start: 2021-03-23 | End: 2021-03-23 | Stop reason: HOSPADM

## 2021-03-23 RX ORDER — LABETALOL HYDROCHLORIDE 5 MG/ML
5 INJECTION, SOLUTION INTRAVENOUS
Status: DISCONTINUED | OUTPATIENT
Start: 2021-03-23 | End: 2021-03-23 | Stop reason: HOSPADM

## 2021-03-23 RX ADMIN — KETAMINE HYDROCHLORIDE 50 MG: 50 INJECTION INTRAMUSCULAR; INTRAVENOUS at 07:08

## 2021-03-23 RX ADMIN — MIDAZOLAM HYDROCHLORIDE 2 MG: 1 INJECTION, SOLUTION INTRAMUSCULAR; INTRAVENOUS at 06:45

## 2021-03-23 RX ADMIN — PROPOFOL 150 MG: 10 INJECTION, EMULSION INTRAVENOUS at 07:08

## 2021-03-23 RX ADMIN — SODIUM CHLORIDE, POTASSIUM CHLORIDE, SODIUM LACTATE AND CALCIUM CHLORIDE: 600; 310; 30; 20 INJECTION, SOLUTION INTRAVENOUS at 06:45

## 2021-03-23 RX ADMIN — ONDANSETRON 4 MG: 2 INJECTION INTRAMUSCULAR; INTRAVENOUS at 08:23

## 2021-03-23 RX ADMIN — KETOROLAC TROMETHAMINE 30 MG: 30 INJECTION, SOLUTION INTRAMUSCULAR at 08:33

## 2021-03-23 RX ADMIN — LIDOCAINE HYDROCHLORIDE 0.5 ML: 10 INJECTION, SOLUTION INFILTRATION; PERINEURAL at 06:14

## 2021-03-23 RX ADMIN — HYDROMORPHONE HYDROCHLORIDE 0.4 MG: 2 INJECTION, SOLUTION INTRAMUSCULAR; INTRAVENOUS; SUBCUTANEOUS at 07:20

## 2021-03-23 RX ADMIN — DEXAMETHASONE SODIUM PHOSPHATE 8 MG: 4 INJECTION, SOLUTION INTRAMUSCULAR; INTRAVENOUS at 07:08

## 2021-03-23 RX ADMIN — LIDOCAINE HYDROCHLORIDE 80 MG: 20 INJECTION, SOLUTION EPIDURAL; INFILTRATION; INTRACAUDAL; PERINEURAL at 07:08

## 2021-03-23 RX ADMIN — GLYCOPYRROLATE 0.1 MG: 0.2 INJECTION, SOLUTION INTRAMUSCULAR; INTRAVENOUS at 06:45

## 2021-03-23 RX ADMIN — SODIUM CHLORIDE, POTASSIUM CHLORIDE, SODIUM LACTATE AND CALCIUM CHLORIDE: 600; 310; 30; 20 INJECTION, SOLUTION INTRAVENOUS at 06:14

## 2021-03-23 RX ADMIN — WATER 15 ML: 100 IRRIGANT IRRIGATION at 06:22

## 2021-03-23 RX ADMIN — HYDROMORPHONE HYDROCHLORIDE 0.4 MG: 2 INJECTION, SOLUTION INTRAMUSCULAR; INTRAVENOUS; SUBCUTANEOUS at 06:45

## 2021-03-23 RX ADMIN — BUPIVACAINE HYDROCHLORIDE AND EPINEPHRINE 25 ML: 2.5; 5 INJECTION, SOLUTION EPIDURAL; INFILTRATION; INTRACAUDAL; PERINEURAL at 06:53

## 2021-03-23 RX ADMIN — HYDROMORPHONE HYDROCHLORIDE 0.4 MG: 2 INJECTION, SOLUTION INTRAMUSCULAR; INTRAVENOUS; SUBCUTANEOUS at 07:43

## 2021-03-23 RX ADMIN — HYDROMORPHONE HYDROCHLORIDE 0.4 MG: 2 INJECTION, SOLUTION INTRAMUSCULAR; INTRAVENOUS; SUBCUTANEOUS at 08:12

## 2021-03-23 RX ADMIN — ACETAMINOPHEN 1000 MG: 500 TABLET, FILM COATED ORAL at 06:14

## 2021-03-23 RX ADMIN — HYDROMORPHONE HYDROCHLORIDE 0.4 MG: 2 INJECTION, SOLUTION INTRAMUSCULAR; INTRAVENOUS; SUBCUTANEOUS at 07:40

## 2021-03-23 RX ADMIN — METOCLOPRAMIDE 10 MG: 5 INJECTION, SOLUTION INTRAMUSCULAR; INTRAVENOUS at 06:45

## 2021-03-23 RX ADMIN — SCOPALAMINE 1 PATCH: 1 PATCH, EXTENDED RELEASE TRANSDERMAL at 06:45

## 2021-03-23 RX ADMIN — HYDROCODONE BITARTRATE AND ACETAMINOPHEN 15 MG: 7.5; 325 SOLUTION ORAL at 09:28

## 2021-03-23 RX ADMIN — CEFAZOLIN 2 G: 1 INJECTION, POWDER, FOR SOLUTION INTRAVENOUS at 07:03

## 2021-03-23 ASSESSMENT — PAIN SCALES - GENERAL: PAIN_LEVEL: 0

## 2021-03-23 ASSESSMENT — PAIN DESCRIPTION - PAIN TYPE: TYPE: ACUTE PAIN;SURGICAL PAIN

## 2021-03-23 ASSESSMENT — FIBROSIS 4 INDEX: FIB4 SCORE: 0.42

## 2021-03-23 NOTE — ANESTHESIA TIME REPORT
Anesthesia Start and Stop Event Times     Date Time Event    3/23/2021 0645 Anesthesia Start     0657 Ready for Procedure     0844 Anesthesia Stop        Responsible Staff  03/23/21    Name Role Begin End    Frank Reynoso M.D. Anesth 0645 0844        Preop Diagnosis (Free Text):  Pre-op Diagnosis     SPRAIN OF ANTERIOR CRUCIATE LIGAMENT OF RIGHT KNEE, KNEE PAIN RIGHT        Preop Diagnosis (Codes):    Post op Diagnosis  Sprain of anterior cruciate ligament of right knee      Premium Reason  A. 3PM - 7AM    Comments: Pre 7am Adductor canal block

## 2021-03-23 NOTE — ANESTHESIA POSTPROCEDURE EVALUATION
Patient: Tonia Xavier    Procedure Summary     Date: 03/23/21 Room / Location:  OR 03 / SURGERY [facility]    Anesthesia Start: 0645 Anesthesia Stop: 0844    Procedure: RECONSTRUCTION, KNEE, ACL, ARTHROSCOPIC - WITH ALLOGRAFT BONE TENDON BONE (Right Knee) Diagnosis: (SPRAIN OF ANTERIOR CRUCIATE LIGAMENT OF RIGHT KNEE, KNEE PAIN RIGHT)    Surgeons: Dexter Pimentel M.D. Responsible Provider: Frank Reynoso M.D.    Anesthesia Type: general, peripheral nerve block ASA Status: 2          Final Anesthesia Type: general, peripheral nerve block  Last vitals  BP   Blood Pressure: 117/56    Temp   36.8 °C (98.2 °F)    Pulse   87   Resp   20    SpO2   98 %      Anesthesia Post Evaluation    Patient location during evaluation: PACU  Patient participation: complete - patient participated  Level of consciousness: sleepy but conscious  Pain score: 0    Airway patency: patent  Anesthetic complications: no  Cardiovascular status: hemodynamically stable  Respiratory status: acceptable, face mask and oral airway  Hydration status: euvolemic    PONV: none          No complications documented.     Nurse Pain Score: 0 (NPRS)

## 2021-03-23 NOTE — OR NURSING
1052: Patient arrives in phase II, able to doze off but states 7/10 pain when awake. Per Dr. Reynoso she is ok for discharge according to the PACU EMILY Gerber.     1116: D/Buck to care of family post uneventful stay in PACU 2.

## 2021-03-23 NOTE — OR NURSING
0950:  Received report, assume care.  Pt states her knee hurts, explained that her sedation level is too high for more pain meds, will continue to monitor.    0957:  Pt continues to desats to 88 then back up to 96.  Pt resting with eyes closed.  O2 1.5L via NC added.    1007:  Report given Zabrina DIAZ.

## 2021-03-23 NOTE — OR NURSING
0840 To PACU from OR via gurney s/p right knee arthroscopy. Pt sleeping, respirations spontaneous and non-labored via OPA. Surgical dressings to right knee. Immobilizer to RLE. Toes to the affected extremity are pink and warm, brisk cap refill observed, pedal pulse palpable.    0855 No changes.     0900 Pt rouses to verbal stimuli, OPA removed.     0910 When roused pt reports 7/10 pain to her right knee. Pt denies nausea. Pt very drowsy, falls asleep during conversation, inappropriate to medicate for pain at this time.     0915 Pt's mother updated.     0920 Dr Reynoso at the bedside to assess the pt. Aware of sedation level, severe pain rating and elevated heart rate.     0925 Oral pain medication administered.     0940 Pt continues to rest quietly with her eyes closed.     0950 Report to EMILY Ulloa.    1005 Resumed care of pt. Pt resting quietly with her eyes closed.    1025 Pt reports 7/10 tolerable pain, remains very drowsy.    1040 No changes. Dr Reynoso informed that pt is still tachycardic despite fluids. Dr Reynoso alright with pt being discharged. Report to discharge EMILY Sheriff.

## 2021-03-23 NOTE — ANESTHESIA PROCEDURE NOTES
Peripheral Block    Date/Time: 3/23/2021 6:48 AM  Performed by: Frank Reynoso M.D.  Authorized by: Frank Reynoso M.D.     Patient Location:  Pre-op  Start Time:  3/23/2021 6:48 AM  End Time:  3/23/2021 6:53 AM  Reason for Block: at surgeon's request and post-op pain management ONLY    patient identified, IV checked, site marked, risks and benefits discussed, surgical consent, monitors and equipment checked, pre-op evaluation and timeout performed    Patient Position:  Supine  Prep: ChloraPrep    Monitoring:  Heart rate, continuous pulse ox and cardiac monitor  Block Region:  Lower Extremity  Lower Extremity - Block Type:  Selective FEMORAL nerve block at the Adductor Canal    Laterality:  Right  Procedures: ultrasound guided  Image captured, interpreted and electronically stored.  Local Infiltration:  Lidocaine  Strength:  1 %  Dose:  3 ml  Block Type:  Single-shot  Needle Localization:  Ultrasound guidance  Injection Assessment:  Negative aspiration for heme, no paresthesia on injection, incremental injection and local visualized surrounding nerve on ultrasound  Evidence of intravascular injection: No     US Guided Selective Femoral Nerve Block at Adductor Canal:   US probe placed at mid-thigh level on externally rotated leg and femur identified.  Probe directed medially until Sartorius Muscle (SM), Femoral Artery (FA) and Saphenous Nerve (SN) identified in Adductor Canal (AC).  Needle inserted anterolateral to probe in an in plane approach into a subsartorial perivascular perineural position.  After negative aspiration LA injected with ease and visualized spreading within the AC.

## 2021-03-23 NOTE — OP REPORT
DATE OF SERVICE:  03/23/2021     PREOPERATIVE DIAGNOSIS:  Right knee ACL deficiency, status post multiple ACL   revisions with previous hardware removal and bone grafting.     POSTOPERATIVE DIAGNOSES:  1.  Right knee ACL deficiency, status post multiple ACL revisions with   previous hardware removal and bone grafting.  2.  Grade 2 chondromalacia of the patella.  3.  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     PROCEDURES:  1.  Right knee revision allograft bone-tendon-bone ACL reconstruction.  2.  Arthroscopic chondroplasty of the patella.     SURGEON:  Dexter Pimentel MD     ASSISTANT:  Mallory Mckenzie PA-C     ANESTHESIA:  General and an adductor canal nerve block.     IMPLANTS:  Cindy 8x23 mm and 9x28 mm Biosteon interference screws and a 3.9   mm Cindy Omega suture anchor.     SPECIMENS:  None.     DISPOSITION:  Stable to postanesthesia care unit.     INDICATIONS:  The patient has ACL deficient knee.  She previously underwent   hardware removal and bone grafting after failure of multiple ACL   reconstructions.  The risks, benefits, alternatives and limitations of   surgical intervention were discussed in detail.  She expressed understanding   and desired to proceed.     PROCEDURE:  The patient and the correct operative extremity were identified in   the preoperative area.  The knee was marked.  She was brought to the   operating room where the correct operative extremity was again confirmed.  She   was placed supine on the OR table.  She underwent an adductor canal nerve   block performed at my request for postoperative pain control.  She underwent   general anesthesia without complication.  Examination under anesthesia showed   2+ Lachman, 2+ pivot shift and 2+ anterior drawer.  No medial or lateral   instability.  Negative posterior drawer.  The knee was prepped with alcohol   and injected with 30 mL of 1% lidocaine with epinephrine.  The knee was then    prepped and draped in the usual sterile fashion using ChloraPrep.  Diagnostic   arthroscopy was then performed, which showed some grade 2 chondromalacia of   the patella with some scarring in the retropatellar fat pad.  The notch showed   chronically torn ACL.  The area on the lateral femoral condyle showed good   incorporation of the bone graft.  There was some mild osteophytic spurring   around the notch.  The PCL was intact.  The medial compartment showed two   distinct chondral defect lesions, which had filled in with fibrocartilage.  It   actually looked pretty good.  The cartilage on the tibial plateau was intact.    The medial meniscus was intact.  The lateral meniscus was intact.  The   cartilage laterally was intact.  There was a small chondral defect again   filled in with scar cartilage on the lateral femoral condyle.  The lateral   tibial plateau was intact.  We then proceeded to open the lateral incision and   the anteromedial incision, split the IT band laterally and then placed a   guide pin from inside out with a 7 mm over the top guide, then drilled from   outside in on the femoral side with a 9.5 mm straight reamer, then drilled   into the center of the tibial footprint through the ACL insertion on the   tibial footprint.  Then, we passed the graft, which had been prepared at the   back table and tensioned through the tibial tunnel, docking it on the femoral   side and placed an interference screw from outside in, getting excellent   fixation of the femoral bone block.  We then removed all the bony debris from   the knee, removed all the fluid, conditioned the graft by taking the knee   through a full range of motion.  Prior to that, we visualized it.  There was   no notch or roof impingement.  We then brought the knee in about 20 degrees of   flexion, placed a posterior Lachman while we placed a 9x28 mm interference   screw on the tibial side.  This had a good interference fit.  We then placed    the traction stitches into an Omega suture anchor for backup fixation and had   a negative Lachman, negative pivot shift.  We then copiously irrigated the   wounds, closed the split in IT band with interrupted #1 Vicryl suture, the   deep subcu was closed with Monocryl.  Benzoin and Steri-Strips were placed   over the incisions.  The portals were closed with nylon.  The knee was   injected with 0.5% bupivacaine with epinephrine.  Sterile dressings were   applied.  A ANTONETTE stocking was placed.  A hinged knee brace was placed.  The   patient was then allowed to awaken from anesthesia and transferred to her   hospital cart and taken to the postanesthesia care unit in stable condition.    She tolerated the procedure well.  There were no immediate complications.        ______________________________  JUAQUIN RICHARDS MD RD/NAI    DD:  03/23/2021 14:38  DT:  03/23/2021 15:16    Job#:  [id number]

## 2021-03-23 NOTE — TELEPHONE ENCOUNTER
Patient just had knee surgery today with Dr. Pimentel. She is taking oxycodone and is still having pain. Reviewed discharge instructions and advised it is ok to take tylenol and ibuprofen with the Oxycodone and well as ice and elevation to help with pain management. All questions answered.     Reason for Disposition  • Caller has medication question, adult has minor symptoms, caller declines triage, and triager answers question    Additional Information  • Negative: Drug overdose and triager unable to answer question  • Negative: Caller requesting information unrelated to medicine  • Negative: Caller requesting a prescription for Strep throat and has a positive culture result  • Negative: Rash while taking a medication or within 3 days of stopping it  • Negative: Immunization reaction suspected  • Negative: Asthma and having symptoms of asthma (cough, wheezing, etc.)  • Negative: Breastfeeding questions about mother's medicines and diet  • Negative: MORE THAN A DOUBLE DOSE of a prescription or over-the-counter (OTC) drug  • Negative: DOUBLE DOSE (an extra dose or lesser amount) of over-the-counter (OTC) drug and any symptoms (e.g., dizziness, nausea, pain, sleepiness)  • Negative: DOUBLE DOSE (an extra dose or lesser amount) of prescription drug and any symptoms (e.g., dizziness, nausea, pain, sleepiness)  • Negative: Took another person's prescription drug  • Negative: DOUBLE DOSE (an extra dose or lesser amount) of prescription drug and NO symptoms (Exception: a double dose of antibiotics)  • Negative: Diabetes drug error or overdose (e.g., took wrong type of insulin or took extra dose)  • Negative: Caller has medication question about med not prescribed by PCP and triager unable to answer question (e.g., compatibility with other med, storage)  • Negative: Request for URGENT new prescription or refill of 'essential' medication (i.e., likelihood of harm to patient if not taken) and triager unable to fill per  "department policy  • Negative: Prescription not at pharmacy and was prescribed today by PCP  • Negative: Pharmacy calling with prescription questions and triager unable to answer question  • Negative: Caller has URGENT medication question about med that PCP prescribed and triager unable to answer question  • Negative: Caller has NON-URGENT medication question about med that PCP prescribed and triager unable to answer question  • Negative: Caller requesting a NON-URGENT new prescription or refill and triager unable to refill per department policy  • Negative: DOUBLE DOSE (an extra dose or lesser amount) of antibiotic drug and NO symptoms  • Negative: Caller has medication question only, adult not sick, and triager answers question  • Negative: DOUBLE DOSE (an extra dose or lesser amount) of over-the-counter (OTC) drug and NO symptoms    Answer Assessment - Initial Assessment Questions  1. SYMPTOMS: \"Do you have any symptoms?\"      pain  2. SEVERITY: If symptoms are present, ask \"Are they mild, moderate or severe?\"     Patient has taken oxycodone and still is having pain wants to see if she can take tylenol and ibuprofen with pain medication.    Protocols used: MEDICATION QUESTION CALL-A-OH      "

## 2021-03-23 NOTE — DISCHARGE INSTRUCTIONS
ACTIVITY: Rest and take it easy for the first 24 hours.  A responsible adult is recommended to remain with you during that time.  It is normal to feel sleepy.  We encourage you to not do anything that requires balance, judgment or coordination.    MILD FLU-LIKE SYMPTOMS ARE NORMAL. YOU MAY EXPERIENCE GENERALIZED MUSCLE ACHES, THROAT IRRITATION, HEADACHE AND/OR SOME NAUSEA.    FOR 24 HOURS DO NOT:  Drive, operate machinery or run household appliances.  Drink beer or alcoholic beverages.   Make important decisions or sign legal documents.    SPECIAL INSTRUCTIONS: Ice and elevate extremity. Non-weight bearing. Locked immobilizer for 24 hours then weight bear as tolerated in unlocked immobilizer. After 24 hours ok to remove immobilizer while in bed.     Remove Scopolamine patch from behind your ear on Friday - wash hands thoroughly immediately afterward and avoid touching your eyes after touching the medication patch when removing.    DIET: To avoid nausea, slowly advance diet as tolerated, avoiding spicy or greasy foods for the first day. Add more substantial food to your diet according to your physician's instructions. INCREASE FLUIDS AND FIBER TO AVOID CONSTIPATION.    SURGICAL DRESSING/BATHING: May remove dressings on Thursday and shower with wound uncovered. Apply bandaids after shower. Keep steri strips on until post op visit.  Do not soak or submerge incisions for two weeks.     FOLLOW-UP APPOINTMENT:  A follow-up appointment should be arranged with your doctor in 7-10 days; call to schedule.    You should CALL YOUR PHYSICIAN if you develop:  Fever greater than 101 degrees F.  Pain not relieved by medication, or persistent nausea or vomiting.  Excessive bleeding (blood soaking through dressing) or unexpected drainage from the wound.  Extreme redness or swelling around the incision site, drainage of pus or foul smelling drainage.  Inability to urinate or empty your bladder within 8 hours.  Problems with breathing  or chest pain.    You should call 911 if you develop problems with breathing or chest pain.  If you are unable to contact your doctor or surgical center, you should go to the nearest emergency room or urgent care center.      Physician's telephone #: [phone]    If any questions arise, call your doctor.  If your doctor is not available, please feel free to call the Surgical Center at [phone]. The Center is open Monday through Friday from 7AM to 7PM. You can also call the [employer] HOTLINE open 24 hours/day, 7 days/week and speak to a nurse at [phone], or toll free at [phone].    A registered nurse may call you a few days after your surgery to see how you are doing after your procedure.    MEDICATIONS: Resume taking daily medication.  Take prescribed pain medication with food.  If no medication is prescribed, you may take non-aspirin pain medication if needed. PAIN MEDICATION CAN BE VERY CONSTIPATING. Take a stool softener or laxative such as senokot, pericolace, or milk of magnesia if needed.    Prescription given prior to procedure.  Last pain medication given at 9:28 am (HYDROcodone-acetaminophen 2.5-108 mg/5mL (HYCET) solution 15 mg)  If your physician has prescribed pain medication that includes Acetaminophen (Tylenol), do not take additional Acetaminophen (Tylenol) while taking the prescribed medication.    Peripheral Nerve Block Discharge Instructions from Same Day Surgery and Inpatient :    What to Expect - Lower Extremity  · The block may cause you to experience numbness and weakness in your hip and thigh, thigh and knee or calf and foot on the same side as your surgery  · Numbness, tingling and / or weakness are all normal. For some people, this may be an unpleasant sensation  · These issues will be resolved when the local anesthetic wears off   · You may experience numbness and tingling in your thigh on the same side as your surgery if the block medicine was injected at your groin  "area  · Numbness will make it difficult to walk  · You may have problems with balance and walking so be very careful   · Follow your surgeon's direction regarding weight bearing on your surgical limb  · Be very careful with your numb limb  Precautions  · The numbness may affect your balance  · Be careful when walking or moving around  · Your leg may be weak: be very careful putting weight on it  · If your surgeon did not specify a time, you should not bear weight for 24 hours  · Be sure to ask for help when you need it  · It is better to have help than to fall and hurt yourself  Prevent Injury  · Protect the limb like a baby  · Beware of exposing your limb to extreme heat or cold or trauma  · The limb may be injured without you noticing because it is numb  · Keep the limb elevated whenever possible  · Do not sleep on the limb  · Change the position of the limb regularly  · Avoid putting pressure on your surgical limb  Pain Control  · The initial block on the day of surgery will make your extremity feel \"numb\"  · Any consecutive injection including prior to discharge from the hospital will make your extremity feel \"numb\"  · You may feel an aching or burning when the local anesthesia starts to wear off  · Take pain pills as prescribed by your surgeon  · Call your surgeon or anesthesiologist if you do not have adequate pain control    Depression / Suicide Risk    As you are discharged from this [facility] facility, it is important to learn how to keep safe from harming yourself.    Recognize the warning signs:  · Abrupt changes in personality, positive or negative- including increase in energy   · Giving away possessions  · Change in eating patterns- significant weight changes-  positive or negative  · Change in sleeping patterns- unable to sleep or sleeping all the time   · Unwillingness or inability to communicate  · Depression  · Unusual sadness, discouragement and loneliness  · Talk of wanting to die  · Neglect " of personal appearance   · Rebelliousness- reckless behavior  · Withdrawal from people/activities they love  · Confusion- inability to concentrate     If you or a loved one observes any of these behaviors or has concerns about self-harm, here's what you can do:  · Talk about it- your feelings and reasons for harming yourself  · Remove any means that you might use to hurt yourself (examples: pills, rope, extension cords, firearm)  · Get professional help from the community (Mental Health, Substance Abuse, psychological counseling)  · Do not be alone:Call your Safe Contact- someone whom you trust who will be there for you.  · Call your local CRISIS HOTLINE 094-1293 or 654-635-9932  · Call your local Children's Mobile Crisis Response Team [place] (895) 471-0463 or www.[employer]  · Call the toll free National Suicide Prevention Hotlines   · National Suicide Prevention Lifeline 973-782-QKHZ (6747)  National Hope Line Network 800-SUICIDE (798-9302)

## 2021-03-23 NOTE — ANESTHESIA PREPROCEDURE EVALUATION
Anes H&P:  PAST MEDICAL HISTORY:   31 y.o. female who presents for Procedure(s) (LRB):  RECONSTRUCTION, KNEE, ACL, ARTHROSCOPIC - WITH ALLOGRAFT BONE TENDON BONE, DESHPANDE (Right).  She has current and past medical problems significant for:    PONV   Patient denies history of seizures or strokes  Patient denies history of diabetes or thyroid disease  Patient denies history of GERD or esophageal disease  Patient denies history of SHAILESH  Patient denies history of previous MI, chest pain or shortness of breath  Patient denies history of renal failure  Patient denies history of upper or lower extremity motor/sensory deficits   Patient denies history of bleeding disorders  Patient denies history of complications with anesthesia    Able to climb 2 flights of stair without dyspnea or angina, > 4 METs     Past Medical History:   Diagnosis Date   • Anemia 2017   • Anxiety    • Asthma    • HSV infection 9/7/2019   • Hyperlipidemia LDL goal <100 9/9/2019   • IFG (impaired fasting glucose) 9/9/2019   • Obesity (BMI 30-39.9) 1/11/2018   • Ovarian cyst 11/2018    left side   • PONV (postoperative nausea and vomiting)    • Vitamin D deficiency 2/1/2018       SMOKING/ALCOHOL/RECREATIONAL DRUG USE:  Social History     Tobacco Use   • Smoking status: Never Smoker   • Smokeless tobacco: Never Used   Substance Use Topics   • Alcohol use: Yes     Alcohol/week: 1.2 oz     Types: 2 Cans of beer per week     Comment: monthly   • Drug use: Yes     Types: Inhaled     Comment: Occ. marijuana     Social History     Substance and Sexual Activity   Drug Use Yes   • Types: Inhaled    Comment: Occ. marijuana       PAST SURGICAL HISTORY:  Past Surgical History:   Procedure Laterality Date   • PB KNEE SCOPE,DIAGNOSTIC Right 10/29/2020    Procedure: ARTHROSCOPY, KNEE;  Surgeon: Dexter Pimentel M.D.;  Location: SURGERY [facility];  Service: Orthopedics   • MEDIAL MENISCECTOMY Right 10/29/2020    Procedure: MENISCECTOMY, KNEE, MEDIAL - PARTIAL;  Surgeon: Dexter  YURI Pimentel;  Location: [facility];  Service: Orthopedics   • HARDWARE REMOVAL ORTHO Right 10/29/2020    Procedure: REMOVAL, HARDWARE tibia ;  Surgeon: Dexter Pimentel M.D.;  Location: [facility];  Service: Orthopedics   • BONE GRAFT Right 10/29/2020    Procedure: curetage and BONE GRAFT femoral cyst, microfracture;  Surgeon: Dexter Pimentel M.D.;  Location: [facility];  Service: Orthopedics   • KNEE ARTHROSCOPY Right 11/13/2018    Procedure: KNEE ARTHROSCOPY;  Surgeon: Dexter Pimentel M.D.;  Location: [facility];  Service: Orthopedics   • SYNOVECTOMY Right 11/13/2018    Procedure: SYNOVECTOMY;  Surgeon: Dexter Pimentel M.D.;  Location: [facility];  Service: Orthopedics   • MEDIAL MENISCECTOMY Right 11/13/2018    Procedure: MEDIAL MENISCECTOMY-  PARTIAL, AND PARTIAL LATERAL;  Surgeon: Dexter Pimentel M.D.;  Location: [facility];  Service: Orthopedics   • CHONDROPLASTY  11/13/2018    Procedure: CHONDROPLASTY;  Surgeon: Dexter Pimentel M.D.;  Location: [facility];  Service: Orthopedics   • HARDWARE REMOVAL ORTHO  11/13/2018    Procedure: HARDWARE REMOVAL ORTHO;  Surgeon: Dexter Pimentel M.D.;  Location: [facility];  Service: Orthopedics   • BONE GRAFT  11/13/2018    Procedure: BONE GRAFT;  Surgeon: Dexter Pimentel M.D.;  Location: [facility];  Service: Orthopedics   • REPEAT C SECTION  11/25/2017    Procedure: REPEAT C SECTION;  Surgeon: Asmita Bolivar M.D.;  Location: [facility] DELIVERY;  Service: Obstetrics   • REPEAT C SECTION  10/19/2015    Procedure: REPEAT C SECTION;  Surgeon: Mary Randolph M.D.;  Location: [facility];  Service:    • ACL RECONSTRUCTION SCOPE  10/2/2014    Performed by Dexter Pimentel M.D. at [facility]   • KNEE ARTHROSCOPY  4/17/2014    Performed by Dexter Pimentel M.D. at [facility]   • HARDWARE REMOVAL ORTHO  4/17/2014    Performed by Dexter Pimentel M.D. at SURGERY  [facility]   • BONE GRAFT  2014    Performed by Dexter Pimentel M.D. at SURGERY [facility]   • PB  DELIVERY ONLY     • ACL RECONSTRUCTION Right 2006       ALLERGIES:   Allergies   Allergen Reactions   • Benzoin Hives     Rash    • Phentermine      Worsening anxiety, excessive dry mouth, lightheaded       MEDICATIONS:  No current facility-administered medications on file prior to encounter.     Current Outpatient Medications on File Prior to Encounter   Medication Sig Dispense Refill   • sertraline (ZOLOFT) 100 MG Tab TAKE 1 TABLET BY MOUTH EVERY DAY 90 Tab 1   • cyclobenzaprine (FLEXERIL) 10 mg Tab Take 1 Tab by mouth every day. CYCLOBENZAPRINE HCL 10 MG TABS 30 Tab 3   • montelukast (SINGULAIR) 10 MG Tab      • Ferrous Sulfate (IRON PO) Take 64 mg by mouth every day.     • valacyclovir (VALTREX) 1 GM Tab TAKE 1 TABLET BY MOUTH EVERY DAY 90 Tab 3   • dicyclomine (BENTYL) 10 MG Cap Take 1 Cap by mouth 4 Times a Day,Before Meals and at Bedtime. 30 Cap 3   • ondansetron (ZOFRAN) 4 MG Tab tablet ZOFRAN 4 MG TABS     • fexofenadine (ALLEGRA) 180 MG tablet 24HR ALLERGY RELIEF TABS         LABS:  Lab Results   Component Value Date/Time    HEMOGLOBIN 13.2 2021 1554    HEMATOCRIT 40.0 2021 1554    WBC 9.8 2021 1554     Lab Results   Component Value Date/Time    SODIUM 139 2020 1731    POTASSIUM 4.0 2020 1731    CHLORIDE 106 2020 1731    CO2 25 2020 1731    GLUCOSE 81 2020 1219    BUN 14 2020 1731    CALCIUM 9.3 2020 1731       SARS-CoV2 result: Negative  Urine hcg negative     PREVIOUS ANESTHETICS: See EMR  __________________________________________      Relevant Problems   NEURO   (+) H/O Bell's palsy   (+) History of iron deficiency       Physical Exam    Airway   Mallampati: II  TM distance: >3 FB  Neck ROM: full       Cardiovascular - normal exam  Rhythm: regular  Rate: normal  (-) murmur     Dental - normal exam           Pulmonary  - normal exam  Breath sounds clear to auscultation     Abdominal    Neurological - normal exam                 Anesthesia Plan    ASA 2       Plan - general and peripheral nerve block     Peripheral nerve block will be post-op pain control  Airway plan will be LMA          Induction: intravenous    Postoperative Plan: Postoperative administration of opioids is intended.    Pertinent diagnostic labs and testing reviewed    Informed Consent:    Anesthetic plan and risks discussed with patient.    Use of blood products discussed with: patient whom consented to blood products.

## 2021-03-23 NOTE — ANESTHESIA PROCEDURE NOTES
Airway    Date/Time: 3/23/2021 7:10 AM  Performed by: Frank Reynoso M.D.  Authorized by: Frank Reynoso M.D.     Location:  OR  Urgency:  Elective  Indications for Airway Management:  Anesthesia      Spontaneous Ventilation: absent    Sedation Level:  Deep  Preoxygenated: Yes    Final Airway Type:  Supraglottic airway  Final Supraglottic Airway:  Standard LMA    SGA Size:  4  Number of Attempts at Approach:  1   Atraumatic x1

## 2021-04-12 ENCOUNTER — PHYSICAL THERAPY (OUTPATIENT)
Dept: PHYSICAL THERAPY | Facility: REHABILITATION | Age: 31
End: 2021-04-12
Attending: ORTHOPAEDIC SURGERY
Payer: COMMERCIAL

## 2021-04-12 DIAGNOSIS — Z47.89 SURGICAL AFTERCARE, MUSCULOSKELETAL SYSTEM: ICD-10-CM

## 2021-04-12 DIAGNOSIS — S83.511S RUPTURE OF ANTERIOR CRUCIATE LIGAMENT OF RIGHT KNEE, SEQUELA: ICD-10-CM

## 2021-04-12 DIAGNOSIS — M25.561 ACUTE PAIN OF RIGHT KNEE: ICD-10-CM

## 2021-04-12 PROCEDURE — 97161 PT EVAL LOW COMPLEX 20 MIN: CPT

## 2021-04-12 PROCEDURE — 97110 THERAPEUTIC EXERCISES: CPT

## 2021-04-12 SDOH — ECONOMIC STABILITY: GENERAL: QUALITY OF LIFE: GOOD

## 2021-04-12 ASSESSMENT — ACTIVITIES OF DAILY LIVING (ADL): POOR_BALANCE: 1

## 2021-04-12 ASSESSMENT — ENCOUNTER SYMPTOMS
PAIN SCALE AT HIGHEST: 6
PAIN SCALE AT LOWEST: 4
ALLEVIATING FACTORS: PAIN MEDICATION
QUALITY: ACHING
PAIN SCALE: 4
EXACERBATED BY: WALKING
ALLEVIATING FACTORS: REST

## 2021-04-12 NOTE — OP THERAPY EVALUATION
Outpatient Physical Therapy  INITIAL EVALUATION    [facility] [address].  [address]  [place] [address]  Phone:  [phone]  Fax:  [phone]    Date of Evaluation: 04/12/2021    Patient: Chase Xavier  YOB: 1990  MRN: [id number]     Referring Provider: Dexter Pimentel M.D.  [address] [place] [address]   Referring Diagnosis Sprain of anterior cruciate ligament of right knee, initial encounter [S83.511A]     Time Calculation    Start time: 0835  Stop time: 0925 Time Calculation (min): 50 minutes         Chief Complaint: Knee Injury    Visit Diagnoses     ICD-10-CM   1. Rupture of anterior cruciate ligament of right knee, sequela  S83.511S   2. Surgical aftercare, musculoskeletal system  Z47.89   3. Acute pain of right knee  M25.561       No data found  Subjective:   History of Present Illness:     Date of onset:  4/12/2018    Date of surgery:  3/23/2021    Mechanism of injury:  Pt underwent allograft reconstruction of Rt ACL on 3/23/2021.    Pt had first ACL surgery 2006, re-tear 2013. Started experiencing pain in 2018 and MD told pt her repair had failed. Had that graft removed but did not follow through with additional surgery. Pt was participating in kickboxing and started experiencing pain, again. And decided to move forward with another surgical intervention.    Pain is intermittent most noticeable when ambulating and in the morning. Pt is working on AROM and quad sets per her recall of previous PT. Per pt MD told her to unlock brace after 24 hours in extension. Pt also chose to DC crutches last week.    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 Pt assists with donning and doffing braces. Returns next week. Will be requesting light duty.    Pt is mother of 3 children (3, 5, 10) and lives in single story home with her .     Quality of life:  Good  Prior level of function:  Independent, very physically active  Pain:     Current  pain ratin    At best pain ratin    At worst pain ratin    Quality:  Aching    Relieving factors:  Pain medication and rest    Aggravating factors:  Walking    Pain Comments::  Sharp pain when walking occasionally.  Treatments:     Current treatment:  Brace  Activities of Daily Living:     Patient reported ADL status: Pt is able to completed appropriate ADLs and has returned to driving without pain.   Patient Goals:     Patient goals for therapy:  Increased motion and decreased pain    Other patient goals:  Return to previous physical activity level, return to work without restrictions      Past Medical History:   Diagnosis Date   • Anemia    • Anxiety    • Asthma    • HSV infection 2019   • Hyperlipidemia LDL goal <100 2019   • IFG (impaired fasting glucose) 2019   • Obesity (BMI 30-39.9) 2018   • Ovarian cyst 2018    left side   • PONV (postoperative nausea and vomiting)    • Vitamin D deficiency 2018     Past Surgical History:   Procedure Laterality Date   • PB KNEE SCOPE,AID ANT CRUCIATE REPAIR Right 3/23/2021    Procedure: RECONSTRUCTION, KNEE, ACL, ARTHROSCOPIC - WITH ALLOGRAFT BONE TENDON BONE;  Surgeon: Dexter Pimentel M.D.;  Location: [facility];  Service: Orthopedics   • PB KNEE SCOPE,DIAGNOSTIC Right 10/29/2020    Procedure: ARTHROSCOPY, KNEE;  Surgeon: Dexter Pimentel M.D.;  Location: [facility];  Service: Orthopedics   • MEDIAL MENISCECTOMY Right 10/29/2020    Procedure: MENISCECTOMY, KNEE, MEDIAL - PARTIAL;  Surgeon: Dexter Pimentel M.D.;  Location: [facility];  Service: Orthopedics   • HARDWARE REMOVAL ORTHO Right 10/29/2020    Procedure: REMOVAL, HARDWARE tibia ;  Surgeon: Dexter Pimentel M.D.;  Location: [facility];  Service: Orthopedics   • BONE GRAFT Right 10/29/2020    Procedure: curetage and BONE GRAFT femoral cyst, microfracture;  Surgeon: Dexter Pimentel M.D.;  Location: [facility];  Service: Orthopedics   • KNEE  ARTHROSCOPY Right 2018    Procedure: KNEE ARTHROSCOPY;  Surgeon: Dexter Pimentel M.D.;  Location: [facility];  Service: Orthopedics   • SYNOVECTOMY Right 2018    Procedure: SYNOVECTOMY;  Surgeon: Dexter Pimentel M.D.;  Location: [facility];  Service: Orthopedics   • MEDIAL MENISCECTOMY Right 2018    Procedure: MEDIAL MENISCECTOMY-  PARTIAL, AND PARTIAL LATERAL;  Surgeon: Dexter Pimentel M.D.;  Location: [facility];  Service: Orthopedics   • CHONDROPLASTY  2018    Procedure: CHONDROPLASTY;  Surgeon: Dexter Pimentel M.D.;  Location: [facility];  Service: Orthopedics   • HARDWARE REMOVAL ORTHO  2018    Procedure: HARDWARE REMOVAL ORTHO;  Surgeon: Dexter Pimentel M.D.;  Location: [facility];  Service: Orthopedics   • BONE GRAFT  2018    Procedure: BONE GRAFT;  Surgeon: Dexter Pimentel M.D.;  Location: [facility];  Service: Orthopedics   • REPEAT C SECTION  2017    Procedure: REPEAT C SECTION;  Surgeon: Asmita Bolivar M.D.;  Location: [facility];  Service: Obstetrics   • REPEAT C SECTION  10/19/2015    Procedure: REPEAT C SECTION;  Surgeon: Mary Randolph M.D.;  Location: [facility];  Service:    • ACL RECONSTRUCTION SCOPE  10/2/2014    Performed by Dexter Pimentel M.D. at [facility]   • KNEE ARTHROSCOPY  2014    Performed by Dexter Pimentel M.D. at [facility]   • HARDWARE REMOVAL ORTHO  2014    Performed by Dexter Pimentel M.D. at [facility]   • BONE GRAFT  2014    Performed by Dexter Pimentel M.D. at [facility]   • PB  DELIVERY ONLY     • ACL RECONSTRUCTION Right 2006     Social History     Tobacco Use   • Smoking status: Never Smoker   • Smokeless tobacco: Never Used   Substance Use Topics   • Alcohol use: Yes     Alcohol/week: 1.2 oz     Types: 2 Cans of beer per week     Comment: monthly     Family and  Occupational History     Socioeconomic History   • Marital status:      Spouse name: Not on file   • Number of children: Not on file   • Years of education: Not on file   • Highest education level: Not on file   Occupational History   • Not on file       Objective     Observations     Additional Observation Details  Pt ambulating in clinic without crutches and brace not locked into extension. Per pt MD told her she may unlock brace after 24 hours. Pt visibly lacking extension in stance phase as well ans knee flexion in swing with more circumduction type compensatory strategy while ambulating. Pt gait is slow/antalgic.    Rt knee wound healing appropriately without redness, increased warmth, or discharge.     Active Range of Motion   Left Knee   Flexion: 146 degrees   Extension: -3 degrees     Right Knee   Flexion: 116 degrees   Extension: 7 degrees     Additional Active Range of Motion Details  Did not assess extension lag without brace per protocol to protect graft.  ROM pain free    Patellar Mobility   Left Knee Patellar tendons within functional limits include the medial, lateral, superior and inferior.     Right Knee Patellar tendons within functional limits include the medial and lateral. Hypomobile in the superior and inferior patellar tendon(s).     Strength:      Additional Strength Details  NT to protect surgical graft. Will assess when appropriate.     Hip abduction 4/5 bilateral  Hip Extension Rt 4-/5 Lt 4+/5    Swelling     Left Knee Girth Measurement (cm)   Joint line: 34.8 cm  10 cm above joint line: 40.4 cm  10 cm below joint line: 37 cm    Right Knee Girth Measurement (cm)   Joint line: 37.5 cm  10 cm above joint line: 40.5 cm  10 cm below joint line: 37.6 cm        Therapeutic Exercises (CPT 74739):     1. Quad sets c NMES 10/10, 5 min    2. Knee extension stretch 1/2 foam roll, 5 min    3. Heel slides c strap, x 10 reps    4. Long sitting gastroc stretch, 2 x 60 sec      Therapeutic Exercise  Summary: HEP: 4 way SLR 3 x 10 WITH BRACE, Quad sets 3 x 10 2-3x per day, long sitting gastroc stretch 3 x 60 sec daily, heel slides c strap 3 x 10 daily. Min-no pain c heel slides. Supine extension stretch 5-10 min 3x per day as tolerated with mod discomfort.    Ice for edema and pain management: 20 min 2-3 x per day with skin protection.     Therapeutic Treatments and Modalities:     1. E Stim Unattended (CPT 49648), Right quad Russian 10/10 5 min    Therapeutic Treatment and Modalities Summary: Utilized Estim with quad sets.    Time-based treatments/modalities:           Assessment, Response and Plan:   Impairments: abnormal ADL function, abnormal gait, difficulty performing job, impaired functional mobility, impaired balance, impaired physical strength, limited ADL's, limited mobility, pain with function, swelling and weight-bearing intolerance    Assessment details:  Pt is 31 year old physically active female who presents to PT 2 weeks post op Right ACL repair with allograft. This is patients 3rd surgical intervention of right ACL since 2006 with most recent failure in 2013. Per MD via phone protocol from [facility] is appropriate. Did call to clarify as this protocol includes 2 weeks with brace in full extension since pt unlocked brace after 24 hours. Pt demonstrated excellent flexion ROM at 116 active but is lacking appropriate extension at 7 degrees in open chain position. Did educate pt on importance of full extension for ambulation as well as appropriate progression for ACL rehab and encouraged emphasis of extension at home. Encouraged supine extension stretch with 2 pillows under ankle for 10 min 2-3x per day as tolerated. Did instruct pt to perform without brace ut nly in a protected environment as she has young children and a large dog. Encouraged extension throughout the day while in brace. Pt verbalized understanding and agreement. While performing quad sets pt does have difficulty with quad  activation with notable hip extension. Fair improvement with use of NMES. At this time pt has significant strength and ROM deficits impacting her ability to ambulate, perform ADLs, and perform her appropriate work duties. Therefore, she will benefit from skilled intervention in order to improve above deficits and return to PLOF.  Other barriers to therapy:  Third surgical repair of right ACL  Prognosis: good    Prognosis details:  Pt is a physically active individual who is motivated to return to her previous physical activity level as well as return to work and interacting with her young children.  Goals:   Short Term Goals:   1. Pt will demonstrate right active knee extension of 0 degrees or better in order to ambulate with appropriate mechanics while maintaining integrity of surgical graft.   2. Pt will demonstrate compliance with progressive HEP, as appropriate, in order to improve ROM, strength, and mobility to progress the POC.  3. Pt will demonstrate improved Rt knee flexion within 5 degrees of Left knee in order to improve gait mechanics.  4. Pt will demonstrate improve hip abduction strength bilaterally 4+/5 or better in order to improve gait mechanics and decrease joint forces through knee.   Short term goal time span:  4-6 weeks      Long Term Goals:    1. Pt will demonstrate Rt knee ROM equal to left in order to ambulate without compensatory strategies.  2. Pt will demonstrate appropriate knee flexion/extension strength in order for pt to safely ascend/descend 12 stairs.  3. Pt will report subjective pain 2/10 or less when performing appropriate squat mechanics while working.   4. Pt will demonstrate appropriate squat mechanics while lifting 10 pounds per job description.   5. Pt will demonstrate Rt LE strength within 80% of left LE in order to return to PLOF.   6. Pt will demonstrate subjective functional improvement with WOMAC score 70/96 or better.  Long term goal time span:  2-4 months    Plan:    Therapy options:  Physical therapy treatment to continue  Planned therapy interventions:  E Stim Unattended (CPT 68397), Gait Training (CPT 79698), Hot or Cold Pack Therapy (CPT 07323), Manual Therapy (CPT 51098), Neuromuscular Re-education (CPT 91044), Therapeutic Exercise (CPT 11988) and Therapeutic Activities (CPT 08243)  Frequency:  2x week  Duration in weeks:  8  Duration in visits:  16  Discussed with:  Patient  Plan details:  Patellar mobs next session, extension emphasis      Functional Assessment Used        Referring provider co-signature:  I have reviewed this plan of care and my co-signature certifies the need for services.    Certification Period: 04/12/2021 to  06/07/21    Physician Signature: ________________________________ Date: ______________

## 2021-04-15 ENCOUNTER — GYNECOLOGY VISIT (OUTPATIENT)
Dept: OBGYN | Facility: CLINIC | Age: 31
End: 2021-04-15
Payer: COMMERCIAL

## 2021-04-15 VITALS
SYSTOLIC BLOOD PRESSURE: 114 MMHG | WEIGHT: 185 LBS | BODY MASS INDEX: 30.82 KG/M2 | DIASTOLIC BLOOD PRESSURE: 67 MMHG | HEIGHT: 65 IN

## 2021-04-15 DIAGNOSIS — T83.32XA INTRAUTERINE CONTRACEPTIVE DEVICE THREADS LOST, INITIAL ENCOUNTER: ICD-10-CM

## 2021-04-15 PROCEDURE — 99395 PREV VISIT EST AGE 18-39: CPT | Performed by: NURSE PRACTITIONER

## 2021-04-15 ASSESSMENT — FIBROSIS 4 INDEX: FIB4 SCORE: 0.42

## 2021-04-15 NOTE — PROGRESS NOTES
Pt here for IUD string check. Placed 2/22/21 and initially p israel some cramping and spotting but nothing more recently. No continued issues and sex has been normal as well. Reports she has not tried to feel her strings yet.     No IUD strings noted at cervical os    TVUS ordered for IUD location  RTC pending US resutls

## 2021-04-15 NOTE — NON-PROVIDER
Patient here for a GYN follow up.   Last seen on 2/22/21 Mirena insertion  C/o string check  pharmacy verified.  [phone] (home)

## 2021-04-16 ENCOUNTER — PHYSICAL THERAPY (OUTPATIENT)
Dept: PHYSICAL THERAPY | Facility: REHABILITATION | Age: 31
End: 2021-04-16
Attending: ORTHOPAEDIC SURGERY
Payer: COMMERCIAL

## 2021-04-16 DIAGNOSIS — M25.561 ACUTE PAIN OF RIGHT KNEE: ICD-10-CM

## 2021-04-16 DIAGNOSIS — Z47.89 SURGICAL AFTERCARE, MUSCULOSKELETAL SYSTEM: ICD-10-CM

## 2021-04-16 DIAGNOSIS — S83.511S RUPTURE OF ANTERIOR CRUCIATE LIGAMENT OF RIGHT KNEE, SEQUELA: ICD-10-CM

## 2021-04-16 PROCEDURE — 97110 THERAPEUTIC EXERCISES: CPT

## 2021-04-16 PROCEDURE — 97014 ELECTRIC STIMULATION THERAPY: CPT

## 2021-04-16 NOTE — OP THERAPY DAILY TREATMENT
Outpatient Physical Therapy  DAILY TREATMENT     [facility] [address].  [address]  [place] [address]  Phone:  [phone]  Fax:  [phone]    Date: 04/16/2021    Patient: Tonia Xavier  YOB: 1990  MRN: [id number]     Time Calculation    Start time: 1548  Stop time: 1625 Time Calculation (min): 37 minutes         Chief Complaint: Knee Injury    Visit #: 2    SUBJECTIVE:  Pt reports some soreness on posteriorlateral knee. Reports good compliance with HEP. Pt started experiencing pain at medial tibia approx 1 inch distal/lateral from joint line while performing adduction and ankle eversion. Remembers similar pain from previous surgeries. Pt plans to return to work next week, sees MD in 2 weeks.    OBJECTIVE:  Current objective measures: Rt knee AROM c Nepalese lacking 1 from 0.          Therapeutic Exercises (CPT 54872):     1. Supine quad sets, russian 10/10 50% duty cycle x 6 min, min tactile cues to quad, left quad reciprocal contraction    2. 4 way supine hip, x20 each, brace on    3. Isometric HS @ 60 deg, x 10 3 sec holds    4. 4 way ankle, x 10 OTB    5. Knee extension prop stretch      Therapeutic Exercise Summary: HEP: Added supine 4 way hip x 20 reps each, in brace. Quad sets, heel slides, seated gastroc stretch, 4 way ankle OTB 3 x 10    Education on bone grafts, bone pain    Therapeutic Treatments and Modalities:     1. Manual Therapy (CPT 77246), patellar mobs    Therapeutic Treatment and Modalities Summary: Manual: grade 4 patellar mobs performed 5 sec holds at end range x 5 min total    Time-based treatments/modalities:    Physical Therapy Timed Treatment Charges  Manual therapy minutes (CPT 05510): 5 minutes  Therapeutic exercise minutes (CPT 94048): 30 minutes        ASSESSMENT:   Pt demo'd significantly improved quad contraction from previous session, more so with cues to complete with left side for reciprocal contraction. Educated pt  that tibial pain likely due to trauma during surgery and will improve. Did encourage her to continue with adduction as tolerated in sidelying. Pt is ambulating better from previous session with better knee extension in stance. Fatigued noticed at end of session when ambulating but improved with cues and education. Pt will benefit from continued skilled intervention in order to improve ROM and strength in order to return to PLOF.    PLAN/RECOMMENDATIONS:   Plan for treatment: therapy treatment to continue next visit.  Planned interventions for next visit: continue with current treatment.

## 2021-04-19 ENCOUNTER — PHYSICAL THERAPY (OUTPATIENT)
Dept: PHYSICAL THERAPY | Facility: REHABILITATION | Age: 31
End: 2021-04-19
Attending: ORTHOPAEDIC SURGERY
Payer: COMMERCIAL

## 2021-04-19 DIAGNOSIS — S83.511S RUPTURE OF ANTERIOR CRUCIATE LIGAMENT OF RIGHT KNEE, SEQUELA: ICD-10-CM

## 2021-04-19 DIAGNOSIS — Z47.89 SURGICAL AFTERCARE, MUSCULOSKELETAL SYSTEM: ICD-10-CM

## 2021-04-19 DIAGNOSIS — M25.561 ACUTE PAIN OF RIGHT KNEE: ICD-10-CM

## 2021-04-19 PROCEDURE — 97110 THERAPEUTIC EXERCISES: CPT

## 2021-04-19 PROCEDURE — 97140 MANUAL THERAPY 1/> REGIONS: CPT

## 2021-04-19 NOTE — OP THERAPY DAILY TREATMENT
Outpatient Physical Therapy  DAILY TREATMENT     [facility] Physical [address].  [address]  [place] [address]  Phone:  [phone]  Fax:  [phone]    Date: 04/19/2021    Patient: Tonia Xavier  YOB: 1990  MRN: [id number]     Time Calculation    Start time: 1502  Stop time: 1550 Time Calculation (min): 48 minutes         Chief Complaint: Knee Injury    Visit #: 3    SUBJECTIVE:  Pt reports getting out of the truck on Saturday when she experienced a pop and pain in right knee at location of previous pain the proximal/medial tibia. Pt reports continued pain since then when weight bearing. Pain is not always constant but can be stabbing. Pt report no pain during PT session. Will call MD related to symptoms after session.    OBJECTIVE:  Current objective measures: Rt knee AROM c [place]ian lacking 1 from 0.          Therapeutic Exercises (CPT 72279):     1. Supine quad sets, 2 x 10 10 sec holds, propped up on foam roller     2. 4 way supine hip, 0, brace on    3. Isometric HS @ 60 deg, x 10 3 sec holds    4. Prone extension stretch, 6min    5. Standing TKE c ball, 2 x 10 10 sec holds    6. Seated bike, 5 min, Tall seat    7. Mini squats 0-30, x 10, brace on    8. Quad isometrics 60 deg, 90 deg, x 10 5 sec holds    20. UPOC 6/7/21      Therapeutic Exercise Summary: HEP: supine 4 way hip x 20 reps each, in brace. Quad sets, heel slides, seated gastroc stretch, 4 way ankle GTB 3 x 10  Added HS stretch supine and prone hangs    Ther ex: Mod VC for standing TKE for quad contraction vs hip ext with good carry over.     Therapeutic Treatments and Modalities:     1. Manual Therapy (CPT 50626), patellar mobs, scar tissue/cross friction    Therapeutic Treatment and Modalities Summary: Manual: grade 4 patellar mobs performed 5 sec holds at end range x 5 min total  Scar tissue mobs: cross friction applied all tissues with emphasis on tibial scar    Time-based  "treatments/modalities:    Physical Therapy Timed Treatment Charges  Manual therapy minutes (CPT 57936): 10 minutes  Therapeutic exercise minutes (CPT 79375): 38 minutes        ASSESSMENT:   Encouraged pt to contact MD related to report of \"pop\" sensation and pain at the tibia. Pt not complaining of this pain during any interventions nor is there significant swelling or pt reported instability therefore session continued. It is unlikely based off her symptoms and her ability to complete session pain free that the graft was compromised however it is safer to confer with MD. Pt verbalized understanding and agreement to contact MD.   Pt continues to have most notable patellar mobility deficits with inferior glides but overall improving. Performed scar tissue/cross friction intervention to all scars with emphasis on tibial scar due to most significant restriction. Educated pt on performance of scar tissue mobilization at home.  Pt demo'd improved quad contraction without NMES in open and closed chain interventions. Encouraged increased prone extension stretching as pt continues to lack full extension 1-4 degrees.  At least 2-3x per day for 5 min each. Pt verbalized understanding. Good quad contraction with isometrics at 90 and 60 degrees and no pain. Continued cues when ambulating for knee extension in stance with fait carry over. Near end of session pt had difficulty due to quad fatigue. Pt will continue to benefit from skilled intervention in order to improve ROM, strength, a gait mechanics in order to progress POC to PLOF.    PLAN/RECOMMENDATIONS:   Plan for treatment: therapy treatment to continue next visit.  Planned interventions for next visit: continue with current treatment.       "

## 2021-04-23 ENCOUNTER — PHYSICAL THERAPY (OUTPATIENT)
Dept: PHYSICAL THERAPY | Facility: REHABILITATION | Age: 31
End: 2021-04-23
Attending: ORTHOPAEDIC SURGERY
Payer: COMMERCIAL

## 2021-04-23 DIAGNOSIS — M25.561 ACUTE PAIN OF RIGHT KNEE: ICD-10-CM

## 2021-04-23 DIAGNOSIS — Z47.89 SURGICAL AFTERCARE, MUSCULOSKELETAL SYSTEM: ICD-10-CM

## 2021-04-23 DIAGNOSIS — S83.511S RUPTURE OF ANTERIOR CRUCIATE LIGAMENT OF RIGHT KNEE, SEQUELA: ICD-10-CM

## 2021-04-23 PROCEDURE — 97110 THERAPEUTIC EXERCISES: CPT

## 2021-04-23 PROCEDURE — 97140 MANUAL THERAPY 1/> REGIONS: CPT

## 2021-04-23 NOTE — OP THERAPY DAILY TREATMENT
Outpatient Physical Therapy  DAILY TREATMENT     [facility] Physical [address].  [address]  [place] [address]  Phone:  [phone]  Fax:  [phone]    Date: 04/23/2021    Patient: Tonia Xavier  YOB: 1990  MRN: [id number]     Time Calculation    Start time: 0830  Stop time: 0920 Time Calculation (min): 50 minutes         Chief Complaint: No chief complaint on file.    Visit #: 4    SUBJECTIVE:  Pt contacted physician about increased pain and pop sensation. No concerns at this time and pt is returning to MD on Monday. Pt reports pain has improved. Some soreness from previous session but no pain. Difficulty/ache with TKE at home in standing. Pt returned to work light duty 2 days ago without dificulty.    OBJECTIVE:  Current objective measures: Rt knee AROM   Extension lacking 1 from 0     Flexion 135 overpressure      Therapeutic Exercises (CPT 70997):     1. Supine quad sets, 30 x 5 sec holds, propped up on foam roller     2. SLR, 2 x 10, quad set, no brace    3. Isometric HS @ 60 deg, 0    4. Prone extension stretch, 6min, 2#    5. Standing TKE c ball, 2 x 10 10 sec holds    6. Seated bike, 5 min, Tall seat    7. Mini squats 0-30, 20, no brace    8. Quad isometrics 60 deg, 90 deg, 0    9. Standing hip abduction, x 10 bilateral //    11. Supine knee flexion c self overpressure, 15 x 10 sec holds in flexion, AROM, end range self op    20. UPOC 6/7/21      Therapeutic Exercise Summary: HEP: supine 4 way hip x 20 reps each, in brace. Quad sets, heel slides, seated gastroc stretch, 4 way ankle GTB 3 x 10  Added HS stretch supine and prone hangs    Ther ex: Mod VC for standing TKE for quad contraction vs hip ext with good carry over.     Therapeutic Treatments and Modalities:     1. Manual Therapy (CPT 41362), patellar mobs, scar tissue/cross friction    Therapeutic Treatment and Modalities Summary: Manual: grade 4 patellar mobs performed 5 sec holds at end range x  3 min total  IASTM:Perofrmed in prone. Distal HS and proximal gastroc emphasis. Increased adhesions noted posterior knee.    Time-based treatments/modalities:    Physical Therapy Timed Treatment Charges  Manual therapy minutes (CPT 82651): 15 minutes  Therapeutic exercise minutes (CPT 37717): 35 minutes        ASSESSMENT:   Pt ambulate with less antalgic gait compared to previous session with improved knee ext in stance. Continues to lack a few degrees as compared to Lt LE. Better quad activation in supine quad sets without estim. No extension lag with SLR without brace. Emphasized continued extension exercises with HEP as well as gastroc and HS stretching. Pt lack of extension may be related to tight musculature.   Pt demo'd improved heel strike initial contact while ambulating without brace in the gym with cueing. Pt instructed to continue to wear brace outside of PT until told my MD. Pt verbalized understanding.     PLAN/RECOMMENDATIONS:   Plan for treatment: therapy treatment to continue next visit.  Planned interventions for next visit: continue with current treatment.

## 2021-04-27 ENCOUNTER — PHYSICAL THERAPY (OUTPATIENT)
Dept: PHYSICAL THERAPY | Facility: REHABILITATION | Age: 31
End: 2021-04-27
Attending: ORTHOPAEDIC SURGERY
Payer: COMMERCIAL

## 2021-04-27 DIAGNOSIS — Z47.89 SURGICAL AFTERCARE, MUSCULOSKELETAL SYSTEM: ICD-10-CM

## 2021-04-27 DIAGNOSIS — M25.561 ACUTE PAIN OF RIGHT KNEE: ICD-10-CM

## 2021-04-27 DIAGNOSIS — S83.511S RUPTURE OF ANTERIOR CRUCIATE LIGAMENT OF RIGHT KNEE, SEQUELA: ICD-10-CM

## 2021-04-27 PROCEDURE — 97110 THERAPEUTIC EXERCISES: CPT

## 2021-04-27 PROCEDURE — 97112 NEUROMUSCULAR REEDUCATION: CPT

## 2021-04-27 NOTE — OP THERAPY DAILY TREATMENT
Outpatient Physical Therapy  DAILY TREATMENT     [facility] Physical [address].  [address]  [place] [address]  Phone:  [phone]  Fax:  [phone]    Date: 04/27/2021    Patient: Tonia Xavier  YOB: 1990  MRN: [id number]     Time Calculation    Start time: 1559  Stop time: 1628 Time Calculation (min): 29 minutes         Chief Complaint: Knee Injury    Visit #: 5    SUBJECTIVE:  Pt cleared by MD to DC brace and return in 6 weeks.  Pt had more noticeable soreness and pain yesterday after DC of brace.    OBJECTIVE:  Current objective measures: Rt knee AROM   Extension lacking 2 from 0     Flexion 135 overpressure  Continues to have tight HS posterior to knee with palpation    Therapeutic Exercises (CPT 44484):     1. Supine quad sets    2. SLR, 2 x 12, quad set, no brace    3. Split squat, // bars x 12 bilateral     4. Prone extension stretch, 2#    5. Standing TKE c ball, 2 x 10 10 sec holds    6. Seated bike, 5 min, Tall seat    7. Tempo Mini squats 0-30, 2x 10, no brace 3-3-3    8. Sit <>stands no UE, 20, 24 inches    9. Standing hip abduction, x 20 bilateral //    10. Standing hip ex , x 20 bilateral    11. Calf raises, 20    20. UPOC 6/7/21      Therapeutic Exercise Summary: HEP: supine 4 way hip x 20 reps each, in brace. Quad sets, heel slides, seated gastroc stretch, 4 way ankle GTB 3 x 10  Added HS stretch supine and prone hangs    Standing hip ext and abd completed bilaterally for strengthening and increased time in single limb stance on Rt LE    Therapeutic Treatments and Modalities:     1. Neuromuscular Re-education (CPT 17755)    Therapeutic Treatment and Modalities Summary: NMR: SLS 2 x 1 min  Tandem head turns x 1 min  Narrow on airex c veritcal head turns   Tandem on airex Rt LE wb 2 x 1 min    Time-based treatments/modalities:    Physical Therapy Timed Treatment Charges  Neuromusc re-ed, balance, coor, post minutes (CPT 36430): 10  minutes  Therapeutic exercise minutes (CPT 24509): 19 minutes        ASSESSMENT:   Progressed CKC strengthening within 0-45 degrees to maintain graft integrity. Pt demo'd good control overall with min cues with mini split squats. Also good stabilty with balance interventions. SLS on level surface Rt LE approx 7-10 seconds. No pain. Continued to emphasize ext HEP and HS stretching. Encouraged ice for pain management  Pt complaints of increased pain yesterday are likely related to first day out of the brace and will improve. Pt verbalized understanding.     PLAN/RECOMMENDATIONS:   Plan for treatment: therapy treatment to continue next visit.  Planned interventions for next visit: continue with current treatment.

## 2021-04-29 ENCOUNTER — APPOINTMENT (OUTPATIENT)
Dept: PHYSICAL THERAPY | Facility: REHABILITATION | Age: 31
End: 2021-04-29
Attending: ORTHOPAEDIC SURGERY
Payer: COMMERCIAL

## 2021-04-30 ENCOUNTER — APPOINTMENT (OUTPATIENT)
Dept: PHYSICAL THERAPY | Facility: REHABILITATION | Age: 31
End: 2021-04-30
Attending: ORTHOPAEDIC SURGERY
Payer: COMMERCIAL

## 2021-05-03 ENCOUNTER — PHYSICAL THERAPY (OUTPATIENT)
Dept: PHYSICAL THERAPY | Facility: REHABILITATION | Age: 31
End: 2021-05-03
Attending: NURSE PRACTITIONER
Payer: COMMERCIAL

## 2021-05-03 ENCOUNTER — HOSPITAL ENCOUNTER (OUTPATIENT)
Dept: RADIOLOGY | Facility: MEDICAL CENTER | Age: 31
End: 2021-05-03
Attending: NURSE PRACTITIONER
Payer: COMMERCIAL

## 2021-05-03 DIAGNOSIS — T83.32XA INTRAUTERINE CONTRACEPTIVE DEVICE THREADS LOST, INITIAL ENCOUNTER: ICD-10-CM

## 2021-05-03 DIAGNOSIS — S83.511S RUPTURE OF ANTERIOR CRUCIATE LIGAMENT OF RIGHT KNEE, SEQUELA: ICD-10-CM

## 2021-05-03 DIAGNOSIS — Z47.89 SURGICAL AFTERCARE, MUSCULOSKELETAL SYSTEM: ICD-10-CM

## 2021-05-03 DIAGNOSIS — M25.561 ACUTE PAIN OF RIGHT KNEE: ICD-10-CM

## 2021-05-03 PROCEDURE — 97010 HOT OR COLD PACKS THERAPY: CPT

## 2021-05-03 PROCEDURE — 76830 TRANSVAGINAL US NON-OB: CPT

## 2021-05-03 PROCEDURE — 97110 THERAPEUTIC EXERCISES: CPT

## 2021-05-03 PROCEDURE — 97112 NEUROMUSCULAR REEDUCATION: CPT

## 2021-05-03 NOTE — OP THERAPY DAILY TREATMENT
Outpatient Physical Therapy  DAILY TREATMENT     [facility] Physical [address].  [address]  [place] [address]  Phone:  [phone]  Fax:  [phone]    Date: 05/03/2021    Patient: Tonia Xavier  YOB: 1990  MRN: [id number]     Time Calculation    Start time: 0837  Stop time: 0953 Time Calculation (min): 76 minutes         Chief Complaint: Knee Injury    Visit #: 6    SUBJECTIVE:  Pt reports some soreness from yesterday after cleaning house. No pain after work and feels she has adjusted well now that brace is DC.     OBJECTIVE:  Current objective measures: Rt knee AROM   Extension 0 post quad sets and prone stretching, 1 prior.     Flexion 132 Rt AROM supine  Hip Ab: Rt MMT 4/5  Gait: Pt ambulating with reciprocal pattern and min trendelenburg. Is reaching extension during stance well with mild deficits in knee flexion during swing.       Therapeutic Exercises (CPT 42294):     1. Supine quad sets, x 15, 5 sec    2. SLR, 2 x 15, quad set, no brace    3. Split squat, // bars x 12 bilateral     4. Prone extension stretch, 5 min, 2#    5. Standing TKE GTB, 15 x 5 sec holds    6. Seated bike, 5 min, Tall seat, lvl 1.5     7. Tempo Mini squats 0-30, 0, no brace 3-3-3    8. Sit <>stands no UE, 20, 24 inches    9. Standing hip iso, 2 x 1 min each, captain kimmy anguiano therapy ball    10. Heel slides, x 12    11. Calf raises, 20    12. LAQ, x 15 3 sec up, 3 sec hold, 3 sec    20. UPOC 6/7/21, 30 day completed 5/3      Therapeutic Exercise Summary: HEP: Updated 5/3: LAQ 3 sec tempo 3x 10 , SLR 2 x 15, Standing TKA GTB 2 x 15, mini wall squats x 30 0-45, calf raises x 20    Therapeutic Treatments and Modalities:     1. Neuromuscular Re-education (CPT 80932), see below    Therapeutic Treatment and Modalities Summary: NMR: for improving proprioception, stabilty, and balance  Tandem on airex c vertical, horizontal head turns, EC x 45 sec ea  Modified SLS 6 inch step on airex c  vertical, horizontal head turns, EC x 45 sec each  Modified SLS 6 inch step airex chops/lifts 4# med ball 10 reps bialteral  SLS on airex x 60 sec SBA    Time-based treatments/modalities:    Physical Therapy Timed Treatment Charges  Neuromusc re-ed, balance, coor, post minutes (CPT 48279): 15 minutes  Therapeutic exercise minutes (CPT 93947): 38 minutes        ASSESSMENT:   Pt demo's good progress towards short term goals with emphasis on ROM. Pt continues to lack 2-3 degrees of ext from Lt LE however significant improvement from evaluation. Progressed HEP to include [place] quad strengthening with good fatigue noted during session and no pain. Also progressed interventions to included CKC strengthening and endurance of hip abductors with standing captain holds. Pt demo'd good stabilty of Rt LE on airex, good improvement from last week where pt experienced pain with SLS on level surface. Pt will continue to benefit from skilled intervention in order to improve ROM, strength, and dynamic control in order to continue to safely progress POC to include higher level activities while maintaining graft integrity.        ST-6 weeks  1. Pt will demonstrate right active knee extension of 0 degrees or better in order to ambulate with appropriate mechanics while maintaining integrity of surgical graft. Partially met: current RO< 0 extension however Lt LE 3 degrees hyper ext.  2. Pt will demonstrate compliance with progressive HEP, as appropriate, in order to improve ROM, strength, and mobility to progress the POC. Partially Met: pt has good compliance with Hep. Progressed today for improving ROM and CKC strength.   3. Pt will demonstrate improved Rt knee flexion within 5 degrees of Left knee in order to improve gait mechanics. Pt had 132 degrees active flexion within 14 degrees of Lt LE.  4. Pt will demonstrate improve hip abduction strength bilaterally 4+/5 or better in order to improve gait mechanics and decrease joint forces  through knee. Pt continue to lack hip abduction strength as noted with slight trendelenburg gait. Current /.    LT-4 months  1. Pt will demonstrate Rt knee ROM equal to left in order to ambulate without compensatory strategies.  2. Pt will demonstrate appropriate knee flexion/extension strength in order for pt to safely ascend/descend 12 stairs.  3. Pt will report subjective pain 2/10 or less when performing appropriate squat mechanics while working.   4. Pt will demonstrate appropriate squat mechanics while lifting 10 pounds per job description.   5. Pt will demonstrate Rt LE strengthwithin 80% of left LE in order to return to PLOF.   6. Pt will demonstrate subjective functional improvement with WOMAC score 70/96 or better.     PLAN/RECOMMENDATIONS:   Plan for treatment: therapy treatment to continue next visit.  Planned interventions for next visit: continue with current treatment.

## 2021-05-05 ENCOUNTER — PHYSICAL THERAPY (OUTPATIENT)
Dept: PHYSICAL THERAPY | Facility: REHABILITATION | Age: 31
End: 2021-05-05
Attending: NURSE PRACTITIONER
Payer: COMMERCIAL

## 2021-05-05 ENCOUNTER — APPOINTMENT (OUTPATIENT)
Dept: PHYSICAL THERAPY | Facility: REHABILITATION | Age: 31
End: 2021-05-05
Payer: COMMERCIAL

## 2021-05-05 DIAGNOSIS — S83.511S RUPTURE OF ANTERIOR CRUCIATE LIGAMENT OF RIGHT KNEE, SEQUELA: ICD-10-CM

## 2021-05-05 DIAGNOSIS — M25.561 ACUTE PAIN OF RIGHT KNEE: ICD-10-CM

## 2021-05-05 DIAGNOSIS — Z47.89 SURGICAL AFTERCARE, MUSCULOSKELETAL SYSTEM: ICD-10-CM

## 2021-05-05 PROCEDURE — 97112 NEUROMUSCULAR REEDUCATION: CPT

## 2021-05-05 PROCEDURE — 97110 THERAPEUTIC EXERCISES: CPT

## 2021-05-05 NOTE — OP THERAPY DAILY TREATMENT
"  Outpatient Physical Therapy  DAILY TREATMENT     [facility] Physical [address].  [address]  [place] [address]  Phone:  [phone]  Fax:  [phone]    Date: 05/05/2021    Patient: Tonia Xavier  YOB: 1990  MRN: [id number]     Time Calculation    Start time: 1531  Stop time: 1602 Time Calculation (min): 31 minutes         Chief Complaint: Knee Problem    Visit #: 7    SUBJECTIVE:  Pt feels her knee cap gets \"stuck\" when performing LAQ. Has noticed she is walking better. No pain.    OBJECTIVE:  Current objective measures: Rt knee AROM   Extension 0 post quad sets and prone stretching, 1 prior.     Flexion 132 Rt AROM supine      Therapeutic Exercises (CPT 02333):     1. Foam roll knee taps squats, x 20, 1 inch past toes    2. Mini squat, x 20    3. Split squat, // bars x 12 bilateral , foam roll for control    4. Standing hip iso, 0, captain kimmy anguiano therapy ball    5. Standing TKE GTB, 15 x 5 sec holds    6. Seated bike, 5 min, seat 6, lvl 1.5     7. LAQ, x 15 3 sec up, 3 sec hold, 3 sec    8. Calf raises, 30    20. UPOC 6/7/21, 30 day completed 5/3      Therapeutic Exercise Summary: HEP: Updated 5/3: LAQ 3 sec tempo 3x 10 , SLR 2 x 15, Standing TKA GTB 2 x 15, mini wall squats x 30 0-45, calf raises x 20    Therapeutic Treatments and Modalities:     1. Neuromuscular Re-education (CPT 59795), see below    Therapeutic Treatment and Modalities Summary: NMR: for improving proprioception, stabilty, and balance  SLS on airex 2 min  SLS on airex UE multitasking/perturbation inside EDUARDO with therapy ball taps x 1 min occasional touch A  STS tandem on airex (bilateral) alternating punches to therapy ball for perturbations 2 x 45 sec each  SLS on airex therapy ball toss x 1 min for reaction/multi tasking.    Time-based treatments/modalities:    Physical Therapy Timed Treatment Charges  Neuromusc re-ed, balance, coor, post minutes (CPT 11265): 16 minutes  Therapeutic " exercise minutes (CPT 91307): 15 minutes    ASSESSMENT:   Due to dedication to HEP pt to be seen 1x per week for 4 weeks followed by 2x per weeks in order to begin advancement into rehab protocol. Educated pt in self mobilization inf/sup of patella with pt demonstrating understanding. Pt demonstrated fair quad control with foam roll taps and will continue to benefit form progressions and challenging stabilty. Pt will continue to benefit from skilled intervention in order to improve ROM, strength, dynamic balance and stabilty in order to return to age appropriate activities and PLOF.        ST-6 weeks  1. Pt will demonstrate right active knee extension of 0 degrees or better in order to ambulate with appropriate mechanics while maintaining integrity of surgical graft. Partially met: current ROM< 0 extension however Lt LE 3 degrees hyper ext.  2. Pt will demonstrate compliance with progressive HEP, as appropriate, in order to improve ROM, strength, and mobility to progress the POC. Partially Met: pt has good compliance with Hep. Progressed today for improving ROM and CKC strength.   3. Pt will demonstrate improved Rt knee flexion within 5 degrees of Left knee in order to improve gait mechanics. Pt had 132 degrees active flexion within 14 degrees of Lt LE.  4. Pt will demonstrate improve hip abduction strength bilaterally 4+/5 or better in order to improve gait mechanics and decrease joint forces through knee. Pt continue to lack hip abduction strength as noted with slight trendelenburg gait. Current 4/5.    LT-4 months  1. Pt will demonstrate Rt knee ROM equal to left in order to ambulate without compensatory strategies.  2. Pt will demonstrate appropriate knee flexion/extension strength in order for pt to safely ascend/descend 12 stairs.  3. Pt will report subjective pain 2/10 or less when performing appropriate squat mechanics while working.   4. Pt will demonstrate appropriate squat mechanics while lifting 10  pounds per job description.   5. Pt will demonstrate Rt LE strengthwithin 80% of left LE in order to return to PLOF.   6. Pt will demonstrate subjective functional improvement with WOMAC score 70/96 or better.     PLAN/RECOMMENDATIONS:   Plan for treatment: therapy treatment to continue next visit.  Planned interventions for next visit: continue with current treatment.

## 2021-05-10 ENCOUNTER — PHYSICAL THERAPY (OUTPATIENT)
Dept: PHYSICAL THERAPY | Facility: REHABILITATION | Age: 31
End: 2021-05-10
Attending: NURSE PRACTITIONER
Payer: COMMERCIAL

## 2021-05-10 DIAGNOSIS — S83.511S RUPTURE OF ANTERIOR CRUCIATE LIGAMENT OF RIGHT KNEE, SEQUELA: ICD-10-CM

## 2021-05-10 DIAGNOSIS — Z47.89 SURGICAL AFTERCARE, MUSCULOSKELETAL SYSTEM: ICD-10-CM

## 2021-05-10 DIAGNOSIS — M25.561 ACUTE PAIN OF RIGHT KNEE: ICD-10-CM

## 2021-05-10 PROCEDURE — 97112 NEUROMUSCULAR REEDUCATION: CPT

## 2021-05-10 PROCEDURE — 97110 THERAPEUTIC EXERCISES: CPT

## 2021-05-10 NOTE — OP THERAPY DAILY TREATMENT
"  Outpatient Physical Therapy  DAILY TREATMENT     [facility] Physical [address].  [address]  [place] [address]  Phone:  [phone]  Fax:  [phone]    Date: 05/10/2021    Patient: Tonia Xavier  YOB: 1990  MRN: [id number]     Time Calculation    Start time: 0830  Stop time: 0926 Time Calculation (min): 56 minutes         Chief Complaint: Knee Problem    Visit #: 8    SUBJECTIVE:  Pt reports feeling \"achey\" 4/10 ar right proximal tibia. Has been completing patellar mobs prior to HEP but continues to feel somewhat stuck.    OBJECTIVE:  Current objective measures: Rt knee AROM   Extension 0 post quad sets and prone stretching, 1 prior.     Flexion 132 Rt AROM supine      Therapeutic Exercises (CPT 33034):     1. Foam roll knee taps squats, 30, 1 inch past toes    2. Tempo mini squat 10#, 30, 2-2-2    3. Split squat, // bars 2 x 10 bilateral , foam roll for control positioned end of foot.    4. Standing hip iso, 3 x 60 B, captain kimmy c therapy ball    5. Standing TKE purpleTB, 15 x 5 sec holds    6. Seated bike, 5 min, seat 6, lvl 1.5     7. LAQ tempo, 15, 3-3-3    8. Calf raises 10#, 30    9. Standing gastroc stretch, 3 x 60 wedge B    10. Tempo hamstring curls prone, 15, 3-3-3    11. Step ups anterior, x 10, x 12, Up Rt/dwn Lt, step over step up standard stairs, step too down ;isaacding c right.    12. Single LE mini squat shuttle    20. UPOC 6/7/21, 30 day completed 5/3      Therapeutic Exercise Summary: HEP: Updated 5/3: LAQ 3 sec tempo 3x 10 , SLR 2 x 15, Standing TKA GTB 2 x 15, mini wall squats x 30 0-45, calf raises x 20    Therapeutic Treatments and Modalities:     1. Neuromuscular Re-education (CPT 45951), see below    Therapeutic Treatment and Modalities Summary: NMR: for improving proprioception, stabilty, and balance  SLS on airex 2 min  -head turns (vertical/horizontal x 1 min each)    Tandem on airex x 1 min bilateral  -c head turns bilateral x 30 " sec each (vert/horizontal)  -EC 30 sec    Time-based treatments/modalities:    Physical Therapy Timed Treatment Charges  Neuromusc re-ed, balance, coor, post minutes (CPT 18461): 15 minutes  Therapeutic exercise minutes (CPT 16378): 41 minutes    ASSESSMENT:   Pt demo's good single leg stabilty on airex pad with abilty to maintin x 2 min with only 2 instances of touch from // bars. Appropriate use of ankle strategy and increased difficulty with head turns.  Extension ROM lacking 2-3 degrees with LAQ, likely due to quad fatigue as it was completed last. Progressed HEP to include Purple TB for TKE. Reminded pt for continued emphasis of prone knee hang. Pt will continue to benefit from skilled intervention in order to appropaitelly progress rehab and return to PLOF.     ST-6 weeks  1. Pt will demonstrate right active knee extension of 0 degrees or better in order to ambulate with appropriate mechanics while maintaining integrity of surgical graft. Partially met: current ROM< 0 extension however Lt LE 3 degrees hyper ext.  2. Pt will demonstrate compliance with progressive HEP, as appropriate, in order to improve ROM, strength, and mobility to progress the POC. Partially Met: pt has good compliance with Hep. Progressed today for improving ROM and CKC strength.   3. Pt will demonstrate improved Rt knee flexion within 5 degrees of Left knee in order to improve gait mechanics. Pt had 132 degrees active flexion within 14 degrees of Lt LE.  4. Pt will demonstrate improve hip abduction strength bilaterally 4+/5 or better in order to improve gait mechanics and decrease joint forces through knee. Pt continue to lack hip abduction strength as noted with slight trendelenburg gait. Current 4/5.    LT-4 months  1. Pt will demonstrate Rt knee ROM equal to left in order to ambulate without compensatory strategies.  2. Pt will demonstrate appropriate knee flexion/extension strength in order for pt to safely ascend/descend 12  stairs.  3. Pt will report subjective pain 2/10 or less when performing appropriate squat mechanics while working.   4. Pt will demonstrate appropriate squat mechanics while lifting 10 pounds per job description.   5. Pt will demonstrate Rt LE strengthwithin 80% of left LE in order to return to PLOF.   6. Pt will demonstrate subjective functional improvement with WOMAC score 70/96 or better.     PLAN/RECOMMENDATIONS:   Plan for treatment: therapy treatment to continue next visit.  Planned interventions for next visit: continue with current treatment.

## 2021-05-12 ENCOUNTER — APPOINTMENT (OUTPATIENT)
Dept: PHYSICAL THERAPY | Facility: REHABILITATION | Age: 31
End: 2021-05-12
Payer: COMMERCIAL

## 2021-05-13 ENCOUNTER — APPOINTMENT (OUTPATIENT)
Dept: PHYSICAL THERAPY | Facility: REHABILITATION | Age: 31
End: 2021-05-13
Attending: NURSE PRACTITIONER
Payer: COMMERCIAL

## 2021-05-17 ENCOUNTER — TELEPHONE (OUTPATIENT)
Dept: OBGYN | Facility: CLINIC | Age: 31
End: 2021-05-17

## 2021-05-17 ENCOUNTER — APPOINTMENT (OUTPATIENT)
Dept: PHYSICAL THERAPY | Facility: REHABILITATION | Age: 31
End: 2021-05-17
Attending: NURSE PRACTITIONER
Payer: COMMERCIAL

## 2021-05-17 NOTE — TELEPHONE ENCOUNTER
----- Message from JAY Dodson sent at 5/17/2021  8:57 AM PDT -----  Have pt schedule with MD to discuss these results.       5/17/2021 1029 Left message for pt to call back regarding US results.   1031 pt called back and notified as above. Scheduled pt for f/u with Dr. Salter for tomorrow at 1030. Pt agreed and verbalized understanding.

## 2021-05-17 NOTE — OP THERAPY DAILY TREATMENT
"  Outpatient Physical Therapy  DAILY TREATMENT     [facility] Physical [address].  [address]  [place] [address]  Phone:  [phone]  Fax:  [phone]    Date: 05/17/2021    Patient: Tonia Xavier  YOB: 1990  MRN: [id number]     Time Calculation                   Chief Complaint: No chief complaint on file.    Visit #: 9    SUBJECTIVE:  Pt reports feeling \"achey\" 4/10 ar right proximal tibia. Has been completing patellar mobs prior to HEP but continues to feel somewhat stuck.    OBJECTIVE:  Current objective measures: Rt knee AROM   Extension 0 post quad sets and prone stretching, 1 prior.     Flexion 132 Rt AROM supine      Therapeutic Exercises (CPT 82788):     1. Foam roll knee taps squats, 30, 1 inch past toes    2. Tempo mini squat 10#, 30, 2-2-2    3. Split squat, // bars 2 x 10 bilateral , foam roll for control positioned end of foot.    4. Standing hip iso, 3 x 60 B, captain kimmy c therapy ball    5. Standing TKE purpleTB, 15 x 5 sec holds    6. Seated bike, 5 min, seat 6, lvl 1.5     7. LAQ tempo, 15, 3-3-3    8. Calf raises 10#, 30    9. Standing gastroc stretch, 3 x 60 wedge B    10. Tempo hamstring curls prone, 15, 3-3-3    11. Step ups anterior, x 10, x 12, Up Rt/dwn Lt, step over step up standard stairs, step too down ;eading c right.    12. Single LE mini squat shuttle    20. UPOC 6/7/21, 30 day completed 5/3      Therapeutic Exercise Summary: HEP: Updated 5/3: LAQ 3 sec tempo 3x 10 , SLR 2 x 15, Standing TKA GTB 2 x 15, mini wall squats x 30 0-45, calf raises x 20    Therapeutic Treatments and Modalities:     1. Neuromuscular Re-education (CPT 55397), see below    Therapeutic Treatment and Modalities Summary: NMR: for improving proprioception, stabilty, and balance  SLS on airex 2 min  -head turns (vertical/horizontal x 1 min each)    Tandem on airex x 1 min bilateral  -c head turns bilateral x 30 sec each (vert/horizontal)  -EC 30 sec    SLS " trampoline perturbations    Time-based treatments/modalities:         ASSESSMENT:   Pt demo's good single leg stabilty on airex pad with abilty to maintin x 2 min with only 2 instances of touch from // bars. Appropriate use of ankle strategy and increased difficulty with head turns.  Extension ROM lacking 2-3 degrees with LAQ, likely due to quad fatigue as it was completed last. Progressed HEP to include Purple TB for TKE. Reminded pt for continued emphasis of prone knee hang. Pt will continue to benefit from skilled intervention in order to appropaitelly progress rehab and return to PLOF.     ST-6 weeks  1. Pt will demonstrate right active knee extension of 0 degrees or better in order to ambulate with appropriate mechanics while maintaining integrity of surgical graft. Partially met: current ROM< 0 extension however Lt LE 3 degrees hyper ext.  2. Pt will demonstrate compliance with progressive HEP, as appropriate, in order to improve ROM, strength, and mobility to progress the POC. Partially Met: pt has good compliance with Hep. Progressed today for improving ROM and CKC strength.   3. Pt will demonstrate improved Rt knee flexion within 5 degrees of Left knee in order to improve gait mechanics. Pt had 132 degrees active flexion within 14 degrees of Lt LE.  4. Pt will demonstrate improve hip abduction strength bilaterally 4+/5 or better in order to improve gait mechanics and decrease joint forces through knee. Pt continue to lack hip abduction strength as noted with slight trendelenburg gait. Current 4/5.    LT-4 months  1. Pt will demonstrate Rt knee ROM equal to left in order to ambulate without compensatory strategies.  2. Pt will demonstrate appropriate knee flexion/extension strength in order for pt to safely ascend/descend 12 stairs.  3. Pt will report subjective pain 2/10 or less when performing appropriate squat mechanics while working.   4. Pt will demonstrate appropriate squat mechanics while  lifting 10 pounds per job description.   5. Pt will demonstrate Rt LE strengthwithin 80% of left LE in order to return to PLOF.   6. Pt will demonstrate subjective functional improvement with WOMAC score 70/96 or better.     PLAN/RECOMMENDATIONS:   Plan for treatment: therapy treatment to continue next visit.  Planned interventions for next visit: continue with current treatment.

## 2021-05-19 ENCOUNTER — APPOINTMENT (OUTPATIENT)
Dept: PHYSICAL THERAPY | Facility: REHABILITATION | Age: 31
End: 2021-05-19
Payer: COMMERCIAL

## 2021-05-20 ENCOUNTER — IMMUNIZATION (OUTPATIENT)
Dept: OTHER | Facility: MEDICAL CENTER | Age: 31
End: 2021-05-20

## 2021-05-20 DIAGNOSIS — Z23 ENCOUNTER FOR VACCINATION: Primary | ICD-10-CM

## 2021-05-20 PROCEDURE — 91301 MODERNA SARS-COV-2 VACCINE: CPT

## 2021-05-20 PROCEDURE — 0011A MODERNA SARS-COV-2 VACCINE: CPT

## 2021-05-21 ENCOUNTER — APPOINTMENT (OUTPATIENT)
Dept: PHYSICAL THERAPY | Facility: REHABILITATION | Age: 31
End: 2021-05-21
Attending: NURSE PRACTITIONER
Payer: COMMERCIAL

## 2021-05-26 ENCOUNTER — PHYSICAL THERAPY (OUTPATIENT)
Dept: PHYSICAL THERAPY | Facility: REHABILITATION | Age: 31
End: 2021-05-26
Attending: NURSE PRACTITIONER
Payer: COMMERCIAL

## 2021-05-26 DIAGNOSIS — Z47.89 SURGICAL AFTERCARE, MUSCULOSKELETAL SYSTEM: ICD-10-CM

## 2021-05-26 DIAGNOSIS — M25.561 ACUTE PAIN OF RIGHT KNEE: ICD-10-CM

## 2021-05-26 DIAGNOSIS — S83.511S RUPTURE OF ANTERIOR CRUCIATE LIGAMENT OF RIGHT KNEE, SEQUELA: ICD-10-CM

## 2021-05-26 PROCEDURE — 97110 THERAPEUTIC EXERCISES: CPT

## 2021-05-26 PROCEDURE — 97140 MANUAL THERAPY 1/> REGIONS: CPT

## 2021-05-26 NOTE — OP THERAPY DAILY TREATMENT
Outpatient Physical Therapy  DAILY TREATMENT     [facility] Physical Therapy [address]  [address]  [place] [address]  Phone:  [phone]  Fax:  [phone]    Date: 05/26/2021    Patient: Tonia Xavier  YOB: 1990  MRN: [id number]     Time Calculation    Start time: 0833  Stop time: 0859 Time Calculation (min): 26 minutes         Chief Complaint: Knee Problem    Visit #: 9    SUBJECTIVE:Pt reports consistent sharp pain in patellar tendon while riding staiontnary bike and quad sets, frequently with gait, and frequently with descending stairs. Pain improved with compression of quad.    OBJECTIVE:  Knee ext 0, lacking hyper ext from Lt LE    Therapeutic Exercises (CPT 46734):     3. Bike (low seat), 6 min    4. Supine quad set, 30, 5 sec holds    Therapeutic Treatments and Modalities:     1. Manual Therapy (CPT 03266)    Therapeutic Treatment and Modalities Summary: Patient agrees to risks and benefits associated with Dry Needling    Chloroprep utilized x 3 min dry time Patients skin cleaned and prepped according to protocol, appropriate hand hygiene performed and gloves donned and prepped.  4 .60mm needles utilized, 2 in vastus lateralis, 2 in vastus medialis. Twitch response observed twice in vastus medialis  No adverse effects noted post treatment . Educated in after effects and soreness.  STM performed to proximal/medial quad x 5 min to dec pain, increase blood flow.    Time-based treatments/modalities:    Physical Therapy Timed Treatment Charges  Manual therapy minutes (CPT 16364): 15 minutes  Therapeutic exercise minutes (CPT 03395): 11 minutes        ASSESSMENT:   Pt educated on utilization of functional dry needling include small risk of infectino. To protect graft and dec small risk of transmission needled limited to proximal vastus lateralis, medialis. Pt educated on signs and symptoms of infection. Pt verbalized understanding and consent form signed. Pt  demonstrated significant decrease in pain with quad sets post manual interventions as well as no pain while ambulating. Overall, excellent response to intervention.    PLAN/RECOMMENDATIONS:   Plan for treatment: therapy treatment to continue next visit.  Planned interventions for next visit: continue with current treatment.

## 2021-05-26 NOTE — OP THERAPY DAILY TREATMENT
"  Outpatient Physical Therapy  DAILY TREATMENT     [facility] Physical [address].  [address]  [place] [address]  Phone:  [phone]  Fax:  [phone]    Date: 05/26/2021    Patient: Tonia Xavier  YOB: 1990  MRN: [id number]     Time Calculation                   Chief Complaint: No chief complaint on file.    Visit #: 9    SUBJECTIVE:  Pt reports feeling \"achey\" 4/10 ar right proximal tibia. Has been completing patellar mobs prior to HEP but continues to feel somewhat stuck.    OBJECTIVE:  Current objective measures: Rt knee AROM   Extension 0 post quad sets and prone stretching, 1 prior.     Flexion 132 Rt AROM supine      Therapeutic Exercises (CPT 21942):     1. Foam roll knee taps squats, 30, 1 inch past toes    2. Tempo mini squat 10#, 30, 2-2-2    3. Split squat, // bars 2 x 10 bilateral , foam roll for control positioned end of foot.    4. Standing hip iso, 3 x 60 B, captain kimmy c therapy ball    5. Standing TKE purpleTB, 15 x 5 sec holds, Up Rt/dwn Lt, step over step up standard stairs, step too down ;eading c right.    6. Step ups anterior, x 10, x 12    7. LAQ tempo, 15, 3-3-3    8. Calf raises 10#, 30    9. Single LE mini squat shuttle, 0-45    10. Wall squat, 0-60    11. Mini wall sit     20. UPOC 6/7/21, 30 day completed 5/3      Therapeutic Exercise Summary: HEP: Updated 5/3: LAQ 3 sec tempo 3x 10 , SLR 2 x 15, Standing TKA GTB 2 x 15, mini wall squats x 30 0-45, calf raises x 20    Therapeutic Treatments and Modalities:     1. Neuromuscular Re-education (CPT 84810), see below    Therapeutic Treatment and Modalities Summary: NMR: for improving proprioception, stabilty, and balance  SLS on airex 2 min  -head turns (vertical/horizontal x 1 min each)    Tandem on airex x 1 min bilateral  -c head turns bilateral x 30 sec each (vert/horizontal)  -EC 30 sec    Time-based treatments/modalities:         ASSESSMENT:   Pt demo's good single leg stabilty " on airex pad with abilty to maintin x 2 min with only 2 instances of touch from // bars. Appropriate use of ankle strategy and increased difficulty with head turns.  Extension ROM lacking 2-3 degrees with LAQ, likely due to quad fatigue as it was completed last. Progressed HEP to include Purple TB for TKE. Reminded pt for continued emphasis of prone knee hang. Pt will continue to benefit from skilled intervention in order to appropaitelly progress rehab and return to PLOF.     ST-6 weeks  1. Pt will demonstrate right active knee extension of 0 degrees or better in order to ambulate with appropriate mechanics while maintaining integrity of surgical graft. Partially met: current ROM< 0 extension however Lt LE 3 degrees hyper ext.  2. Pt will demonstrate compliance with progressive HEP, as appropriate, in order to improve ROM, strength, and mobility to progress the POC. Partially Met: pt has good compliance with Hep. Progressed today for improving ROM and CKC strength.   3. Pt will demonstrate improved Rt knee flexion within 5 degrees of Left knee in order to improve gait mechanics. Pt had 132 degrees active flexion within 14 degrees of Lt LE.  4. Pt will demonstrate improve hip abduction strength bilaterally 4+/5 or better in order to improve gait mechanics and decrease joint forces through knee. Pt continue to lack hip abduction strength as noted with slight trendelenburg gait. Current 4/5.    LT-4 months  1. Pt will demonstrate Rt knee ROM equal to left in order to ambulate without compensatory strategies.  2. Pt will demonstrate appropriate knee flexion/extension strength in order for pt to safely ascend/descend 12 stairs.  3. Pt will report subjective pain 2/10 or less when performing appropriate squat mechanics while working.   4. Pt will demonstrate appropriate squat mechanics while lifting 10 pounds per job description.   5. Pt will demonstrate Rt LE strengthwithin 80% of left LE in order to return to  PLOF.   6. Pt will demonstrate subjective functional improvement with WOMAC score 70/96 or better.     PLAN/RECOMMENDATIONS:   Plan for treatment: therapy treatment to continue next visit.  Planned interventions for next visit: continue with current treatment.

## 2021-06-02 ENCOUNTER — APPOINTMENT (OUTPATIENT)
Dept: RADIOLOGY | Facility: IMAGING CENTER | Age: 31
End: 2021-06-02
Attending: ORTHOPAEDIC SURGERY
Payer: COMMERCIAL

## 2021-06-02 DIAGNOSIS — S83.511A SPRAIN OF ANTERIOR CRUCIATE LIGAMENT OF RIGHT KNEE, INITIAL ENCOUNTER: ICD-10-CM

## 2021-06-02 DIAGNOSIS — M25.561 RIGHT KNEE PAIN, UNSPECIFIED CHRONICITY: ICD-10-CM

## 2021-06-02 PROCEDURE — 73565 X-RAY EXAM OF KNEES: CPT | Mod: TC | Performed by: ORTHOPAEDIC SURGERY

## 2021-06-03 ENCOUNTER — APPOINTMENT (OUTPATIENT)
Dept: PHYSICAL THERAPY | Facility: REHABILITATION | Age: 31
End: 2021-06-03
Attending: NURSE PRACTITIONER
Payer: COMMERCIAL

## 2021-06-03 DIAGNOSIS — M25.561 ACUTE PAIN OF RIGHT KNEE: ICD-10-CM

## 2021-06-03 DIAGNOSIS — S83.511S RUPTURE OF ANTERIOR CRUCIATE LIGAMENT OF RIGHT KNEE, SEQUELA: ICD-10-CM

## 2021-06-03 DIAGNOSIS — Z47.89 SURGICAL AFTERCARE, MUSCULOSKELETAL SYSTEM: ICD-10-CM

## 2021-06-03 PROCEDURE — 97140 MANUAL THERAPY 1/> REGIONS: CPT

## 2021-06-03 PROCEDURE — 97110 THERAPEUTIC EXERCISES: CPT

## 2021-06-03 NOTE — OP THERAPY DAILY TREATMENT
Outpatient Physical Therapy  DAILY TREATMENT     [facility] Physical [address]  [address]  [place] [address]  Phone:  [phone]  Fax:  [phone]    Date: 06/03/2021    Patient: Tonia Xavier  YOB: 1990  MRN: [id number]     Time Calculation    Start time: 1500  Stop time: 1532 Time Calculation (min): 32 minutes         Chief Complaint: Knee Injury    Visit #: 10    SUBJECTIVE:Pt reports overall decrease in sharp pain with knee extension however still occurs occasionally. Had not been stretching as much frequently due to discomfort in knee cap in prone.    OBJECTIVE:  Knee ext 0, lacking hyper ext from Lt LE    Therapeutic Exercises (CPT 81038):     1. Modified pistol squat TRX, Rt LE x 10, tall box +1airex    2. SLS on shuttle, x 30, 4 cords OTB to cue AB    3. BOSU ball squat on sled, x 30, deep-90 deg    4. TRX split squat, 2x10 B    Therapeutic Treatments and Modalities:     1. Manual Therapy (CPT 83306)    Therapeutic Treatment and Modalities Summary: Patient agrees to risks and benefits associated with Dry Needling    Chloroprep utilized x 3 min dry time Patients skin cleaned and prepped according to protocol, appropriate hand hygiene performed and gloves donned and prepped.  4 .60mm needles utilized, 2 in vastus lateralis, 2 in vastus medialis. Twitch response observed twice in vastus medialis  No adverse effects noted post treatment . Educated in after effects and soreness.  ASTM performed to proximal/medial quad. ITB x 5 min to dec pain, increase blood flow.  Scar tissue mobilization x 2 min, and instruction    Time-based treatments/modalities:    Physical Therapy Timed Treatment Charges  Manual therapy minutes (CPT 48917): 13 minutes  Therapeutic exercise minutes (CPT 53478): 18 minutes        ASSESSMENT:   Discussed importance of continued stretching and knee ext as she is currently lacking hyper ext to equal Lt LE. Utilized lack of ROM noticed  with shuttle squats for visual, pt verbalized understanding. Pt able to perform progression of CKC strengthening with deeper ROM and single LE strengthening without pain. Did have min pain with modified pistol squat that improved with reps.  Increased valgus/instabilty noted with SLS on shuttle that improved with OTB for external cue for hip abduction. Pt will continue to benefit from skilled PT in order to safely progress ACL repair protocol and return to PLOF.    PN next visit      PLAN/RECOMMENDATIONS:   Plan for treatment: therapy treatment to continue next visit.  Planned interventions for next visit: continue with current treatment.

## 2021-06-07 ENCOUNTER — PHYSICAL THERAPY (OUTPATIENT)
Dept: PHYSICAL THERAPY | Facility: REHABILITATION | Age: 31
End: 2021-06-07
Attending: NURSE PRACTITIONER
Payer: COMMERCIAL

## 2021-06-07 DIAGNOSIS — M25.561 ACUTE PAIN OF RIGHT KNEE: ICD-10-CM

## 2021-06-07 DIAGNOSIS — Z47.89 SURGICAL AFTERCARE, MUSCULOSKELETAL SYSTEM: ICD-10-CM

## 2021-06-07 DIAGNOSIS — S83.511S RUPTURE OF ANTERIOR CRUCIATE LIGAMENT OF RIGHT KNEE, SEQUELA: ICD-10-CM

## 2021-06-07 PROCEDURE — 97110 THERAPEUTIC EXERCISES: CPT

## 2021-06-07 NOTE — OP THERAPY PROGRESS SUMMARY
Outpatient Physical Therapy  PROGRESS SUMMARY NOTE      [facility] Physical Therapy [address].  [address]  [place] [address]  Phone:  [phone]  Fax:  [phone]    Date of Visit: 06/07/2021    Patient: Tonia Xavier  YOB: 1990  MRN: [id number]     Referring Provider: Dexter Pimentel M.D.   Referring Diagnosis Right ACL tear [S83.511A]     Visit Diagnoses     ICD-10-CM   1. Rupture of anterior cruciate ligament of right knee, sequela  S83.511S   2. Surgical aftercare, musculoskeletal system  Z47.89   3. Acute pain of right knee  M25.561       Rehab Potential: good    Progress Report Period: 4/12/21-6/7/21    Functional Assessment Used          Objective Findings and Assessment:   Patient progression towards goals: Pt demonstrates good progress towards short term and long term goals. Pt currently Met 2 short term goals including improving knee ext to 0 for proper gait mechanics. Pt partially met multiple goals including improving knee flexion ROM within 7 degrees with goal written for within 5. Pt has been very compliant with HEP, goal partially met, as it will continue to be reassessed with progression of HEP. Pt currently able to ascend/descend 12 stairs but due to limited quad strength and control pt requires use of railing for descending. Goals have been appropriately updated and progressed.     Objective findings and assessment details: Knee Flexion:Rt 130, Lt 137  Knee Extension: Rt 2 deg hyperext, Lt 6 deg hyper ext  Hip Ab MMT: 4+/5 B    Pt is currently 11 weeks post op her 3rd ACL repair with allograft reconstruction which occurred 3/23/2021. 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 At this time pt demosntrates exccellent progress towards goals and has been apprropaitely progressing through protocol for ACL allograft reconstruction. As this is her 3rd reconstruction the progression may  be slower however it has been consistent. Currently pt lacks full ROM as compared to non surgical limb, 4 degrees difference in hyper extension, and 7 degrees of flexion. Pt recently progressed to SLS strengthening of surgical leg in both OKC and CKC positions. CKC continues to be most difficulty with inability to perform 0-30 deg without UE support. Pt will continue to benefit from skilled PT in order to safely progress POC as per rehabilitation protocol for surgical repair as well as improving ROM, strength, balance, and progression to more higher level activities as appropriate with her activity level.     Goals:   Short Term Goals:   1. Pt will demonstrate Rt knee ROM equal to left in order to ambulate without compensatory strategies.  2. Pt will demonstrate compliance with progressive HEP, as appropriate, in order to improve ROM, strength, and mobility to progress the POC.  3. Pt will demonstrate improved Rt knee flexion within 5 degrees of Left knee in order to improve gait mechanics.  4. Pt will demonstrate improve hip abduction strength bilaterally 5/5 or better in order to improve gait mechanics and decrease joint forces through knee.   Short term goal time span:  4-6 weeks  Short term goal time span:  4-6 weeks      Long Term Goals:      Long Term Goals:    1. Pt will demonstrate appropriate knee flexion/extension strength in order for pt to safely ascend/descend 12 stairs without UE support.  2. Pt will report subjective pain 2/10 or less when performing appropriate squat mechanics while working.   3. Pt will demonstrate appropriate squat mechanics while lifting 10 pounds per job description.   4. Pt will demonstrate Rt LE strength within 80% of left LE in order to return to PLOF.   5. Pt will demonstrate subjective functional improvement with WOMAC score 70/96 or better.  6. Pt will demonstrate single LE hop test score Rt LE within 85% of Lt LE.    Long term goal time span:  2-4 months    Plan:   Planned  therapy interventions:  E Stim Unattended (CPT 87433), Therapeutic Activities (CPT 97648), Therapeutic Exercise (CPT 29172), Manual Therapy (CPT 90026) and Neuromuscular Re-education (CPT 99695)  Frequency:  2x week  Duration in weeks:  8  Plan details:  Pt to be seen 1-2x per week for 8 weeks.       Referring provider co-signature:  I have reviewed this plan of care and my co-signature certifies the need for services.     Certification Period: 06/07/2021 to 08/02/21    Physician Signature: ________________________________ Date: ______________

## 2021-06-07 NOTE — OP THERAPY DAILY TREATMENT
Outpatient Physical Therapy  DAILY TREATMENT     [facility] Physical [address]  [address]  [place] [address]  Phone:  [phone]  Fax:  [phone]    Date: 06/07/2021    Patient: Tonia Xavier  YOB: 1990  MRN: [id number]     Time Calculation    Start time: 0830  Stop time: 0902 Time Calculation (min): 32 minutes         Chief Complaint: Knee Injury    Visit #: 11    SUBJECTIVE: Pt reports she continues to experience Rt pain in the distal scar region, Pain is occasional but can be consistent. Unable to report when pain occurs consistently ie walking, stairs, etc. Pt continues to be compliant with HEP.    OBJECTIVE:  Rt knee Ext Rt 2 deg hyper ext, Lt 6 deg hyper ext  Knee flexion : Rt 130, Lt 137  Hip abduction MMT 4+/5 B  Gait mechanics: appropriate gait mechanics, no signs of discomfotrt or pain during todays session.  Single LE squat: unable to perform without UE support and elevated surface d/t weakness    Therapeutic Exercises (CPT 65879):     1. Prone knee ex stretch, 2 min, 3# AW    2. SLS on shuttle, x 30, 4 cords, blue fabiano disk    3. Anterior heel taps, x 30, 2 inch step, no UE    4. Lateral heel taps, x 30, 2 inch step no UE    5. Squats, 30, foam roll pos in 2 inch fornt right knee control    6. Lunge, 30 B, foam roll pos in fornt right knee control    7. Modified pistol squat TRX, 17 (to fatigue), tall box + 1 airex, sit/stand    8. Bike seat 5,, 5 min, level 1.5      Therapeutic Exercise Summary: Pain with anterior heel taps 4, 6 inch steps with excess hip drop  Unable to perform modified pistol squat without sit/stand. Attempts to tap surface unsuccessful due to fatigue, this was last intervention.      Time-based treatments/modalities:    Physical Therapy Timed Treatment Charges  Therapeutic exercise minutes (CPT 51576): 32 minutes        ASSESSMENT:   Pt is currently 11 weeks post op her 3rd ACL repair with allograft reconstruction which  occurred 3/23/2021. 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 At this time pt demosntrates exccellent progress towards goals and has been apprropaitely progressing through protocol for ACL allograft reconstruction. As this is her 3rd reconstruction the progression may be slower however it has been consistent. Currently pt lacks full ROM as compared to non surgical limb, 4 degrees difference in hyper extension, and 7 degrees of flexion. Pt recently progressed to [facility] strengthening of surgical leg in both OKC and CKC positions. CKC continues to be most difficulty with inability to perform 0-30 deg without UE support. Pt will continue to benefit from skilled PT in order to safely progress POC as per rehabilitation protocol for surgical repair as well as improving ROM, strength, balance, and progression to more higher level activities as appropriate with her activity level.     STG  1. Pt will demonstrate right active knee extension of 0 degrees or better in order to ambulate with appropriate mechanics while maintaining integrity of surgical graft. Goal Met  2. Pt will demonstrate compliance with progressive HEP, as appropriate, in order to improve ROM, strength, and mobility to progress the POC.Partially met. Will continue to progress pt HEP  3. Pt will demonstrate improved Rt knee flexion within 5 degrees of Left knee in order to improve gait mechanics.Partiall met, currently within 7 degrees  4. Pt will demonstrate improve hip abduction strength bilaterally 4+/5 or better in order to improve gait mechanics and decrease joint forces through knee. Goal Met  Short term goal time span:  4-6 weeks      Long Term Goals:    1. Pt will demonstrate Rt knee ROM equal to left in order to ambulate without compensatory strategies. Partially met: pt lacks knee hyper ext equal to non surgical limb  2. Pt will demonstrate appropriate knee flexion/extension  strength in order for pt to safely ascend/descend 12 stairs. Partially met. Pt requires use of UE for safe descend  3. Pt will report subjective pain 2/10 or less when performing appropriate squat mechanics while working. Partially met: pt unable to perform full squat currently but continues to progress for graft protection.  4. Pt will demonstrate appropriate squat mechanics while lifting 10 pounds per job description. Not Met  5. Pt will demonstrate Rt LE strength within 80% of left LE in order to return to PLOF. Not met  6. Pt will demonstrate subjective functional improvement with WOMAC score 70/96 or better. Not met, did not reassess      PLAN/RECOMMENDATIONS:   Plan for treatment: therapy treatment to continue next visit.  Planned interventions for next visit: continue with current treatment.

## 2021-06-11 ENCOUNTER — APPOINTMENT (OUTPATIENT)
Dept: PHYSICAL THERAPY | Facility: REHABILITATION | Age: 31
End: 2021-06-11
Attending: NURSE PRACTITIONER
Payer: COMMERCIAL

## 2021-06-14 ENCOUNTER — PHYSICAL THERAPY (OUTPATIENT)
Dept: PHYSICAL THERAPY | Facility: REHABILITATION | Age: 31
End: 2021-06-14
Attending: NURSE PRACTITIONER
Payer: COMMERCIAL

## 2021-06-14 DIAGNOSIS — S83.511S RUPTURE OF ANTERIOR CRUCIATE LIGAMENT OF RIGHT KNEE, SEQUELA: ICD-10-CM

## 2021-06-14 DIAGNOSIS — M25.561 ACUTE PAIN OF RIGHT KNEE: ICD-10-CM

## 2021-06-14 DIAGNOSIS — Z47.89 SURGICAL AFTERCARE, MUSCULOSKELETAL SYSTEM: ICD-10-CM

## 2021-06-14 PROCEDURE — 97112 NEUROMUSCULAR REEDUCATION: CPT

## 2021-06-14 PROCEDURE — 97110 THERAPEUTIC EXERCISES: CPT

## 2021-06-14 NOTE — OP THERAPY DAILY TREATMENT
Outpatient Physical Therapy  DAILY TREATMENT     [facility] Physical [address]  [address]  [place] [address]  Phone:  [phone]  Fax:  [phone]    Date: 06/14/2021    Patient: Tonia Xavier  YOB: 1990  MRN: [id number]     Time Calculation    Start time: 0829  Stop time: 0858 Time Calculation (min): 29 minutes         Chief Complaint: Knee Injury    Visit #: 12    SUBJECTIVE: Pt reports she continues to experience Rt pain in the distal scar region, Pain is occasional but can be consistent, described as sharp. Had follow up with surgeon who feels everything looks good, will follow up again in 3 months.    OBJECTIVE:  Rt knee Ext Rt 2 deg hyper ext, Lt 6 deg hyper ext  Knee flexion : Rt 130, Lt 137  Hip abduction MMT 4+/5 B  Gait mechanics: appropriate gait mechanics, no signs of discomfotrt or pain during todays session.  Single LE squat: unable to perform without UE support and elevated surface d/t weakness    Therapeutic Exercises (CPT 75432):     1. Bike seat 5,, 5 min, 1.5    2. SLS on shuttle, x 30, cords 2-6    3. Anterior heel taps, x 30, 2 inch step, no UE    4. Lateral heel taps, x 30, 2 inch step no UE    5. Squats on airex, 30    6. Lunge Front leg on BOSU, 30 B, Ball side down    7. Modified pistol squat TRX, 0, tall box + 1 airex, sit/stand      Therapeutic Exercise Summary: Pain with anterior heel taps 4in       Therapeutic Treatments and Modalities:     1. Neuromuscular Re-education (CPT 43155), see below    Therapeutic Treatment and Modalities Summary: airex  -tandem x 45 sec  -Tandem reaching inside/outside EDUARDO  -tandem EC x 1 min  -SLS Rt LE x 1 min  -SLS vertical and horizontal head turns x 30 sec ea  -SLS EC x 30 sec    BOSU  -Narrow EDUARDO BOSU  -Narrow c head turns x 30 sec ea  Anterior step ups onto BOSU (ball side up) no UE (Up Rt/Dwn Lt) x 30  BOSU A/P weight shifts CGA    Time-based treatments/modalities:    Physical Therapy Timed  Treatment Charges  Neuromusc re-ed, balance, coor, post minutes (CPT 61064): 12 minutes  Therapeutic exercise minutes (CPT 08244): 16 minutes        ASSESSMENT:   Pt continues to tolerated appropriate progression of CKC and OKC strengthening as well as improving balance and proprioception post ACL surgery. Pt continues to have deficits with single LE balance with dynamic progressions. Overall, progressing well. Pt pain she is experiencing may be related to scar tissue and strength deficits, encouraged continue scar tissue mobilization.     PLAN/RECOMMENDATIONS:   Plan for treatment: therapy treatment to continue next visit.  Planned interventions for next visit: continue with current treatment.

## 2021-06-17 DIAGNOSIS — E55.9 VITAMIN D DEFICIENCY: ICD-10-CM

## 2021-06-17 DIAGNOSIS — R79.0 ABNORMAL BLOOD LEVEL OF COPPER: ICD-10-CM

## 2021-06-17 DIAGNOSIS — E67.1 CAROTENEMIA: ICD-10-CM

## 2021-06-17 DIAGNOSIS — Z13.29 SCREENING FOR THYROID DISORDER: ICD-10-CM

## 2021-06-17 DIAGNOSIS — R73.01 IFG (IMPAIRED FASTING GLUCOSE): ICD-10-CM

## 2021-06-17 DIAGNOSIS — Z86.39 HISTORY OF IRON DEFICIENCY: ICD-10-CM

## 2021-06-17 DIAGNOSIS — D50.8 OTHER IRON DEFICIENCY ANEMIA: ICD-10-CM

## 2021-06-17 DIAGNOSIS — Z13.21 ENCOUNTER FOR VITAMIN DEFICIENCY SCREENING: ICD-10-CM

## 2021-06-17 DIAGNOSIS — E78.5 HYPERLIPIDEMIA LDL GOAL <100: ICD-10-CM

## 2021-06-18 ENCOUNTER — APPOINTMENT (OUTPATIENT)
Dept: PHYSICAL THERAPY | Facility: REHABILITATION | Age: 31
End: 2021-06-18
Attending: NURSE PRACTITIONER
Payer: COMMERCIAL

## 2021-06-21 ENCOUNTER — PHYSICAL THERAPY (OUTPATIENT)
Dept: PHYSICAL THERAPY | Facility: REHABILITATION | Age: 31
End: 2021-06-21
Attending: NURSE PRACTITIONER
Payer: COMMERCIAL

## 2021-06-21 DIAGNOSIS — M25.561 ACUTE PAIN OF RIGHT KNEE: ICD-10-CM

## 2021-06-21 DIAGNOSIS — Z47.89 SURGICAL AFTERCARE, MUSCULOSKELETAL SYSTEM: ICD-10-CM

## 2021-06-21 DIAGNOSIS — S83.511S RUPTURE OF ANTERIOR CRUCIATE LIGAMENT OF RIGHT KNEE, SEQUELA: ICD-10-CM

## 2021-06-21 PROCEDURE — 97110 THERAPEUTIC EXERCISES: CPT

## 2021-06-21 PROCEDURE — 97112 NEUROMUSCULAR REEDUCATION: CPT

## 2021-06-21 NOTE — OP THERAPY DAILY TREATMENT
"  Outpatient Physical Therapy  DAILY TREATMENT     [facility] Physical Therapy [address].  [address]  [place] [address]  Phone:  [phone]  Fax:  [phone]    Date: 06/21/2021    Patient: Tonia Xavier  YOB: 1990  MRN: [id number]     Time Calculation                   Chief Complaint: No chief complaint on file.    Visit #: 13    SUBJECTIVE:  ***    OBJECTIVE:  Current objective measures: ***        Exercises/Treatment  Time-based treatments/modalities:           Pain rating (1-10) before treatment:  {PAIN NUMBERS_1-10:33059}  Pain rating (1-10) after treatment:  {PAIN NUMBERS_1-10:49250}    ASSESSMENT:   Response to treatment: ***    PLAN/RECOMMENDATIONS:   Plan for treatment: {AMB OP THERAPY - THERAPY PLAN:942469898::\"therapy treatment to continue next visit\"}.  Planned interventions for next visit: {PT PLANNED THERAPY INTERVENTIONS:176756532::\"continue with current treatment\"}.       "

## 2021-06-21 NOTE — OP THERAPY DAILY TREATMENT
Outpatient Physical Therapy  DAILY TREATMENT     [facility] Physical [address].  [address]  [place] [address]  Phone:  [phone]  Fax:  [phone]    Date: 06/21/2021    Patient: Tonia Xavier  YOB: 1990  MRN: [id number]     Time Calculation    Start time: 0931  Stop time: 0958 Time Calculation (min): 27 minutes         Chief Complaint: Knee Injury    Visit #: 13    SUBJECTIVE:Some discomfort/pain with eccentric lowering for modified pistol squats, mostly fatigue.     OBJECTIVE:  0-120    Therapeutic Exercises (CPT 40147):     1. Bike seat 5,, 3 min, 1.9    2. Modified pistol squat TRX, 3 x 10, small box + 1 airex, sit/stand    3. Anterior heel taps, 2 x 10, 4 inch step, no UE    4. Lateral heel taps, 2 x 10, 4 inch, no UE, not full range due to fatigue    5. Squats on airex    6. Lunge Front leg on BOSU, Ball side down    7. Forward step ups 10 in, 30, anterior tibia translation, up right/dwn left    8. Standing HS curls, 3 x 10, tempo 3-3-3    20. POC 8/2/21      Therapeutic Exercise Summary:       Therapeutic Treatments and Modalities:     1. Neuromuscular Re-education (CPT 63671), see below    Therapeutic Treatment and Modalities Summary:   SLS on airex  1 min  1 min vertical and horizontal head turns  1 min EC (min A)    Tandem on BOSU, ball side up  1 min  30 sec horizontal head turns  30 sec vertical head turns  1 min EC (min A)    Time-based treatments/modalities:    Physical Therapy Timed Treatment Charges  Neuromusc re-ed, balance, coor, post minutes (CPT 49849): 10 minutes  Therapeutic exercise minutes (CPT 67792): 17 minutes      ASSESSMENT:   Continues to tolerated progressive single Le strengthening and progressions for improving LE stabilty.  Occasional pain during session likely due to continued eccentric strength deficits as they occurred with pistol squats and eccentric heel taps.     PLAN/RECOMMENDATIONS:   Plan for treatment: therapy  treatment to continue next visit.  Planned interventions for next visit: continue with current treatment.

## 2021-06-25 ENCOUNTER — APPOINTMENT (OUTPATIENT)
Dept: PHYSICAL THERAPY | Facility: REHABILITATION | Age: 31
End: 2021-06-25
Attending: NURSE PRACTITIONER
Payer: COMMERCIAL

## 2021-06-25 ENCOUNTER — HOSPITAL ENCOUNTER (OUTPATIENT)
Dept: LAB | Facility: MEDICAL CENTER | Age: 31
End: 2021-06-25
Attending: NURSE PRACTITIONER
Payer: COMMERCIAL

## 2021-06-25 ENCOUNTER — IMMUNIZATION (OUTPATIENT)
Dept: OCCUPATIONAL MEDICINE | Facility: CLINIC | Age: 31
End: 2021-06-25

## 2021-06-25 DIAGNOSIS — Z13.29 SCREENING FOR THYROID DISORDER: ICD-10-CM

## 2021-06-25 DIAGNOSIS — Z13.21 ENCOUNTER FOR VITAMIN DEFICIENCY SCREENING: ICD-10-CM

## 2021-06-25 DIAGNOSIS — E78.5 HYPERLIPIDEMIA LDL GOAL <100: ICD-10-CM

## 2021-06-25 DIAGNOSIS — R79.0 ABNORMAL BLOOD LEVEL OF COPPER: ICD-10-CM

## 2021-06-25 DIAGNOSIS — Z86.39 HISTORY OF IRON DEFICIENCY: ICD-10-CM

## 2021-06-25 DIAGNOSIS — D50.8 OTHER IRON DEFICIENCY ANEMIA: ICD-10-CM

## 2021-06-25 DIAGNOSIS — Z23 ENCOUNTER FOR VACCINATION: Primary | ICD-10-CM

## 2021-06-25 DIAGNOSIS — E55.9 VITAMIN D DEFICIENCY: ICD-10-CM

## 2021-06-25 DIAGNOSIS — R73.01 IFG (IMPAIRED FASTING GLUCOSE): ICD-10-CM

## 2021-06-25 LAB
25(OH)D3 SERPL-MCNC: 37 NG/ML (ref 30–100)
ALBUMIN SERPL BCP-MCNC: 4.4 G/DL (ref 3.2–4.9)
ALBUMIN/GLOB SERPL: 1.4 G/DL
ALP SERPL-CCNC: 98 U/L (ref 30–99)
ALT SERPL-CCNC: 24 U/L (ref 2–50)
ANION GAP SERPL CALC-SCNC: 11 MMOL/L (ref 7–16)
AST SERPL-CCNC: 22 U/L (ref 12–45)
BASOPHILS # BLD AUTO: 0.3 % (ref 0–1.8)
BASOPHILS # BLD: 0.02 K/UL (ref 0–0.12)
BILIRUB SERPL-MCNC: 0.7 MG/DL (ref 0.1–1.5)
BUN SERPL-MCNC: 9 MG/DL (ref 8–22)
CALCIUM SERPL-MCNC: 9 MG/DL (ref 8.5–10.5)
CHLORIDE SERPL-SCNC: 108 MMOL/L (ref 96–112)
CHOLEST SERPL-MCNC: 176 MG/DL (ref 100–199)
CO2 SERPL-SCNC: 25 MMOL/L (ref 20–33)
CREAT SERPL-MCNC: 0.58 MG/DL (ref 0.5–1.4)
CREAT UR-MCNC: 85.78 MG/DL
EOSINOPHIL # BLD AUTO: 0.17 K/UL (ref 0–0.51)
EOSINOPHIL NFR BLD: 2.2 % (ref 0–6.9)
ERYTHROCYTE [DISTWIDTH] IN BLOOD BY AUTOMATED COUNT: 43.3 FL (ref 35.9–50)
EST. AVERAGE GLUCOSE BLD GHB EST-MCNC: 108 MG/DL
FASTING STATUS PATIENT QL REPORTED: NORMAL
FERRITIN SERPL-MCNC: 60.7 NG/ML (ref 10–291)
GLOBULIN SER CALC-MCNC: 3.1 G/DL (ref 1.9–3.5)
GLUCOSE SERPL-MCNC: 99 MG/DL (ref 65–99)
HBA1C MFR BLD: 5.4 % (ref 4–5.6)
HCT VFR BLD AUTO: 45 % (ref 37–47)
HDLC SERPL-MCNC: 41 MG/DL
HGB BLD-MCNC: 14.8 G/DL (ref 12–16)
IMM GRANULOCYTES # BLD AUTO: 0.04 K/UL (ref 0–0.11)
IMM GRANULOCYTES NFR BLD AUTO: 0.5 % (ref 0–0.9)
IRON SATN MFR SERPL: 40 % (ref 15–55)
IRON SERPL-MCNC: 125 UG/DL (ref 40–170)
LDLC SERPL CALC-MCNC: 113 MG/DL
LYMPHOCYTES # BLD AUTO: 2 K/UL (ref 1–4.8)
LYMPHOCYTES NFR BLD: 26.1 % (ref 22–41)
MCH RBC QN AUTO: 27.3 PG (ref 27–33)
MCHC RBC AUTO-ENTMCNC: 32.9 G/DL (ref 33.6–35)
MCV RBC AUTO: 83 FL (ref 81.4–97.8)
MICROALBUMIN UR-MCNC: <1.2 MG/DL
MICROALBUMIN/CREAT UR: NORMAL MG/G (ref 0–30)
MONOCYTES # BLD AUTO: 0.44 K/UL (ref 0–0.85)
MONOCYTES NFR BLD AUTO: 5.7 % (ref 0–13.4)
NEUTROPHILS # BLD AUTO: 5 K/UL (ref 2–7.15)
NEUTROPHILS NFR BLD: 65.2 % (ref 44–72)
NRBC # BLD AUTO: 0 K/UL
NRBC BLD-RTO: 0 /100 WBC
PLATELET # BLD AUTO: 367 K/UL (ref 164–446)
PMV BLD AUTO: 11.1 FL (ref 9–12.9)
POTASSIUM SERPL-SCNC: 4.5 MMOL/L (ref 3.6–5.5)
PROT SERPL-MCNC: 7.5 G/DL (ref 6–8.2)
RBC # BLD AUTO: 5.42 M/UL (ref 4.2–5.4)
SODIUM SERPL-SCNC: 144 MMOL/L (ref 135–145)
T4 FREE SERPL-MCNC: 1.12 NG/DL (ref 0.93–1.7)
TIBC SERPL-MCNC: 311 UG/DL (ref 250–450)
TRIGL SERPL-MCNC: 108 MG/DL (ref 0–149)
TSH SERPL DL<=0.005 MIU/L-ACNC: 0.58 UIU/ML (ref 0.38–5.33)
UIBC SERPL-MCNC: 186 UG/DL (ref 110–370)
VIT B12 SERPL-MCNC: 867 PG/ML (ref 211–911)
WBC # BLD AUTO: 7.7 K/UL (ref 4.8–10.8)

## 2021-06-25 PROCEDURE — 82380 ASSAY OF CAROTENE: CPT

## 2021-06-25 PROCEDURE — 82043 UR ALBUMIN QUANTITATIVE: CPT

## 2021-06-25 PROCEDURE — 80061 LIPID PANEL: CPT

## 2021-06-25 PROCEDURE — 82607 VITAMIN B-12: CPT

## 2021-06-25 PROCEDURE — 82525 ASSAY OF COPPER: CPT

## 2021-06-25 PROCEDURE — 36415 COLL VENOUS BLD VENIPUNCTURE: CPT

## 2021-06-25 PROCEDURE — 91301 MODERNA SARS-COV-2 VACCINE: CPT | Performed by: PREVENTIVE MEDICINE

## 2021-06-25 PROCEDURE — 84443 ASSAY THYROID STIM HORMONE: CPT

## 2021-06-25 PROCEDURE — 82570 ASSAY OF URINE CREATININE: CPT

## 2021-06-25 PROCEDURE — 0012A MODERNA SARS-COV-2 VACCINE: CPT | Performed by: PREVENTIVE MEDICINE

## 2021-06-25 PROCEDURE — 85025 COMPLETE CBC W/AUTO DIFF WBC: CPT

## 2021-06-25 PROCEDURE — 80053 COMPREHEN METABOLIC PANEL: CPT

## 2021-06-25 PROCEDURE — 82728 ASSAY OF FERRITIN: CPT

## 2021-06-25 PROCEDURE — 83036 HEMOGLOBIN GLYCOSYLATED A1C: CPT

## 2021-06-25 PROCEDURE — 82306 VITAMIN D 25 HYDROXY: CPT

## 2021-06-25 PROCEDURE — 83550 IRON BINDING TEST: CPT

## 2021-06-25 PROCEDURE — 84439 ASSAY OF FREE THYROXINE: CPT

## 2021-06-25 PROCEDURE — 83540 ASSAY OF IRON: CPT

## 2021-06-28 LAB — COPPER SERPL-MCNC: 119.2 UG/DL (ref 80–155)

## 2021-06-29 ENCOUNTER — TELEPHONE (OUTPATIENT)
Dept: MEDICAL GROUP | Facility: MEDICAL CENTER | Age: 31
End: 2021-06-29

## 2021-06-29 NOTE — LETTER
June 29, 2021        Tonia Xavier  [address] [place] [address]        Dear Tonia:    After careful review of your chart, we have noted you are due for a follow up appointment.  We request you call our office at [phone] at your earliest convenience and make an appointment.     We look forward to scheduling an appointment for you, so that we may provide you with the safest and most complete medical care.        If you have any questions or concerns, please don't hesitate to call.        Sincerely,        COLE Hinojosa.    Electronically Signed

## 2021-06-29 NOTE — TELEPHONE ENCOUNTER
----- Message from BRANDON Hinojosa sent at 6/29/2021  1:28 PM PDT -----  Please have pt set appointment to review and discuss treatment for labs.

## 2021-07-04 LAB — TEST NAME 95000: NORMAL

## 2021-07-07 ENCOUNTER — TELEPHONE (OUTPATIENT)
Dept: MEDICAL GROUP | Facility: MEDICAL CENTER | Age: 31
End: 2021-07-07

## 2021-07-07 NOTE — TELEPHONE ENCOUNTER
----- Message from BRANDON Hinojosa sent at 7/5/2021  6:20 PM PDT -----  Please have pt set appointment to review and discuss treatment for labs.

## 2021-07-16 ENCOUNTER — PHYSICAL THERAPY (OUTPATIENT)
Dept: PHYSICAL THERAPY | Facility: REHABILITATION | Age: 31
End: 2021-07-16
Attending: NURSE PRACTITIONER
Payer: COMMERCIAL

## 2021-07-16 DIAGNOSIS — M25.561 ACUTE PAIN OF RIGHT KNEE: ICD-10-CM

## 2021-07-16 DIAGNOSIS — Z47.89 SURGICAL AFTERCARE, MUSCULOSKELETAL SYSTEM: ICD-10-CM

## 2021-07-16 DIAGNOSIS — S83.511S RUPTURE OF ANTERIOR CRUCIATE LIGAMENT OF RIGHT KNEE, SEQUELA: ICD-10-CM

## 2021-07-16 PROCEDURE — 97110 THERAPEUTIC EXERCISES: CPT

## 2021-07-16 NOTE — OP THERAPY DAILY TREATMENT
Outpatient Physical Therapy  DAILY TREATMENT     [facility] Physical [address].  [address]  [place] [address]  Phone:  [phone]  Fax:  [phone]    Date: 07/16/2021    Patient: Tonia Xavier  YOB: 1990  MRN: [id number]     Time Calculation    Start time: 0830  Stop time: 0912 Time Calculation (min): 42 minutes         Chief Complaint: Knee Injury    Visit #: 14    SUBJECTIVE:Reports she has not been compliant with HEP the last month. Has had detah in family, stress etc and found herself mostly doing cardio on the bike. Complains of ache/pain/weakness at the end of the day. Pt has to frequently sustain squats, tall kneeling, half kneeling at her job which can cause pain.    OBJECTIVE:  0-133,     Therapeutic Exercises (CPT 33809):     1. Treadmill, 4% incline 6 min, 3 min bwd    2. SL squat shuttle, 30Rt, cords 1-5, cues for stability    3. Anterior heel taps, 2 x 5, 6 inch step 1 UE    4. Lateral heel taps, 8, 1 UE, full range, 6inch step    5. HS bridges therapy ball, 30    6. Single LE bridge, 20Rt    7. Forward step ups 10 in, 30, anterior tibia translation, up right/dwn left    8. Wall sit, 38 sec x 3    9. SL squat shuttle fabiano disk, 30, cords 1-4    10. Heel sits, 20, airex (25-50% ROM)    20. POC 8/2/21      Therapeutic Exercise Summary:   HEP: updates 7/16 heel sits, sl bridges 15 sec holds, HS bridges, SL sit to stands, wall sits 1 min, step downs      Time-based treatments/modalities:    Physical Therapy Timed Treatment Charges  Therapeutic exercise minutes (CPT 17218): 42 minutes      ASSESSMENT:   Educated pt that her complaints of pain at the end of the day are likely still related to strength deficits as she had stopped completing HEP. Progressed HEP for SL strengthening as pt goal is to safely return to [employer]ing. Encouraged compliance. Occasional cues for improving awareness/ stabilty with SL activities during session. Due to pt scheduling  difficulties and personal matters it has been 1 month since last seen. Her ROM has improved and continues to have deficits. She will continue to benefit from skilled PT.    STG  1. Pt will demonstrate Rt knee ROM equal to left in order to ambulate without compensatory strategies. Partially met  2. Pt will demonstrate compliance with progressive HEP, as appropriate, in order to improve ROM, strength, and mobility to progress the POC. Not met at this time, poor compliance last 4 weeks  3. Pt will demonstrate improved Rt knee flexion within 5 degrees of Left knee in order to improve gait mechanics. MET  4. Pt will demonstrate improve hip abduction strength bilaterally 5/5 or better in order to improve gait mechanics and decrease joint forces through knee. Partially met  Short term goal time span:  4-6 weeks        Long Term Goals:      1. Pt will demonstrate appropriate knee flexion/extension strength in order for pt to safely ascend/descend 12 stairs without UE support. Partially met, requires 1 UE at this time, occasional pain  2. Pt will report subjective pain 2/10 or less when performing appropriate squat mechanics while working. Pain increases while in sustained positions. Partially met  3. Pt will demonstrate appropriate squat mechanics while lifting 10 pounds per job description. Not assessed d/t time  4. Pt will demonstrate Rt LE strength within 80% of left LE in order to return to PLOF. Not assessed  5. Pt will demonstrate subjective functional improvement with WOMAC score 70/96 or better. Not assessed  6. Pt will demonstrate single LE hop test score Rt LE within 85% of Lt LE. Not assessed-unsafe    PLAN/RECOMMENDATIONS:   Plan for treatment: therapy treatment to continue next visit.  Planned interventions for next visit: continue with current treatment.

## 2021-07-29 ENCOUNTER — OCCUPATIONAL MEDICINE (OUTPATIENT)
Dept: URGENT CARE | Facility: CLINIC | Age: 31
End: 2021-07-29
Payer: COMMERCIAL

## 2021-07-29 VITALS
SYSTOLIC BLOOD PRESSURE: 120 MMHG | DIASTOLIC BLOOD PRESSURE: 70 MMHG | TEMPERATURE: 96.7 F | BODY MASS INDEX: 29.99 KG/M2 | RESPIRATION RATE: 16 BRPM | HEART RATE: 76 BPM | WEIGHT: 180 LBS | HEIGHT: 65 IN | OXYGEN SATURATION: 98 %
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
## 2021-07-29 DIAGNOSIS — S29.019A ACUTE THORACIC MYOFASCIAL STRAIN, INITIAL ENCOUNTER: Primary | ICD-10-CM

## 2021-07-29 DIAGNOSIS — Z02.83 ENCOUNTER FOR DRUG SCREENING: ICD-10-CM

## 2021-07-29 PROCEDURE — 82075 ASSAY OF BREATH ETHANOL: CPT | Performed by: NURSE PRACTITIONER

## 2021-07-29 PROCEDURE — 80305 DRUG TEST PRSMV DIR OPT OBS: CPT | Performed by: NURSE PRACTITIONER

## 2021-07-29 PROCEDURE — 99214 OFFICE O/P EST MOD 30 MIN: CPT | Performed by: NURSE PRACTITIONER

## 2021-07-29 RX ORDER — TIZANIDINE 4 MG/1
4 TABLET ORAL EVERY 6 HOURS PRN
Qty: 30 TABLET | Refills: 3 | Status: SHIPPED | OUTPATIENT
Start: 2021-07-29 | End: 2021-09-27

## 2021-07-29 RX ORDER — METHYLPREDNISOLONE 4 MG/1
TABLET ORAL
Qty: 21 TABLET | Refills: 0 | Status: SHIPPED | OUTPATIENT
Start: 2021-07-29 | End: 2021-09-27

## 2021-07-29 ASSESSMENT — FIBROSIS 4 INDEX: FIB4 SCORE: 0.37

## 2021-07-29 ASSESSMENT — ENCOUNTER SYMPTOMS
FALLS: 0
NECK PAIN: 0
NAUSEA: 0
FOCAL WEAKNESS: 0
CHILLS: 0
BACK PAIN: 1
VOMITING: 0
FEVER: 0
ABDOMINAL PAIN: 0
MYALGIAS: 1

## 2021-07-29 NOTE — LETTER
[facility] [place] [address] [facility] [address] [place] [address]  Phone:  [phone] - Fax:  [phone]   [facility] Progress Report and Disability Certification  Date of Service: 7/29/2021   No Show:  No  Date / Time of Next Visit:   8/5/21 @ 4PM   Claim Information   Patient Name: Tonia Xavier  Claim Number:     Employer: [employer]  Date of Injury: 7/19/2021     Insurer / TPA:   Workers Choice ID / SSN:     Occupation: MA [employer]   Diagnosis: The primary encounter diagnosis was Acute thoracic myofascial strain, initial encounter. Diagnoses of Encounter for drug screening and Work related injury were also pertinent to this visit.    Medical Information   Related to Industrial Injury? Yes    Subjective Complaints:  This is a new problem. The current episode started 1 to 4 weeks ago (10 days ago Tonia was assisting a pt with transferring from one wheelchair to another one. The pt was very week and was unable to help with the transfer. Tonia was able to get him to the wheelchair safetly, but then developed upper back pain/spasms). The problem occurs intermittently. The problem has been unchanged. Associated symptoms include myalgias. Pertinent negatives include no abdominal pain, chills, fever, nausea, neck pain or vomiting. The symptoms are aggravated by bending, twisting, walking and stress. She has tried NSAIDs, rest and acetaminophen (Flexeril) for the symptoms. The treatment provided no relief.   Negative for previous upper back pain or neck pain.   Objective Findings: Thoracic back: Spasms and tenderness present. No swelling, edema, deformity, signs of trauma, lacerations or bony tenderness. Normal range of motion. No scoliosis.      Pre-Existing Condition(s): none   Assessment:   Initial Visit    Status: Additional Care Required  Permanent Disability:No    Plan: MedicationMedication (NOT at Work)    Diagnostics:      Comments:       Disability Information    Status:      From:     Through:   Restrictions are:     Physical Restrictions   Sitting:    Standing:    Stooping:    Bending:      Squatting:    Walking:    Climbing:    Pushing:      Pulling:    Other:    Reaching Above Shoulder (L):   Reaching Above Shoulder (R):       Reaching Below Shoulder (L):    Reaching Below Shoulder (R):      Not to exceed Weight Limits   Carrying(hrs):   Weight Limit(lb): < or = to 10 pounds Lifting(hrs):   Weight  Limit(lb): < or = to 10 pounds   Comments: Follow-up in clinic in 7 days for recheck.  She was educated to discontinue use of Flexeril while using Zanaflex.  She was also notified to discontinue use of Celebrex with use of Medrol Dosepak.  She may resume treatment with Celebrex after completing Dosepak.  She verbalized understanding.  Patient to refrain from additional NSAIDs while using steroid.  Tylenol may be used every 4 hours for additional pain relief.  Work restrictions provided.  Supportive treatment including ice and heat also recommended.    Repetitive Actions   Hands: i.e. Fine Manipulations from Grasping:     Feet: i.e. Operating Foot Controls:     Driving / Operate Machinery:     Provider Name:   JAY Drew Physician Signature:  Physician Name:     Clinic Name / Location: [place] [address]  [place] [address] Clinic Phone Number: Dept: [phone]   Appointment Time: 8:15 Am Visit Start Time: 9:24 AM   Check-In Time:  8:29 Am Visit Discharge Time:  10:23 AM   Original-Treating Physician or Chiropractor    Page 2-Insurer/TPA    Page 3-Employer    Page 4-Employee

## 2021-07-29 NOTE — PATIENT INSTRUCTIONS
Thoracic Strain  A thoracic strain, which is sometimes called a mid-back strain, is an injury to the muscles or tendons that attach to the upper part of your back behind your chest. This type of injury occurs when a muscle is overstretched or overloaded.  Thoracic strains can range from mild to severe. Mild strains may involve stretching a muscle or tendon without tearing it. These injuries may heal in 1-2 weeks. More severe strains involve tearing of muscle fibers or tendons. These will cause more pain and may take 6-8 weeks to heal.  What are the causes?  This condition may be caused by:  · Trauma, such as a fall or a hit to the body.  · Twisting or overstretching the back. This may result from doing activities that require a lot of energy, such as lifting heavy objects.  In some cases, the cause may not be known.  What increases the risk?  This injury is more common in:  · Athletes.  · People with obesity.  What are the signs or symptoms?  The main symptom of this condition is pain in the middle back, especially with movement. Other symptoms include:  · Stiffness or limited range of motion.  · Sudden muscle tightening (spasms).  How is this diagnosed?  This condition may be diagnosed based on:  · Your symptoms.  · Your medical history.  · A physical exam.  · Imaging tests, such as X-rays or an MRI.  How is this treated?  This condition may be treated with:  · Resting the injured area.  · Applying heat and cold to the injured area.  · Over-the-counter medicines for pain and inflammation, such as NSAIDs.  · Prescription pain medicine or muscle relaxants may be needed for a short time.  · Physical therapy. This will involve doing stretching and strengthening exercises.  Follow these instructions at home:  Managing pain, stiffness, and swelling         · If directed, put ice on the injured area.  ? Put ice in a plastic bag.  ? Place a towel between your skin and the bag.  ? Leave the ice on for 20 minutes, 2-3 times  a day.  · If directed, apply heat to the affected area as often as told by your health care provider. Use the heat source that your health care provider recommends, such as a moist heat pack or a heating pad.  ? Place a towel between your skin and the heat source.  ? Leave the heat on for 20-30 minutes.  ? Remove the heat if your skin turns bright red. This is especially important if you are unable to feel pain, heat, or cold. You may have a greater risk of getting burned.  Activity  · Rest and return to your normal activities as told by your health care provider. Ask your health care provider what activities are safe for you.  · Do exercises as told by your health care provider.  Medicines  · Take over-the-counter and prescription medicines only as told by your health care provider.  · Ask your health care provider if the medicine prescribed to you:  ? Requires you to avoid driving or using heavy machinery.  ? Can cause constipation. You may need to take these actions to prevent or treat constipation:  § Drink enough fluid to keep your urine pale yellow.  § Take over-the-counter or prescription medicines.  § Eat foods that are high in fiber, such as beans, whole grains, and fresh fruits and vegetables.  § Limit foods that are high in fat and processed sugars, such as fried or sweet foods.  Injury prevention  To prevent a future mid-back injury:  · Always warm up properly before physical activity or sports.  · Cool down and stretch after being active.  · Use correct form when playing sports and lifting heavy objects. Bend your knees before you lift heavy objects.  · Use good posture when sitting and standing.  · Stay physically fit and maintain a healthy weight.  ? Do at least 150 minutes of moderate-intensity exercise each week, such as brisk walking or water aerobics.  ? Do strength exercises at least 2 times each week.    General instructions  · Do not use any products that contain nicotine or tobacco, such as  cigarettes, e-cigarettes, and chewing tobacco. If you need help quitting, ask your health care provider.  · Keep all follow-up visits as told by your health care provider. This is important.  Contact a health care provider if:  · Your pain is not helped by medicine.  · Your pain or stiffness is getting worse.  · You develop pain or stiffness in your neck or lower back.  Get help right away if you:  · Have shortness of breath.  · Have chest pain.  · Develop numbness or weakness in your legs or arms.  · Have involuntary loss of urine (urinary incontinence).  Summary  · A thoracic strain, which is sometimes called a mid-back strain, is an injury to the muscles or tendons that attach to the upper part of your back behind your chest.  · This type of injury occurs when a muscle is overstretched or overloaded.  · Rest and return to your normal activities as told by your health care provider. If directed, apply heat or ice to the affected area as often as told by your health care provider.  · Take over-the-counter and prescription medicines only as told by your health care provider.  · Contact a health care provider if you have new or worsening symptoms.  This information is not intended to replace advice given to you by your health care provider. Make sure you discuss any questions you have with your health care provider.  Document Released: 03/09/2005 Document Revised: 11/05/2019 Document Reviewed: 11/05/2019  [employer] Patient Education © 2020 [employer]

## 2021-07-29 NOTE — PROGRESS NOTES
Subjective:     Tonia Xavier is a 31 y.o. female who presents for Other (NEW WC, 07/19/2021) and Back Injury (upper back pain with spasms )      Other  This is a new problem. The current episode started 1 to 4 weeks ago (10 days ago Tonia was assisting a pt with transferring from one wheelchair to another one. The pt was very week and was unable to help with the transfer. Tonia was able to get him to the wheelchair safetly, but then developed upper back pain/spasms). The problem occurs intermittently. The problem has been unchanged. Associated symptoms include myalgias. Pertinent negatives include no abdominal pain, chills, fever, nausea, neck pain or vomiting. The symptoms are aggravated by bending, twisting, walking and stress. She has tried NSAIDs, rest and acetaminophen (Flexeril) for the symptoms. The treatment provided no relief.   Negative for previous upper back pain or neck pain.  She recently started taking Celebrex 2 days ago for low back pain and bilateral knee pain.  She does use Flexeril at night for her low back pain.  This has not provided any relief of her mid back pain.     Review of Systems   Constitutional: Negative for chills and fever.   Gastrointestinal: Negative for abdominal pain, nausea and vomiting.   Musculoskeletal: Positive for back pain and myalgias. Negative for falls, joint pain and neck pain.   Neurological: Negative for focal weakness.       PMH:   Past Medical History:   Diagnosis Date   • Anxiety    • Asthma    • HSV infection 9/7/2019   • Hyperlipidemia LDL goal <100 9/9/2019   • IFG (impaired fasting glucose) 9/9/2019   • Ovarian cyst 11/2018    left side   • Vitamin D deficiency 2/1/2018     ALLERGIES:   Allergies   Allergen Reactions   • Benzoin Hives     Rash    • Phentermine      Worsening anxiety, excessive dry mouth, lightheaded     SURGHX:   Past Surgical History:   Procedure Laterality Date   • PB KNEE SCOPE,AID ANT CRUCIATE REPAIR Right 3/23/2021     Procedure: RECONSTRUCTION, KNEE, ACL, ARTHROSCOPIC - WITH ALLOGRAFT BONE TENDON BONE;  Surgeon: Dexter Pimentel M.D.;  Location: [facility];  Service: Orthopedics   • PB KNEE SCOPE,DIAGNOSTIC Right 10/29/2020    Procedure: ARTHROSCOPY, KNEE;  Surgeon: Dexter Pimentel M.D.;  Location: [facility];  Service: Orthopedics   • MEDIAL MENISCECTOMY Right 10/29/2020    Procedure: MENISCECTOMY, KNEE, MEDIAL - PARTIAL;  Surgeon: Dexter Pimentel M.D.;  Location: [facility];  Service: Orthopedics   • HARDWARE REMOVAL ORTHO Right 10/29/2020    Procedure: REMOVAL, HARDWARE tibia ;  Surgeon: Dexter Pimentel M.D.;  Location: [facility];  Service: Orthopedics   • BONE GRAFT Right 10/29/2020    Procedure: curetage and BONE GRAFT femoral cyst, microfracture;  Surgeon: Dexter Pimentel M.D.;  Location: [facility];  Service: Orthopedics   • KNEE ARTHROSCOPY Right 11/13/2018    Procedure: KNEE ARTHROSCOPY;  Surgeon: Dexter Pimentel M.D.;  Location: [facility];  Service: Orthopedics   • SYNOVECTOMY Right 11/13/2018    Procedure: SYNOVECTOMY;  Surgeon: Dexter Pimentel M.D.;  Location: [facility];  Service: Orthopedics   • MEDIAL MENISCECTOMY Right 11/13/2018    Procedure: MEDIAL MENISCECTOMY-  PARTIAL, AND PARTIAL LATERAL;  Surgeon: Dexter Pimentel M.D.;  Location: [facility];  Service: Orthopedics   • CHONDROPLASTY  11/13/2018    Procedure: CHONDROPLASTY;  Surgeon: Dexter Pimentel M.D.;  Location: [facility];  Service: Orthopedics   • HARDWARE REMOVAL ORTHO  11/13/2018    Procedure: HARDWARE REMOVAL ORTHO;  Surgeon: Dexter Pimentel M.D.;  Location: [facility];  Service: Orthopedics   • BONE GRAFT  11/13/2018    Procedure: BONE GRAFT;  Surgeon: Dexter Pimentel M.D.;  Location: [facility];  Service: Orthopedics   • REPEAT C SECTION  11/25/2017    Procedure: REPEAT C SECTION;  Surgeon: Asmita Bolivar M.D.;  [facility];   Service: Obstetrics   • REPEAT C SECTION  10/19/2015    Procedure: REPEAT C SECTION;  Surgeon: Mary Randolph M.D.;  [facility];  Service:    • ACL RECONSTRUCTION SCOPE  10/2/2014    Performed by Dexter Pimentel M.D. at [facility]   • KNEE ARTHROSCOPY  2014    Performed by Dexter Pimentel M.D. at [facility]   • HARDWARE REMOVAL ORTHO  2014    Performed by Dexter Pimentel M.D. at [facility]   • BONE GRAFT  2014    Performed by Dexter Pimentel M.D. at [facility]   • PB  DELIVERY ONLY     • ACL RECONSTRUCTION Right 2006     SOCHX:   Social History     Socioeconomic History   • Marital status:      Spouse name: Not on file   • Number of children: Not on file   • Years of education: Not on file   • Highest education level: Not on file   Occupational History   • Not on file   Tobacco Use   • Smoking status: Never Smoker   • Smokeless tobacco: Never Used   Vaping Use   • Vaping Use: Never used   Substance and Sexual Activity   • Alcohol use: Yes     Alcohol/week: 1.2 oz     Types: 2 Cans of beer per week     Comment: monthly   • Drug use: Yes     Types: Inhaled     Comment: Occ. marijuana   • Sexual activity: Yes     Partners: Male     Birth control/protection: Pill   Other Topics Concern   • Not on file   Social History Narrative   • Not on file     Social Determinants of Health     Financial Resource Strain:    • Difficulty of Paying Living Expenses:    Food Insecurity:    • Worried About Running Out of Food in the Last Year:    • Ran Out of Food in the Last Year:    Transportation Needs:    • Lack of Transportation (Medical):    • Lack of Transportation (Non-Medical):    Physical Activity:    • Days of Exercise per Week:    • Minutes of Exercise per Session:    Stress:    • Feeling of Stress :    Social Connections:    • Frequency of Communication with Friends and Family:    • Frequency of Social Gatherings with  "Friends and Family:    • Attends Evangelical Services:    • Active Member of Clubs or Organizations:    • Attends Club or Organization Meetings:    • Marital Status:    Intimate Partner Violence:    • Fear of Current or Ex-Partner:    • Emotionally Abused:    • Physically Abused:    • Sexually Abused:      FH:   Family History   Problem Relation Age of Onset   • Diabetes Other    • Hypertension Other    • Diabetes Maternal Grandmother         DM w/ESRD   • Diabetes Maternal Grandfather    • Cancer Maternal Grandfather         prostate, esophageal   • Diabetes Paternal Grandmother         DM on dialysis         Objective:   /70 (BP Location: Right arm, Patient Position: Sitting, BP Cuff Size: Adult long)   Pulse 76   Temp 35.9 °C (96.7 °F) (Temporal)   Resp 16   Ht 1.651 m (5' 5\")   Wt 81.6 kg (180 lb)   SpO2 98%   BMI 29.95 kg/m²     Physical Exam  Vitals and nursing note reviewed.   Constitutional:       General: She is not in acute distress.     Appearance: Normal appearance. She is not ill-appearing.   HENT:      Head: Normocephalic and atraumatic.      Right Ear: External ear normal.      Left Ear: External ear normal.      Nose: No congestion or rhinorrhea.      Mouth/Throat:      Mouth: Mucous membranes are moist.   Eyes:      Extraocular Movements: Extraocular movements intact.      Pupils: Pupils are equal, round, and reactive to light.   Cardiovascular:      Rate and Rhythm: Normal rate and regular rhythm.      Pulses: Normal pulses.      Heart sounds: Normal heart sounds.   Pulmonary:      Effort: Pulmonary effort is normal.      Breath sounds: Normal breath sounds.   Abdominal:      General: Abdomen is flat. Bowel sounds are normal.      Palpations: Abdomen is soft.      Tenderness: There is no abdominal tenderness.   Musculoskeletal:         General: Normal range of motion.      Cervical back: Normal range of motion and neck supple.      Thoracic back: Spasms and tenderness present. No " swelling, edema, deformity, signs of trauma, lacerations or bony tenderness. Normal range of motion. No scoliosis.        Back:    Skin:     General: Skin is warm and dry.      Capillary Refill: Capillary refill takes less than 2 seconds.   Neurological:      General: No focal deficit present.      Mental Status: She is alert and oriented to person, place, and time. Mental status is at baseline.   Psychiatric:         Mood and Affect: Mood normal.         Behavior: Behavior normal.         Thought Content: Thought content normal.         Judgment: Judgment normal.         Assessment/Plan:   Assessment      1. Acute thoracic myofascial strain, initial encounter  methylPREDNISolone (MEDROL DOSEPAK) 4 MG Tablet Therapy Pack    tizanidine (ZANAFLEX) 4 MG Tab   2. Encounter for drug screening  POCT 11 Panel Urine Drug Screen    POCT Breath Alcohol Test   3. Work related injury     Follow-up in clinic in 7 days for recheck.  She was educated to discontinue use of Flexeril while using Zanaflex.  She was also notified to discontinue use of Celebrex with use of Medrol Dosepak.  She may resume treatment with Celebrex after completing Dosepak.  She verbalized understanding.  Patient to refrain from additional NSAIDs while using steroid.  Tylenol may be used every 4 hours for additional pain relief.  Work restrictions provided.  Supportive treatment including ice and heat also recommended.  Time spent evaluating this patient was at least 30 minutes and includes preparing for visit, counseling/education, exam and evaluation, obtaining history, independent interpretation and ordering labs/tests/procedures.

## 2021-07-29 NOTE — LETTER
"EMPLOYEE’S CLAIM FOR COMPENSATION/ REPORT OF INITIAL TREATMENT  FORM C-4    EMPLOYEE’S CLAIM - PROVIDE ALL INFORMATION REQUESTED   First Name  Tonia Last Name  Duc Birthdate                    1990                Sex  female Claim Number   Home Address  [address] Age  31 y.o. 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 (if applicable)  helping a patient transfer to a wheelchair    How did this injury or occupational disease occur? (Be specific an answer in detail. Use additional sheet if necessary)  I wheeled a patient from my office to the ER. We were moving him from my wheelchair to the ER's. Patient was weak and started falling down. We didn't have time to get him to the wheelchair and me and his daughter were holding him while we called for help   If you believe that you have an occupational disease, when did you first have knowledge of the disability and it relationship to your employment?  NA Witnesses to the Accident  Patients daughter, ER lobby. AT [facility]      Nature of Injury or Occupational Disease  Strain  " Part(s) of Body Injured or Affected  Lumbar and/or Sacral Vertebrae (Vertebrae NOC Trunk), ,     I certify that the above is true and correct to the best of my knowledge and that I have provided this information in order to obtain the benefits of [place]  I hereby authorize any physician, chiropractor, surgeon, practitioner, or other person, any hospital, including [facility] or [facility], any medical service organization, any insurance company, or other institution or organization to release to each other, any medical or other information, including benefits paid or payable, pertinent to this injury or disease, except information relative to diagnosis, treatment and/or counseling for AIDS, psychological conditions, alcohol or controlled substances, for which I must give specific authorization.  A Photostat of this authorization shall be as valid as the original.     Date   [facility]    Employee’s Signature   THIS REPORT MUST BE COMPLETED AND MAILED WITHIN 3 WORKING DAYS OF TREATMENT   Place  [facility]  Name of [facility]   Date  7/29/2021 Diagnosis  (S29.019A) Acute thoracic myofascial strain, initial encounter  (primary encounter diagnosis)  (Z02.83) Encounter for drug screening  (Y99.0) Work related injury Is there evidence the injured employee was under the              influence of alcohol and/or another controlled substance at the time of accident?   Hour  9:24 AM Description of Injury or Disease  The primary encounter diagnosis was Acute thoracic myofascial strain, initial encounter. Diagnoses of Encounter for drug screening and Work related injury were also pertinent to this visit. No   Treatment  Work restrictions, rest, ice, heat and medication.  Have you advised the patient to remain off work five days or     more? No   X-Ray Findings      If Yes    "From Date  To Date      From information given by the employee, together with medical evidence, can you directly connect this injury or occupational disease as job incurred?  Yes If No Full Duty    No Modified Duty  Yes   Is additional medical care by a physician indicated?  Yes If Modified Duty, Specify any Limitations / Restrictions  No heavy lifting greater than 10 pounds   Do you know of any previous injury or disease contributing to this condition or occupational disease?                            No   Date  7/29/2021 Print Doctor’s Name   JAY Drew I certify the employer’s copy of  this form was mailed on:   Address  [address] Insurer’s Use Only     [place] Zip  [address]    Provider’s Tax ID Number  [id number] Telephone  Dept: [phone]      e-RAF Escamilla  Signature:     Degree          ORIGINAL-TREATING PHYSICIAN OR CHIROPRACTOR    PAGE 2-INSURER/TPA    PAGE 3-EMPLOYER    PAGE 4-EMPLOYEE        Form C-4 (rev.10/07)           BRIEF DESCRIPTION OF RIGHTS AND BENEFITS  (Pursuant to [place] 616C.[place])    Notice of Injury or Occupational Disease (Incident Report Form C-1): If an injury or occupational disease (OD) arises out of and in the course of employment, you must provide written notice to your employer as soon as practicable, but no later than 7 days after the accident or OD. Your employer shall maintain a sufficient supply of the required forms.    Claim for Compensation (Form C-4): If medical treatment is sought, the form C-4 is available at the place of initial treatment. A completed \"Claim for Compensation\" (Form C-4) must be filed within 90 days after an accident or OD. The treating physician or chiropractor must, within 3 working days after treatment, complete and mail to the employer, the employer's insurer and third-party , the Claim for Compensation.    Medical Treatment: If you require medical treatment for your on-the-job injury " or OD, you may be required to select a physician or chiropractor from a list provided by your workers’ compensation insurer, if it has contracted with an Organization for Managed Care (MCO) or Preferred Provider Organization (PPO) or providers of health care. If your employer has not entered into a contract with an MCO or PPO, you may select a physician or chiropractor from the Panel of Physicians and Chiropractors. Any medical costs related to your industrial injury or OD will be paid by your insurer.    Temporary Total Disability (TTD): If your doctor has certified that you are unable to work for a period of at least 5 consecutive days, or 5 cumulative days in a 20-day period, or places restrictions on you that your employer does not accommodate, you may be entitled to TTD compensation.    Temporary Partial Disability (TPD): If the wage you receive upon reemployment is less than the compensation for TTD to which you are entitled, the insurer may be required to pay you TPD compensation to make up the difference. TPD can only be paid for a maximum of 24 months.    Permanent Partial Disability (PPD): When your medical condition is stable and there is an indication of a PPD as a result of your injury or OD, within 30 days, your insurer must arrange for an evaluation by a rating physician or chiropractor to determine the degree of your PPD. The amount of your PPD award depends on the date of injury, the results of the PPD evaluation, your age and wage.    Permanent Total Disability (PTD): If you are medically certified by a treating physician or chiropractor as permanently and totally disabled and have been granted a PTD status by your insurer, you are entitled to receive monthly benefits not to exceed 66 2/3% of your average monthly wage. The amount of your PTD payments is subject to reduction if you previously received a lump-sum PPD award.    Vocational Rehabilitation Services: You may be eligible for vocational  rehabilitation services if you are unable to return to the job due to a permanent physical impairment or permanent restrictions as a result of your injury or occupational disease.    Transportation and Per Yoandy Reimbursement: You may be eligible for travel expenses and per yoandy associated with medical treatment.    Reopening: You may be able to reopen your claim if your condition worsens after claim closure.     Appeal Process: If you disagree with a written determination issued by the insurer or the insurer does not respond to your request, you may appeal to the Department of Administration, , by following the instructions contained in your determination letter. You must appeal the determination within 70 days from the date of the determination letter at [address], [address], [place], [place] [address], or [address], [address], [place], [place] [address]. If you disagree with the  decision, you may appeal to the Department of Administration, . You must file your appeal within 30 days from the date of the  decision letter at [address], [address], [place], [place] [address], or [address], [address], [place], [place] [address]. If you disagree with a decision of an , you may file a petition for judicial review with the District Court. You must do so within 30 days of the Appeal Officer’s decision. You may be represented by an  at your own expense or you may contact the [facility] for possible representation.    [place]  for Injured Workers (NAIW): If you disagree with a  decision, you may request that NAIW represent you without charge at an  Hearing. For information regarding denial of benefits, you may contact the [facility] at: [address], [address], [place] [address], [phone], or [address], [facility] [facility] [address], [place] [address], [phone]  [phone]    To File a Complaint with the Division: If you wish to file a complaint with the  of the Division of Industrial Relations (DIR),  please contact the Workers’ Compensation Section, [address], [address], [place], [place] [address], telephone [phone]) [phone], or [address], [address], [place], [place] [address], telephone [phone].    For assistance with Workers’ Compensation Issues: You may contact the [facility] Office for Consumer Health Assistance, [address], [address], [place], [place] [address], Toll Free 1-612.670.3100, Web site: http://[facility].[place].gov/Programs/LAXMI E-mail: laxmi@[facility].[place].gov              __________________________________________________________________                                    _________________            Employee Name / Signature                                                                                                                            Date                                                                                                                                                                                                                              D-2 (rev. 10/20)

## 2021-08-03 ENCOUNTER — OFFICE VISIT (OUTPATIENT)
Dept: MEDICAL GROUP | Facility: MEDICAL CENTER | Age: 31
End: 2021-08-03
Payer: COMMERCIAL

## 2021-08-03 VITALS
WEIGHT: 183.4 LBS | TEMPERATURE: 96.8 F | SYSTOLIC BLOOD PRESSURE: 114 MMHG | BODY MASS INDEX: 30.56 KG/M2 | DIASTOLIC BLOOD PRESSURE: 66 MMHG | HEIGHT: 65 IN | HEART RATE: 79 BPM | OXYGEN SATURATION: 98 %

## 2021-08-03 DIAGNOSIS — E78.5 HYPERLIPIDEMIA LDL GOAL <100: ICD-10-CM

## 2021-08-03 DIAGNOSIS — R73.01 IFG (IMPAIRED FASTING GLUCOSE): ICD-10-CM

## 2021-08-03 DIAGNOSIS — M54.6 ACUTE BILATERAL THORACIC BACK PAIN: ICD-10-CM

## 2021-08-03 DIAGNOSIS — E50.9 VITAMIN A DEFICIENCY: ICD-10-CM

## 2021-08-03 PROCEDURE — 99214 OFFICE O/P EST MOD 30 MIN: CPT | Performed by: NURSE PRACTITIONER

## 2021-08-03 ASSESSMENT — FIBROSIS 4 INDEX: FIB4 SCORE: 0.37

## 2021-08-03 NOTE — PROGRESS NOTES
Subjective:     Tonia Xavier is a 31 y.o. female who presents with IFG.    HPI:   Seen in f/u for IFG.  She was seen in UC last week for thoracic back pain.  She is on medrol and tizanidine.  When done w/medrol will go back on celebrex.  She has occas left hand ring and little finger numbness with the pain.    Reviewed lab with pt.  Carotene is low at 2.  Not on otc supplement.  Alb/cr ratio, CMP, A1C, TSH, T4, D, B12, CBC, COPPER, FERRITIN, FE/TIBC, GFR is wnl  LP shows much improved trg down to 108.  HDL up from 30's to 40.  She has been on strict diet and exercising regularly.  LDL is up from 68 to 113.  Goal is <100.  Not on meds.    She is not on otc d3 supplement.  D is normal at 37.    Last year FE/TIBC was mildly abn.  This year it is wnl.  Last year her a1c was 5.8 but now down to 5.4 with healthy lifestyle.     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 21 tablet 0   • tizanidine (ZANAFLEX) 4 MG Tab Take 1 tablet by mouth every 6 hours as needed. 30 tablet 3   • celecoxib (CELEBREX) 200 MG Cap Take 1 capsule by mouth every day. 30 capsule 0   • valacyclovir (VALTREX) 1 GM Tab TAKE 1 TABLET BY MOUTH EVERY DAY 90 tablet 0   • acyclovir (ZOVIRAX) 5 % Cream Apply 1 Application topically every 3 hours. 5 g 1   • ibuprofen (MOTRIN) 200 MG Tab Take 400 mg by mouth  "every 6 hours as needed.     • sertraline (ZOLOFT) 100 MG Tab TAKE 1 TABLET BY MOUTH EVERY DAY 90 Tab 1   • cyclobenzaprine (FLEXERIL) 10 mg Tab Take 1 Tab by mouth every day. CYCLOBENZAPRINE HCL 10 MG TABS 30 Tab 3   • montelukast (SINGULAIR) 10 MG Tab      • Ferrous Sulfate (IRON PO) Take 64 mg by mouth every day.     • dicyclomine (BENTYL) 10 MG Cap Take 1 Cap by mouth 4 Times a Day,Before Meals and at Bedtime. (Patient not taking: Reported on 7/26/2021) 30 Cap 3   • ondansetron (ZOFRAN) 4 MG Tab tablet ZOFRAN 4 MG TABS     • fexofenadine (ALLEGRA) 180 MG tablet 24HR ALLERGY RELIEF TABS       No current facility-administered medications for this visit.       Allergies   Allergen Reactions   • Benzoin Hives     Rash    • Phentermine      Worsening anxiety, excessive dry mouth, lightheaded       ROS  Constitutional: Negative. Negative for fever, chills, weight loss, malaise/fatigue and diaphoresis.   HENT: Negative. Negative for hearing loss, ear pain, nosebleeds, congestion, sore throat, neck pain, tinnitus and ear discharge.   Respiratory: Negative. Negative for cough, hemoptysis, sputum production, shortness of breath, wheezing and stridor.   Cardiovascular: Negative. Negative for chest pain, palpitations, orthopnea, claudication, leg swelling and PND.   Gastrointestinal: Denies nausea, vomiting, diarrhea, constipation, heartburn, melena or hematochezia.  Genitourinary: Denies dysuria, hematuria, urinary incontinence, frequency or urgency.        Objective:     /66 (BP Location: Right arm, Patient Position: Sitting)   Pulse 79   Temp 36 °C (96.8 °F) (Temporal)   Ht 1.651 m (5' 5\")   Wt 83.2 kg (183 lb 6.4 oz)   SpO2 98%  Body mass index is 30.52 kg/m².    Physical Exam:  Vitals reviewed.  Constitutional: Oriented to person, place, and time. appears well-developed and well-nourished. No distress.   Cardiovascular: Normal rate, regular rhythm, normal heart sounds and intact distal pulses. Exam reveals " no gallop and no friction rub. No murmur heard. No carotid bruits.   Pulmonary/Chest: Effort normal and breath sounds normal. No stridor. No respiratory distress. no wheezes or rales. exhibits no tenderness.   Musculoskeletal: Normal range of motion. exhibits no edema. joshua pedal pulses 2+.  Lymphadenopathy: No cervical or supraclavicular adenopathy.   Neurological: Alert and oriented to person, place, and time. exhibits normal muscle tone.  Skin: Skin is warm and dry. No diaphoresis.   Psychiatric: Normal mood and affect. Behavior is normal.      Assessment and Plan:     The following treatment plan was discussed:    1. IFG (impaired fasting glucose)      a1c is now wnl on healthy diet and regular exercise.  plan:  f/u yearly.  call for lab slip   2. Hyperlipidemia LDL goal <100      trg much improved.  HDL improved but still sl low.  LDL up from 68 to 113. will continue healthy lifestyle.  fernandez 1 yr   3. Vitamin A deficiency      take otc vitamin A supplement.  will recheck 1 yr   4. Acute bilateral thoracic back pain      continue medrol and tizanidine as ordered.  then chg to celebrex when off medrol.  if not improved consider xray & PT         Followup: Return in about 1 year (around 8/3/2022).

## 2021-08-05 DIAGNOSIS — F41.1 GENERALIZED ANXIETY DISORDER: ICD-10-CM

## 2021-08-10 RX ORDER — SERTRALINE HYDROCHLORIDE 100 MG/1
TABLET, FILM COATED ORAL
Qty: 90 TABLET | Refills: 3 | Status: SHIPPED | OUTPATIENT
Start: 2021-08-10 | End: 2021-12-09

## 2021-08-12 DIAGNOSIS — M54.6 ACUTE BILATERAL THORACIC BACK PAIN: ICD-10-CM

## 2021-08-13 ENCOUNTER — TELEPHONE (OUTPATIENT)
Dept: PHYSICAL THERAPY | Facility: REHABILITATION | Age: 31
End: 2021-08-13

## 2021-08-13 NOTE — OP THERAPY DISCHARGE SUMMARY
Outpatient Physical Therapy  DISCHARGE SUMMARY NOTE      [facility] [address]  [address]  [place] [address]  Phone:  [phone]  Fax:  [phone]    Date of Visit: 08/13/2021    Patient: Tonia Xavier  YOB: 1990  MRN: [id number]     Referring Provider: Dexter Pimentel M.D.   Referring Diagnosis S83.511A         Functional Assessment Used        Your patient is being discharged from Physical Therapy with the following comments:   · Patient has failed to schedule or reschedule follow-up visits    Comments:  Pt called to cancel all follow ups on 8/5. Pt last seen on 7/16 related to ACL pos sx. Unable to assess goals.     Limitations Remaining:  Unable to assess  Recommendations:  ALLYSON Chambers, PT, DPT    Date: 8/13/2021

## 2021-08-16 ENCOUNTER — HOSPITAL ENCOUNTER (OUTPATIENT)
Dept: RADIOLOGY | Facility: MEDICAL CENTER | Age: 31
End: 2021-08-16
Attending: NURSE PRACTITIONER
Payer: COMMERCIAL

## 2021-08-16 DIAGNOSIS — M54.6 ACUTE BILATERAL THORACIC BACK PAIN: ICD-10-CM

## 2021-08-16 PROCEDURE — 72070 X-RAY EXAM THORAC SPINE 2VWS: CPT

## 2021-08-17 ENCOUNTER — TELEPHONE (OUTPATIENT)
Dept: MEDICAL GROUP | Facility: MEDICAL CENTER | Age: 31
End: 2021-08-17

## 2021-08-17 NOTE — TELEPHONE ENCOUNTER
Please let pt know that the thoracic xray shows mild DDD.  That can cause pain.  If still having the pain we can do PT.

## 2021-09-03 ENCOUNTER — PATIENT MESSAGE (OUTPATIENT)
Dept: OBGYN | Facility: CLINIC | Age: 31
End: 2021-09-03

## 2021-09-03 DIAGNOSIS — R10.2 PELVIC PAIN: ICD-10-CM

## 2021-09-20 ENCOUNTER — HOSPITAL ENCOUNTER (OUTPATIENT)
Dept: RADIOLOGY | Facility: MEDICAL CENTER | Age: 31
End: 2021-09-20
Attending: OBSTETRICS & GYNECOLOGY
Payer: COMMERCIAL

## 2021-09-20 DIAGNOSIS — R10.2 PELVIC PAIN: ICD-10-CM

## 2021-09-20 PROCEDURE — 76830 TRANSVAGINAL US NON-OB: CPT

## 2021-09-23 ENCOUNTER — IMMUNIZATION (OUTPATIENT)
Dept: OCCUPATIONAL MEDICINE | Facility: CLINIC | Age: 31
End: 2021-09-23
Payer: COMMERCIAL

## 2021-09-23 DIAGNOSIS — Z23 NEED FOR VACCINATION: Primary | ICD-10-CM

## 2021-09-23 PROCEDURE — 90686 IIV4 VACC NO PRSV 0.5 ML IM: CPT | Performed by: PREVENTIVE MEDICINE

## 2021-09-27 ENCOUNTER — OFFICE VISIT (OUTPATIENT)
Dept: MEDICAL GROUP | Facility: MEDICAL CENTER | Age: 31
End: 2021-09-27

## 2021-09-27 ENCOUNTER — GYNECOLOGY VISIT (OUTPATIENT)
Dept: OBGYN | Facility: CLINIC | Age: 31
End: 2021-09-27
Payer: COMMERCIAL

## 2021-09-27 VITALS
TEMPERATURE: 96.6 F | WEIGHT: 183.8 LBS | HEART RATE: 73 BPM | SYSTOLIC BLOOD PRESSURE: 112 MMHG | BODY MASS INDEX: 30.62 KG/M2 | DIASTOLIC BLOOD PRESSURE: 68 MMHG | HEIGHT: 65 IN | OXYGEN SATURATION: 98 %

## 2021-09-27 VITALS — DIASTOLIC BLOOD PRESSURE: 62 MMHG | WEIGHT: 184 LBS | SYSTOLIC BLOOD PRESSURE: 112 MMHG | BODY MASS INDEX: 30.62 KG/M2

## 2021-09-27 DIAGNOSIS — Z97.5 IUD (INTRAUTERINE DEVICE) IN PLACE: ICD-10-CM

## 2021-09-27 DIAGNOSIS — Z97.5 BREAKTHROUGH BLEEDING ASSOCIATED WITH INTRAUTERINE DEVICE (IUD): ICD-10-CM

## 2021-09-27 DIAGNOSIS — R10.2 PELVIC CRAMPING: ICD-10-CM

## 2021-09-27 DIAGNOSIS — R30.0 DYSURIA: ICD-10-CM

## 2021-09-27 DIAGNOSIS — N92.1 BREAKTHROUGH BLEEDING ASSOCIATED WITH INTRAUTERINE DEVICE (IUD): ICD-10-CM

## 2021-09-27 DIAGNOSIS — M54.2 NECK PAIN: ICD-10-CM

## 2021-09-27 LAB
APPEARANCE UR: CLEAR
BILIRUB UR STRIP-MCNC: NEGATIVE MG/DL
COLOR UR AUTO: YELLOW
GLUCOSE UR STRIP.AUTO-MCNC: NEGATIVE MG/DL
KETONES UR STRIP.AUTO-MCNC: NEGATIVE MG/DL
LEUKOCYTE ESTERASE UR QL STRIP.AUTO: NEGATIVE
NITRITE UR QL STRIP.AUTO: NEGATIVE
PH UR STRIP.AUTO: 5.5 [PH] (ref 5–8)
PROT UR QL STRIP: NEGATIVE MG/DL
RBC UR QL AUTO: NORMAL
SP GR UR STRIP.AUTO: 1.01
UROBILINOGEN UR STRIP-MCNC: 0.2 MG/DL

## 2021-09-27 PROCEDURE — 99214 OFFICE O/P EST MOD 30 MIN: CPT | Performed by: NURSE PRACTITIONER

## 2021-09-27 PROCEDURE — 99212 OFFICE O/P EST SF 10 MIN: CPT | Performed by: OBSTETRICS & GYNECOLOGY

## 2021-09-27 PROCEDURE — 81002 URINALYSIS NONAUTO W/O SCOPE: CPT | Performed by: NURSE PRACTITIONER

## 2021-09-27 RX ORDER — DICLOFENAC SODIUM 75 MG/1
75 TABLET, DELAYED RELEASE ORAL 2 TIMES DAILY
Qty: 30 TABLET | Refills: 0 | Status: SHIPPED | OUTPATIENT
Start: 2021-09-27 | End: 2021-12-09

## 2021-09-27 RX ORDER — METHYLPREDNISOLONE 4 MG/1
TABLET ORAL
Qty: 21 TABLET | Refills: 0 | Status: SHIPPED | OUTPATIENT
Start: 2021-09-27 | End: 2021-12-09

## 2021-09-27 RX ORDER — CYCLOBENZAPRINE HCL 10 MG
10 TABLET ORAL DAILY
Qty: 30 TABLET | Refills: 3 | Status: SHIPPED | OUTPATIENT
Start: 2021-09-27 | End: 2022-06-05

## 2021-09-27 ASSESSMENT — FIBROSIS 4 INDEX
FIB4 SCORE: 0.37
FIB4 SCORE: 0.37

## 2021-09-27 ASSESSMENT — PATIENT HEALTH QUESTIONNAIRE - PHQ9: CLINICAL INTERPRETATION OF PHQ2 SCORE: 0

## 2021-09-27 NOTE — PROGRESS NOTES
Subjective     Tonia Xavier is a 31 y.o. female who presents for Gynecologic Exam (U/S follow up)            HPI patient is a 31-year-old multiparous female who presents today for follow-up with pelvic cramping and breakthrough bleeding with IUD.  She reports symptoms have become less but she still has some breakthrough spotting and some mild midline pelvic cramping with sometimes radiation to her right hip.  Patient had pelvic ultrasound and would like to discuss results    ROS all organ systems are reviewed and were negative except for complaints in HPI           Objective     /62   Wt 83.5 kg (184 lb)   LMP 09/23/2021   BMI 30.62 kg/m²      Physical Exam  Vitals and nursing note reviewed.   Constitutional:       General: She is not in acute distress.     Appearance: Normal appearance.   Neurological:      General: No focal deficit present.      Mental Status: She is alert and oriented to person, place, and time.   Psychiatric:         Mood and Affect: Mood normal.         Behavior: Behavior normal.         Thought Content: Thought content normal.         Judgment: Judgment normal.            Discussion:  I discussed pelvic ultrasound results showing normal size uterus without any uterine masses.  IUD appears in normal location.  There are no ovarian cysts or pelvic free fluid noted.  I discussed that her ultrasound is within normal limits.  I discussed symptoms discussed these symptoms and not unusual in some patient did have IUD.  I discussed that bleeding can improve over time and can disappear in some patients.  I discussed that we can do observation for symptoms and try ibuprofen once or twice daily for cramping which can also reduce bleeding with the order option will be to remove her IUD if symptoms are interfering with her daily functioning and restart OCP which she tolerated well before.  After extensive discussion, patient states she will try ibuprofen and monitor her symptoms in  the next 1 to 2 months and will let us know if she would like to proceed with IUD removal.    9/20/2021 7:30 AM     HISTORY/REASON FOR EXAM:  Pain; Mirena IUD, hx of ovarian cysts, pelvic pain        TECHNIQUE/EXAM DESCRIPTION:  Transabdominal and transvaginal pelvic ultrasound.     COMPARISON:   5/3/2021     FINDINGS:  Both transabdominal and transvaginal scanning were performed to optimally visualize the pelvis.     The uterus measures 4.21 cm x 7.88 cm x 5.09 cm.  The uterine myometrium is within normal limits.  The endometrial echo complex measures 0.66 cm.  An IUD is located within the endometrial cavity.     Low resistive waveforms are confirmed within the ovaries.  The right ovary measures 3.73 cm x 2.03 cm x 1.68 cm.     The left ovary measures 2.60 cm x 3.75 cm x 2.06 cm.     No large ovarian cysts or adnexal masses.     There is no free fluid seen.     IMPRESSION:     Normal transvaginal appearance of the pelvis.        Exam Ended: 09/20/21  8:12 AM                 Assessment & Plan        1. Pelvic cramping  31-year-old with pelvic cramping intermittently.  We discussed treatment options including daily ibuprofen therapy.  Patient will try ibuprofen therapy    2. IUD (intrauterine device) in place  Mirena IUD is in place.  Pelvic ultrasound results were reviewed with patient and are within normal limits    3. Breakthrough bleeding associated with intrauterine device (IUD)  We discussed breakthrough bleeding with IUD usage.  Bleeding has lessened over time.  Patient will try ibuprofen for cramping and bleeding and will let us know if symptoms worsens and if she wants to remove her IUD.    4.  Precautions and plan of care were discussed.

## 2021-09-27 NOTE — PROGRESS NOTES
Subjective:     Tonia Xavier is a 31 y.o. female who presents with headaches.    HPI:   Seen in f/u for headaches.  She is getting daily headaches.  Pain is in back of head behind ears and on shoulders.  Has full ROM of arms.  Pain Can occur on either side of shoulders.  Saw eye dr and eyes are ok.  No vision changed.  No numbness.  Using ibuprofen and tyl alt.   No hx of injury.  She has had tingling fingers but that was before these sx.  She types at work.  Her neck is very stiff when she wakes in am.  Has ddd in lumbar and thoracic spine.    She is concerned that she may have a UTI.  She is on her menses.  UA in office neg except for trace blood.  She is having some discomfort.        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 21 Tablet 0   • diclofenac DR (VOLTAREN) 75 MG Tablet Delayed Response Take 1 Tablet by mouth 2 times a day. 30 Tablet 0   • cyclobenzaprine (FLEXERIL) 10 mg Tab Take 1 Tablet by mouth every day. CYCLOBENZAPRINE HCL 10 MG TABS 30 Tablet 3   • sertraline (ZOLOFT) 100 MG Tab TAKE 1 TABLET BY MOUTH EVERY DAY 90 tablet 3   • valacyclovir (VALTREX) 1 GM Tab TAKE 1 TABLET BY MOUTH EVERY DAY 90 tablet 0   • acyclovir (ZOVIRAX) 5 % Cream Apply 1 Application topically every 3 hours. 5 g 1   • montelukast (SINGULAIR) 10 MG Tab      • Ferrous Sulfate (IRON PO)  "Take 64 mg by mouth every day.     • ondansetron (ZOFRAN) 4 MG Tab tablet ZOFRAN 4 MG TABS     • fexofenadine (ALLEGRA) 180 MG tablet 24HR ALLERGY RELIEF TABS       No current facility-administered medications for this visit.       Allergies   Allergen Reactions   • Benzoin Hives     Rash    • Phentermine      Worsening anxiety, excessive dry mouth, lightheaded       ROS  Constitutional: Negative. Negative for fever, chills, weight loss, malaise/fatigue and diaphoresis.   HENT: Negative. Negative for hearing loss, ear pain, nosebleeds, congestion, sore throat, neck pain, tinnitus and ear discharge.   Respiratory: Negative. Negative for cough, hemoptysis, sputum production, shortness of breath, wheezing and stridor.   Cardiovascular: Negative. Negative for chest pain, palpitations, orthopnea, claudication, leg swelling and PND.   Gastrointestinal: Denies nausea, vomiting, diarrhea, constipation, heartburn, melena or hematochezia.  Genitourinary: Denies dysuria, hematuria, urinary incontinence, frequency or urgency.        Objective:     /68 (BP Location: Right arm, Patient Position: Sitting)   Pulse 73   Temp 35.9 °C (96.6 °F) (Temporal)   Ht 1.651 m (5' 5\")   Wt 83.4 kg (183 lb 12.8 oz)   SpO2 98%  Body mass index is 30.59 kg/m².    Physical Exam:  Vitals reviewed.  Constitutional: Oriented to person, place, and time. appears well-developed and well-nourished. No distress.   Cardiovascular: Normal rate, regular rhythm, normal heart sounds and intact distal pulses. Exam reveals no gallop and no friction rub. No murmur heard. No carotid bruits.   Pulmonary/Chest: Effort normal and breath sounds normal. No stridor. No respiratory distress. no wheezes or rales. exhibits no tenderness.   Musculoskeletal: Normal range of motion. exhibits no edema. joshua pedal pulses 2+.  Lymphadenopathy: No cervical or supraclavicular adenopathy.   Neurological: Alert and oriented to person, place, and time. exhibits normal " muscle tone.  Skin: Skin is warm and dry. No diaphoresis.   Psychiatric: Normal mood and affect. Behavior is normal.      Assessment and Plan:     The following treatment plan was discussed:    1. Neck pain  methylPREDNISolone (MEDROL DOSEPAK) 4 MG Tablet Therapy Pack    diclofenac DR (VOLTAREN) 75 MG Tablet Delayed Response    DX-CERVICAL SPINE-2 OR 3 VIEWS    cyclobenzaprine (FLEXERIL) 10 mg Tab    pain is in joshua shoulders and radiates to upper neck.  try medrol.  if not better use voltaren.  RF flexeril.  cervical xray is still painful after tx.     2. Dysuria      UA in office does not show UTI.  wnl except for blood.  she is on her menses.          Followup: Return if symptoms worsen or fail to improve.

## 2021-10-18 ENCOUNTER — TELEPHONE (OUTPATIENT)
Dept: MEDICAL GROUP | Facility: MEDICAL CENTER | Age: 31
End: 2021-10-18

## 2021-10-18 ENCOUNTER — APPOINTMENT (OUTPATIENT)
Dept: RADIOLOGY | Facility: IMAGING CENTER | Age: 31
End: 2021-10-18
Attending: NURSE PRACTITIONER
Payer: COMMERCIAL

## 2021-10-18 DIAGNOSIS — M54.2 NECK PAIN: ICD-10-CM

## 2021-10-18 PROCEDURE — 72040 X-RAY EXAM NECK SPINE 2-3 VW: CPT | Mod: TC | Performed by: NURSE PRACTITIONER

## 2021-10-27 NOTE — TELEPHONE ENCOUNTER
Received request via: Pharmacy    Was the patient seen in the last year in this department? Yes 5/7/2020    Does the patient have an active prescription (recently filled or refills available) for medication(s) requested? Yes.  Pt asks for a 90 day supply  
[[id number]]

## 2021-12-09 ENCOUNTER — OFFICE VISIT (OUTPATIENT)
Dept: MEDICAL GROUP | Facility: MEDICAL CENTER | Age: 31
End: 2021-12-09
Payer: COMMERCIAL

## 2021-12-09 VITALS
TEMPERATURE: 96.7 F | OXYGEN SATURATION: 98 % | BODY MASS INDEX: 31.79 KG/M2 | HEART RATE: 85 BPM | HEIGHT: 65 IN | SYSTOLIC BLOOD PRESSURE: 102 MMHG | DIASTOLIC BLOOD PRESSURE: 66 MMHG | WEIGHT: 190.8 LBS

## 2021-12-09 DIAGNOSIS — F33.1 MODERATE EPISODE OF RECURRENT MAJOR DEPRESSIVE DISORDER (HCC): ICD-10-CM

## 2021-12-09 PROCEDURE — 99214 OFFICE O/P EST MOD 30 MIN: CPT | Performed by: NURSE PRACTITIONER

## 2021-12-09 RX ORDER — DULOXETIN HYDROCHLORIDE 30 MG/1
30 CAPSULE, DELAYED RELEASE ORAL DAILY
Qty: 30 CAPSULE | Refills: 3 | Status: SHIPPED | OUTPATIENT
Start: 2021-12-09 | End: 2022-01-02

## 2021-12-09 RX ORDER — VALACYCLOVIR HYDROCHLORIDE 1 G/1
TABLET, FILM COATED ORAL
Qty: 30 TABLET | Refills: 2 | Status: SHIPPED | OUTPATIENT
Start: 2021-12-09 | End: 2022-03-01

## 2021-12-09 ASSESSMENT — FIBROSIS 4 INDEX: FIB4 SCORE: 0.37

## 2021-12-10 ENCOUNTER — HOSPITAL ENCOUNTER (OUTPATIENT)
Facility: MEDICAL CENTER | Age: 31
End: 2021-12-10
Attending: PHYSICIAN ASSISTANT
Payer: COMMERCIAL

## 2021-12-10 PROCEDURE — 87205 SMEAR GRAM STAIN: CPT

## 2021-12-10 PROCEDURE — 87070 CULTURE OTHR SPECIMN AEROBIC: CPT

## 2021-12-10 NOTE — PROGRESS NOTES
Subjective:     Tonia Xavier is a 31 y.o. female who presents with depression.    HPI:   Seen in f/u for depression.  She is having worse sx.  She is on 100 mg  Zoloft but its not working.  Not on anything else recently.  She is feeling sad.  Not feeling like herself.  She doesn't want to go to work.  Loosing patience with her kids.  Denies SI.  Tearful in office.      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 30 Capsule 3   • cyclobenzaprine (FLEXERIL) 10 mg Tab Take 1 Tablet by mouth every day. CYCLOBENZAPRINE HCL 10 MG TABS 30 Tablet 3   • valacyclovir (VALTREX) 1 GM Tab TAKE 1 TABLET BY MOUTH EVERY DAY 90 tablet 0   • acyclovir (ZOVIRAX) 5 % Cream Apply 1 Application topically every 3 hours. 5 g 1   • montelukast (SINGULAIR) 10 MG Tab      • Ferrous Sulfate (IRON PO) Take 64 mg by mouth every day.     • ondansetron (ZOFRAN) 4 MG Tab tablet ZOFRAN 4 MG TABS     • fexofenadine (ALLEGRA) 180 MG tablet 24HR ALLERGY RELIEF TABS       No current facility-administered medications for this visit.       Allergies   Allergen Reactions   • Benzoin Hives     Rash    • Phentermine      Worsening anxiety, excessive dry mouth, lightheaded       ROS  Constitutional: Negative. Negative for fever, chills, weight loss, malaise/fatigue and diaphoresis.   HENT: Negative. Negative for hearing loss, ear pain,  "nosebleeds, congestion, sore throat, neck pain, tinnitus and ear discharge.   Respiratory: Negative. Negative for cough, hemoptysis, sputum production, shortness of breath, wheezing and stridor.   Cardiovascular: Negative. Negative for chest pain, palpitations, orthopnea, claudication, leg swelling and PND.   Gastrointestinal: Denies nausea, vomiting, diarrhea, constipation, heartburn, melena or hematochezia.  Genitourinary: Denies dysuria, hematuria, urinary incontinence, frequency or urgency.        Objective:     /66 (BP Location: Right arm, Patient Position: Sitting)   Pulse 85   Temp 35.9 °C (96.7 °F) (Temporal)   Ht 1.651 m (5' 5\")   Wt 86.5 kg (190 lb 12.8 oz)   SpO2 98%  Body mass index is 31.75 kg/m².    Physical Exam:  Vitals reviewed.  Constitutional: Oriented to person, place, and time. appears well-developed and well-nourished. No distress.   Cardiovascular: Normal rate, regular rhythm, normal heart sounds and intact distal pulses. Exam reveals no gallop and no friction rub. No murmur heard. No carotid bruits.   Pulmonary/Chest: Effort normal and breath sounds normal. No stridor. No respiratory distress. no wheezes or rales. exhibits no tenderness.   Musculoskeletal: Normal range of motion. exhibits no edema. joshua pedal pulses 2+.  Lymphadenopathy: No cervical or supraclavicular adenopathy.   Neurological: Alert and oriented to person, place, and time. exhibits normal muscle tone.  Skin: Skin is warm and dry. No diaphoresis.   Psychiatric: Normal mood and affect. Behavior is normal.      Assessment and Plan:     The following treatment plan was discussed:    1. Moderate episode of recurrent major depressive disorder (HCC)  Referral to Psychology    DULoxetine (CYMBALTA) 30 MG Cap DR Particles    not controlled on zoloft.  chg to cymbalta.  refer counselor.  f/u 1 mo for sx eval         Followup: Return in about 4 weeks (around 1/6/2022).  "

## 2021-12-11 LAB
GRAM STN SPEC: NORMAL
SIGNIFICANT IND 70042: NORMAL
SITE SITE: NORMAL
SOURCE SOURCE: NORMAL

## 2021-12-21 LAB
BACTERIA WND AEROBE CULT: ABNORMAL
GRAM STN SPEC: ABNORMAL
SIGNIFICANT IND 70042: ABNORMAL
SITE SITE: ABNORMAL
SOURCE SOURCE: ABNORMAL

## 2021-12-23 ENCOUNTER — TELEPHONE (OUTPATIENT)
Dept: INFECTIOUS DISEASES | Facility: MEDICAL CENTER | Age: 31
End: 2021-12-23

## 2021-12-28 ENCOUNTER — NON-PROVIDER VISIT (OUTPATIENT)
Dept: MEDICAL GROUP | Facility: MEDICAL CENTER | Age: 31
End: 2021-12-28
Payer: COMMERCIAL

## 2021-12-28 DIAGNOSIS — Z11.52 ENCOUNTER FOR SCREENING FOR COVID-19: ICD-10-CM

## 2021-12-28 LAB
EXTERNAL QUALITY CONTROL: NORMAL
SARS-COV+SARS-COV-2 AG RESP QL IA.RAPID: POSITIVE

## 2021-12-28 PROCEDURE — 87426 SARSCOV CORONAVIRUS AG IA: CPT | Mod: QW | Performed by: NURSE PRACTITIONER

## 2021-12-29 NOTE — PROGRESS NOTES
Tonia Xavier is a 31 y.o. female here for a non-provider visit for COVID testing.    Clinic collect COVID order in system?: Yes    Patient tolerated specimen collection and no adverse effects were observed or reported: Yes

## 2022-01-01 DIAGNOSIS — F33.1 MODERATE EPISODE OF RECURRENT MAJOR DEPRESSIVE DISORDER (HCC): ICD-10-CM

## 2022-01-02 RX ORDER — DULOXETIN HYDROCHLORIDE 30 MG/1
30 CAPSULE, DELAYED RELEASE ORAL DAILY
Qty: 30 CAPSULE | Refills: 3 | Status: SHIPPED | OUTPATIENT
Start: 2022-01-02 | End: 2022-04-03

## 2022-01-06 ENCOUNTER — PHARMACY VISIT (OUTPATIENT)
Dept: PHARMACY | Facility: MEDICAL CENTER | Age: 32
End: 2022-01-06
Payer: COMMERCIAL

## 2022-01-06 ENCOUNTER — APPOINTMENT (OUTPATIENT)
Dept: PHARMACY | Facility: MEDICAL CENTER | Age: 32
End: 2022-01-06
Payer: COMMERCIAL

## 2022-01-06 PROCEDURE — RXMED WILLOW AMBULATORY MEDICATION CHARGE: Performed by: INTERNAL MEDICINE

## 2022-01-06 RX ORDER — CX-024414 0.2 MG/ML
0.25 INJECTION, SUSPENSION INTRAMUSCULAR
Qty: 0.25 ML | Refills: 0 | Status: SHIPPED | OUTPATIENT
Start: 2022-01-06 | End: 2022-01-17

## 2022-01-17 ENCOUNTER — OFFICE VISIT (OUTPATIENT)
Dept: MEDICAL GROUP | Facility: MEDICAL CENTER | Age: 32
End: 2022-01-17
Payer: COMMERCIAL

## 2022-01-17 VITALS
SYSTOLIC BLOOD PRESSURE: 110 MMHG | TEMPERATURE: 97.5 F | HEIGHT: 65 IN | BODY MASS INDEX: 32.22 KG/M2 | WEIGHT: 193.4 LBS | HEART RATE: 76 BPM | OXYGEN SATURATION: 98 % | DIASTOLIC BLOOD PRESSURE: 66 MMHG

## 2022-01-17 DIAGNOSIS — F33.1 MODERATE EPISODE OF RECURRENT MAJOR DEPRESSIVE DISORDER (HCC): ICD-10-CM

## 2022-01-17 PROCEDURE — 99214 OFFICE O/P EST MOD 30 MIN: CPT | Performed by: NURSE PRACTITIONER

## 2022-01-17 ASSESSMENT — FIBROSIS 4 INDEX: FIB4 SCORE: 0.37

## 2022-01-17 NOTE — PROGRESS NOTES
Subjective:     Tonia Xavier is a 31 y.o. female who presents with depression.    HPI:   Seen in f/u for depression.  She was having worsening sx of depression at last appt.  She was on zoloft but it was not helping her sx.  She was tearful.  Feeling sad most of the time.  Her zoloft was stopped and she was changed to cymbalta 30 mg daily.  Today she is feeling much improved.  Her depression sx are now controlled.  No anxiety.  Stable on med.  No s/e to med.  Taking med approp.      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 CYCLOBENZAPRINE HCL 10 MG TABS 30 Tablet 3   • acyclovir (ZOVIRAX) 5 % Cream Apply 1 Application topically every 3 hours. 5 g 1   • montelukast (SINGULAIR) 10 MG Tab      • Ferrous Sulfate (IRON PO) Take 64 mg by mouth every day.     • ondansetron (ZOFRAN) 4 MG Tab tablet ZOFRAN 4 MG TABS     • fexofenadine (ALLEGRA) 180 MG tablet 24HR ALLERGY RELIEF TABS       No current facility-administered medications for this visit.       Allergies   Allergen Reactions   • Benzoin Hives     Rash    • Phentermine      Worsening anxiety, excessive dry mouth, lightheaded       ROS  Constitutional: Negative. Negative for  "fever, chills, weight loss, malaise/fatigue and diaphoresis.   HENT: Negative. Negative for hearing loss, ear pain, nosebleeds, congestion, sore throat, neck pain, tinnitus and ear discharge.   Respiratory: Negative. Negative for cough, hemoptysis, sputum production, shortness of breath, wheezing and stridor.   Cardiovascular: Negative. Negative for chest pain, palpitations, orthopnea, claudication, leg swelling and PND.   Gastrointestinal: Denies nausea, vomiting, diarrhea, constipation, heartburn, melena or hematochezia.  Genitourinary: Denies dysuria, hematuria, urinary incontinence, frequency or urgency.        Objective:     /66 (BP Location: Right arm, Patient Position: Sitting)   Pulse 76   Temp 36.4 °C (97.5 °F) (Temporal)   Ht 1.651 m (5' 5\")   Wt 87.7 kg (193 lb 6.4 oz)   SpO2 98%  Body mass index is 32.18 kg/m².    Physical Exam:  Vitals reviewed.  Constitutional: Oriented to person, place, and time. appears well-developed and well-nourished. No distress.   Cardiovascular: Normal rate, regular rhythm, normal heart sounds and intact distal pulses. Exam reveals no gallop and no friction rub. No murmur heard. No carotid bruits.   Pulmonary/Chest: Effort normal and breath sounds normal. No stridor. No respiratory distress. no wheezes or rales. exhibits no tenderness.   Musculoskeletal: Normal range of motion. exhibits no edema. joshua pedal pulses 2+.  Lymphadenopathy: No cervical or supraclavicular adenopathy.   Neurological: Alert and oriented to person, place, and time. exhibits normal muscle tone.  Skin: Skin is warm and dry. No diaphoresis.   Psychiatric: Normal mood and affect. Behavior is normal.      Assessment and Plan:     The following treatment plan was discussed:    1. Moderate episode of recurrent major depressive disorder (HCC)      stable and well controlled on cymbalta.  continue med.  plan:  f/u 6/22.  call for lab slip         Followup: Return in about 6 months (around 7/17/2022), " or call for lab slip.

## 2022-02-03 ENCOUNTER — OFFICE VISIT (OUTPATIENT)
Dept: NEUROLOGY | Facility: MEDICAL CENTER | Age: 32
End: 2022-02-03
Attending: PSYCHIATRY & NEUROLOGY
Payer: COMMERCIAL

## 2022-02-03 VITALS
WEIGHT: 194 LBS | DIASTOLIC BLOOD PRESSURE: 85 MMHG | HEIGHT: 66 IN | OXYGEN SATURATION: 98 % | HEART RATE: 83 BPM | BODY MASS INDEX: 31.18 KG/M2 | SYSTOLIC BLOOD PRESSURE: 120 MMHG | RESPIRATION RATE: 16 BRPM | TEMPERATURE: 97.4 F

## 2022-02-03 DIAGNOSIS — G43.009 MIGRAINE WITHOUT AURA AND WITHOUT STATUS MIGRAINOSUS, NOT INTRACTABLE: Primary | ICD-10-CM

## 2022-02-03 PROCEDURE — 99212 OFFICE O/P EST SF 10 MIN: CPT

## 2022-02-03 PROCEDURE — 99215 OFFICE O/P EST HI 40 MIN: CPT | Performed by: PSYCHIATRY & NEUROLOGY

## 2022-02-03 RX ORDER — METOCLOPRAMIDE 10 MG/1
TABLET ORAL
Qty: 45 TABLET | Refills: 1 | Status: SHIPPED | OUTPATIENT
Start: 2022-02-03 | End: 2022-06-06 | Stop reason: SDUPTHER

## 2022-02-03 RX ORDER — LASMIDITAN 100 MG/1
TABLET ORAL
Qty: 4 TABLET | Refills: 0 | COMMUNITY
Start: 2022-02-03 | End: 2022-03-03

## 2022-02-03 RX ORDER — DESOGESTREL AND ETHINYL ESTRADIOL 21-5 (28)
1 KIT ORAL DAILY
Qty: 28 TABLET | Refills: 0 | Status: SHIPPED | DISCHARGE
Start: 2022-02-03 | End: 2022-06-06 | Stop reason: SDUPTHER

## 2022-02-03 RX ORDER — FREMANEZUMAB-VFRM 225 MG/1.5ML
225 INJECTION SUBCUTANEOUS
Qty: 1 EACH | Refills: 11 | Status: SHIPPED | OUTPATIENT
Start: 2022-02-03 | End: 2022-02-13

## 2022-02-03 ASSESSMENT — PATIENT HEALTH QUESTIONNAIRE - PHQ9: CLINICAL INTERPRETATION OF PHQ2 SCORE: 0

## 2022-02-03 ASSESSMENT — PAIN SCALES - GENERAL: PAINLEVEL: 7=MODERATE-SEVERE PAIN

## 2022-02-03 ASSESSMENT — ENCOUNTER SYMPTOMS
HEADACHES: 1
NAUSEA: 1
HEARTBURN: 1

## 2022-02-03 ASSESSMENT — FIBROSIS 4 INDEX: FIB4 SCORE: 0.37

## 2022-02-04 ENCOUNTER — TELEPHONE (OUTPATIENT)
Dept: PHARMACY | Facility: MEDICAL CENTER | Age: 32
End: 2022-02-04

## 2022-02-04 NOTE — TELEPHONE ENCOUNTER
Ajovy 225mg/1.5ml SOAJ    PA submitted via CM Key: [id number] awaiting response TAT 24-72 hrs. - 02/04/2022 10:03am

## 2022-02-04 NOTE — PROGRESS NOTES
Subjective     Tonia Xavier is a 31 y.o. female who presents from the office of JAMIE Hinojosa, for consultation, with a history of persistent and worsening migraine headaches.    KAYLA Santoro is a pleasant 31-year-old right-handed woman whose headaches started in her early 20s, but which have over the last year begun to increase, intermixed with moderate headaches.    Prodrome: None    Aura: None    Headache: She describes moderate headaches occurring 3 to 4 days every week, slightly disabling but never incapacitating.    Severe headaches typically begin over the occiput and neck on the left, radiating to the vertex, at their worst throbbing, associated with worsening nausea, photophobia, impaired concentration and sensitivity to movement.  There is mild skin hypersensitivity over the back of the skull and neck ipsilaterally, the neck stiffness itself clearly worsens.    Duration: Severe headaches last about 1 day.    Onset: The worst headaches have already started upon awakening, though they can occur at any other time throughout the day.    Frequency: The severe headaches are increasing though still only once or twice in a month.  The moderate headaches more problematic occurring 3-4 days every week.  This pattern started in August of last year.    Triggers: None identified.    Work-up: None    Treatment: She has found that she has become intolerant to most NSAIDs, GI symptoms occurring after trials of Voltaren and Aleve.  She has been on no prescription medications for her headaches.  About 1 month ago she was started on Cymbalta 30 mg daily for her depression, this has had no effect on the headaches along with the other.    Medical history is remarkable for celiac disease, right-sided Bell's palsy with her most recent pregnancy, depression and migraine.  There is no history of CAD, CVA, PVD, liver or kidney disease, pulmonary disease, IBD, autoimmune disease, blood dyscrasia, RLS, SHAILESH,  "diabetes, hypertension, dyslipidemia, or neurodegenerative disease.    There is no surgical history of note.    Her menses are now irregular between removal of Mirena and being started on another BCP.  Historically they were regular.    Family history: The only 1 in the immediate family with headaches is her son.    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    She is on Kariva, Cymbalta 30 mg daily, Flexeril 10 mg daily, Valtrex, Zofran 4 mg as needed, Singulair and Allegra.    Review of Systems   Gastrointestinal: Positive for heartburn and nausea.   Neurological: Positive for headaches.   All other systems reviewed and are negative.    Objective     /85 (BP Location: Right arm, Patient Position: Sitting, BP Cuff Size: Adult)   Pulse 83   Temp 36.3 °C (97.4 °F) (Temporal)   Resp 16   Ht 1.676 m (5' 6\")   Wt 88 kg (194 lb 0.1 oz)   SpO2 98%   BMI 31.31 kg/m²      Physical Exam    She appears in no acute distress. She is quite cooperative.  Vital signs are stable.  There is some mild bitemporal tenderness, the occipital ridge bilaterally is nontender.  There is no jaw claudication.  There is no muscle spasm or tenderness of the cervical paraspinal and trapezius muscle bodies bilaterally.  Range of motion of the cervical spine is full.  Carotid pulses are present bilaterally without asymmetry.  Cardiac evaluation reveals a regular rhythm.  There is no lower extremity edema.     Neurological Exam    Fully oriented, there is no aphasia, agnosia, apraxia, or inattention.    PERRLA/EOMI, visual fields are full, funduscopic exam reveals sharp disc margins.  Facial movements are symmetric, sensory exam is intact to temperature and light touch bilaterally.  The tongue and uvula are midline.  Shoulder shrug and head rotation are intact and symmetric.    Musculoskeletal exam reveals normal tone bilaterally, there is no tremor, asterixis, or drift.  Strength is 5/5 in all muscle groups.  " Reflexes are present throughout, diminished but present at the ankles, there are no asymmetries, both toes are downgoing.    She stands easily, armswing is symmetric, gait is normal and station and stride length.  Heel, toe, and tandem walking are all normal.  There is no appendicular dystaxia.  Fine motor control with repetitive movements is normal and symmetric throughout, amplitudes and frequencies normal.    Sensory exam is intact to vibration and temperature, Romberg is absent.    Assessment & Plan     1. Migraine without aura and without status migrainosus, not intractable  Though her headaches are increasing in frequency, at least the severe migraines have not to any major extent.  Still, they are happening once or twice in a month.  It is the more frequent moderate, migrainous headaches that are problematic and may be a harbinger of bad things to come.  Thus, she does warrant maintenance therapy as well as an adequate rescue treatment plan.  The rationale for this was reviewed.    I spent some time talking about the nature, prognosis, as well as the pathophysiology of migraine headaches.  She has a less than 0.2% chance of these headaches being symptomatic of another process, thus further diagnostics are not indicated at this time.    Ajovy 225 mg injections every 4 weeks will be's prescribed.  One of the new CGRP medication group, these drugs as a class are well-tolerated and very effective.  For rescue, Reyvow ow 100 mg with Reglan 10 mg are taken together, and can be repeated once in 2-4 hours.  We will communicate via [place] as to how well she is doing, drug efficacy and tolerability, etc.  We will follow-up in 6 months or so.    - desogestrel-ethinyl estradiol (KARIVA) 0.15-0.02/0.01 MG (21/5) per tablet; Take 1 Tablet by mouth every day.  Dispense: 28 Tablet; Refill: 0  - Fremanezumab-vfrm (AJOVY) 225 MG/1.5ML Solution Auto-injector; Inject 225 mg under the skin every 4 weeks.  Dispense: 1 Each;  Refill: 11  - metoclopramide (REGLAN) 10 MG Tab; 1-3 tab at headache/nausea onset; repeat in 4-6 hours prn  Dispense: 45 Tablet; Refill: 1  - Lasmiditan Succinate (REYVOW) 100 MG Tab; 1 tab at headache onset; repeat on 2 hours prn  Dispense: 4 Tablet; Refill: 0    Time: 50 minutes in total spent on patient care including precharting, record review, discussions with healthcare staff and documentation.  This includes face-to-face time with the patient for exam, review, discussion, as well as counseling and coordinating care.

## 2022-02-07 ENCOUNTER — APPOINTMENT (OUTPATIENT)
Dept: RADIOLOGY | Facility: IMAGING CENTER | Age: 32
End: 2022-02-07
Attending: ORTHOPAEDIC SURGERY
Payer: COMMERCIAL

## 2022-02-07 DIAGNOSIS — M25.561 RIGHT KNEE PAIN, UNSPECIFIED CHRONICITY: ICD-10-CM

## 2022-02-13 ENCOUNTER — TELEPHONE (OUTPATIENT)
Dept: NEUROLOGY | Facility: MEDICAL CENTER | Age: 32
End: 2022-02-13

## 2022-02-13 DIAGNOSIS — G43.009 MIGRAINE WITHOUT AURA AND WITHOUT STATUS MIGRAINOSUS, NOT INTRACTABLE: ICD-10-CM

## 2022-02-13 RX ORDER — PROPRANOLOL HCL 60 MG
CAPSULE, EXTENDED RELEASE 24HR ORAL
Qty: 60 CAPSULE | Refills: 3 | Status: SHIPPED | OUTPATIENT
Start: 2022-02-13 | End: 2022-03-14

## 2022-02-13 NOTE — TELEPHONE ENCOUNTER
----- Message from Nomi Woody sent at 2/4/2022  4:06 PM PST -----  Regarding: Patient's Ajovy  Helmary Iglesias,          We have received a denial for patient's Ajovy. The plan would like to see a trial and failure of at least 2 of the following: Topamax, Depkote, Elavil, Effexor, or a Beta Blocker, or contraindication for any one of those medication. I did not see anything in her chart as far as contraindications. Please let myself or liaison Deena Martínez know how you want to proceed.    Thank you,    Nomi Zabala

## 2022-02-13 NOTE — TELEPHONE ENCOUNTER
Let the patient know that we are going to have to try another medication before Ajovy will be authorized by the insurance company.  She has failed Topamax, she needs to fail another medication, we will try Inderal LA 60 mg daily.  In 2 weeks, I want her to go up to 120 mg daily.  Side effects can include malaise, dizziness, tiredness, or change in endurance.  Have her contact us as to how well she is doing.  If she fails this drug, we can then reappeal for Ajovy.  Prescription is at her pharmacy.

## 2022-03-01 RX ORDER — VALACYCLOVIR HYDROCHLORIDE 1 G/1
TABLET, FILM COATED ORAL
Qty: 30 TABLET | Refills: 2 | Status: SHIPPED | OUTPATIENT
Start: 2022-03-01 | End: 2022-07-06

## 2022-03-28 ENCOUNTER — TELEPHONE (OUTPATIENT)
Dept: NEUROLOGY | Facility: MEDICAL CENTER | Age: 32
End: 2022-03-28
Payer: COMMERCIAL

## 2022-03-28 DIAGNOSIS — G43.009 MIGRAINE WITHOUT AURA AND WITHOUT STATUS MIGRAINOSUS, NOT INTRACTABLE: ICD-10-CM

## 2022-03-28 RX ORDER — FREMANEZUMAB-VFRM 225 MG/1.5ML
225 INJECTION SUBCUTANEOUS
Qty: 1 EACH | Refills: 11 | Status: SHIPPED | OUTPATIENT
Start: 2022-03-28 | End: 2022-06-06 | Stop reason: SDUPTHER

## 2022-03-28 NOTE — TELEPHONE ENCOUNTER
The patient has failed 2 medications, Topamax and now Inderal, as per the insurance requirements.  We should be able to get Ajnimcoy authorized on appeal.  The patient was notified, I will have the staff contact her as matters move forwards.

## 2022-04-01 ENCOUNTER — OFFICE VISIT (OUTPATIENT)
Dept: BEHAVIORAL HEALTH | Facility: CLINIC | Age: 32
End: 2022-04-01
Payer: COMMERCIAL

## 2022-04-01 DIAGNOSIS — F33.1 MODERATE EPISODE OF RECURRENT MAJOR DEPRESSIVE DISORDER (HCC): ICD-10-CM

## 2022-04-01 DIAGNOSIS — F41.9 ANXIETY: ICD-10-CM

## 2022-04-01 PROCEDURE — 90791 PSYCH DIAGNOSTIC EVALUATION: CPT | Performed by: MARRIAGE & FAMILY THERAPIST

## 2022-04-01 NOTE — PROGRESS NOTES
[facility]   Initial Assessment    Name: Tonia aXvier  MRN: [id number]  : 1990  Age: 32 y.o.  Date of assessment: 2022  PCP: BRANDON Hinojosa  Persons in attendance: Patient  Total session time: 55 minutes      CHIEF COMPLAINT AND HISTORY OF PRESENTING PROBLEM:  (as stated by Patient):  Tonia Xavier is a 32 y.o., White female referred for assessment by No ref. provider found.  Primary presenting issue includes   Chief Complaint   Patient presents with   • Initial  Evaluation   . Anxiety and depression, sleep.       BEHAVIORAL HEALTH TREATMENT HISTORY  Does patient/parent report a history of prior behavioral health treatment for patient? No:  History of untreated behavioral health issues identified? No  Does patient/parent report change in appetite or weight loss/gain? No  Does patient/parent report physical pain? Yes              Indicate if pain is acute or chronic, and location: knees              Pain scale rating:           FAMILY/SOCIAL HISTORY  Current living situation/household members: Patient lives with  of 13 years and 10, 6, 4 year old children.   Does patient/parent report a family history of behavioral health issues, diagnoses, or treatment?   Family History   Problem Relation Age of Onset   • Diabetes Other    • Hypertension Other    • Diabetes Maternal Grandmother         DM w/ESRD   • Diabetes Maternal Grandfather    • Cancer Maternal Grandfather         prostate, esophageal   • Diabetes Paternal Grandmother         DM on dialysis          EMPLOYMENT/RESOURCES  Is the patient currently employed? Yes  Does the patient/parent report adequate financial resources? Yes       HISTORY:  Does patient report current or past enlistment? No               [If yes, complete below items]  Does patient report history of exposure to combat? No      SPIRITUAL/CULTURAL/IDENTITY:  What are the patient's/family's spiritual beliefs or practices?  Sikhism      ABUSE/NEGLECT/TRAUMA SCREENING  Does patient report feeling “unsafe” in his/her home, or afraid of anyone? No  Does patient report any history of physical, sexual, or emotional abuse? No  Is there evidence of neglect by self? No  Is there evidence of neglect by a caregiver? No                                                                                                          SAFETY ASSESSMENT - SELF  Does patient acknowledge current or past symptoms of dangerousness to self? No  Recent change in frequency/specificity/intensity of suicidal thoughts or self-harm behavior? No  Current access to firearms, medications, or other identified means of suicide/self-harm? No  If yes, willing to restrict access to means of suicide/self-harm? Yes      Current Suicide Risk: Not applicable  Crisis Safety Plan completed and copy given to patient: No      SAFETY ASSESSMENT - OTHERS  Recent change in frequency/specificity/intensity of thoughts or threats to harm others? No  If Yes:  Current access to firearms/other identified means of harm?   If yes, willing to restrict access to weapons/means of harm?     Current Homicide Risk:  Not applicable  Crisis Safety Plan completed and copy given to patient? No  Based on information provided during the current assessment, is a mandated “duty to warn” being exercised? No      SUBSTANCE USE/ADDICTION HISTORY  Patient denies use of any substance/addictive behaviors Yes    If No:  Is there a family history of substance use/addiction? No  Does patient acknowledge or parent/significant other report use of/dependence on substances? No  Last time patient used alcohol: two weeks ago  Within the past week? No  Last time patient used marijuana: n/a  Within the past month? No  Any other street drugs ever tried even once? No  Any use of prescription medications/pills without a prescription, or for reasons others than originally prescribed?  No  Any other addictive behavior reported  (gambling, shopping, sex)? No     Drug History:  Amphetamine:      Cannibis:      Cocaine:      Ecstasy:      Hallucinogen:      Inhalant:       Opiate:      Other:      Sedative:           MENTAL STATUS/OBSERVATIONS              Participation: Active verbal participation, Attentive and Engaged  Grooming: Casual  Orientation:Alert and Fully Oriented   Behavior: Calm  Eye contact: Good          Mood:Euthymic  Affect:Flexible and Full range  Thought process: Logical and Goal-directed  Thought content:  Within normal limits  Speech: Rate within normal limits and Volume within normal limits  Perception: Within normal limits  Memory: No gross evidence of memory deficits  Insight: Adequate  Judgment:  Adequate  Other:               Family/couple interaction observations: n/a      Patient's motivation/readiness for change: The patient reported that she wants to have better communication with her  whom she has known since she was 16 years old.  If the patient discussed poor sleep and increased worries regarding several different topics.  The patient discussed very few self care options.    Topics addressed in psychotherapy include: Worked with the patient on automatic negative thoughts and improving communication skills with her .    Care plan completed: No  Does patient express agreement with the above plan? Yes     Diagnosis:  1. Anxiety        Referral appointment(s) scheduled? No       NEVAEH Waters

## 2022-04-03 RX ORDER — DULOXETIN HYDROCHLORIDE 30 MG/1
30 CAPSULE, DELAYED RELEASE ORAL DAILY
Qty: 90 CAPSULE | Refills: 1 | Status: SHIPPED | OUTPATIENT
Start: 2022-04-03 | End: 2022-06-06 | Stop reason: SDUPTHER

## 2022-04-20 DIAGNOSIS — Z86.39 HISTORY OF IRON DEFICIENCY: ICD-10-CM

## 2022-04-20 DIAGNOSIS — R73.01 IFG (IMPAIRED FASTING GLUCOSE): ICD-10-CM

## 2022-04-20 DIAGNOSIS — E55.9 VITAMIN D DEFICIENCY: ICD-10-CM

## 2022-04-20 DIAGNOSIS — E78.5 HYPERLIPIDEMIA LDL GOAL <100: ICD-10-CM

## 2022-04-20 DIAGNOSIS — Z13.21 ENCOUNTER FOR VITAMIN DEFICIENCY SCREENING: ICD-10-CM

## 2022-05-18 ENCOUNTER — OFFICE VISIT (OUTPATIENT)
Dept: MEDICAL GROUP | Facility: MEDICAL CENTER | Age: 32
End: 2022-05-18
Payer: COMMERCIAL

## 2022-05-18 VITALS
DIASTOLIC BLOOD PRESSURE: 68 MMHG | OXYGEN SATURATION: 98 % | SYSTOLIC BLOOD PRESSURE: 116 MMHG | TEMPERATURE: 97.2 F | BODY MASS INDEX: 30.67 KG/M2 | HEIGHT: 66 IN | HEART RATE: 87 BPM | WEIGHT: 190.8 LBS

## 2022-05-18 DIAGNOSIS — R63.5 WEIGHT GAIN: ICD-10-CM

## 2022-05-18 PROCEDURE — 99213 OFFICE O/P EST LOW 20 MIN: CPT | Performed by: NURSE PRACTITIONER

## 2022-05-18 ASSESSMENT — FIBROSIS 4 INDEX: FIB4 SCORE: 0.38

## 2022-05-19 NOTE — PROGRESS NOTES
Subjective:     Tonia Xavier is a 32 y.o. female who presents with BMI 30.    HPI:   Seen in BMI 30.  She is interested in loosing weight.  She has been on healthy diet.  Drinking more water.  She has seen Dr Galarza's program in the past.  She is interested in restarting her program.  Otherwise she is feeling well.    Patient Active Problem List    Diagnosis Date Noted   • Migraine without aura and without status migrainosus, not intractable 02/13/2022   • Weight gain 02/10/2020   • Generalized abdominal pain 11/11/2019   • Chronic pain of right knee 11/11/2019   • Celiac disease 11/11/2019   • History of iron deficiency 10/14/2019   • H/O Mark's palsy 09/12/2019   • Obesity (BMI 35.0-39.9 without comorbidity) (HCC) 09/12/2019   • Hyperlipidemia LDL goal <100 09/09/2019   • HSV infection 09/07/2019   • Vitamin D deficiency 02/01/2018   • Anxiety 01/11/2018       Current medicines (including changes today)  Current Outpatient Medications   Medication Sig Dispense Refill   • DULoxetine (CYMBALTA) 30 MG Cap DR Particles TAKE 1 CAPSULE BY MOUTH EVERY DAY 90 Capsule 1   • Fremanezumab-vfrm (AJOVY) 225 MG/1.5ML Solution Auto-injector Inject 225 mg under the skin every 4 weeks. 1 Each 11   • valacyclovir (VALTREX) 1 GM Tab TAKE 1 TABLET BY MOUTH EVERY DAY 30 Tablet 2   • desogestrel-ethinyl estradiol (KARIVA) 0.15-0.02/0.01 MG (21/5) per tablet Take 1 Tablet by mouth every day. 28 Tablet 0   • metoclopramide (REGLAN) 10 MG Tab 1-3 tab at headache/nausea onset; repeat in 4-6 hours prn 45 Tablet 1   • cyclobenzaprine (FLEXERIL) 10 mg Tab Take 1 Tablet by mouth every day. CYCLOBENZAPRINE HCL 10 MG TABS 30 Tablet 3   • acyclovir (ZOVIRAX) 5 % Cream Apply 1 Application topically every 3 hours. 5 g 1   • montelukast (SINGULAIR) 10 MG Tab      • Ferrous Sulfate (IRON PO) Take 64 mg by mouth every day.     • ondansetron (ZOFRAN) 4 MG Tab tablet ZOFRAN 4 MG TABS     • fexofenadine (ALLEGRA) 180 MG tablet 24HR  "ALLERGY RELIEF TABS       No current facility-administered medications for this visit.       Allergies   Allergen Reactions   • Benzoin Hives     Rash    • Phentermine      Worsening anxiety, excessive dry mouth, lightheaded       ROS  Constitutional: Negative. Negative for fever, chills, weight loss, malaise/fatigue and diaphoresis.   HENT: Negative. Negative for hearing loss, ear pain, nosebleeds, congestion, sore throat, neck pain, tinnitus and ear discharge.   Respiratory: Negative. Negative for cough, hemoptysis, sputum production, shortness of breath, wheezing and stridor.   Cardiovascular: Negative. Negative for chest pain, palpitations, orthopnea, claudication, leg swelling and PND.   Gastrointestinal: Denies nausea, vomiting, diarrhea, constipation, heartburn, melena or hematochezia.  Genitourinary: Denies dysuria, hematuria, urinary incontinence, frequency or urgency.        Objective:     /68 (BP Location: Right arm, Patient Position: Sitting)   Pulse 87   Temp 36.2 °C (97.2 °F) (Temporal)   Ht 1.676 m (5' 6\")   Wt 86.5 kg (190 lb 12.8 oz)   SpO2 98%  Body mass index is 30.8 kg/m².    Physical Exam:  Vitals reviewed.  Constitutional: Oriented to person, place, and time. appears well-developed and well-nourished. No distress.   Cardiovascular: Normal rate, regular rhythm, normal heart sounds and intact distal pulses. Exam reveals no gallop and no friction rub. No murmur heard. No carotid bruits.   Pulmonary/Chest: Effort normal and breath sounds normal. No stridor. No respiratory distress. no wheezes or rales. exhibits no tenderness.   Musculoskeletal: Normal range of motion. exhibits no edema. joshua pedal pulses 2+.  Lymphadenopathy: No cervical or supraclavicular adenopathy.   Neurological: Alert and oriented to person, place, and time. exhibits normal muscle tone.  Skin: Skin is warm and dry. No diaphoresis.   Psychiatric: Normal mood and affect. Behavior is normal.      Assessment and Plan: "     The following treatment plan was discussed:    1. BMI 30.0-30.9,adult      refer back to Dr Hinson program.  she has done it in the past.  plan: do lab late june.  f/u for review.          Followup: Return in about 2 months (around 7/18/2022).

## 2022-05-27 ENCOUNTER — APPOINTMENT (OUTPATIENT)
Dept: BEHAVIORAL HEALTH | Facility: CLINIC | Age: 32
End: 2022-05-27
Payer: COMMERCIAL

## 2022-06-04 DIAGNOSIS — M54.2 NECK PAIN: ICD-10-CM

## 2022-06-05 RX ORDER — CYCLOBENZAPRINE HCL 10 MG
TABLET ORAL
Qty: 30 TABLET | Refills: 3 | Status: SHIPPED | OUTPATIENT
Start: 2022-06-05 | End: 2022-06-06 | Stop reason: SDUPTHER

## 2022-06-06 ENCOUNTER — PATIENT MESSAGE (OUTPATIENT)
Dept: MEDICAL GROUP | Facility: MEDICAL CENTER | Age: 32
End: 2022-06-06
Payer: COMMERCIAL

## 2022-06-06 DIAGNOSIS — F33.1 MODERATE EPISODE OF RECURRENT MAJOR DEPRESSIVE DISORDER (HCC): ICD-10-CM

## 2022-06-06 DIAGNOSIS — G43.009 MIGRAINE WITHOUT AURA AND WITHOUT STATUS MIGRAINOSUS, NOT INTRACTABLE: ICD-10-CM

## 2022-06-06 DIAGNOSIS — M54.2 NECK PAIN: ICD-10-CM

## 2022-06-06 RX ORDER — FREMANEZUMAB-VFRM 225 MG/1.5ML
225 INJECTION SUBCUTANEOUS
Qty: 1 EACH | Refills: 0 | Status: SHIPPED | OUTPATIENT
Start: 2022-06-06 | End: 2022-06-09 | Stop reason: SDUPTHER

## 2022-06-06 RX ORDER — CYCLOBENZAPRINE HCL 10 MG
10 TABLET ORAL
Qty: 30 TABLET | Refills: 0 | Status: SHIPPED | OUTPATIENT
Start: 2022-06-06 | End: 2022-07-26 | Stop reason: SDUPTHER

## 2022-06-06 RX ORDER — MONTELUKAST SODIUM 10 MG/1
10 TABLET ORAL DAILY
Qty: 30 TABLET | Refills: 0 | Status: SHIPPED | OUTPATIENT
Start: 2022-06-06 | End: 2022-07-26

## 2022-06-06 RX ORDER — METOCLOPRAMIDE 10 MG/1
TABLET ORAL
Qty: 45 TABLET | Refills: 0 | Status: SHIPPED | OUTPATIENT
Start: 2022-06-06 | End: 2022-11-03

## 2022-06-06 RX ORDER — DESOGESTREL AND ETHINYL ESTRADIOL 21-5 (28)
1 KIT ORAL DAILY
Qty: 28 TABLET | Refills: 0 | Status: SHIPPED | OUTPATIENT
Start: 2022-06-06 | End: 2022-08-31 | Stop reason: SDUPTHER

## 2022-06-06 RX ORDER — DULOXETIN HYDROCHLORIDE 30 MG/1
30 CAPSULE, DELAYED RELEASE ORAL DAILY
Qty: 30 CAPSULE | Refills: 0 | Status: SHIPPED | OUTPATIENT
Start: 2022-06-06 | End: 2022-11-04 | Stop reason: SDUPTHER

## 2022-06-09 DIAGNOSIS — G43.009 MIGRAINE WITHOUT AURA AND WITHOUT STATUS MIGRAINOSUS, NOT INTRACTABLE: ICD-10-CM

## 2022-06-09 RX ORDER — FREMANEZUMAB-VFRM 225 MG/1.5ML
225 INJECTION SUBCUTANEOUS
Qty: 1 EACH | Refills: 0 | Status: SHIPPED | OUTPATIENT
Start: 2022-06-09 | End: 2022-07-26 | Stop reason: SDUPTHER

## 2022-06-10 ENCOUNTER — APPOINTMENT (OUTPATIENT)
Dept: BEHAVIORAL HEALTH | Facility: CLINIC | Age: 32
End: 2022-06-10
Payer: COMMERCIAL

## 2022-07-06 RX ORDER — VALACYCLOVIR HYDROCHLORIDE 1 G/1
TABLET, FILM COATED ORAL
Qty: 90 TABLET | Refills: 0 | Status: SHIPPED | OUTPATIENT
Start: 2022-07-06 | End: 2022-07-26 | Stop reason: SDUPTHER

## 2022-07-09 ENCOUNTER — HOSPITAL ENCOUNTER (OUTPATIENT)
Dept: LAB | Facility: MEDICAL CENTER | Age: 32
End: 2022-07-09
Attending: NURSE PRACTITIONER
Payer: COMMERCIAL

## 2022-07-09 DIAGNOSIS — Z13.21 ENCOUNTER FOR VITAMIN DEFICIENCY SCREENING: ICD-10-CM

## 2022-07-09 DIAGNOSIS — R63.5 WEIGHT GAIN: ICD-10-CM

## 2022-07-09 DIAGNOSIS — E55.9 VITAMIN D DEFICIENCY: ICD-10-CM

## 2022-07-09 DIAGNOSIS — E78.5 HYPERLIPIDEMIA LDL GOAL <100: ICD-10-CM

## 2022-07-09 DIAGNOSIS — Z86.39 HISTORY OF IRON DEFICIENCY: ICD-10-CM

## 2022-07-09 DIAGNOSIS — R73.01 IFG (IMPAIRED FASTING GLUCOSE): ICD-10-CM

## 2022-07-09 LAB
25(OH)D3 SERPL-MCNC: 32 NG/ML (ref 30–100)
ALBUMIN SERPL BCP-MCNC: 4.2 G/DL (ref 3.2–4.9)
ALBUMIN/GLOB SERPL: 1.6 G/DL
ALP SERPL-CCNC: 86 U/L (ref 30–99)
ALT SERPL-CCNC: 16 U/L (ref 2–50)
ANION GAP SERPL CALC-SCNC: 12 MMOL/L (ref 7–16)
AST SERPL-CCNC: 18 U/L (ref 12–45)
BASOPHILS # BLD AUTO: 0.6 % (ref 0–1.8)
BASOPHILS # BLD: 0.05 K/UL (ref 0–0.12)
BILIRUB SERPL-MCNC: 0.3 MG/DL (ref 0.1–1.5)
BUN SERPL-MCNC: 9 MG/DL (ref 8–22)
CALCIUM SERPL-MCNC: 8.7 MG/DL (ref 8.5–10.5)
CHLORIDE SERPL-SCNC: 107 MMOL/L (ref 96–112)
CHOLEST SERPL-MCNC: 151 MG/DL (ref 100–199)
CO2 SERPL-SCNC: 21 MMOL/L (ref 20–33)
CREAT SERPL-MCNC: 0.68 MG/DL (ref 0.5–1.4)
CREAT UR-MCNC: 115.4 MG/DL
EOSINOPHIL # BLD AUTO: 0.38 K/UL (ref 0–0.51)
EOSINOPHIL NFR BLD: 4.9 % (ref 0–6.9)
ERYTHROCYTE [DISTWIDTH] IN BLOOD BY AUTOMATED COUNT: 38.9 FL (ref 35.9–50)
EST. AVERAGE GLUCOSE BLD GHB EST-MCNC: 105 MG/DL
FASTING STATUS PATIENT QL REPORTED: NORMAL
FERRITIN SERPL-MCNC: 96.5 NG/ML (ref 10–291)
GFR SERPLBLD CREATININE-BSD FMLA CKD-EPI: 119 ML/MIN/1.73 M 2
GLOBULIN SER CALC-MCNC: 2.7 G/DL (ref 1.9–3.5)
GLUCOSE SERPL-MCNC: 99 MG/DL (ref 65–99)
HBA1C MFR BLD: 5.3 % (ref 4–5.6)
HCT VFR BLD AUTO: 42.8 % (ref 37–47)
HDLC SERPL-MCNC: 30 MG/DL
HGB BLD-MCNC: 14.1 G/DL (ref 12–16)
IMM GRANULOCYTES # BLD AUTO: 0.02 K/UL (ref 0–0.11)
IMM GRANULOCYTES NFR BLD AUTO: 0.3 % (ref 0–0.9)
IRON SATN MFR SERPL: 22 % (ref 15–55)
IRON SERPL-MCNC: 74 UG/DL (ref 40–170)
LDLC SERPL CALC-MCNC: 58 MG/DL
LYMPHOCYTES # BLD AUTO: 2.83 K/UL (ref 1–4.8)
LYMPHOCYTES NFR BLD: 36.6 % (ref 22–41)
MCH RBC QN AUTO: 27.8 PG (ref 27–33)
MCHC RBC AUTO-ENTMCNC: 32.9 G/DL (ref 33.6–35)
MCV RBC AUTO: 84.3 FL (ref 81.4–97.8)
MICROALBUMIN UR-MCNC: <1.2 MG/DL
MICROALBUMIN/CREAT UR: NORMAL MG/G (ref 0–30)
MONOCYTES # BLD AUTO: 0.47 K/UL (ref 0–0.85)
MONOCYTES NFR BLD AUTO: 6.1 % (ref 0–13.4)
NEUTROPHILS # BLD AUTO: 3.99 K/UL (ref 2–7.15)
NEUTROPHILS NFR BLD: 51.5 % (ref 44–72)
NRBC # BLD AUTO: 0 K/UL
NRBC BLD-RTO: 0 /100 WBC
PLATELET # BLD AUTO: 314 K/UL (ref 164–446)
PMV BLD AUTO: 11.5 FL (ref 9–12.9)
POTASSIUM SERPL-SCNC: 4.3 MMOL/L (ref 3.6–5.5)
PROT SERPL-MCNC: 6.9 G/DL (ref 6–8.2)
RBC # BLD AUTO: 5.08 M/UL (ref 4.2–5.4)
SODIUM SERPL-SCNC: 140 MMOL/L (ref 135–145)
T4 FREE SERPL-MCNC: 1.42 NG/DL (ref 0.93–1.7)
TIBC SERPL-MCNC: 336 UG/DL (ref 250–450)
TRIGL SERPL-MCNC: 316 MG/DL (ref 0–149)
TSH SERPL DL<=0.005 MIU/L-ACNC: 0.78 UIU/ML (ref 0.38–5.33)
UIBC SERPL-MCNC: 262 UG/DL (ref 110–370)
VIT B12 SERPL-MCNC: 416 PG/ML (ref 211–911)
WBC # BLD AUTO: 7.7 K/UL (ref 4.8–10.8)

## 2022-07-09 PROCEDURE — 80061 LIPID PANEL: CPT

## 2022-07-09 PROCEDURE — 82306 VITAMIN D 25 HYDROXY: CPT

## 2022-07-09 PROCEDURE — 85025 COMPLETE CBC W/AUTO DIFF WBC: CPT

## 2022-07-09 PROCEDURE — 82043 UR ALBUMIN QUANTITATIVE: CPT

## 2022-07-09 PROCEDURE — 82570 ASSAY OF URINE CREATININE: CPT

## 2022-07-09 PROCEDURE — 80053 COMPREHEN METABOLIC PANEL: CPT

## 2022-07-09 PROCEDURE — 82728 ASSAY OF FERRITIN: CPT

## 2022-07-09 PROCEDURE — 84439 ASSAY OF FREE THYROXINE: CPT

## 2022-07-09 PROCEDURE — 83550 IRON BINDING TEST: CPT

## 2022-07-09 PROCEDURE — 36415 COLL VENOUS BLD VENIPUNCTURE: CPT

## 2022-07-09 PROCEDURE — 83036 HEMOGLOBIN GLYCOSYLATED A1C: CPT

## 2022-07-09 PROCEDURE — 83540 ASSAY OF IRON: CPT

## 2022-07-09 PROCEDURE — 84443 ASSAY THYROID STIM HORMONE: CPT

## 2022-07-09 PROCEDURE — 82607 VITAMIN B-12: CPT

## 2022-07-11 ENCOUNTER — TELEPHONE (OUTPATIENT)
Dept: MEDICAL GROUP | Facility: MEDICAL CENTER | Age: 32
End: 2022-07-11
Payer: COMMERCIAL

## 2022-07-11 NOTE — TELEPHONE ENCOUNTER
----- Message from BRANDON Hinojosa sent at 7/10/2022  1:05 PM PDT -----  Please have pt set appointment to review and discuss treatment for labs.

## 2022-07-11 NOTE — LETTER
July 11, 2022        Tonia Casanova CHRISTINANivia Humberto [facility] [place] [address]        Dear Tonia:    Our records indicate that you are due for your next  in-clinic visit to review lab results. Please give us a call at [phone] and our scheduling team will assist you with scheduling this appointment.      If you have any questions or concerns, please don't hesitate to call.        Sincerely,        COLE Hinojosa.    Electronically Signed

## 2022-07-11 NOTE — LETTER
July 11, 2022        Tonia Casanova [place] H[address]to [facility] [place] [address]        Dear Tonia:    Our records indicate that you are due for your next  in-clinic visit to review lab results. Please give us a call at [phone] and our scheduling team will assist you with scheduling this appointment.       If you have any questions or concerns, please don't hesitate to call.        Sincerely,        COLE Hinojosa.    Electronically Signed

## 2022-07-13 PROCEDURE — RXMED WILLOW AMBULATORY MEDICATION CHARGE: Performed by: NURSE PRACTITIONER

## 2022-07-19 ENCOUNTER — PHARMACY VISIT (OUTPATIENT)
Dept: PHARMACY | Facility: MEDICAL CENTER | Age: 32
End: 2022-07-19
Payer: COMMERCIAL

## 2022-07-26 ENCOUNTER — OFFICE VISIT (OUTPATIENT)
Dept: MEDICAL GROUP | Facility: MEDICAL CENTER | Age: 32
End: 2022-07-26
Payer: COMMERCIAL

## 2022-07-26 VITALS
HEART RATE: 98 BPM | DIASTOLIC BLOOD PRESSURE: 72 MMHG | SYSTOLIC BLOOD PRESSURE: 116 MMHG | BODY MASS INDEX: 29.57 KG/M2 | TEMPERATURE: 97 F | WEIGHT: 184 LBS | OXYGEN SATURATION: 97 % | HEIGHT: 66 IN

## 2022-07-26 DIAGNOSIS — E53.8 DISORDER OF VITAMIN B12: ICD-10-CM

## 2022-07-26 DIAGNOSIS — E55.9 VITAMIN D DEFICIENCY: ICD-10-CM

## 2022-07-26 DIAGNOSIS — G43.009 MIGRAINE WITHOUT AURA AND WITHOUT STATUS MIGRAINOSUS, NOT INTRACTABLE: ICD-10-CM

## 2022-07-26 DIAGNOSIS — E78.5 HYPERLIPIDEMIA LDL GOAL <100: ICD-10-CM

## 2022-07-26 DIAGNOSIS — D50.8 OTHER IRON DEFICIENCY ANEMIA: ICD-10-CM

## 2022-07-26 DIAGNOSIS — M54.2 NECK PAIN: ICD-10-CM

## 2022-07-26 PROCEDURE — 99214 OFFICE O/P EST MOD 30 MIN: CPT | Performed by: NURSE PRACTITIONER

## 2022-07-26 PROCEDURE — RXMED WILLOW AMBULATORY MEDICATION CHARGE: Performed by: NURSE PRACTITIONER

## 2022-07-26 RX ORDER — FREMANEZUMAB-VFRM 225 MG/1.5ML
225 INJECTION SUBCUTANEOUS
Qty: 1 EACH | Refills: 0 | Status: SHIPPED | OUTPATIENT
Start: 2022-07-26 | End: 2022-07-26 | Stop reason: SDUPTHER

## 2022-07-26 RX ORDER — VALACYCLOVIR HYDROCHLORIDE 1 G/1
1000 TABLET, FILM COATED ORAL
Qty: 90 TABLET | Refills: 0 | Status: SHIPPED | OUTPATIENT
Start: 2022-07-26 | End: 2022-12-03 | Stop reason: SDUPTHER

## 2022-07-26 RX ORDER — FREMANEZUMAB-VFRM 225 MG/1.5ML
225 INJECTION SUBCUTANEOUS
Qty: 3 EACH | Refills: 3 | Status: SHIPPED | OUTPATIENT
Start: 2022-07-26 | End: 2022-09-25 | Stop reason: SDUPTHER

## 2022-07-26 ASSESSMENT — FIBROSIS 4 INDEX: FIB4 SCORE: 0.44

## 2022-07-27 RX ORDER — CYCLOBENZAPRINE HCL 10 MG
10 TABLET ORAL
Qty: 30 TABLET | Refills: 0 | Status: SHIPPED | OUTPATIENT
Start: 2022-07-27 | End: 2022-09-25 | Stop reason: SDUPTHER

## 2022-07-27 NOTE — PROGRESS NOTES
Subjective:     Tonia Xavier is a 32 y.o. female who presents with hyperlipidemia.    HPI:   Seen in f/u for hyperlipidemia.  She is down to BMI 29.  She has lost 10 lbs since march.  She is not on basia program.  She is still having issues with cravings.  Will think about weight watchers.  Trying to eat healthy.  Exercising several times weekly but hard to do with childrens activities.   She is on ajovy for her migraines.  Doing very well.  She will see ian next month.  Dec freq of headaches.  Needs RF.   She has not been taking her b12 or vitamin d regularly otc.  She was on fe for irregular menses and fe def.  Has been off for over a month.    Reviewed lab with pt.  GFR, CMP, A1C, alb/cr ratio, ferritin, FE/TIBC, CBC, TSH, T4 is wnl  LP shows trg up to 316.  Did not eat carbs the  Nite before her lab.  HDL is chronically low.  LDL is much improved.  Down from 113 to 58.  This is with healthy diet and exercise.  Vitamin d and b12 are low normal.        Patient Active Problem List    Diagnosis Date Noted   • Migraine without aura and without status migrainosus, not intractable 02/13/2022   • Weight gain 02/10/2020   • Generalized abdominal pain 11/11/2019   • Chronic pain of right knee 11/11/2019   • Celiac disease 11/11/2019   • History of iron deficiency 10/14/2019   • H/O Mark's palsy 09/12/2019   • Obesity (BMI 35.0-39.9 without comorbidity) (HCC) 09/12/2019   • Hyperlipidemia LDL goal <100 09/09/2019   • HSV infection 09/07/2019   • Vitamin D deficiency 02/01/2018   • Anxiety 01/11/2018       Current medicines (including changes today)  Current Outpatient Medications   Medication Sig Dispense Refill   • Fremanezumab-vfrm (AJOVY) 225 MG/1.5ML Solution Auto-injector Inject 225 mg under the skin every 4 weeks. 3 Each 3   • valacyclovir (VALTREX) 1 GM Tab Take 1 Tablet by mouth every day. 90 Tablet 0   • montelukast (SINGULAIR) 10 MG Tab Take 1 tablet by mouth daily 30 Tablet 11   •  "cyclobenzaprine (FLEXERIL) 10 mg Tab Take 1 Tablet by mouth every day. 30 Tablet 0   • desogestrel-ethinyl estradiol (KARIVA) 0.15-0.02/0.01 MG (21/5) per tablet Take 1 Tablet by mouth every day. 28 Tablet 0   • DULoxetine (CYMBALTA) 30 MG Cap DR Particles Take 1 Capsule by mouth every day. 30 Capsule 0   • metoclopramide (REGLAN) 10 MG Tab Take 1-3 tablet at headache/nausea onset; repeat in 4-6 hours as needed 45 Tablet 0   • acyclovir (ZOVIRAX) 5 % Cream Apply 1 Application topically every 3 hours. 5 g 1   • Ferrous Sulfate (IRON PO) Take 64 mg by mouth every day.     • ondansetron (ZOFRAN) 4 MG Tab tablet ZOFRAN 4 MG TABS     • fexofenadine (ALLEGRA) 180 MG tablet 24HR ALLERGY RELIEF TABS       No current facility-administered medications for this visit.       Allergies   Allergen Reactions   • Benzoin Hives     Rash    • Phentermine      Worsening anxiety, excessive dry mouth, lightheaded       ROS  Constitutional: Negative. Negative for fever, chills, weight loss, malaise/fatigue and diaphoresis.   HENT: Negative. Negative for hearing loss, ear pain, nosebleeds, congestion, sore throat, neck pain, tinnitus and ear discharge.   Respiratory: Negative. Negative for cough, hemoptysis, sputum production, shortness of breath, wheezing and stridor.   Cardiovascular: Negative. Negative for chest pain, palpitations, orthopnea, claudication, leg swelling and PND.   Gastrointestinal: Denies nausea, vomiting, diarrhea, constipation, heartburn, melena or hematochezia.  Genitourinary: Denies dysuria, hematuria, urinary incontinence, frequency or urgency.        Objective:     /72   Pulse 98   Temp 36.1 °C (97 °F) (Temporal)   Ht 1.676 m (5' 6\")   Wt 83.5 kg (184 lb)   SpO2 97%  Body mass index is 29.7 kg/m².    Physical Exam:  Vitals reviewed.  Constitutional: Oriented to person, place, and time. appears well-developed and well-nourished. No distress.   Cardiovascular: Normal rate, regular rhythm, normal heart " sounds and intact distal pulses. Exam reveals no gallop and no friction rub. No murmur heard. No carotid bruits.   Pulmonary/Chest: Effort normal and breath sounds normal. No stridor. No respiratory distress. no wheezes or rales. exhibits no tenderness.   Musculoskeletal: Normal range of motion. exhibits no edema. joshua pedal pulses 2+.  Lymphadenopathy: No cervical or supraclavicular adenopathy.   Neurological: Alert and oriented to person, place, and time. exhibits normal muscle tone.  Skin: Skin is warm and dry. No diaphoresis.   Psychiatric: Normal mood and affect. Behavior is normal.      Assessment and Plan:     The following treatment plan was discussed:    1. Hyperlipidemia LDL goal <100  Lipid Profile    trg very high at over 300.  dec carbs in diet. continue regular exercise.  fernandez LP in 3 mo.  f/u for review   2. Migraine without aura and without status migrainosus, not intractable  Fremanezumab-vfrm (AJOVY) 225 MG/1.5ML Solution Auto-injector    migraines improved on ajovy.  rf med.  will see ian as sched   3. Other iron deficiency anemia  CBC WITH DIFFERENTIAL    FERRITIN    IRON/TOTAL IRON BIND    has been off fe for 1 mo.  current lab wnl but will recheck ferritin, FE/TIBC, CBC in 3 mo.  f/u for review.     4. Vitamin D deficiency  VITAMIN D,25 HYDROXY    vitamin d low normal.  take d3 2000 units daily.  recheck lab 3 mo.  f/u for review   5. Disorder of vitamin B12  VITAMIN B12    b12 low normal in 400's.  take b12 1000 mcg daily.  recheck lab 3 mo.  fu for review.    6. BMI 29.0-29.9,adult      continue healthy diet and regular exercise         Followup: Return in about 3 months (around 10/26/2022).

## 2022-07-28 ENCOUNTER — PHARMACY VISIT (OUTPATIENT)
Dept: PHARMACY | Facility: MEDICAL CENTER | Age: 32
End: 2022-07-28
Payer: COMMERCIAL

## 2022-08-12 ENCOUNTER — OFFICE VISIT (OUTPATIENT)
Dept: NEUROLOGY | Facility: MEDICAL CENTER | Age: 32
End: 2022-08-12
Attending: PSYCHIATRY & NEUROLOGY
Payer: COMMERCIAL

## 2022-08-12 VITALS
BODY MASS INDEX: 30.89 KG/M2 | HEIGHT: 65 IN | SYSTOLIC BLOOD PRESSURE: 98 MMHG | WEIGHT: 185.41 LBS | TEMPERATURE: 96.9 F | OXYGEN SATURATION: 97 % | DIASTOLIC BLOOD PRESSURE: 62 MMHG | HEART RATE: 82 BPM

## 2022-08-12 DIAGNOSIS — G43.009 MIGRAINE WITHOUT AURA AND WITHOUT STATUS MIGRAINOSUS, NOT INTRACTABLE: Primary | ICD-10-CM

## 2022-08-12 PROCEDURE — 99213 OFFICE O/P EST LOW 20 MIN: CPT | Performed by: PSYCHIATRY & NEUROLOGY

## 2022-08-12 PROCEDURE — 99212 OFFICE O/P EST SF 10 MIN: CPT | Performed by: PSYCHIATRY & NEUROLOGY

## 2022-08-12 RX ORDER — RIZATRIPTAN BENZOATE 10 MG/1
TABLET, ORALLY DISINTEGRATING ORAL
Qty: 12 TABLET | Refills: 6 | Status: SHIPPED | OUTPATIENT
Start: 2022-08-12 | End: 2022-11-03

## 2022-08-12 ASSESSMENT — FIBROSIS 4 INDEX: FIB4 SCORE: 0.44

## 2022-08-12 ASSESSMENT — ENCOUNTER SYMPTOMS: HEADACHES: 1

## 2022-08-12 NOTE — PROGRESS NOTES
"Subjective     Tonia Xavier is a 32 y.o. female who presents for follow-up, with a history of migraine without aura, now with improved control on Ajovy injections.     HPI    When seen 6 months ago, though I had wanted to start Ajovy, her insurance balked.  We would then require to failed 2 trials of other medications including both Topamax and Inderal, having done so, she is on Ajovy injections for the last 4 months.  With this, she has had significant headache improvement, she has had to reach for rescue maybe 3 times in the last 4 months.  The moderate, more severe headaches also have improved, she can actually have days at a time without headache at all.    She was given samples of Reyvow 100 mg tablets though she never took them.  She uses Motrin (despite NSAID intolerance), and Reglan only if she is nauseated.  She tolerates the Ajovy without issues of injection site reaction, depression, constipation, hair loss, irritability, dizziness, or shortness of breath.    Medical, surgical and family histories are reviewed, there are no new drug allergies.  She remains on birth control, the traditional 3-week on, 1 week of placebo, there is no association of headaches with the placebo week.  Other than the Ajovy and Reglan, she is on her Kariva BCP, Cymbalta 30 mg daily, Flexeril, Valtrex, Allegra and Singulair.    Review of Systems   Neurological:  Positive for headaches.   All other systems reviewed and are negative.    Objective     BP (!) 98/62   Pulse 82   Temp 36.1 °C (96.9 °F) (Temporal)   Ht 1.651 m (5' 5\")   Wt 84.1 kg (185 lb 6.5 oz)   SpO2 97%   BMI 30.85 kg/m²      Physical Exam    She appears in no acute distress.  Her vital signs are stable.  There is no malar rash, temporal or jaw tenderness, jaw claudication, or allodynia.  The occipital ridge is nontender bilaterally.  There is no spasm of the cervical paraspinal and trapezius muscle bodies.  Range of motion of the cervical spine is " full.  Carotid pulses are present bilaterally without asymmetry.  Cardiac evaluation reveals a regular rhythm.  There is no lower extremity edema.     Neurological Exam    Including mental status, cranial nerves, musculoskeletal, reflex, coordination, and since evaluations, her full neurologic examination is done and reveals no evidence of deficits or toxicities.  This is unchanged from her initial evaluation of 6 months ago.    Assessment & Plan     1. Migraine without aura and without status migrainosus, not intractable  She is doing much better on the Ajovy, we will continue the medication for now.  Maxalt-MLT 10 mg will be used as rescue along with the Reglan.  Given the nausea that occurs with her headaches early on, the dissolvable wafer formulation may be better tolerated.  It can be used with pain onset with Reglan, but the Maxalt can then be repeated every hour up to 3 tablets.  Side effects were reviewed.    She was told to look around home for the Reyvow samples, if she does have them, we can then substitute this if the Maxalt-MLT proves ineffective.  We will communicate via [employer], otherwise we can follow-up in 6 months.    - rizatriptan (MAXALT-MLT) 10 MG disintegrating tablet; 1 tab at headache onset; repeat 1 tab every hour up to #4 tab/24 hours  Dispense: 12 Tablet; Refill: 6    Time: 20 minutes in total spent on patient care including pre-charting, record review, discussion with healthcare staff and documentation.  This includes face-to-face time for exam, review, discussion, as well as counseling and coordinating care.

## 2022-08-22 ENCOUNTER — PHARMACY VISIT (OUTPATIENT)
Dept: PHARMACY | Facility: MEDICAL CENTER | Age: 32
End: 2022-08-22
Payer: COMMERCIAL

## 2022-08-22 PROCEDURE — RXMED WILLOW AMBULATORY MEDICATION CHARGE: Performed by: PSYCHIATRY & NEUROLOGY

## 2022-08-22 PROCEDURE — RXMED WILLOW AMBULATORY MEDICATION CHARGE: Performed by: NURSE PRACTITIONER

## 2022-09-10 ENCOUNTER — PHARMACY VISIT (OUTPATIENT)
Dept: PHARMACY | Facility: MEDICAL CENTER | Age: 32
End: 2022-09-10
Payer: COMMERCIAL

## 2022-09-10 PROCEDURE — RXMED WILLOW AMBULATORY MEDICATION CHARGE: Performed by: NURSE PRACTITIONER

## 2022-09-25 DIAGNOSIS — M54.2 NECK PAIN: ICD-10-CM

## 2022-09-25 DIAGNOSIS — G43.009 MIGRAINE WITHOUT AURA AND WITHOUT STATUS MIGRAINOSUS, NOT INTRACTABLE: ICD-10-CM

## 2022-09-26 PROCEDURE — RXMED WILLOW AMBULATORY MEDICATION CHARGE: Performed by: NURSE PRACTITIONER

## 2022-09-26 RX ORDER — FREMANEZUMAB-VFRM 225 MG/1.5ML
225 INJECTION SUBCUTANEOUS
Qty: 3 EACH | Refills: 3 | Status: SHIPPED | OUTPATIENT
Start: 2022-09-26 | End: 2022-10-02 | Stop reason: SDUPTHER

## 2022-09-27 PROCEDURE — RXMED WILLOW AMBULATORY MEDICATION CHARGE: Performed by: NURSE PRACTITIONER

## 2022-09-27 RX ORDER — CYCLOBENZAPRINE HCL 10 MG
10 TABLET ORAL
Qty: 30 TABLET | Refills: 0 | Status: SHIPPED | OUTPATIENT
Start: 2022-09-27 | End: 2022-11-04 | Stop reason: SDUPTHER

## 2022-09-27 NOTE — TELEPHONE ENCOUNTER
Received request via: Pharmacy    Was the patient seen in the last year in this department? Yes, LOV: 08/12/2022    Does the patient have an active prescription (recently filled or refills available) for medication(s) requested? No

## 2022-09-29 ENCOUNTER — PHARMACY VISIT (OUTPATIENT)
Dept: PHARMACY | Facility: MEDICAL CENTER | Age: 32
End: 2022-09-29
Payer: COMMERCIAL

## 2022-10-02 DIAGNOSIS — G43.009 MIGRAINE WITHOUT AURA AND WITHOUT STATUS MIGRAINOSUS, NOT INTRACTABLE: ICD-10-CM

## 2022-10-02 RX ORDER — FREMANEZUMAB-VFRM 225 MG/1.5ML
225 INJECTION SUBCUTANEOUS
Qty: 3 EACH | Refills: 0 | Status: SHIPPED | OUTPATIENT
Start: 2022-10-02 | End: 2023-01-03

## 2022-10-03 ENCOUNTER — IMMUNIZATION (OUTPATIENT)
Dept: OCCUPATIONAL MEDICINE | Facility: CLINIC | Age: 32
End: 2022-10-03

## 2022-10-03 DIAGNOSIS — Z23 NEED FOR VACCINATION: Primary | ICD-10-CM

## 2022-10-03 PROCEDURE — 90686 IIV4 VACC NO PRSV 0.5 ML IM: CPT | Performed by: NURSE PRACTITIONER

## 2022-10-24 ENCOUNTER — HOSPITAL ENCOUNTER (OUTPATIENT)
Dept: LAB | Facility: MEDICAL CENTER | Age: 32
End: 2022-10-24
Attending: NURSE PRACTITIONER
Payer: COMMERCIAL

## 2022-10-24 DIAGNOSIS — D50.8 OTHER IRON DEFICIENCY ANEMIA: ICD-10-CM

## 2022-10-24 DIAGNOSIS — E53.8 DISORDER OF VITAMIN B12: ICD-10-CM

## 2022-10-24 DIAGNOSIS — E78.5 HYPERLIPIDEMIA LDL GOAL <100: ICD-10-CM

## 2022-10-24 DIAGNOSIS — E55.9 VITAMIN D DEFICIENCY: ICD-10-CM

## 2022-10-24 LAB
25(OH)D3 SERPL-MCNC: 25 NG/ML (ref 30–100)
BASOPHILS # BLD AUTO: 0.7 % (ref 0–1.8)
BASOPHILS # BLD: 0.06 K/UL (ref 0–0.12)
CHOLEST SERPL-MCNC: 146 MG/DL (ref 100–199)
EOSINOPHIL # BLD AUTO: 0.24 K/UL (ref 0–0.51)
EOSINOPHIL NFR BLD: 2.8 % (ref 0–6.9)
ERYTHROCYTE [DISTWIDTH] IN BLOOD BY AUTOMATED COUNT: 38.7 FL (ref 35.9–50)
FASTING STATUS PATIENT QL REPORTED: NORMAL
FERRITIN SERPL-MCNC: 87.9 NG/ML (ref 10–291)
HCT VFR BLD AUTO: 40.4 % (ref 37–47)
HDLC SERPL-MCNC: 34 MG/DL
HGB BLD-MCNC: 13.5 G/DL (ref 12–16)
IMM GRANULOCYTES # BLD AUTO: 0.03 K/UL (ref 0–0.11)
IMM GRANULOCYTES NFR BLD AUTO: 0.3 % (ref 0–0.9)
IRON SATN MFR SERPL: 20 % (ref 15–55)
IRON SERPL-MCNC: 67 UG/DL (ref 40–170)
LDLC SERPL CALC-MCNC: 60 MG/DL
LYMPHOCYTES # BLD AUTO: 2.41 K/UL (ref 1–4.8)
LYMPHOCYTES NFR BLD: 28 % (ref 22–41)
MCH RBC QN AUTO: 27.8 PG (ref 27–33)
MCHC RBC AUTO-ENTMCNC: 33.4 G/DL (ref 33.6–35)
MCV RBC AUTO: 83.3 FL (ref 81.4–97.8)
MONOCYTES # BLD AUTO: 0.46 K/UL (ref 0–0.85)
MONOCYTES NFR BLD AUTO: 5.3 % (ref 0–13.4)
NEUTROPHILS # BLD AUTO: 5.4 K/UL (ref 2–7.15)
NEUTROPHILS NFR BLD: 62.9 % (ref 44–72)
NRBC # BLD AUTO: 0 K/UL
NRBC BLD-RTO: 0 /100 WBC
PLATELET # BLD AUTO: 313 K/UL (ref 164–446)
PMV BLD AUTO: 11.9 FL (ref 9–12.9)
RBC # BLD AUTO: 4.85 M/UL (ref 4.2–5.4)
TIBC SERPL-MCNC: 339 UG/DL (ref 250–450)
TRIGL SERPL-MCNC: 261 MG/DL (ref 0–149)
UIBC SERPL-MCNC: 272 UG/DL (ref 110–370)
VIT B12 SERPL-MCNC: 437 PG/ML (ref 211–911)
WBC # BLD AUTO: 8.6 K/UL (ref 4.8–10.8)

## 2022-10-24 PROCEDURE — 83550 IRON BINDING TEST: CPT

## 2022-10-24 PROCEDURE — 82728 ASSAY OF FERRITIN: CPT

## 2022-10-24 PROCEDURE — 83540 ASSAY OF IRON: CPT

## 2022-10-24 PROCEDURE — 82607 VITAMIN B-12: CPT

## 2022-10-24 PROCEDURE — 85025 COMPLETE CBC W/AUTO DIFF WBC: CPT

## 2022-10-24 PROCEDURE — 36415 COLL VENOUS BLD VENIPUNCTURE: CPT

## 2022-10-24 PROCEDURE — 80061 LIPID PANEL: CPT

## 2022-10-24 PROCEDURE — 82306 VITAMIN D 25 HYDROXY: CPT

## 2022-10-25 DIAGNOSIS — G43.009 MIGRAINE WITHOUT AURA AND WITHOUT STATUS MIGRAINOSUS, NOT INTRACTABLE: ICD-10-CM

## 2022-10-25 RX ORDER — RIMEGEPANT SULFATE 75 MG/75MG
1 TABLET, ORALLY DISINTEGRATING ORAL PRN
Qty: 10 TABLET | Refills: 5 | Status: SHIPPED | OUTPATIENT
Start: 2022-10-25 | End: 2023-01-03

## 2022-11-03 ENCOUNTER — OFFICE VISIT (OUTPATIENT)
Dept: MEDICAL GROUP | Facility: MEDICAL CENTER | Age: 32
End: 2022-11-03
Payer: COMMERCIAL

## 2022-11-03 VITALS
HEIGHT: 65 IN | WEIGHT: 185.4 LBS | BODY MASS INDEX: 30.89 KG/M2 | HEART RATE: 96 BPM | OXYGEN SATURATION: 94 % | DIASTOLIC BLOOD PRESSURE: 66 MMHG | RESPIRATION RATE: 16 BRPM | TEMPERATURE: 97.9 F | SYSTOLIC BLOOD PRESSURE: 110 MMHG

## 2022-11-03 DIAGNOSIS — E53.8 DISORDER OF VITAMIN B12: ICD-10-CM

## 2022-11-03 DIAGNOSIS — G43.009 MIGRAINE WITHOUT AURA AND WITHOUT STATUS MIGRAINOSUS, NOT INTRACTABLE: ICD-10-CM

## 2022-11-03 DIAGNOSIS — E55.9 VITAMIN D DEFICIENCY: ICD-10-CM

## 2022-11-03 DIAGNOSIS — E78.5 HYPERLIPIDEMIA LDL GOAL <100: ICD-10-CM

## 2022-11-03 PROCEDURE — RXMED WILLOW AMBULATORY MEDICATION CHARGE: Performed by: NURSE PRACTITIONER

## 2022-11-03 PROCEDURE — 99214 OFFICE O/P EST MOD 30 MIN: CPT | Performed by: NURSE PRACTITIONER

## 2022-11-03 ASSESSMENT — FIBROSIS 4 INDEX: FIB4 SCORE: 0.45

## 2022-11-03 NOTE — PROGRESS NOTES
Subjective:     Tonia Xavier is a 32 y.o. female who presents with BMI 30.    HPI:   Seen in f/u BMI 30.    She is still being followed by Anabelle for her migraines.  Now on ajovy since insurance won't pay for nurtec yet.  Failed maxalt.  She is interested in weight loss.  She has tried and failed weight watchers, dr lua program with phentermine.  She is trying to do low carb now.  Not able to exercise d/t knee pain.  She has had 6 surgeries on rt knee which makes exercise difficult.  She does not have access to a pool.  Reviewed lab iwth pt.  Her vitamin d is low and B12 is low normal.  She just started otc supplement with b12 and vitamin d.  Lab was done just after starting supplements.  Since starting supplements she does feel sl improved with her energy levels.  CBC, ferritin, FE/TIBC IS WNL  LP shows trg down from 316 to 261.  She is trying to do low carb diet.  HDL is chronically low in 30's.  LDL is at goal at 60.  Goal is <130.      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 1.5 mL 0    Fremanezumab-vfrm (AJOVY) 225 MG/1.5ML Solution Auto-injector Inject 225 mg under the skin every 4 weeks. 3 Each 0    cyclobenzaprine (FLEXERIL) 10 mg Tab Take 1 Tablet by mouth every day. 30  "Tablet 0    desogestrel-ethinyl estradiol (KARIVA) 0.15-0.02/0.01 MG (21/5) per tablet Take 1 Tablet by mouth every day. 84 Tablet 0    valacyclovir (VALTREX) 1 GM Tab Take 1 Tablet by mouth every day. 90 Tablet 0    montelukast (SINGULAIR) 10 MG Tab Take 1 tablet by mouth daily 30 Tablet 11    DULoxetine (CYMBALTA) 30 MG Cap DR Particles Take 1 Capsule by mouth every day. 30 Capsule 0    acyclovir (ZOVIRAX) 5 % Cream Apply 1 Application topically every 3 hours. 5 g 1    ondansetron (ZOFRAN) 4 MG Tab tablet ZOFRAN 4 MG TABS      fexofenadine (ALLEGRA) 180 MG tablet 24HR ALLERGY RELIEF TABS       No current facility-administered medications for this visit.       Allergies   Allergen Reactions    Benzoin Hives     Rash     Phentermine      Worsening anxiety, excessive dry mouth, lightheaded       ROS  Constitutional: Negative. Negative for fever, chills, weight loss, malaise/fatigue and diaphoresis.   HENT: Negative. Negative for hearing loss, ear pain, nosebleeds, congestion, sore throat, neck pain, tinnitus and ear discharge.   Respiratory: Negative. Negative for cough, hemoptysis, sputum production, shortness of breath, wheezing and stridor.   Cardiovascular: Negative. Negative for chest pain, palpitations, orthopnea, claudication, leg swelling and PND.   Gastrointestinal: Denies nausea, vomiting, diarrhea, constipation, heartburn, melena or hematochezia.  Genitourinary: Denies dysuria, hematuria, urinary incontinence, frequency or urgency.        Objective:     /66   Pulse 96   Temp 36.6 °C (97.9 °F) (Temporal)   Resp 16   Ht 1.651 m (5' 5\")   Wt 84.1 kg (185 lb 6.4 oz)   SpO2 94%  Body mass index is 30.85 kg/m².    Physical Exam:  Vitals reviewed.  Constitutional: Oriented to person, place, and time. appears well-developed and well-nourished. No distress.   Cardiovascular: Normal rate, regular rhythm, normal heart sounds and intact distal pulses. Exam reveals no gallop and no friction rub. No murmur " heard. No carotid bruits.   Pulmonary/Chest: Effort normal and breath sounds normal. No stridor. No respiratory distress. no wheezes or rales. exhibits no tenderness.   Musculoskeletal: Normal range of motion. exhibits no edema. joshua pedal pulses 2+.  Lymphadenopathy: No cervical or supraclavicular adenopathy.   Neurological: Alert and oriented to person, place, and time. exhibits normal muscle tone.  Skin: Skin is warm and dry. No diaphoresis.   Psychiatric: Normal mood and affect. Behavior is normal.      Assessment and Plan:     The following treatment plan was discussed:    1. BMI 30.0-30.9,adult  Semaglutide,0.25 or 0.5MG/DOS, 2 MG/1.5ML Solution Pen-injector    try ozempic 0.25 mg if insurance will cover.  f/u 1 mo for wt check      2. Hyperlipidemia LDL goal <100      enc pt to improve healthy diet and exercise as able.  trg better but not at goal.  HDL chronic low. LDL at goal.  not on statin.       3. Vitamin D deficiency      just started otc supplement of b12 and vitamin d.  plan: recheck lab with CMP, LP, D, B12 in 6 mo.  call for lab slip      4. Disorder of vitamin B12        5. Migraine without aura and without status migrainosus, not intractable      current on ajovy.  followed by Anabelle            Followup: Return in about 4 weeks (around 12/1/2022), or for wt check on ozempic/wegovy.

## 2022-11-04 ENCOUNTER — PHARMACY VISIT (OUTPATIENT)
Dept: PHARMACY | Facility: MEDICAL CENTER | Age: 32
End: 2022-11-04
Payer: COMMERCIAL

## 2022-11-04 DIAGNOSIS — F33.1 MODERATE EPISODE OF RECURRENT MAJOR DEPRESSIVE DISORDER (HCC): ICD-10-CM

## 2022-11-04 DIAGNOSIS — M54.2 NECK PAIN: ICD-10-CM

## 2022-11-04 PROCEDURE — RXMED WILLOW AMBULATORY MEDICATION CHARGE: Performed by: NURSE PRACTITIONER

## 2022-11-06 PROCEDURE — RXMED WILLOW AMBULATORY MEDICATION CHARGE: Performed by: NURSE PRACTITIONER

## 2022-11-06 RX ORDER — CYCLOBENZAPRINE HCL 10 MG
10 TABLET ORAL
Qty: 30 TABLET | Refills: 0 | Status: SHIPPED | OUTPATIENT
Start: 2022-11-06 | End: 2022-12-03 | Stop reason: SDUPTHER

## 2022-11-06 RX ORDER — DULOXETIN HYDROCHLORIDE 30 MG/1
30 CAPSULE, DELAYED RELEASE ORAL DAILY
Qty: 30 CAPSULE | Refills: 0 | Status: SHIPPED | OUTPATIENT
Start: 2022-11-06 | End: 2022-12-03 | Stop reason: SDUPTHER

## 2022-11-07 ENCOUNTER — HOSPITAL ENCOUNTER (OUTPATIENT)
Dept: RADIOLOGY | Facility: MEDICAL CENTER | Age: 32
End: 2022-11-07
Attending: ORTHOPAEDIC SURGERY
Payer: COMMERCIAL

## 2022-11-07 DIAGNOSIS — S83.511D RUPTURE OF ANTERIOR CRUCIATE LIGAMENT OF RIGHT KNEE, SUBSEQUENT ENCOUNTER: ICD-10-CM

## 2022-11-07 DIAGNOSIS — M94.261 CHONDROMALACIA, KNEE, RIGHT: ICD-10-CM

## 2022-11-07 PROCEDURE — 73721 MRI JNT OF LWR EXTRE W/O DYE: CPT

## 2022-11-11 ENCOUNTER — PHARMACY VISIT (OUTPATIENT)
Dept: PHARMACY | Facility: MEDICAL CENTER | Age: 32
End: 2022-11-11
Payer: COMMERCIAL

## 2022-11-21 ENCOUNTER — TELEPHONE (OUTPATIENT)
Dept: MEDICAL GROUP | Facility: MEDICAL CENTER | Age: 32
End: 2022-11-21
Payer: COMMERCIAL

## 2022-11-21 NOTE — TELEPHONE ENCOUNTER
Sent to plan   Suturegard Intro: Intraoperative tissue expansion was performed, utilizing the SUTUREGARD device, in order to reduce wound tension.

## 2022-11-21 NOTE — TELEPHONE ENCOUNTER
----- Message from Yelena Grande, Med Ass't sent at 11/9/2022  5:19 PM PST -----    ----- Message -----  From: BRANDON Hinojosa  Sent: 11/3/2022  10:29 AM PST  To: Yelena Grande, Med Ass't, So Sincere Jules    Please do PA for joceline       Quality 431: Preventive Care And Screening: Unhealthy Alcohol Use - Screening: Patient screened for unhealthy alcohol use using a single question and scores less than 2 times per year Quality 226: Preventive Care And Screening: Tobacco Use: Screening And Cessation Intervention: Patient screened for tobacco and never smoked Detail Level: Detailed Quality 110: Preventive Care And Screening: Influenza Immunization: Influenza Immunization not Administered because Patient Refused. Quality 130: Documentation Of Current Medications In The Medical Record: Current Medications Documented

## 2022-11-29 ENCOUNTER — OFFICE VISIT (OUTPATIENT)
Dept: MEDICAL GROUP | Facility: MEDICAL CENTER | Age: 32
End: 2022-11-29
Payer: COMMERCIAL

## 2022-11-29 VITALS
WEIGHT: 187.6 LBS | HEIGHT: 65 IN | TEMPERATURE: 97.3 F | SYSTOLIC BLOOD PRESSURE: 110 MMHG | OXYGEN SATURATION: 96 % | BODY MASS INDEX: 31.25 KG/M2 | HEART RATE: 89 BPM | RESPIRATION RATE: 16 BRPM | DIASTOLIC BLOOD PRESSURE: 74 MMHG

## 2022-11-29 PROCEDURE — 99213 OFFICE O/P EST LOW 20 MIN: CPT | Performed by: NURSE PRACTITIONER

## 2022-11-29 PROCEDURE — RXMED WILLOW AMBULATORY MEDICATION CHARGE: Performed by: NURSE PRACTITIONER

## 2022-11-29 ASSESSMENT — FIBROSIS 4 INDEX: FIB4 SCORE: 0.45

## 2022-11-29 NOTE — PROGRESS NOTES
Subjective:     Tonia Xavier is a 32 y.o. female who presents with BMI.    HPI:   Seen in f/u for BMI.  She was started on ozempic 0.25 mg one month ago.  No s/e on med.  Unfortunately didn't loose weight.  Seh is exercising regularly.  She is on healthy low carb no sugar diet.  She has gained 2 lbs since last appt.    Otherwise feeling well.     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 1.5 mL 1    DULoxetine (CYMBALTA) 30 MG Cap DR Particles Take 1 Capsule by mouth every day. 30 Capsule 0    cyclobenzaprine (FLEXERIL) 10 mg Tab Take 1 Tablet by mouth every day. 30 Tablet 0    Fremanezumab-vfrm (AJOVY) 225 MG/1.5ML Solution Auto-injector Inject 225 mg under the skin every 4 weeks. 3 Each 0    desogestrel-ethinyl estradiol (KARIVA) 0.15-0.02/0.01 MG (21/5) per tablet Take 1 Tablet by mouth every day. 84 Tablet 0    valacyclovir (VALTREX) 1 GM Tab Take 1 Tablet by mouth every day. 90 Tablet 0    montelukast (SINGULAIR) 10 MG Tab Take 1 tablet by mouth daily 30 Tablet 11    acyclovir (ZOVIRAX) 5 % Cream Apply 1 Application topically every 3 hours. 5 g 1    ondansetron (ZOFRAN) 4 MG Tab tablet ZOFRAN 4 MG TABS      fexofenadine (ALLEGRA) 180 MG tablet 24HR ALLERGY RELIEF TABS       No current  "facility-administered medications for this visit.       Allergies   Allergen Reactions    Benzoin Hives     Rash     Phentermine      Worsening anxiety, excessive dry mouth, lightheaded       ROS  Constitutional: Negative. Negative for fever, chills, weight loss, malaise/fatigue and diaphoresis.   HENT: Negative. Negative for hearing loss, ear pain, nosebleeds, congestion, sore throat, neck pain, tinnitus and ear discharge.   Respiratory: Negative. Negative for cough, hemoptysis, sputum production, shortness of breath, wheezing and stridor.   Cardiovascular: Negative. Negative for chest pain, palpitations, orthopnea, claudication, leg swelling and PND.   Gastrointestinal: Denies nausea, vomiting, diarrhea, constipation, heartburn, melena or hematochezia.  Genitourinary: Denies dysuria, hematuria, urinary incontinence, frequency or urgency.        Objective:     /74   Pulse 89   Temp 36.3 °C (97.3 °F) (Temporal)   Resp 16   Ht 1.651 m (5' 5\")   Wt 85.1 kg (187 lb 9.6 oz)   SpO2 96%  Body mass index is 31.22 kg/m².    Physical Exam:  Vitals reviewed.  Constitutional: Oriented to person, place, and time. appears well-developed and well-nourished. No distress.   Cardiovascular: Normal rate, regular rhythm, normal heart sounds and intact distal pulses. Exam reveals no gallop and no friction rub. No murmur heard. No carotid bruits.   Pulmonary/Chest: Effort normal and breath sounds normal. No stridor. No respiratory distress. no wheezes or rales. exhibits no tenderness.   Musculoskeletal: Normal range of motion. exhibits no edema. joshua pedal pulses 2+.  Lymphadenopathy: No cervical or supraclavicular adenopathy.   Neurological: Alert and oriented to person, place, and time. exhibits normal muscle tone.  Skin: Skin is warm and dry. No diaphoresis.   Psychiatric: Normal mood and affect. Behavior is normal.      Assessment and Plan:     The following treatment plan was discussed:    1. BMI 31.0-31.9,adult  " Semaglutide,0.25 or 0.5MG/DOS, 2 MG/1.5ML Solution Pen-injector    increase ozempic to 0.5 mg.  RF med.  continue healthy diet and regular exercise.  f/u 1 mo for wt ck. consider other tx if no wt lost            Followup: Return in about 4 weeks (around 12/27/2022), or for wt check.

## 2022-11-30 ENCOUNTER — PHARMACY VISIT (OUTPATIENT)
Dept: PHARMACY | Facility: MEDICAL CENTER | Age: 32
End: 2022-11-30
Payer: COMMERCIAL

## 2022-12-01 ENCOUNTER — PHARMACY VISIT (OUTPATIENT)
Dept: PHARMACY | Facility: MEDICAL CENTER | Age: 32
End: 2022-12-01
Payer: COMMERCIAL

## 2022-12-01 PROCEDURE — RXMED WILLOW AMBULATORY MEDICATION CHARGE: Performed by: INTERNAL MEDICINE

## 2022-12-03 DIAGNOSIS — M54.2 NECK PAIN: ICD-10-CM

## 2022-12-03 DIAGNOSIS — G43.009 MIGRAINE WITHOUT AURA AND WITHOUT STATUS MIGRAINOSUS, NOT INTRACTABLE: ICD-10-CM

## 2022-12-03 DIAGNOSIS — F33.1 MODERATE EPISODE OF RECURRENT MAJOR DEPRESSIVE DISORDER (HCC): ICD-10-CM

## 2022-12-03 PROCEDURE — RXMED WILLOW AMBULATORY MEDICATION CHARGE: Performed by: NURSE PRACTITIONER

## 2022-12-05 PROCEDURE — RXMED WILLOW AMBULATORY MEDICATION CHARGE: Performed by: NURSE PRACTITIONER

## 2022-12-05 RX ORDER — DESOGESTREL AND ETHINYL ESTRADIOL 21-5 (28)
1 KIT ORAL DAILY
Qty: 84 TABLET | Refills: 0 | Status: SHIPPED | OUTPATIENT
Start: 2022-12-05 | End: 2023-02-16 | Stop reason: SDUPTHER

## 2022-12-05 RX ORDER — DULOXETIN HYDROCHLORIDE 30 MG/1
30 CAPSULE, DELAYED RELEASE ORAL DAILY
Qty: 30 CAPSULE | Refills: 0 | Status: SHIPPED | OUTPATIENT
Start: 2022-12-05 | End: 2023-01-09 | Stop reason: SDUPTHER

## 2022-12-05 RX ORDER — CYCLOBENZAPRINE HCL 10 MG
10 TABLET ORAL
Qty: 30 TABLET | Refills: 0 | Status: SHIPPED | OUTPATIENT
Start: 2022-12-05 | End: 2023-01-09 | Stop reason: SDUPTHER

## 2022-12-05 RX ORDER — VALACYCLOVIR HYDROCHLORIDE 1 G/1
1000 TABLET, FILM COATED ORAL
Qty: 90 TABLET | Refills: 0 | Status: SHIPPED | OUTPATIENT
Start: 2022-12-05 | End: 2023-02-16 | Stop reason: SDUPTHER

## 2022-12-06 ENCOUNTER — PHARMACY VISIT (OUTPATIENT)
Dept: PHARMACY | Facility: MEDICAL CENTER | Age: 32
End: 2022-12-06
Payer: COMMERCIAL

## 2022-12-06 PROCEDURE — RXOTC WILLOW AMBULATORY OTC CHARGE

## 2022-12-07 ENCOUNTER — PHARMACY VISIT (OUTPATIENT)
Dept: PHARMACY | Facility: MEDICAL CENTER | Age: 32
End: 2022-12-07
Payer: COMMERCIAL

## 2022-12-12 ENCOUNTER — HOSPITAL ENCOUNTER (OUTPATIENT)
Facility: MEDICAL CENTER | Age: 32
End: 2022-12-12
Payer: COMMERCIAL

## 2022-12-12 ENCOUNTER — HOSPITAL ENCOUNTER (OUTPATIENT)
Dept: LAB | Facility: MEDICAL CENTER | Age: 32
End: 2022-12-12

## 2022-12-12 PROCEDURE — 88175 CYTOPATH C/V AUTO FLUID REDO: CPT

## 2022-12-12 PROCEDURE — 87624 HPV HI-RISK TYP POOLED RSLT: CPT

## 2022-12-13 LAB
CYTOLOGY REG CYTOL: NORMAL
HPV HR 12 DNA CVX QL NAA+PROBE: NEGATIVE
HPV16 DNA SPEC QL NAA+PROBE: NEGATIVE
HPV18 DNA SPEC QL NAA+PROBE: NEGATIVE
SPECIMEN SOURCE: NORMAL

## 2022-12-23 DIAGNOSIS — G43.009 MIGRAINE WITHOUT AURA AND WITHOUT STATUS MIGRAINOSUS, NOT INTRACTABLE: ICD-10-CM

## 2022-12-23 RX ORDER — RIMEGEPANT SULFATE 75 MG/75MG
1 TABLET, ORALLY DISINTEGRATING ORAL PRN
Qty: 10 TABLET | Refills: 5 | Status: SHIPPED | OUTPATIENT
Start: 2022-12-23 | End: 2023-01-08 | Stop reason: SDUPTHER

## 2022-12-24 NOTE — TELEPHONE ENCOUNTER
Received request via: Pharmacy    Was the patient seen in the last year in this department? No    Does the patient have an active prescription (recently filled or refills available) for medication(s) requested? No    Does the patient have [facility] and need 100 day supply (blood pressure, diabetes and cholesterol meds only)? Patient does not have SCP

## 2023-01-03 ENCOUNTER — OFFICE VISIT (OUTPATIENT)
Dept: MEDICAL GROUP | Facility: MEDICAL CENTER | Age: 33
End: 2023-01-03
Payer: COMMERCIAL

## 2023-01-03 VITALS
DIASTOLIC BLOOD PRESSURE: 70 MMHG | HEIGHT: 65 IN | WEIGHT: 187 LBS | TEMPERATURE: 98.7 F | OXYGEN SATURATION: 99 % | SYSTOLIC BLOOD PRESSURE: 100 MMHG | RESPIRATION RATE: 16 BRPM | BODY MASS INDEX: 31.16 KG/M2 | HEART RATE: 110 BPM

## 2023-01-03 DIAGNOSIS — E66.09 CLASS 1 OBESITY DUE TO EXCESS CALORIES WITHOUT SERIOUS COMORBIDITY WITH BODY MASS INDEX (BMI) OF 31.0 TO 31.9 IN ADULT: ICD-10-CM

## 2023-01-03 DIAGNOSIS — M25.511 ACUTE PAIN OF BOTH SHOULDERS: ICD-10-CM

## 2023-01-03 DIAGNOSIS — M25.512 ACUTE PAIN OF BOTH SHOULDERS: ICD-10-CM
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
## 2023-01-03 DIAGNOSIS — S09.93XA FACIAL TRAUMA, INITIAL ENCOUNTER: ICD-10-CM

## 2023-01-03 DIAGNOSIS — E78.5 HYPERLIPIDEMIA LDL GOAL <100: ICD-10-CM

## 2023-01-03 DIAGNOSIS — M54.2 NECK PAIN: ICD-10-CM

## 2023-01-03 DIAGNOSIS — R51.9 HEADACHE, OCCIPITAL: ICD-10-CM

## 2023-01-03 PROCEDURE — 99214 OFFICE O/P EST MOD 30 MIN: CPT | Performed by: NURSE PRACTITIONER

## 2023-01-03 PROCEDURE — RXMED WILLOW AMBULATORY MEDICATION CHARGE: Performed by: NURSE PRACTITIONER

## 2023-01-03 RX ORDER — GABAPENTIN 100 MG/1
100-300 CAPSULE ORAL 2 TIMES DAILY PRN
Qty: 60 CAPSULE | Refills: 0 | Status: SHIPPED | OUTPATIENT
Start: 2023-01-03 | End: 2023-02-21

## 2023-01-03 RX ORDER — TIRZEPATIDE 2.5 MG/.5ML
2.5 INJECTION, SOLUTION SUBCUTANEOUS
Qty: 2 ML | Refills: 0 | Status: SHIPPED | OUTPATIENT
Start: 2023-01-03 | End: 2023-01-09 | Stop reason: SDUPTHER

## 2023-01-03 ASSESSMENT — FIBROSIS 4 INDEX: FIB4 SCORE: 0.45
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
## 2023-01-04 ENCOUNTER — HOSPITAL ENCOUNTER (OUTPATIENT)
Dept: RADIOLOGY | Facility: MEDICAL CENTER | Age: 33
End: 2023-01-04
Attending: NURSE PRACTITIONER
Payer: COMMERCIAL

## 2023-01-04 ENCOUNTER — PHARMACY VISIT (OUTPATIENT)
Dept: PHARMACY | Facility: MEDICAL CENTER | Age: 33
End: 2023-01-04
Payer: COMMERCIAL

## 2023-01-04 DIAGNOSIS — R51.9 HEADACHE, OCCIPITAL: ICD-10-CM

## 2023-01-04 DIAGNOSIS — M54.2 NECK PAIN: ICD-10-CM

## 2023-01-04 DIAGNOSIS — S09.93XA FACIAL TRAUMA, INITIAL ENCOUNTER: ICD-10-CM
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
## 2023-01-04 PROCEDURE — 70450 CT HEAD/BRAIN W/O DYE: CPT

## 2023-01-05 ENCOUNTER — HOSPITAL ENCOUNTER (OUTPATIENT)
Dept: RADIOLOGY | Facility: MEDICAL CENTER | Age: 33
End: 2023-01-05
Attending: NURSE PRACTITIONER
Payer: COMMERCIAL

## 2023-01-05 DIAGNOSIS — S09.93XA FACIAL TRAUMA, INITIAL ENCOUNTER: ICD-10-CM
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
## 2023-01-05 DIAGNOSIS — M54.2 NECK PAIN: ICD-10-CM

## 2023-01-05 DIAGNOSIS — R51.9 HEADACHE, OCCIPITAL: ICD-10-CM

## 2023-01-05 PROCEDURE — 72040 X-RAY EXAM NECK SPINE 2-3 VW: CPT

## 2023-01-06 ENCOUNTER — APPOINTMENT (OUTPATIENT)
Dept: RADIOLOGY | Facility: MEDICAL CENTER | Age: 33
End: 2023-01-06
Attending: NURSE PRACTITIONER
Payer: COMMERCIAL

## 2023-01-08 DIAGNOSIS — G43.009 MIGRAINE WITHOUT AURA AND WITHOUT STATUS MIGRAINOSUS, NOT INTRACTABLE: ICD-10-CM

## 2023-01-09 DIAGNOSIS — F33.1 MODERATE EPISODE OF RECURRENT MAJOR DEPRESSIVE DISORDER (HCC): ICD-10-CM

## 2023-01-09 DIAGNOSIS — M54.2 NECK PAIN: ICD-10-CM

## 2023-01-09 PROCEDURE — RXMED WILLOW AMBULATORY MEDICATION CHARGE: Performed by: NURSE PRACTITIONER

## 2023-01-09 RX ORDER — TIRZEPATIDE 2.5 MG/.5ML
2.5 INJECTION, SOLUTION SUBCUTANEOUS
Qty: 2 ML | Refills: 0 | Status: SHIPPED | OUTPATIENT
Start: 2023-01-09 | End: 2023-02-21

## 2023-01-09 RX ORDER — RIMEGEPANT SULFATE 75 MG/75MG
1 TABLET, ORALLY DISINTEGRATING ORAL PRN
Qty: 10 TABLET | Refills: 5 | Status: SHIPPED | OUTPATIENT
Start: 2023-01-09 | End: 2023-08-30 | Stop reason: SDUPTHER

## 2023-01-11 ENCOUNTER — TELEPHONE (OUTPATIENT)
Dept: MEDICAL GROUP | Facility: MEDICAL CENTER | Age: 33
End: 2023-01-11
Payer: COMMERCIAL

## 2023-01-12 ENCOUNTER — TELEPHONE (OUTPATIENT)
Dept: NEUROLOGY | Facility: MEDICAL CENTER | Age: 33
End: 2023-01-12
Payer: COMMERCIAL

## 2023-01-12 NOTE — TELEPHONE ENCOUNTER
Received request via: Pharmacy    Was the patient seen in the last year in this department? No    Does the patient have an active prescription (recently filled or refills available) for medication(s) requested? No    Does the patient have [facility] and need 100 day supply (blood pressure, diabetes and cholesterol meds only)? Patient does not have SCP

## 2023-01-14 PROCEDURE — RXMED WILLOW AMBULATORY MEDICATION CHARGE: Performed by: NURSE PRACTITIONER

## 2023-01-14 RX ORDER — CYCLOBENZAPRINE HCL 10 MG
10 TABLET ORAL
Qty: 30 TABLET | Refills: 0 | Status: SHIPPED | OUTPATIENT
Start: 2023-01-14 | End: 2023-02-16 | Stop reason: SDUPTHER

## 2023-01-14 RX ORDER — DULOXETIN HYDROCHLORIDE 30 MG/1
30 CAPSULE, DELAYED RELEASE ORAL DAILY
Qty: 30 CAPSULE | Refills: 0 | Status: SHIPPED | OUTPATIENT
Start: 2023-01-14 | End: 2023-02-16 | Stop reason: SDUPTHER

## 2023-01-16 ENCOUNTER — PHARMACY VISIT (OUTPATIENT)
Dept: PHARMACY | Facility: MEDICAL CENTER | Age: 33
End: 2023-01-16
Payer: COMMERCIAL

## 2023-01-19 PROBLEM — E66.09 OBESITY DUE TO EXCESS CALORIES: Status: ACTIVE | Noted: 2023-01-19

## 2023-02-08 NOTE — TELEPHONE ENCOUNTER
Reminder call for wc appt with [facility] on 08-   Drysol Pregnancy And Lactation Text: This medication is considered safe during pregnancy and breast feeding.

## 2023-02-16 DIAGNOSIS — M54.2 NECK PAIN: ICD-10-CM

## 2023-02-16 DIAGNOSIS — F33.1 MODERATE EPISODE OF RECURRENT MAJOR DEPRESSIVE DISORDER (HCC): ICD-10-CM

## 2023-02-16 DIAGNOSIS — G43.009 MIGRAINE WITHOUT AURA AND WITHOUT STATUS MIGRAINOSUS, NOT INTRACTABLE: ICD-10-CM

## 2023-02-16 PROCEDURE — RXMED WILLOW AMBULATORY MEDICATION CHARGE: Performed by: NURSE PRACTITIONER

## 2023-02-16 SDOH — ECONOMIC STABILITY: HOUSING INSECURITY
IN THE LAST 12 MONTHS, WAS THERE A TIME WHEN YOU DID NOT HAVE A STEADY PLACE TO SLEEP OR SLEPT IN A SHELTER (INCLUDING NOW)?: NO

## 2023-02-16 SDOH — HEALTH STABILITY: PHYSICAL HEALTH: ON AVERAGE, HOW MANY DAYS PER WEEK DO YOU ENGAGE IN MODERATE TO STRENUOUS EXERCISE (LIKE A BRISK WALK)?: 0 DAYS

## 2023-02-16 SDOH — ECONOMIC STABILITY: INCOME INSECURITY: IN THE LAST 12 MONTHS, WAS THERE A TIME WHEN YOU WERE NOT ABLE TO PAY THE MORTGAGE OR RENT ON TIME?: NO

## 2023-02-16 SDOH — ECONOMIC STABILITY: FOOD INSECURITY: WITHIN THE PAST 12 MONTHS, YOU WORRIED THAT YOUR FOOD WOULD RUN OUT BEFORE YOU GOT MONEY TO BUY MORE.: NEVER TRUE

## 2023-02-16 SDOH — ECONOMIC STABILITY: TRANSPORTATION INSECURITY
IN THE PAST 12 MONTHS, HAS THE LACK OF TRANSPORTATION KEPT YOU FROM MEDICAL APPOINTMENTS OR FROM GETTING MEDICATIONS?: NO

## 2023-02-16 SDOH — ECONOMIC STABILITY: FOOD INSECURITY: WITHIN THE PAST 12 MONTHS, THE FOOD YOU BOUGHT JUST DIDN'T LAST AND YOU DIDN'T HAVE MONEY TO GET MORE.: NEVER TRUE

## 2023-02-16 SDOH — ECONOMIC STABILITY: INCOME INSECURITY: HOW HARD IS IT FOR YOU TO PAY FOR THE VERY BASICS LIKE FOOD, HOUSING, MEDICAL CARE, AND HEATING?: SOMEWHAT HARD

## 2023-02-16 SDOH — HEALTH STABILITY: PHYSICAL HEALTH: ON AVERAGE, HOW MANY MINUTES DO YOU ENGAGE IN EXERCISE AT THIS LEVEL?: 0 MIN

## 2023-02-16 SDOH — ECONOMIC STABILITY: TRANSPORTATION INSECURITY
IN THE PAST 12 MONTHS, HAS LACK OF TRANSPORTATION KEPT YOU FROM MEETINGS, WORK, OR FROM GETTING THINGS NEEDED FOR DAILY LIVING?: NO

## 2023-02-16 SDOH — ECONOMIC STABILITY: TRANSPORTATION INSECURITY
IN THE PAST 12 MONTHS, HAS LACK OF RELIABLE TRANSPORTATION KEPT YOU FROM MEDICAL APPOINTMENTS, MEETINGS, WORK OR FROM GETTING THINGS NEEDED FOR DAILY LIVING?: NO

## 2023-02-16 SDOH — HEALTH STABILITY: MENTAL HEALTH
STRESS IS WHEN SOMEONE FEELS TENSE, NERVOUS, ANXIOUS, OR CAN'T SLEEP AT NIGHT BECAUSE THEIR MIND IS TROUBLED. HOW STRESSED ARE YOU?: NOT AT ALL

## 2023-02-16 SDOH — ECONOMIC STABILITY: HOUSING INSECURITY: IN THE LAST 12 MONTHS, HOW MANY PLACES HAVE YOU LIVED?: 1

## 2023-02-16 ASSESSMENT — SOCIAL DETERMINANTS OF HEALTH (SDOH)
HOW OFTEN DO YOU ATTENT MEETINGS OF THE CLUB OR ORGANIZATION YOU BELONG TO?: NEVER
DO YOU BELONG TO ANY CLUBS OR ORGANIZATIONS SUCH AS CHURCH GROUPS UNIONS, FRATERNAL OR ATHLETIC GROUPS, OR SCHOOL GROUPS?: NO
HOW OFTEN DO YOU HAVE SIX OR MORE DRINKS ON ONE OCCASION: NEVER
HOW OFTEN DO YOU GET TOGETHER WITH FRIENDS OR RELATIVES?: TWICE A WEEK
HOW OFTEN DO YOU ATTENT MEETINGS OF THE CLUB OR ORGANIZATION YOU BELONG TO?: NEVER
HOW HARD IS IT FOR YOU TO PAY FOR THE VERY BASICS LIKE FOOD, HOUSING, MEDICAL CARE, AND HEATING?: SOMEWHAT HARD
HOW OFTEN DO YOU GET TOGETHER WITH FRIENDS OR RELATIVES?: TWICE A WEEK
HOW OFTEN DO YOU ATTEND CHURCH OR RELIGIOUS SERVICES?: 1 TO 4 TIMES PER YEAR
HOW MANY DRINKS CONTAINING ALCOHOL DO YOU HAVE ON A TYPICAL DAY WHEN YOU ARE DRINKING: 1 OR 2
DO YOU BELONG TO ANY CLUBS OR ORGANIZATIONS SUCH AS CHURCH GROUPS UNIONS, FRATERNAL OR ATHLETIC GROUPS, OR SCHOOL GROUPS?: NO
IN A TYPICAL WEEK, HOW MANY TIMES DO YOU TALK ON THE PHONE WITH FAMILY, FRIENDS, OR NEIGHBORS?: MORE THAN THREE TIMES A WEEK
HOW OFTEN DO YOU HAVE A DRINK CONTAINING ALCOHOL: MONTHLY OR LESS
WITHIN THE PAST 12 MONTHS, YOU WORRIED THAT YOUR FOOD WOULD RUN OUT BEFORE YOU GOT THE MONEY TO BUY MORE: NEVER TRUE
IN A TYPICAL WEEK, HOW MANY TIMES DO YOU TALK ON THE PHONE WITH FAMILY, FRIENDS, OR NEIGHBORS?: MORE THAN THREE TIMES A WEEK
HOW OFTEN DO YOU ATTEND CHURCH OR RELIGIOUS SERVICES?: 1 TO 4 TIMES PER YEAR

## 2023-02-16 ASSESSMENT — LIFESTYLE VARIABLES
SKIP TO QUESTIONS 9-10: 1
HOW MANY STANDARD DRINKS CONTAINING ALCOHOL DO YOU HAVE ON A TYPICAL DAY: 1 OR 2
HOW OFTEN DO YOU HAVE SIX OR MORE DRINKS ON ONE OCCASION: NEVER
HOW OFTEN DO YOU HAVE A DRINK CONTAINING ALCOHOL: MONTHLY OR LESS
AUDIT-C TOTAL SCORE: 1

## 2023-02-17 RX ORDER — FREMANEZUMAB-VFRM 225 MG/1.5ML
INJECTION SUBCUTANEOUS
COMMUNITY
Start: 2023-01-25 | End: 2023-03-20

## 2023-02-19 PROCEDURE — RXMED WILLOW AMBULATORY MEDICATION CHARGE: Performed by: NURSE PRACTITIONER

## 2023-02-19 RX ORDER — VALACYCLOVIR HYDROCHLORIDE 1 G/1
1000 TABLET, FILM COATED ORAL
Qty: 90 TABLET | Refills: 0 | Status: SHIPPED | OUTPATIENT
Start: 2023-02-19 | End: 2023-06-16 | Stop reason: SDUPTHER

## 2023-02-19 RX ORDER — CYCLOBENZAPRINE HCL 10 MG
10 TABLET ORAL
Qty: 30 TABLET | Refills: 0 | Status: SHIPPED | OUTPATIENT
Start: 2023-02-19 | End: 2023-04-13 | Stop reason: SDUPTHER

## 2023-02-19 RX ORDER — DESOGESTREL AND ETHINYL ESTRADIOL 21-5 (28)
1 KIT ORAL DAILY
Qty: 84 TABLET | Refills: 0 | Status: SHIPPED | OUTPATIENT
Start: 2023-02-19 | End: 2023-05-29 | Stop reason: SDUPTHER

## 2023-02-19 RX ORDER — DULOXETIN HYDROCHLORIDE 30 MG/1
30 CAPSULE, DELAYED RELEASE ORAL DAILY
Qty: 30 CAPSULE | Refills: 0 | Status: SHIPPED | OUTPATIENT
Start: 2023-02-19 | End: 2023-02-21

## 2023-02-21 ENCOUNTER — OFFICE VISIT (OUTPATIENT)
Dept: MEDICAL GROUP | Facility: PHYSICIAN GROUP | Age: 33
End: 2023-02-21
Payer: COMMERCIAL

## 2023-02-21 VITALS
WEIGHT: 192.5 LBS | RESPIRATION RATE: 16 BRPM | TEMPERATURE: 97.7 F | BODY MASS INDEX: 32.07 KG/M2 | DIASTOLIC BLOOD PRESSURE: 62 MMHG | HEIGHT: 65 IN | OXYGEN SATURATION: 99 % | SYSTOLIC BLOOD PRESSURE: 100 MMHG | HEART RATE: 86 BPM

## 2023-02-21 DIAGNOSIS — G89.29 CHRONIC MIDLINE LOW BACK PAIN WITHOUT SCIATICA: ICD-10-CM

## 2023-02-21 DIAGNOSIS — Z86.39 HISTORY OF IRON DEFICIENCY: ICD-10-CM

## 2023-02-21 DIAGNOSIS — B00.9 HSV INFECTION: ICD-10-CM

## 2023-02-21 DIAGNOSIS — G43.009 MIGRAINE WITHOUT AURA AND WITHOUT STATUS MIGRAINOSUS, NOT INTRACTABLE: ICD-10-CM

## 2023-02-21 DIAGNOSIS — M54.50 CHRONIC MIDLINE LOW BACK PAIN WITHOUT SCIATICA: ICD-10-CM

## 2023-02-21 DIAGNOSIS — M25.561 CHRONIC PAIN OF RIGHT KNEE: ICD-10-CM

## 2023-02-21 DIAGNOSIS — T78.40XD ALLERGY, SUBSEQUENT ENCOUNTER: ICD-10-CM

## 2023-02-21 DIAGNOSIS — E55.9 VITAMIN D DEFICIENCY: ICD-10-CM

## 2023-02-21 DIAGNOSIS — K90.0 CELIAC DISEASE: ICD-10-CM

## 2023-02-21 DIAGNOSIS — E66.09 CLASS 1 OBESITY DUE TO EXCESS CALORIES WITHOUT SERIOUS COMORBIDITY WITH BODY MASS INDEX (BMI) OF 32.0 TO 32.9 IN ADULT: ICD-10-CM

## 2023-02-21 DIAGNOSIS — F41.9 ANXIETY: ICD-10-CM

## 2023-02-21 DIAGNOSIS — E78.5 HYPERLIPIDEMIA LDL GOAL <100: ICD-10-CM

## 2023-02-21 DIAGNOSIS — G89.29 CHRONIC PAIN OF RIGHT KNEE: ICD-10-CM

## 2023-02-21 PROBLEM — F32.A ANXIETY AND DEPRESSION: Status: ACTIVE | Noted: 2018-01-11

## 2023-02-21 PROBLEM — E66.811 CLASS 1 OBESITY DUE TO EXCESS CALORIES WITHOUT SERIOUS COMORBIDITY WITH BODY MASS INDEX (BMI) OF 32.0 TO 32.9 IN ADULT: Status: ACTIVE | Noted: 2023-01-19

## 2023-02-21 PROBLEM — R63.5 WEIGHT GAIN: Status: RESOLVED | Noted: 2020-02-10 | Resolved: 2023-02-21

## 2023-02-21 PROBLEM — T78.40XA ALLERGIES: Status: ACTIVE | Noted: 2023-02-21

## 2023-02-21 PROBLEM — E66.9 OBESITY (BMI 35.0-39.9 WITHOUT COMORBIDITY): Status: RESOLVED | Noted: 2019-09-12 | Resolved: 2023-02-21

## 2023-02-21 PROCEDURE — RXMED WILLOW AMBULATORY MEDICATION CHARGE: Performed by: PHYSICIAN ASSISTANT

## 2023-02-21 PROCEDURE — 99214 OFFICE O/P EST MOD 30 MIN: CPT | Performed by: PHYSICIAN ASSISTANT

## 2023-02-21 RX ORDER — SERTRALINE HYDROCHLORIDE 25 MG/1
25 TABLET, FILM COATED ORAL DAILY
Qty: 30 TABLET | Refills: 11 | Status: SHIPPED | OUTPATIENT
Start: 2023-02-21 | End: 2023-03-28

## 2023-02-21 RX ORDER — FERROUS GLUCONATE 324(38)MG
324 TABLET ORAL
Qty: 90 TABLET | Refills: 0 | Status: SHIPPED | OUTPATIENT
Start: 2023-02-21 | End: 2023-06-16 | Stop reason: SDUPTHER

## 2023-02-21 RX ORDER — DULOXETIN HYDROCHLORIDE 20 MG/1
20 CAPSULE, DELAYED RELEASE ORAL DAILY
Qty: 30 CAPSULE | Refills: 0 | Status: SHIPPED | OUTPATIENT
Start: 2023-02-21 | End: 2023-03-28

## 2023-02-21 ASSESSMENT — FIBROSIS 4 INDEX: FIB4 SCORE: 0.45

## 2023-02-21 ASSESSMENT — ENCOUNTER SYMPTOMS
BACK PAIN: 1
CHILLS: 0
SHORTNESS OF BREATH: 0
FEVER: 0

## 2023-02-21 ASSESSMENT — PATIENT HEALTH QUESTIONNAIRE - PHQ9: CLINICAL INTERPRETATION OF PHQ2 SCORE: 0

## 2023-02-21 NOTE — PROGRESS NOTES
Subjective:     CC: Establish as a new patient    HPI:   Tonia presents today with    Problem   Allergies    Chronic, stable.  Sees an allergist once a year.  Has allergy shots monthly.  Has been doing allergy shots for years.     Chronic Midline Low Back Pain Without Sciatica    Chronic, stable.  Has had pain for about 10 years, most days.  Uses cyclobenzaprine daily.  Sees [facility]ir[facility] for her knee.  Advised following up with them if her back starts to worsen.     Class 1 Obesity Due to Excess Calories Without Serious Comorbidity With Body Mass Index (Bmi) of 32.0 to 32.9 in Adult    Chronic, uncontrolled.  Having a hard time losing weight.  Had side effects to phentermine.  Tried Ozempic with a weight loss physician in the past but it did not seem to help.  Mounjaro is not covered by insurance.  Discussed increasing plant-based foods, decreasing animal-based and processed foods.  Discussed increasing activity.     Migraine Without Aura and Without Status Migrainosus, Not Intractable    Chronic, stable.  Followed by neurology.  Managed with Ajovy injections and Nurtec as needed.  Failed (SE): NSAIDS (GI), Reyvow (MS, dizzy)  Failed (ineffective): TPX, Inderal, Maxalt     Chronic Pain of Right Knee    Chronic, stable.  Followed by Niranjan.  Had an injection in her right knee about 3 weeks ago.  Starting to have some pain over the weekend.  Was told to follow-up as needed.     Celiac Disease    Chronic, stable.  Does report pain with gluten ingestion.  Tries to limit it but does not always.  Has appointment with GI in 2 weeks because she has been having some pain and bloating.     History of Iron Deficiency    Chronic, stable.  Iron level 10/24/2022, 67.  Patient does feel tired.  Will restart ferrous gluconate, once daily and repeat labs in 3 to 6 months.       Hyperlipidemia Ldl Goal <100    Chronic, uncontrolled.  LDL 60  HDL 34  Triglycerides 261  Discussed increasing plant-based foods, decreasing  "animal-based foods.  Increase daily activity, aiming for 30-45 minutes brisk exercise daily.       Hsv Infection    Chronic, stable.  Takes valacyclovir 1 g daily to prevent cold sores.     Vitamin D Deficiency    Chronic, uncontrolled.  Taking 1 OTC vitamin supplement (unsure of dose).  10/2022, 25.  Will increase to 2 tabs daily.     Anxiety    Chronic, uncontrolled.  History of zoloft for anxiety.  Feels like it helped.  Switched from zoloft to cymbalta after grandfather passed May 2021.  Feels like the switch did help but feels like its less depression at this point, more anxiety.  Would like to go back to zoloft.  Decrease Cymbalta to 20 mg, start Zoloft to 25 mg for 1 month.  Then start Zoloft 50 mg daily.  Patient will MyChart message me if she needs to increase her dose.       Weight Gain (Resolved)   Obesity (BMI 35.0-39.9 without comorbidity) (HCC) (Resolved)       Health Maintenance: Completed    ROS:  Review of Systems   Constitutional:  Negative for chills and fever.   Respiratory:  Negative for shortness of breath.    Cardiovascular:  Negative for chest pain.   Musculoskeletal:  Positive for back pain.     Objective:     Exam:  /62 (BP Location: Right arm, Patient Position: Sitting, BP Cuff Size: Large adult)   Pulse 86   Temp 36.5 °C (97.7 °F) (Temporal)   Resp 16   Ht 1.651 m (5' 5\")   Wt 87.3 kg (192 lb 8 oz)   LMP 01/15/2023 (Approximate)   SpO2 99%   Breastfeeding No   BMI 32.03 kg/m²  Body mass index is 32.03 kg/m².    Physical Exam  Constitutional:       Appearance: Normal appearance.   Cardiovascular:      Rate and Rhythm: Normal rate and regular rhythm.      Heart sounds: Normal heart sounds.   Pulmonary:      Effort: Pulmonary effort is normal.      Breath sounds: Normal breath sounds.   Musculoskeletal:      Cervical back: Normal range of motion and neck supple.   Skin:     General: Skin is warm.   Neurological:      General: No focal deficit present.      Mental Status: She " is alert.   Psychiatric:         Mood and Affect: Mood normal.         Assessment & Plan:     32 y.o. female with the following -     1. Anxiety  Chronic, stable.  For the next month: Sertraline 25 mg daily, duloxetine 20 mg daily.  After that month, sertraline 50 mg daily.  Patient will send a [employer] message if she wants to increase her dose.    - sertraline (ZOLOFT) 25 MG tablet; Take 1 Tablet by mouth every day.  Dispense: 30 Tablet; Refill: 11  - DULoxetine (CYMBALTA) 20 MG Cap DR Particles; Take 1 Capsule by mouth every day.  Dispense: 30 Capsule; Refill: 0  - sertraline (ZOLOFT) 50 MG Tab; Take 1 Tablet by mouth every day.  Dispense: 90 Tablet; Refill: 3      2. Vitamin D deficiency  Chronic, uncontrolled.  Vitamin D level is 25.  Patient to increase over-the-counter supplementation to 2 tablets daily.  Patient is unsure of the dose.  Repeat labs in 3 to 6 months.    - VITAMIN D,25 HYDROXY (DEFICIENCY); Future    3. Hyperlipidemia LDL goal <100  Chronic, uncontrolled.  Discussed increasing plant-based foods, decreasing animal-based foods.  Increase daily activity, aiming for 30-45 minutes brisk exercise daily.  Repeat labs in 3-6 months.    - Lipid Profile; Future    4. HSV infection  Chronic, controlled.  Continue valacyclovir 1 g daily.    5. History of iron deficiency  Chronic, stable.  Patient's iron level is low normal but is feeling fatigued.  Restarting ferrous gluconate once daily.  Repeat labs in 3 to 6 months.    - IRON/TOTAL IRON BIND; Future    6. Chronic pain of right knee  Chronic, stable.  Followed by Niranjan.      7. Celiac disease  Chronic, stable.  Has follow-up with GI in 2 weeks.    8. Migraine without aura and without status migrainosus, not intractable  Chronic, stable.  Managed by neurology.    9. Class 1 obesity due to excess calories without serious comorbidity with body mass index (BMI) of 32.0 to 32.9 in adult  Chronic, uncontrolled.  Discussed increasing plant-based foods,  decreasing animal-based foods.  Increase daily activity, aiming for 30-45 minutes brisk exercise daily.      10. Allergy, subsequent encounter  Chronic, stable.  Managed by allergy.    11. Chronic midline low back pain without sciatica  Chronic, stable.  Continue Flexeril 10 mg daily as needed.  Follow-up for worsening or persistent symptoms.        Return if symptoms worsen or fail to improve.    Please note that this dictation was created using voice recognition software. I have made every reasonable attempt to correct obvious errors, but I expect that there are errors of grammar and possibly content that I did not discover before finalizing the note.

## 2023-02-22 NOTE — TELEPHONE ENCOUNTER
pa renewal for ajovy 225mg/1.5ml aj; pa submitted in cmm: [id number] waiting on plan response, current pa exp 3/3/23

## 2023-02-23 ENCOUNTER — PHARMACY VISIT (OUTPATIENT)
Dept: PHARMACY | Facility: MEDICAL CENTER | Age: 33
End: 2023-02-23
Payer: COMMERCIAL

## 2023-03-10 PROCEDURE — RXMED WILLOW AMBULATORY MEDICATION CHARGE: Performed by: NURSE PRACTITIONER

## 2023-03-14 ENCOUNTER — PHARMACY VISIT (OUTPATIENT)
Dept: PHARMACY | Facility: MEDICAL CENTER | Age: 33
End: 2023-03-14
Payer: COMMERCIAL

## 2023-03-18 ENCOUNTER — OFFICE VISIT (OUTPATIENT)
Dept: URGENT CARE | Facility: PHYSICIAN GROUP | Age: 33
End: 2023-03-18
Payer: COMMERCIAL

## 2023-03-18 VITALS
BODY MASS INDEX: 31.65 KG/M2 | HEART RATE: 72 BPM | WEIGHT: 190 LBS | RESPIRATION RATE: 15 BRPM | TEMPERATURE: 98.2 F | HEIGHT: 65 IN | SYSTOLIC BLOOD PRESSURE: 110 MMHG | DIASTOLIC BLOOD PRESSURE: 66 MMHG | OXYGEN SATURATION: 98 %

## 2023-03-18 DIAGNOSIS — J02.9 ACUTE PHARYNGITIS, UNSPECIFIED ETIOLOGY: ICD-10-CM

## 2023-03-18 LAB
FLUAV RNA SPEC QL NAA+PROBE: NEGATIVE
FLUBV RNA SPEC QL NAA+PROBE: NEGATIVE
RSV RNA SPEC QL NAA+PROBE: NEGATIVE
S PYO DNA SPEC NAA+PROBE: NOT DETECTED
SARS-COV-2 RNA RESP QL NAA+PROBE: NEGATIVE

## 2023-03-18 PROCEDURE — 99213 OFFICE O/P EST LOW 20 MIN: CPT

## 2023-03-18 PROCEDURE — 87651 STREP A DNA AMP PROBE: CPT

## 2023-03-18 PROCEDURE — 0241U POCT CEPHEID COV-2, FLU A/B, RSV - PCR: CPT

## 2023-03-18 ASSESSMENT — ENCOUNTER SYMPTOMS
TROUBLE SWALLOWING: 0
ABDOMINAL PAIN: 0
SPUTUM PRODUCTION: 0
COUGH: 0
SORE THROAT: 1
CHILLS: 0
SWOLLEN GLANDS: 0
WHEEZING: 0
SHORTNESS OF BREATH: 0
SINUS PAIN: 0
FEVER: 0

## 2023-03-18 ASSESSMENT — FIBROSIS 4 INDEX: FIB4 SCORE: 0.45

## 2023-03-18 NOTE — PATIENT INSTRUCTIONS
"  As we discussed your clinical condition would benefit from over-the-counter remedies:    FLONASE (once per day)  You may consider intranasal steroids such as fluticasone (brand name Flonase), (other options include Nasonex, Rhinocort, Nasacort) daily; continue for at least 2-3 weeks. Any generic should work as well but may cause slightly more nasal irritation. Please take according to package directions.  This steroid will help reduce inflammation of your sinuses.  This is the number one medication to help with seasonal allergies and treat nasal inflammation.  Cost: around $8 at [facility] for the generic fluticasone [facility] product (as of 5/20).    ANTIHISTAMINES  You may take a non-sedating antihistamine like Zyrtec (cetirizine) , Allegra (fexofenadine), Xyzal (levocetirizine), or Claritin (loratadine).  This will help \"dry you out\" and reduce the amount of nasal inflammation.  Follow package directions paying attention to whether they are once or twice daily.  Note: if there is a \"D\" such raghav Allegra-D it also contains a decongestant such as sudafed.  Some patients benefit from alternating these medications every few weeks but literature does not support this.   Cost: around $11 at [employer] for the generic cetirizine [facility] branded product (as of 5/20)    SUDAFED (low dose to see if tolerated)  You may consider over-the-counter Sudafed (pseudoephedrine) to act as a decongestant to improve your breathing through your nose.  Please do not use this medication if you already have known high blood pressure.  Please take according to package directions and note that this has a stimulating effect and should not be taken late in the day.  There is a low dose 12 hour formulation and a higher dose 24 hour formulation.  Start with a low dose to make sure you tolerate the medication.  Do not take late in the day as it may interfere with sleep.   Cost: Around $6 for the generic [facility] branded product at a 10mg dose (as of " "2/2023).      BENZOCAINE DROPS  These contain the active ingredient benzocaine which is an anesthetic agent.  It helps relieve the symptoms of sore throat and may diminish your cough reflex.  Take according to package directions.  The brand name CEPACOL but the generic will suffice.  Please note that taking too frequently may cause harm - pay attention to package directions.  Cost: around $4 at [employer] for Chloroseptic drops (15 count) (as of 2/2023).      SALINE IRRIGATION/SPRAY  You may consider using a saline nasal irrigation (such as a \"Neti pot\" or similar device using sterile water, NOT tap water) or OTC saline nasal spray      NSAIDs  You may take Ibuprofen (brand name Motrin or Advil) may be taken 800 mg up to three times per day taken with food (do not exceed 2400 mg per day).  If you prefer you may consider Naproxen (brand name Naprosyn or Aleve) around 500 mg twice per day with food (do not exceed 1200 mg per day). Please do not take if you have known stomach ulcers or known kidney disease.     TYLENOL  You may take Tylenol according to package directions (1000 mg every 8 hours not to exceed 3000 mg per day).  Please do not consume with any alcohol products, or if you have known or suspected liver disease. These will help reduce inflammation, help with pain control, and can reduce fevers.    "

## 2023-03-18 NOTE — PROGRESS NOTES
Subjective:     Tonia Xavier is a 32 y.o. female who presents for Nasal Congestion (Pt states she has been sick for over a week. ), Pharyngitis, and Otalgia (Bilateral )      Saw the allergist on Thursday and she has been taking Claritin-D and montelukast.     Pharyngitis   The current episode started yesterday (Started 8 days ago.). The problem has been gradually worsening. Neither side of throat is experiencing more pain than the other. There has been no fever. Associated symptoms include congestion and ear pain. Pertinent negatives include no abdominal pain, coughing, ear discharge, shortness of breath, swollen glands or trouble swallowing. She has had no exposure to strep or mono. Treatments tried: Nyquil, antihistmines for congestion. The treatment provided mild relief.     Review of Systems   Constitutional:  Negative for chills, fever and malaise/fatigue.   HENT:  Positive for congestion, ear pain and sore throat. Negative for ear discharge, sinus pain and trouble swallowing.    Respiratory:  Negative for cough, sputum production, shortness of breath and wheezing.    Gastrointestinal:  Negative for abdominal pain.     PMH:   Past Medical History:   Diagnosis Date    Anemia     Anxiety     Asthma     HSV infection 09/07/2019    Hyperlipidemia LDL goal <100 09/09/2019    IFG (impaired fasting glucose) 09/09/2019    Migraine without aura and without status migrainosus, not intractable 02/13/2022    Ovarian cyst 11/2018    left side    Vitamin D deficiency 02/01/2018     ALLERGIES:   Allergies   Allergen Reactions    Benzoin Hives     Rash     Phentermine      Worsening anxiety, excessive dry mouth, lightheaded     SURGHX:   Past Surgical History:   Procedure Laterality Date    PB KNEE SCOPE,AID ANT CRUCIATE REPAIR Right 3/23/2021    Procedure: RECONSTRUCTION, KNEE, ACL, ARTHROSCOPIC - WITH ALLOGRAFT BONE TENDON BONE;  Surgeon: Dexter Pimentel M.D.;  Location: SURGERY [facility];  Service: Orthopedics     PB KNEE SCOPE,DIAGNOSTIC Right 10/29/2020    Procedure: ARTHROSCOPY, KNEE;  Surgeon: Dexter Pimentel M.D.;  Location: [facility];  Service: Orthopedics    MENISCECTOMY, KNEE, MEDIAL Right 10/29/2020    Procedure: MENISCECTOMY, KNEE, MEDIAL - PARTIAL;  Surgeon: Dexter Pimentel M.D.;  Location: [facility];  Service: Orthopedics    HARDWARE REMOVAL ORTHO Right 10/29/2020    Procedure: REMOVAL, HARDWARE tibia ;  Surgeon: Dexter Pimentel M.D.;  Location: [facility];  Service: Orthopedics    BONE GRAFT Right 10/29/2020    Procedure: curetage and BONE GRAFT femoral cyst, microfracture;  Surgeon: Dexter Pimentel M.D.;  Location: [facility];  Service: Orthopedics    KNEE ARTHROSCOPY Right 11/13/2018    Procedure: KNEE ARTHROSCOPY;  Surgeon: Dexter Pimentel M.D.;  Location: [facility];  Service: Orthopedics    SYNOVECTOMY Right 11/13/2018    Procedure: SYNOVECTOMY;  Surgeon: Dexter Pimentel M.D.;  Location: [facility];  Service: Orthopedics    MENISCECTOMY, KNEE, MEDIAL Right 11/13/2018    Procedure: MEDIAL MENISCECTOMY-  PARTIAL, AND PARTIAL LATERAL;  Surgeon: Dexter Pimentel M.D.;  Location: [facility];  Service: Orthopedics    CHONDROPLASTY  11/13/2018    Procedure: CHONDROPLASTY;  Surgeon: Dexter Pimentel M.D.;  Location: [facility];  Service: Orthopedics    HARDWARE REMOVAL ORTHO  11/13/2018    Procedure: HARDWARE REMOVAL ORTHO;  Surgeon: Dexter Pimentel M.D.;  Location: [facility];  Service: Orthopedics    BONE GRAFT  11/13/2018    Procedure: BONE GRAFT;  Surgeon: Dexter Pimentel M.D.;  Location: [facility];  Service: Orthopedics    REPEAT C SECTION  11/25/2017    Procedure: REPEAT C SECTION;  Surgeon: Asmita Bolivar M.D.;  Location: LABOR AND DELIVERY;  Service: Obstetrics    REPEAT C SECTION  10/19/2015    Procedure: REPEAT C SECTION;  Surgeon: Mary Randolph M.D.;  Location: LABOR AND DELIVERY;  Service:      RECONSTRUCTION, KNEE, ACL, ARTHROSCOPIC  10/2/2014    Performed by Dexter Pimentel M.D. at SURGERY [facility]    KNEE ARTHROSCOPY  2014    Performed by Dexter Pimentel M.D. at SURGERY [facility]    HARDWARE REMOVAL ORTHO  2014    Performed by Dexter Pimentel M.D. at SURGERY [facility]    BONE GRAFT  2014    Performed by Dexter Pimentel M.D. at SURGERY [facility]    DC  DELIVERY ONLY      RECONSTRUCTION, KNEE, ACL Right 2006     SOCHX:   Social History     Socioeconomic History    Marital status:     Highest education level: Some college, no degree   Tobacco Use    Smoking status: Never    Smokeless tobacco: Never   Vaping Use    Vaping Use: Never used   Substance and Sexual Activity    Alcohol use: Not Currently     Comment: Occasionally    Drug use: Never     Comment: Occ. marijuana    Sexual activity: Yes     Partners: Male     Birth control/protection: Pill     Social Determinants of Health     Financial Resource Strain: Medium Risk    Difficulty of Paying Living Expenses: Somewhat hard   Food Insecurity: No Food Insecurity    Worried About Running Out of Food in the Last Year: Never true    Ran Out of Food in the Last Year: Never true   Transportation Needs: No Transportation Needs    Lack of Transportation (Medical): No    Lack of Transportation (Non-Medical): No   Physical Activity: Inactive    Days of Exercise per Week: 0 days    Minutes of Exercise per Session: 0 min   Stress: No Stress Concern Present    Feeling of Stress : Not at all   Social Connections: Moderately Integrated    Frequency of Communication with Friends and Family: More than three times a week    Frequency of Social Gatherings with Friends and Family: Twice a week    Attends Sabianist Services: 1 to 4 times per year    Active Member of Clubs or Organizations: No    Attends Club or Organization Meetings: Never    Marital Status:    Housing Stability: Low Risk     Unable to Pay for  "Housing in the Last Year: No    Number of Places Lived in the Last Year: 1    Unstable Housing in the Last Year: No     FH:   Family History   Problem Relation Age of Onset    Diabetes Other     Hypertension Other     Diabetes Maternal Grandmother         DM w/ESRD    Diabetes Maternal Grandfather     Cancer Maternal Grandfather         prostate, esophageal    Hyperlipidemia Maternal Grandfather     Diabetes Paternal Grandmother         DM on dialysis    Hyperlipidemia Paternal Grandmother          Objective:   /66 (BP Location: Right arm, Patient Position: Sitting, BP Cuff Size: Adult long)   Pulse 72   Temp 36.8 °C (98.2 °F) (Temporal)   Resp 15   Ht 1.651 m (5' 5\")   Wt 86.2 kg (190 lb)   LMP 01/15/2023 (Approximate)   SpO2 98%   BMI 31.62 kg/m²     Physical Exam  Vitals reviewed.   Constitutional:       Appearance: Normal appearance.   HENT:      Head: Normocephalic and atraumatic.      Right Ear: Ear canal and external ear normal. A middle ear effusion is present. Tympanic membrane is injected.      Left Ear: Ear canal and external ear normal. A middle ear effusion is present. Tympanic membrane is injected.      Nose: Nose normal.      Mouth/Throat:      Mouth: Mucous membranes are moist.      Pharynx: Posterior oropharyngeal erythema present. No oropharyngeal exudate.   Eyes:      Extraocular Movements: Extraocular movements intact.      Conjunctiva/sclera: Conjunctivae normal.      Pupils: Pupils are equal, round, and reactive to light.   Cardiovascular:      Rate and Rhythm: Normal rate and regular rhythm.   Pulmonary:      Effort: Pulmonary effort is normal.   Musculoskeletal:         General: Normal range of motion.      Cervical back: Normal range of motion.   Skin:     General: Skin is warm and dry.   Neurological:      Mental Status: She is alert.   Psychiatric:         Mood and Affect: Mood normal.         Behavior: Behavior normal.         Thought Content: Thought content normal. "     Results for orders placed or performed in visit on 03/18/23   POCT CoV-2, Flu A/B, RSV by PCR   Result Value Ref Range    SARS-CoV-2 by PCR Negative Negative, Invalid    Influenza virus A RNA Negative Negative, Invalid    Influenza virus B, PCR Negative Negative, Invalid    RSV, PCR Negative Negative, Invalid   POCT GROUP A STREP, PCR   Result Value Ref Range    POC Group A Strep, PCR Not Detected Not Detected, Invalid         Assessment/Plan:   Assessment      1. Acute pharyngitis, unspecified etiology  - POCT CoV-2, Flu A/B, RSV by PCR  - POCT GROUP A STREP, PCR    Based on patient's symptoms, symptom duration, and physical exam findings, it was discussed with patient that the likely etiology of the illness is viral.  Results from in-clinic PCR tests negative.  Patient was called with the results from test and symptomatic treatment was encouraged. Symptom management for cough, congestion, and pharyngitis include warm salt water gargles, over-the-counter throat sprays, rest, hydration with frozen (eg, ice or popsicles) or warmed liquids, herbal tea containing licorice root, elm inner bark, marshmallow root, and licorice root aqueous dry extract, Tylenol/Ibuprofen for pain control, Cepacol lozenges, soft diet, honey, zinc, elderberry, and cool mist humidification.     I considered other causes of pharyngitis including Group C, G strep, peritonsillar abscess, Jh's angina, and retropharyngeal abscess but the patient's reported symptoms and my exam do not support these alternative diagnosis based on information I have available today.  This may change and I encouraged the patient to return to clinic if they are experiencing new symptoms or their symptoms fail to resolve with time as we cannot rule out all pathology from a single Urgent Care visit.     Differential diagnosis, natural history, and supportive care discussed. AVS handout given and reviewed with patient. Patient educated on red flags and when to  seek treatment back in ED or UC.     I personally reviewed prior external notes and test results pertinent to today's visit.  I have independently reviewed and interpreted all diagnostics ordered during this urgent care visit.     This dictation has been created using voice recognition software. The accuracy of the dictation is limited by the abilities of the software. I expect there may be some errors of grammar and possibly content. I made every attempt to manually correct the errors within my dictation. However, errors related to voice recognition software may still exist and should be interpreted within the appropriate context.    This note was electronically signed by JAY Gonzalez

## 2023-03-21 RX ORDER — AMOXICILLIN AND CLAVULANATE POTASSIUM 875; 125 MG/1; MG/1
1 TABLET, FILM COATED ORAL 2 TIMES DAILY
Qty: 14 TABLET | Refills: 0 | Status: SHIPPED | OUTPATIENT
Start: 2023-03-21 | End: 2023-03-28

## 2023-03-28 ENCOUNTER — PHARMACY VISIT (OUTPATIENT)
Dept: PHARMACY | Facility: MEDICAL CENTER | Age: 33
End: 2023-03-28
Payer: COMMERCIAL

## 2023-03-28 ENCOUNTER — OFFICE VISIT (OUTPATIENT)
Dept: MEDICAL GROUP | Facility: PHYSICIAN GROUP | Age: 33
End: 2023-03-28
Payer: COMMERCIAL

## 2023-03-28 VITALS
DIASTOLIC BLOOD PRESSURE: 70 MMHG | RESPIRATION RATE: 18 BRPM | HEART RATE: 80 BPM | BODY MASS INDEX: 32.36 KG/M2 | TEMPERATURE: 97.2 F | OXYGEN SATURATION: 99 % | HEIGHT: 65 IN | WEIGHT: 194.2 LBS | SYSTOLIC BLOOD PRESSURE: 122 MMHG

## 2023-03-28 DIAGNOSIS — E66.3 OVERWEIGHT: ICD-10-CM

## 2023-03-28 PROCEDURE — RXMED WILLOW AMBULATORY MEDICATION CHARGE: Performed by: PHYSICIAN ASSISTANT

## 2023-03-28 PROCEDURE — 99214 OFFICE O/P EST MOD 30 MIN: CPT | Performed by: PHYSICIAN ASSISTANT

## 2023-03-28 RX ORDER — KETOTIFEN FUMARATE 0.25 MG/ML
SOLUTION/ DROPS OPHTHALMIC
COMMUNITY
Start: 2023-03-19 | End: 2023-06-16

## 2023-03-28 RX ORDER — DEXTRAN 70 AND HYPROMELLOSE 2910 1; 3 MG/ML; MG/ML
SOLUTION/ DROPS OPHTHALMIC
COMMUNITY
Start: 2023-03-19 | End: 2023-06-16

## 2023-03-28 RX ORDER — PHENTERMINE HYDROCHLORIDE 15 MG/1
15-30 CAPSULE ORAL EVERY MORNING
Qty: 60 CAPSULE | Refills: 0 | Status: SHIPPED | OUTPATIENT
Start: 2023-03-28 | End: 2023-04-25

## 2023-03-28 ASSESSMENT — ENCOUNTER SYMPTOMS
CHILLS: 0
SHORTNESS OF BREATH: 0
FEVER: 0

## 2023-03-28 ASSESSMENT — FIBROSIS 4 INDEX: FIB4 SCORE: 0.46

## 2023-03-28 NOTE — PROGRESS NOTES
"Subjective:     CC: Weight loss    HPI:   Tonia presents today with    Problem   Overweight    Chronic, uncontrolled.  Having a hard time losing weight.  Had side effects to phentermine.  Tried Ozempic with a weight loss physician in the past but it did not seem to help.  Mounjaro is not covered by insurance.  Discussed increasing plant-based foods, decreasing animal-based and processed foods.  Discussed increasing activity.    3/28/2023:  Chronic, uncontrolled.  Patient would like to try phentermine.  Patient had a reaction to phentermine in the past describing heart palpitations and feelings of anxiety.  She would like to try it again.  Starting at low-dose.  Discussed recommendation of 3-4 months of use, 6 months maximum.  Need to monitor her palpitations and blood pressure.  Patient understands this requires in person visit.  Consent form signed today.  We will collect UDS at follow-up if she wants to continue the medication.           Health Maintenance: Completed    ROS:  Review of Systems   Constitutional:  Negative for chills and fever.   Respiratory:  Negative for shortness of breath.    Cardiovascular:  Negative for chest pain.     Objective:     Exam:  /70   Pulse 80   Temp 36.2 °C (97.2 °F) (Temporal)   Resp 18   Ht 1.651 m (5' 5\")   Wt 88.1 kg (194 lb 3.2 oz)   SpO2 99%   BMI 32.32 kg/m²  Body mass index is 32.32 kg/m².    Physical Exam  Constitutional:       Appearance: Normal appearance.   Pulmonary:      Effort: Pulmonary effort is normal.   Skin:     General: Skin is warm.   Neurological:      General: No focal deficit present.      Mental Status: She is alert.   Psychiatric:         Mood and Affect: Mood normal.         Assessment & Plan:     33 y.o. female with the following -     1. Overweight  Chronic, uncontrolled.  Trial phentermine.  Patient to follow-up in 1 month for reevaluation, sooner as needed.    - phentermine 15 MG capsule; Take 1-2 Capsules by mouth every morning for " 30 days.  Dispense: 60 Capsule; Refill: 0  - Consent for Opiate Prescription        Return in about 1 month (around 4/28/2023) for med check.    Please note that this dictation was created using voice recognition software. I have made every reasonable attempt to correct obvious errors, but I expect that there are errors of grammar and possibly content that I did not discover before finalizing the note.

## 2023-04-04 PROCEDURE — RXMED WILLOW AMBULATORY MEDICATION CHARGE: Performed by: NURSE PRACTITIONER

## 2023-04-06 ENCOUNTER — PHARMACY VISIT (OUTPATIENT)
Dept: PHARMACY | Facility: MEDICAL CENTER | Age: 33
End: 2023-04-06
Payer: COMMERCIAL

## 2023-04-13 DIAGNOSIS — M54.2 NECK PAIN: ICD-10-CM

## 2023-04-13 RX ORDER — CYCLOBENZAPRINE HCL 10 MG
10 TABLET ORAL
Qty: 30 TABLET | Refills: 3 | Status: SHIPPED | OUTPATIENT
Start: 2023-04-13 | End: 2024-01-15 | Stop reason: SDUPTHER

## 2023-04-14 PROCEDURE — RXMED WILLOW AMBULATORY MEDICATION CHARGE: Performed by: NURSE PRACTITIONER

## 2023-04-25 ENCOUNTER — TELEMEDICINE (OUTPATIENT)
Dept: MEDICAL GROUP | Facility: PHYSICIAN GROUP | Age: 33
End: 2023-04-25
Payer: COMMERCIAL

## 2023-04-25 ENCOUNTER — PHARMACY VISIT (OUTPATIENT)
Dept: PHARMACY | Facility: MEDICAL CENTER | Age: 33
End: 2023-04-25
Payer: COMMERCIAL

## 2023-04-25 VITALS — HEIGHT: 65 IN | RESPIRATION RATE: 16 BRPM | BODY MASS INDEX: 30.39 KG/M2 | WEIGHT: 182.44 LBS

## 2023-04-25 DIAGNOSIS — E66.3 OVERWEIGHT: ICD-10-CM

## 2023-04-25 PROCEDURE — 99214 OFFICE O/P EST MOD 30 MIN: CPT | Mod: 95 | Performed by: PHYSICIAN ASSISTANT

## 2023-04-25 PROCEDURE — RXMED WILLOW AMBULATORY MEDICATION CHARGE: Performed by: PHYSICIAN ASSISTANT

## 2023-04-25 RX ORDER — PHENTERMINE HYDROCHLORIDE 30 MG/1
30 CAPSULE ORAL EVERY MORNING
Qty: 30 CAPSULE | Refills: 2 | Status: SHIPPED | OUTPATIENT
Start: 2023-04-25 | End: 2023-07-24

## 2023-04-25 ASSESSMENT — FIBROSIS 4 INDEX: FIB4 SCORE: 0.46

## 2023-04-25 NOTE — PROGRESS NOTES
Virtual Visit: Established Patient   This visit was conducted via Zoom using secure and encrypted videoconferencing technology.   The patient was in their home in the [facility].    The patient's identity was confirmed and verbal consent was obtained for this virtual visit.     Subjective:   CC:   Chief Complaint   Patient presents with    Medication Follow-up     phentermine       Tonia Xavier is a 33 y.o. female presenting for evaluation and management of:    Problem   Overweight    Chronic, uncontrolled.  Having a hard time losing weight.  Had side effects to phentermine.  Tried Ozempic with a weight loss physician in the past but it did not seem to help.  Mounjaro is not covered by insurance.  Discussed increasing plant-based foods, decreasing animal-based and processed foods.  Discussed increasing activity.    4/25/2023:  Chronic, uncontrolled but improving.  Patient tolerated phentermine 30 mg daily.  15 mg did not help.  She does report a decrease in appetite and 7 pound weight loss since her last visit.  She denies palpitations and difficulty sleeping.  She would like to continue 30 mg daily.           ROS   Denies palpitations, difficulty sleeping, chest pain, shortness of breath.    Current medicines (including changes today)  Current Outpatient Medications   Medication Sig Dispense Refill    phentermine 30 MG capsule Take 1 Capsule by mouth every morning for 90 days. 30 Capsule 2    cyclobenzaprine (FLEXERIL) 10 mg Tab Take 1 Tablet by mouth every day. 30 Tablet 3    AJOVY 225 MG/1.5ML Solution Auto-injector INJECT 225 MG UNDER THE SKIN EVERY 4 WEEKS. 1.5 mL 0    dicyclomine (BENTYL) 20 MG Tab Take 1 tablet orally every 6 hours as needed for cramps/discomfort 120 Tablet 0    azelastine (ASTELIN) 137 MCG/SPRAY nasal spray Use 1 spray in each nostril once daily. 30 mL 2    sertraline (ZOLOFT) 50 MG Tab Take 1 Tablet by mouth every day. 90 Tablet 3    ferrous gluconate (FERGON) 324 (38 Fe)  "MG Tab Take 1 Tablet by mouth every morning with breakfast. 90 Tablet 0    valacyclovir (VALTREX) 1 GM Tab Take 1 Tablet by mouth every day. 90 Tablet 0    desogestrel-ethinyl estradiol (KARIVA) 0.15-0.02/0.01 MG (21/5) per tablet Take 1 Tablet by mouth every day. 84 Tablet 0    Rimegepant Sulfate (NURTEC) 75 MG TABLET DISPERSIBLE Take 1 Tablet by mouth as needed (Headache). 1 tab at headache onset; repeat in 24 hours 10 Tablet 5    montelukast (SINGULAIR) 10 MG Tab Take 1 tablet by mouth daily 30 Tablet 11    acyclovir (ZOVIRAX) 5 % Cream Apply 1 Application topically every 3 hours. 5 g 1    ondansetron (ZOFRAN) 4 MG Tab tablet ZOFRAN 4 MG TABS      fexofenadine (ALLEGRA) 180 MG tablet 24HR ALLERGY RELIEF TABS      LUBRICATING TEARS EYE DROPS 0.1-0.3 % Solution       ketotifen (ZADITOR) 0.025 % ophthalmic solution TAKE 1 DROP IN EACH AFFECTED EYE EVERY 8 HOURS X 7 DAYS       No current facility-administered medications for this visit.       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  Skin: No rashes in visible areas.  Eye: Round. Conjunctiva clear, lids normal. No icterus.   ENMT: Lips pink without lesions, good dentition, " moist mucous membranes. Phonation normal.  Neck: No masses, no thyromegaly. Moves freely without pain.  Respiratory: Unlabored respiratory effort, no cough or audible wheeze  Psych: Alert and oriented x3, normal affect and mood.     Assessment and Plan:   The following treatment plan was discussed:     1. Overweight  - phentermine 30 MG capsule; Take 1 Capsule by mouth every morning for 90 days.  Dispense: 30 Capsule; Refill: 2    Chronic, uncontrolled but improving.  Patient is down 7 pounds since her last visit.  Continue phentermine 30 mg daily.  Continue increasing plant-based foods and decreasing animal-based and processed foods.  Continue increased activity.  Follow-up in 3 months if needed.    Follow-up: Return if symptoms worsen or fail to improve.

## 2023-05-29 DIAGNOSIS — G43.009 MIGRAINE WITHOUT AURA AND WITHOUT STATUS MIGRAINOSUS, NOT INTRACTABLE: ICD-10-CM

## 2023-05-29 PROCEDURE — RXMED WILLOW AMBULATORY MEDICATION CHARGE: Performed by: NURSE PRACTITIONER

## 2023-05-30 ENCOUNTER — PHARMACY VISIT (OUTPATIENT)
Dept: PHARMACY | Facility: MEDICAL CENTER | Age: 33
End: 2023-05-30
Payer: COMMERCIAL

## 2023-05-30 PROCEDURE — RXMED WILLOW AMBULATORY MEDICATION CHARGE: Performed by: NURSE PRACTITIONER

## 2023-05-30 RX ORDER — DESOGESTREL AND ETHINYL ESTRADIOL 21-5 (28)
1 KIT ORAL DAILY
Qty: 84 TABLET | Refills: 0 | Status: SHIPPED | OUTPATIENT
Start: 2023-05-30 | End: 2024-01-04

## 2023-06-16 ENCOUNTER — TELEMEDICINE (OUTPATIENT)
Dept: MEDICAL GROUP | Facility: PHYSICIAN GROUP | Age: 33
End: 2023-06-16
Payer: COMMERCIAL

## 2023-06-16 VITALS — WEIGHT: 186.25 LBS | HEIGHT: 65 IN | BODY MASS INDEX: 31.03 KG/M2 | RESPIRATION RATE: 20 BRPM

## 2023-06-16 DIAGNOSIS — E66.3 OVERWEIGHT: ICD-10-CM

## 2023-06-16 PROCEDURE — RXMED WILLOW AMBULATORY MEDICATION CHARGE: Performed by: PHYSICIAN ASSISTANT

## 2023-06-16 PROCEDURE — 99214 OFFICE O/P EST MOD 30 MIN: CPT | Mod: 95 | Performed by: PHYSICIAN ASSISTANT

## 2023-06-16 RX ORDER — SEMAGLUTIDE 0.68 MG/ML
0.5 INJECTION, SOLUTION SUBCUTANEOUS
Qty: 3 ML | Refills: 0 | Status: SHIPPED | OUTPATIENT
Start: 2023-06-16 | End: 2024-01-04

## 2023-06-16 RX ORDER — VALACYCLOVIR HYDROCHLORIDE 1 G/1
1000 TABLET, FILM COATED ORAL
Qty: 90 TABLET | Refills: 0 | Status: SHIPPED | OUTPATIENT
Start: 2023-06-16 | End: 2023-10-05 | Stop reason: SDUPTHER

## 2023-06-16 ASSESSMENT — FIBROSIS 4 INDEX: FIB4 SCORE: 0.46

## 2023-06-16 NOTE — PROGRESS NOTES
Virtual Visit: Established Patient   This visit was conducted via Zoom using secure and encrypted videoconferencing technology.   The patient was in their home in the state of [place].    The patient's identity was confirmed and verbal consent was obtained for this virtual visit.     Subjective:   CC:   Chief Complaint   Patient presents with    Med Change Request       Tonia Xavier is a 33 y.o. female presenting for evaluation and management of:        ROS   Denies nausea, vomiting, abdominal pain, constipation    Current medicines (including changes today)  Current Outpatient Medications   Medication Sig Dispense Refill    Semaglutide,0.25 or 0.5MG/DOS, (OZEMPIC, 0.25 OR 0.5 MG/DOSE,) 2 MG/3ML Solution Pen-injector Inject 0.5 mg under the skin every 7 days. 3 mL 0    desogestrel-ethinyl estradiol (KARIVA) 0.15-0.02/0.01 MG (21/5) per tablet Take 1 Tablet by mouth every day. 84 Tablet 0    phentermine 30 MG capsule Take 1 Capsule by mouth every morning for 90 days. 30 Capsule 2    cyclobenzaprine (FLEXERIL) 10 mg Tab Take 1 Tablet by mouth every day. 30 Tablet 3    AJOVY 225 MG/1.5ML Solution Auto-injector INJECT 225 MG UNDER THE SKIN EVERY 4 WEEKS. 1.5 mL 0    dicyclomine (BENTYL) 20 MG Tab Take 1 tablet orally every 6 hours as needed for cramps/discomfort 120 Tablet 0    azelastine (ASTELIN) 137 MCG/SPRAY nasal spray Use 1 spray in each nostril once daily. 30 mL 2    sertraline (ZOLOFT) 50 MG Tab Take 1 Tablet by mouth every day. 90 Tablet 3    ferrous gluconate (FERGON) 324 (38 Fe) MG Tab Take 1 Tablet by mouth every morning with breakfast. 90 Tablet 0    valacyclovir (VALTREX) 1 GM Tab Take 1 Tablet by mouth every day. 90 Tablet 0    Rimegepant Sulfate (NURTEC) 75 MG TABLET DISPERSIBLE Take 1 Tablet by mouth as needed (Headache). 1 tab at headache onset; repeat in 24 hours 10 Tablet 5    montelukast (SINGULAIR) 10 MG Tab Take 1 tablet by mouth daily 30 Tablet 11    acyclovir (ZOVIRAX) 5 % Cream  "Apply 1 Application topically every 3 hours. 5 g 1    ondansetron (ZOFRAN) 4 MG Tab tablet ZOFRAN 4 MG TABS      fexofenadine (ALLEGRA) 180 MG tablet 24HR ALLERGY RELIEF TABS       No current facility-administered medications for this visit.       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  Skin: No rashes in visible areas.  Eye: Round. Conjunctiva clear, lids normal. No icterus.   ENMT: Lips pink without lesions, good dentition, moist mucous membranes. Phonation normal.  Neck: No masses, no thyromegaly. Moves freely without pain.  Respiratory: Unlabored respiratory effort, no cough or audible wheeze  Psych: Alert and oriented x3, normal affect and mood.     Assessment and Plan:   The following treatment plan was discussed:     1. Overweight  - Semaglutide,0.25 or 0.5MG/DOS, (OZEMPIC, 0.25 OR 0.5 MG/DOSE,) 2 MG/3ML Solution Pen-injector; Inject 0.5 mg under the skin every 7 days.  Dispense: 3 mL; Refill: 0  Chronic, uncontrolled.  Continue Ozempic as directed.  Follow-up in 6 weeks, sooner as needed.    Follow-up: Return in about 6 weeks (around 7/28/2023) for ozempic follow up.         "

## 2023-06-19 PROCEDURE — RXMED WILLOW AMBULATORY MEDICATION CHARGE: Performed by: PHYSICIAN ASSISTANT

## 2023-06-19 RX ORDER — FERROUS GLUCONATE 324(38)MG
324 TABLET ORAL
Qty: 90 TABLET | Refills: 0 | Status: SHIPPED | OUTPATIENT
Start: 2023-06-19 | End: 2024-01-15 | Stop reason: SDUPTHER

## 2023-06-20 ENCOUNTER — TELEPHONE (OUTPATIENT)
Dept: MEDICAL GROUP | Facility: PHYSICIAN GROUP | Age: 33
End: 2023-06-20
Payer: COMMERCIAL

## 2023-06-20 NOTE — TELEPHONE ENCOUNTER
MEDICATION PRIOR AUTHORIZATION NEEDED:    1. Name of Medication: OZEMPIC    2. Requested By (Name of Pharmacy): [facility]     3. Is insurance on file current? YES    4. What is the name & phone number of the 3rd party payor? [employer]

## 2023-06-21 ENCOUNTER — PHARMACY VISIT (OUTPATIENT)
Dept: PHARMACY | Facility: MEDICAL CENTER | Age: 33
End: 2023-06-21
Payer: COMMERCIAL

## 2023-06-22 NOTE — TELEPHONE ENCOUNTER
FINAL PRIOR AUTHORIZATION STATUS:    1.  Name of Medication & Dose:  Semaglutide,0.25 or 0.5MG/DOS, (OZEMPIC, 0.25 OR 0.5 MG/DOSE,) 2 MG/3ML Solution Pen-injector     2. Prior Auth Status: Denied.  Reason: medication is for treatment of weight loss which is excluded from your plan. If you choose to use an excluded medication, you may be responsible to pay 100% of the cost. See attachment in media    3. Action Taken: Pharmacy Notified: yes Patient Notified: no

## 2023-07-10 ENCOUNTER — HOSPITAL ENCOUNTER (OUTPATIENT)
Dept: LAB | Facility: MEDICAL CENTER | Age: 33
End: 2023-07-10
Attending: PHYSICIAN ASSISTANT
Payer: COMMERCIAL

## 2023-07-10 DIAGNOSIS — Z86.39 HISTORY OF IRON DEFICIENCY: ICD-10-CM

## 2023-07-10 DIAGNOSIS — E55.9 VITAMIN D DEFICIENCY: ICD-10-CM

## 2023-07-10 DIAGNOSIS — E78.5 HYPERLIPIDEMIA LDL GOAL <100: ICD-10-CM

## 2023-07-10 LAB
25(OH)D3 SERPL-MCNC: 56 NG/ML (ref 30–100)
CHOLEST SERPL-MCNC: 136 MG/DL (ref 100–199)
FASTING STATUS PATIENT QL REPORTED: NORMAL
HDLC SERPL-MCNC: 37 MG/DL
IRON SATN MFR SERPL: 17 % (ref 15–55)
IRON SERPL-MCNC: 60 UG/DL (ref 40–170)
LDLC SERPL CALC-MCNC: 65 MG/DL
TIBC SERPL-MCNC: 348 UG/DL (ref 250–450)
TRIGL SERPL-MCNC: 169 MG/DL (ref 0–149)
UIBC SERPL-MCNC: 288 UG/DL (ref 110–370)

## 2023-07-10 PROCEDURE — 80061 LIPID PANEL: CPT

## 2023-07-10 PROCEDURE — RXMED WILLOW AMBULATORY MEDICATION CHARGE: Performed by: NURSE PRACTITIONER

## 2023-07-10 PROCEDURE — 83550 IRON BINDING TEST: CPT

## 2023-07-10 PROCEDURE — 83540 ASSAY OF IRON: CPT

## 2023-07-10 PROCEDURE — 36415 COLL VENOUS BLD VENIPUNCTURE: CPT

## 2023-07-10 PROCEDURE — 82306 VITAMIN D 25 HYDROXY: CPT

## 2023-07-10 PROCEDURE — RXMED WILLOW AMBULATORY MEDICATION CHARGE: Performed by: PHYSICIAN ASSISTANT

## 2023-07-13 ENCOUNTER — PHARMACY VISIT (OUTPATIENT)
Dept: PHARMACY | Facility: MEDICAL CENTER | Age: 33
End: 2023-07-13
Payer: COMMERCIAL

## 2023-07-13 PROCEDURE — RXMED WILLOW AMBULATORY MEDICATION CHARGE: Performed by: NURSE PRACTITIONER

## 2023-07-13 RX ORDER — MONTELUKAST SODIUM 10 MG/1
10 TABLET ORAL DAILY
Qty: 30 TABLET | Refills: 9 | OUTPATIENT
Start: 2023-07-13

## 2023-07-20 ENCOUNTER — PHARMACY VISIT (OUTPATIENT)
Dept: PHARMACY | Facility: MEDICAL CENTER | Age: 33
End: 2023-07-20
Payer: COMMERCIAL

## 2023-07-28 ENCOUNTER — TELEMEDICINE (OUTPATIENT)
Dept: MEDICAL GROUP | Facility: PHYSICIAN GROUP | Age: 33
End: 2023-07-28
Payer: COMMERCIAL

## 2023-07-28 VITALS — HEIGHT: 65 IN | BODY MASS INDEX: 30.94 KG/M2 | RESPIRATION RATE: 16 BRPM | WEIGHT: 185.7 LBS

## 2023-07-28 DIAGNOSIS — E78.5 HYPERLIPIDEMIA LDL GOAL <100: ICD-10-CM

## 2023-07-28 DIAGNOSIS — E55.9 VITAMIN D DEFICIENCY: ICD-10-CM

## 2023-07-28 DIAGNOSIS — E66.3 OVERWEIGHT: ICD-10-CM

## 2023-07-28 DIAGNOSIS — Z86.39 HISTORY OF IRON DEFICIENCY: ICD-10-CM

## 2023-07-28 PROBLEM — J02.9 ACUTE PHARYNGITIS: Status: RESOLVED | Noted: 2023-03-18 | Resolved: 2023-07-28

## 2023-07-28 PROCEDURE — RXMED WILLOW AMBULATORY MEDICATION CHARGE: Performed by: PHYSICIAN ASSISTANT

## 2023-07-28 PROCEDURE — 99214 OFFICE O/P EST MOD 30 MIN: CPT | Mod: 95 | Performed by: PHYSICIAN ASSISTANT

## 2023-07-28 RX ORDER — PHENTERMINE HYDROCHLORIDE 37.5 MG/1
37.5 TABLET ORAL
Qty: 30 TABLET | Refills: 1 | Status: SHIPPED | OUTPATIENT
Start: 2023-07-28 | End: 2023-10-29 | Stop reason: SDUPTHER

## 2023-07-28 ASSESSMENT — FIBROSIS 4 INDEX: FIB4 SCORE: 0.46

## 2023-07-28 NOTE — PROGRESS NOTES
Virtual Visit: Established Patient   This visit was conducted via Zoom using secure and encrypted videoconferencing technology.   The patient was in their home in the [facility].    The patient's identity was confirmed and verbal consent was obtained for this virtual visit.     Subjective:   CC:   Chief Complaint   Patient presents with    Lab Results       Tonia Xavier is a 33 y.o. female presenting for evaluation and management of:    Problem   Overweight    Chronic, uncontrolled.  Having a hard time losing weight.  Had side effects to phentermine.  Tried Ozempic with a weight loss physician in the past but it did not seem to help.  Mounjaro is not covered by insurance.  Discussed increasing plant-based foods, decreasing animal-based and processed foods.  Discussed increasing activity.    4/25/2023:  Chronic, uncontrolled but improving.  Patient tolerated phentermine 30 mg daily.  15 mg did not help.  She does report a decrease in appetite and 7 pound weight loss since her last visit.  She denies palpitations and difficulty sleeping.  She would like to continue 30 mg daily.    6/16/2023:  Chronic, uncontrolled.  Patient states phentermine was not helping with her appetite as much during the second month.  She would like to go back to Ozempic.  She was on 0.25 mg weekly without side effects.  This was well covered by her insurance.  She would like to go back to this medication.  1 pen prescribed which should be 8 doses, 8 weeks.  Recommend follow-up in 6 weeks.    7/28/2023:  Ozempic was not covered by her insurance.  She would like to try phentermine again.  She tolerated phentermine 30 mg without issue although it did start waning on its efficacy with her appetite.  Will try phentermine 37.5 mg daily with 1 refill.     History of Iron Deficiency    Chronic, stable.  Iron level 10/24/2022, 67.  Patient does feel tired.  Will restart ferrous gluconate, once daily and repeat labs in 3 to 6  months.    Component      Latest Ref Rng 10/24/2022 7/10/2023   LDL      <100 mg/dL 60  65    Iron level is stable with 1 ferrous gluconate daily.  Advise she can increase to 2 for makes her feel better and is able to tolerate it.           Hyperlipidemia Ldl Goal <100    Chronic, controlled.   Discussed increasing activity and decreasing carbohydrate type foods.  Latest Labs:   Lab Results   Component Value Date/Time    CHOLSTRLTOT 136 07/10/2023 10:00 AM    LDL 65 07/10/2023 10:00 AM    HDL 37 (A) 07/10/2023 10:00 AM    TRIGLYCERIDE 169 (H) 07/10/2023 10:00 AM      Medications: none  Medication side effects:   Diet/Exercise:   Family History of high cholesterol or heart disease?   Risk calculator: The ASCVD Risk score (Zelda OLIVEIRA, et al., 2019) failed to calculate.        Vitamin D Deficiency    Chronic, controlled.  Tolerating 2 OTC vitamin supplement (unsure of dose).  Component      Latest Ref Rng 7/10/2023   25-Hydroxy Vitamin D 25      30 - 100 ng/mL 56             Acute Pharyngitis (Resolved)         ROS   Denies nausea, vomiting, diarrhea    Current medicines (including changes today)  Current Outpatient Medications   Medication Sig Dispense Refill    phentermine (ADIPEX-P) 37.5 MG tablet Take 1 Tablet by mouth every morning before breakfast for 60 days. 30 Tablet 1    montelukast (SINGULAIR) 10 MG Tab Take 1 tablet by mouth daily 30 Tablet 9    ferrous gluconate (FERGON) 324 (38 Fe) MG Tab Take 1 Tablet by mouth every morning with breakfast. 90 Tablet 0    valacyclovir (VALTREX) 1 GM Tab Take 1 Tablet by mouth every day. 90 Tablet 0    desogestrel-ethinyl estradiol (KARIVA) 0.15-0.02/0.01 MG (21/5) per tablet Take 1 Tablet by mouth every day. 84 Tablet 0    cyclobenzaprine (FLEXERIL) 10 mg Tab Take 1 Tablet by mouth every day. 30 Tablet 3    AJOVY 225 MG/1.5ML Solution Auto-injector INJECT 225 MG UNDER THE SKIN EVERY 4 WEEKS. 1.5 mL 0    dicyclomine (BENTYL) 20 MG Tab Take 1 tablet orally every 6 hours as  "needed for cramps/discomfort 120 Tablet 0    azelastine (ASTELIN) 137 MCG/SPRAY nasal spray Use 1 spray in each nostril once daily. 30 mL 2    sertraline (ZOLOFT) 50 MG Tab Take 1 Tablet by mouth every day. 90 Tablet 3    Rimegepant Sulfate (NURTEC) 75 MG TABLET DISPERSIBLE Take 1 Tablet by mouth as needed (Headache). 1 tab at headache onset; repeat in 24 hours 10 Tablet 5    acyclovir (ZOVIRAX) 5 % Cream Apply 1 Application topically every 3 hours. 5 g 1    ondansetron (ZOFRAN) 4 MG Tab tablet ZOFRAN 4 MG TABS      fexofenadine (ALLEGRA) 180 MG tablet 24HR ALLERGY RELIEF TABS      Semaglutide,0.25 or 0.5MG/DOS, (OZEMPIC, 0.25 OR 0.5 MG/DOSE,) 2 MG/3ML Solution Pen-injector Inject 0.5 mg under the skin every 7 days. (Patient not taking: Reported on 7/28/2023) 3 mL 0     No current facility-administered medications for this visit.       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  Wt 84.2 kg (185 lb 11.2 oz) Comment: per pt.  LMP 07/17/2023 (Approximate)   Breastfeeding No   BMI 30.90 kg/m²     Physical Exam:  Constitutional: Alert, no distress, well-groomed.  Skin: No rashes in visible areas.  Eye: Round. Conjunctiva clear, lids normal. No icterus.   ENMT: Lips pink without lesions, good dentition, moist mucous membranes. Phonation normal.  Neck: No masses, no thyromegaly. Moves freely without pain.  Respiratory: Unlabored respiratory effort, " no cough or audible wheeze  Psych: Alert and oriented x3, normal affect and mood.     LABS:       Assessment and Plan:   The following treatment plan was discussed:     1. Vitamin D deficiency  Chronic, stable.  Continue to over-the-counter supplements daily.    2. Hyperlipidemia LDL goal <100  Chronic, stable.  LDL is in control.  Low HDL: Advise increasing activity.  Slightly elevated triglycerides: Advise decreasing carbohydrates, sugars, alcohol.    3. History of iron deficiency  Chronic, stable.  Continue 1 ferrous gluconate daily, increasing to 2 daily if tolerated and if feeling better taking 2.    4. Overweight  Chronic, uncontrolled.  Patient is working on lifestyle changes.  Will continue phentermine for 2 more months.    - phentermine (ADIPEX-P) 37.5 MG tablet; Take 1 Tablet by mouth every morning before breakfast for 60 days.  Dispense: 30 Tablet; Refill: 1      Follow-up: Return in about 1 year (around 7/28/2024), or if symptoms worsen or fail to improve.

## 2023-08-01 ENCOUNTER — PHARMACY VISIT (OUTPATIENT)
Dept: PHARMACY | Facility: MEDICAL CENTER | Age: 33
End: 2023-08-01
Payer: COMMERCIAL

## 2023-08-07 ENCOUNTER — HOSPITAL ENCOUNTER (OUTPATIENT)
Dept: LAB | Facility: MEDICAL CENTER | Age: 33
End: 2023-08-07
Payer: COMMERCIAL

## 2023-08-07 LAB
HCT VFR BLD AUTO: 41.7 % (ref 37–47)
HGB BLD-MCNC: 13.9 G/DL (ref 12–16)
T4 FREE SERPL-MCNC: 1.18 NG/DL (ref 0.93–1.7)
TSH SERPL DL<=0.005 MIU/L-ACNC: 1.15 UIU/ML (ref 0.38–5.33)

## 2023-08-07 PROCEDURE — 85018 HEMOGLOBIN: CPT

## 2023-08-07 PROCEDURE — 84443 ASSAY THYROID STIM HORMONE: CPT

## 2023-08-07 PROCEDURE — 85014 HEMATOCRIT: CPT

## 2023-08-07 PROCEDURE — 84439 ASSAY OF FREE THYROXINE: CPT

## 2023-08-08 RX ORDER — FREMANEZUMAB-VFRM 225 MG/1.5ML
225 INJECTION SUBCUTANEOUS
Qty: 1.5 ML | Refills: 0 | Status: SHIPPED | OUTPATIENT
Start: 2023-08-08 | End: 2023-09-10 | Stop reason: SDUPTHER

## 2023-08-08 NOTE — TELEPHONE ENCOUNTER
Received request via: Pharmacy    Was the patient seen in the last year in this department? Yes    Does the patient have an active prescription (recently filled or refills available) for medication(s) requested? YES    Does the patient have assisted Plus and need 100 day supply (blood pressure, diabetes and cholesterol meds only)? Patient does not have SCP   Requested Prescriptions     Pending Prescriptions Disp Refills    Fremanezumab-vfrm (AJOVY) 225 MG/1.5ML Solution Auto-injector 1.5 mL 0

## 2023-08-23 ENCOUNTER — PHARMACY VISIT (OUTPATIENT)
Dept: PHARMACY | Facility: MEDICAL CENTER | Age: 33
End: 2023-08-23
Payer: COMMERCIAL

## 2023-08-23 PROCEDURE — RXMED WILLOW AMBULATORY MEDICATION CHARGE: Performed by: NURSE PRACTITIONER

## 2023-08-24 PROCEDURE — RXMED WILLOW AMBULATORY MEDICATION CHARGE: Performed by: PHYSICIAN ASSISTANT

## 2023-08-25 ENCOUNTER — PHARMACY VISIT (OUTPATIENT)
Dept: PHARMACY | Facility: MEDICAL CENTER | Age: 33
End: 2023-08-25
Payer: COMMERCIAL

## 2023-08-30 DIAGNOSIS — G43.009 MIGRAINE WITHOUT AURA AND WITHOUT STATUS MIGRAINOSUS, NOT INTRACTABLE: ICD-10-CM

## 2023-08-30 RX ORDER — RIMEGEPANT SULFATE 75 MG/75MG
1 TABLET, ORALLY DISINTEGRATING ORAL PRN
Qty: 10 TABLET | Refills: 5 | Status: SHIPPED | OUTPATIENT
Start: 2023-08-30 | End: 2024-01-15 | Stop reason: SDUPTHER

## 2023-09-05 ENCOUNTER — TELEPHONE (OUTPATIENT)
Dept: NEUROLOGY | Facility: MEDICAL CENTER | Age: 33
End: 2023-09-05
Payer: COMMERCIAL

## 2023-09-05 ENCOUNTER — DOCUMENTATION (OUTPATIENT)
Dept: PHARMACY | Facility: MEDICAL CENTER | Age: 33
End: 2023-09-05
Payer: COMMERCIAL

## 2023-09-05 NOTE — PROGRESS NOTES
** TRF Onboarding**  Diagnosis:  Migraine without aura and without status migrainosus, not intractable [G43.009]    Drug & Non-Drug Allergies: EMR Reviewed. No concerning drug or non-drug allergies.                                                                                          Drug Therapy (name/formulation/dose/route of admin/frequency): Ajovy 225mg/1.5mL solution in auto-injector, 1 pen SC Q 4 weeks       EMR Reviewed   - Dose Appropriateness (Y/N):  Y   - Renal or Hepatic Adjustments (Y/N):  N   - Any comorbidities, PMH, precautions, or contraindications that pose a medication safety concern? (Y/N):  N   - Any relevant lab work needed that affects the initial start date? (Y/N):  n/a, patient on med dating back to Feb 2022    - List Past Treatments: Reyvow; NSAIDs; topiramate; propranolol; rizatriptan    - List DDI’s: no clinically significant DDIs     - Patient’s ability to self-administer medication:  No issues identified per EMR  List Goals of Therapy:       - To reduce migraine frequency, duration, and severity    - To improve acute medication responsiveness and reduce need for acute attacks    - To identify and modify migraine triggers     Patient unable to reach to offer services; gap list patient first filled on 8/23/23

## 2023-09-05 NOTE — TELEPHONE ENCOUNTER
[facility] 75 MG TBDP    PA submitted via CMM Key: [id number] - PA Case ID: [id number] awaiting response TAT 24-72 hrs. - 09/05/2023 10:09am

## 2023-09-06 ENCOUNTER — TELEPHONE (OUTPATIENT)
Dept: NEUROLOGY | Facility: MEDICAL CENTER | Age: 33
End: 2023-09-06
Payer: COMMERCIAL

## 2023-09-06 NOTE — TELEPHONE ENCOUNTER
Plan has denied, [facility] does not want to see 2 CGRP's given at the same time. Liaison aware.  - 09/06/2023 4:20pm

## 2023-09-11 RX ORDER — FREMANEZUMAB-VFRM 225 MG/1.5ML
225 INJECTION SUBCUTANEOUS
Qty: 1.5 ML | Refills: 0 | Status: SHIPPED | OUTPATIENT
Start: 2023-09-11 | End: 2023-11-09 | Stop reason: SDUPTHER

## 2023-09-11 NOTE — TELEPHONE ENCOUNTER
Received request via: Pharmacy    Was the patient seen in the last year in this department? Yes    Does the patient have an active prescription (recently filled or refills available) for medication(s) requested? yes    Does the patient have custodial Plus and need 100 day supply (blood pressure, diabetes and cholesterol meds only)? Patient does not have SCP   Requested Prescriptions     Pending Prescriptions Disp Refills    Fremanezumab-vfrm (AJOVY) 225 MG/1.5ML Solution Auto-injector 1.5 mL 0     Sig: Inject 225 mg as directed every 4 weeks. INJECT 225 MG UNDER THE SKIN EVERY 4 WEEKS.  Strength: 225 MG/1.5ML

## 2023-09-16 ENCOUNTER — APPOINTMENT (OUTPATIENT)
Dept: RADIOLOGY | Facility: MEDICAL CENTER | Age: 33
End: 2023-09-16
Payer: COMMERCIAL

## 2023-09-18 PROCEDURE — RXMED WILLOW AMBULATORY MEDICATION CHARGE: Performed by: PHYSICIAN ASSISTANT

## 2023-09-19 ENCOUNTER — PHARMACY VISIT (OUTPATIENT)
Dept: PHARMACY | Facility: MEDICAL CENTER | Age: 33
End: 2023-09-19
Payer: COMMERCIAL

## 2023-09-21 NOTE — TELEPHONE ENCOUNTER
Tonia has signed the [employer] financial assistance forms electrionically.  I've emailed the signed form to [facility] [place] to obtain MD signature.  Once MD signature has been obtain, we'll be able to apply for formally [employer] assistance.  Awaiting MD signature.

## 2023-09-21 NOTE — TELEPHONE ENCOUNTER
Due to insurance denial, [employer] financial assistance forms have been emailed to Tonia via [email or web address] to [email or web address].  I've left her a VM asking her to check her email and sign the forms electronically.

## 2023-09-25 PROCEDURE — RXMED WILLOW AMBULATORY MEDICATION CHARGE: Performed by: PHYSICIAN ASSISTANT

## 2023-09-29 ENCOUNTER — PHARMACY VISIT (OUTPATIENT)
Dept: PHARMACY | Facility: MEDICAL CENTER | Age: 33
End: 2023-09-29
Payer: COMMERCIAL

## 2023-10-05 ENCOUNTER — IMMUNIZATION (OUTPATIENT)
Dept: OCCUPATIONAL MEDICINE | Facility: CLINIC | Age: 33
End: 2023-10-05

## 2023-10-05 DIAGNOSIS — Z23 NEED FOR VACCINATION: Primary | ICD-10-CM

## 2023-10-05 PROCEDURE — RXMED WILLOW AMBULATORY MEDICATION CHARGE: Performed by: PHYSICIAN ASSISTANT

## 2023-10-05 PROCEDURE — 90686 IIV4 VACC NO PRSV 0.5 ML IM: CPT | Performed by: PREVENTIVE MEDICINE

## 2023-10-05 PROCEDURE — RXMED WILLOW AMBULATORY MEDICATION CHARGE: Performed by: NURSE PRACTITIONER

## 2023-10-05 RX ORDER — VALACYCLOVIR HYDROCHLORIDE 1 G/1
1000 TABLET, FILM COATED ORAL
Qty: 90 TABLET | Refills: 0 | Status: SHIPPED | OUTPATIENT
Start: 2023-10-05 | End: 2024-01-15 | Stop reason: SDUPTHER

## 2023-10-05 NOTE — TELEPHONE ENCOUNTER
Received request via: Pharmacy    Was the patient seen in the last year in this department? Yes    Does the patient have an active prescription (recently filled or refills available) for medication(s) requested? yes    Does the patient have [facility] Plus and need 100 day supply (blood pressure, diabetes and cholesterol meds only)? Patient does not have SCP   Requested Prescriptions     Pending Prescriptions Disp Refills    valacyclovir (VALTREX) 1 GM Tab 90 Tablet 0     Sig: Take 1 Tablet by mouth every day.

## 2023-10-06 ENCOUNTER — PHARMACY VISIT (OUTPATIENT)
Dept: PHARMACY | Facility: MEDICAL CENTER | Age: 33
End: 2023-10-06
Payer: COMMERCIAL

## 2023-10-10 ENCOUNTER — PHARMACY VISIT (OUTPATIENT)
Dept: PHARMACY | Facility: MEDICAL CENTER | Age: 33
End: 2023-10-10

## 2023-10-16 ENCOUNTER — APPOINTMENT (OUTPATIENT)
Dept: RADIOLOGY | Facility: MEDICAL CENTER | Age: 33
End: 2023-10-16
Payer: COMMERCIAL

## 2023-10-29 DIAGNOSIS — E66.3 OVERWEIGHT: ICD-10-CM

## 2023-10-31 PROCEDURE — RXMED WILLOW AMBULATORY MEDICATION CHARGE: Performed by: PHYSICIAN ASSISTANT

## 2023-10-31 RX ORDER — PHENTERMINE HYDROCHLORIDE 37.5 MG/1
37.5 TABLET ORAL
Qty: 30 TABLET | Refills: 0 | Status: SHIPPED | OUTPATIENT
Start: 2023-10-31 | End: 2023-12-01

## 2023-11-01 ENCOUNTER — PHARMACY VISIT (OUTPATIENT)
Dept: PHARMACY | Facility: MEDICAL CENTER | Age: 33
End: 2023-11-01
Payer: COMMERCIAL

## 2023-11-09 ENCOUNTER — HOSPITAL ENCOUNTER (OUTPATIENT)
Dept: RADIOLOGY | Facility: MEDICAL CENTER | Age: 33
End: 2023-11-09
Payer: COMMERCIAL

## 2023-11-09 DIAGNOSIS — N92.1 METRORRHAGIA: ICD-10-CM

## 2023-11-09 PROCEDURE — RXMED WILLOW AMBULATORY MEDICATION CHARGE: Performed by: NURSE PRACTITIONER

## 2023-11-09 PROCEDURE — 76830 TRANSVAGINAL US NON-OB: CPT

## 2023-11-10 NOTE — TELEPHONE ENCOUNTER
Received request via: Pharmacy    Was the patient seen in the last year in this department? Yes    Does the patient have an active prescription (recently filled or refills available) for medication(s) requested? yes    Does the patient have [facility] Plus and need 100 day supply (blood pressure, diabetes and cholesterol meds only)? Patient does not have SCP   Requested Prescriptions     Pending Prescriptions Disp Refills    Fremanezumab-vfrm (AJOVY) 225 MG/1.5ML Solution Auto-injector 1.5 mL 0     Sig: Inject 225 mg as directed every 4 weeks. INJECT 225 MG UNDER THE SKIN EVERY 4 WEEKS.  Strength: 225 MG/1.5ML

## 2023-11-13 PROCEDURE — RXMED WILLOW AMBULATORY MEDICATION CHARGE: Performed by: PHYSICIAN ASSISTANT

## 2023-11-13 RX ORDER — FREMANEZUMAB-VFRM 225 MG/1.5ML
225 INJECTION SUBCUTANEOUS
Qty: 1.5 ML | Refills: 0 | Status: SHIPPED | OUTPATIENT
Start: 2023-11-13 | End: 2023-12-20 | Stop reason: SDUPTHER

## 2023-11-14 PROCEDURE — RXMED WILLOW AMBULATORY MEDICATION CHARGE

## 2023-11-17 ENCOUNTER — PHARMACY VISIT (OUTPATIENT)
Dept: PHARMACY | Facility: MEDICAL CENTER | Age: 33
End: 2023-11-17
Payer: COMMERCIAL

## 2023-11-17 DIAGNOSIS — E66.3 OVERWEIGHT: ICD-10-CM

## 2023-11-17 PROCEDURE — RXOTC WILLOW AMBULATORY OTC CHARGE: Performed by: PHARMACIST

## 2023-11-17 RX ORDER — PHENTERMINE HYDROCHLORIDE 37.5 MG/1
37.5 TABLET ORAL
Qty: 30 TABLET | Refills: 0 | OUTPATIENT
Start: 2023-11-17 | End: 2023-12-17

## 2023-12-20 PROCEDURE — RXMED WILLOW AMBULATORY MEDICATION CHARGE: Performed by: NURSE PRACTITIONER

## 2023-12-21 PROCEDURE — RXMED WILLOW AMBULATORY MEDICATION CHARGE: Performed by: PHYSICIAN ASSISTANT

## 2023-12-21 RX ORDER — FREMANEZUMAB-VFRM 225 MG/1.5ML
225 INJECTION SUBCUTANEOUS
Qty: 1.5 ML | Refills: 5 | Status: SHIPPED | OUTPATIENT
Start: 2023-12-21 | End: 2024-01-26 | Stop reason: SDUPTHER

## 2024-01-03 ENCOUNTER — PHARMACY VISIT (OUTPATIENT)
Dept: PHARMACY | Facility: MEDICAL CENTER | Age: 34
End: 2024-01-03
Payer: COMMERCIAL

## 2024-01-04 ENCOUNTER — OFFICE VISIT (OUTPATIENT)
Dept: URGENT CARE | Facility: PHYSICIAN GROUP | Age: 34
End: 2024-01-04
Payer: COMMERCIAL

## 2024-01-04 VITALS
RESPIRATION RATE: 16 BRPM | HEIGHT: 65 IN | TEMPERATURE: 97.1 F | SYSTOLIC BLOOD PRESSURE: 112 MMHG | DIASTOLIC BLOOD PRESSURE: 76 MMHG | HEART RATE: 91 BPM | WEIGHT: 182 LBS | BODY MASS INDEX: 30.32 KG/M2 | OXYGEN SATURATION: 99 %

## 2024-01-04 DIAGNOSIS — R10.9 BELLY PAIN: ICD-10-CM

## 2024-01-04 LAB
APPEARANCE UR: CLEAR
BILIRUB UR STRIP-MCNC: NEGATIVE MG/DL
COLOR UR AUTO: YELLOW
GLUCOSE UR STRIP.AUTO-MCNC: NEGATIVE MG/DL
KETONES UR STRIP.AUTO-MCNC: NEGATIVE MG/DL
LEUKOCYTE ESTERASE UR QL STRIP.AUTO: NEGATIVE
NITRITE UR QL STRIP.AUTO: NEGATIVE
PH UR STRIP.AUTO: 6 [PH] (ref 5–8)
POCT INT CON NEG: NEGATIVE
POCT INT CON POS: POSITIVE
POCT URINE PREGNANCY TEST: NEGATIVE
PROT UR QL STRIP: NEGATIVE MG/DL
RBC UR QL AUTO: NEGATIVE
SP GR UR STRIP.AUTO: 1.01
UROBILINOGEN UR STRIP-MCNC: 0.2 MG/DL

## 2024-01-04 PROCEDURE — 3078F DIAST BP <80 MM HG: CPT | Performed by: FAMILY MEDICINE

## 2024-01-04 PROCEDURE — RXMED WILLOW AMBULATORY MEDICATION CHARGE: Performed by: FAMILY MEDICINE

## 2024-01-04 PROCEDURE — 3074F SYST BP LT 130 MM HG: CPT | Performed by: FAMILY MEDICINE

## 2024-01-04 PROCEDURE — 81002 URINALYSIS NONAUTO W/O SCOPE: CPT | Performed by: FAMILY MEDICINE

## 2024-01-04 PROCEDURE — 99213 OFFICE O/P EST LOW 20 MIN: CPT | Performed by: FAMILY MEDICINE

## 2024-01-04 PROCEDURE — 81025 URINE PREGNANCY TEST: CPT | Performed by: FAMILY MEDICINE

## 2024-01-04 RX ORDER — DICYCLOMINE HCL 20 MG
20 TABLET ORAL
Qty: 28 TABLET | Refills: 0 | Status: SHIPPED | OUTPATIENT
Start: 2024-01-04

## 2024-01-04 ASSESSMENT — FIBROSIS 4 INDEX: FIB4 SCORE: 0.46

## 2024-01-04 ASSESSMENT — ENCOUNTER SYMPTOMS: ABDOMINAL PAIN: 1

## 2024-01-04 NOTE — PROGRESS NOTES
Subjective     Tonia Xavier is a 33 y.o. female who presents with GI Problem (Pain travels to back, feels like muscle pain  X1 week/Bloating and cramps)    - This is a very pleasant 33 y.o. who has come to the walk-in clinic today for ~1wk w/ upset stomach, on/off bloating, cramps gassy. Moves around. A cpl episodes of non bloody diarrhea. No vomiting or dyspepsia/gerd. Eating/drinking ok. No new foods, recent travel or abx use or anything can think of to have caused this.           ALLERGIES:  Benzoin     PMH:  Past Medical History:   Diagnosis Date    Anemia     Anxiety     Asthma     HSV infection 09/07/2019    Hyperlipidemia LDL goal <100 09/09/2019    IFG (impaired fasting glucose) 09/09/2019    Migraine without aura and without status migrainosus, not intractable 02/13/2022    Ovarian cyst 11/2018    left side    Vitamin D deficiency 02/01/2018        PSH:  Past Surgical History:   Procedure Laterality Date    PB KNEE SCOPE,AID ANT CRUCIATE REPAIR Right 3/23/2021    Procedure: RECONSTRUCTION, KNEE, ACL, ARTHROSCOPIC - WITH ALLOGRAFT BONE TENDON BONE;  Surgeon: Dexter Pimentel M.D.;  Location: [facility];  Service: Orthopedics    PB KNEE SCOPE,DIAGNOSTIC Right 10/29/2020    Procedure: ARTHROSCOPY, KNEE;  Surgeon: Dexter Pimentel M.D.;  Location: [facility];  Service: Orthopedics    MENISCECTOMY, KNEE, MEDIAL Right 10/29/2020    Procedure: MENISCECTOMY, KNEE, MEDIAL - PARTIAL;  Surgeon: Dexter Pimentel M.D.;  Location: [facility];  Service: Orthopedics    HARDWARE REMOVAL ORTHO Right 10/29/2020    Procedure: REMOVAL, HARDWARE tibia ;  Surgeon: Dexter Pimentel M.D.;  Location: [facility];  Service: Orthopedics    BONE GRAFT Right 10/29/2020    Procedure: curetage and BONE GRAFT femoral cyst, microfracture;  Surgeon: Dexter Pimentel M.D.;  Location: [facility];  Service: Orthopedics    KNEE ARTHROSCOPY Right 11/13/2018    Procedure: KNEE ARTHROSCOPY;  Surgeon: Dexter  YURI Pimentel;  Location: [facility];  Service: Orthopedics    SYNOVECTOMY Right 2018    Procedure: SYNOVECTOMY;  Surgeon: Dexter Pimentel M.D.;  Location: [facility];  Service: Orthopedics    MENISCECTOMY, KNEE, MEDIAL Right 2018    Procedure: MEDIAL MENISCECTOMY-  PARTIAL, AND PARTIAL LATERAL;  Surgeon: Dexter Pimentel M.D.;  Location: [facility];  Service: Orthopedics    CHONDROPLASTY  2018    Procedure: CHONDROPLASTY;  Surgeon: Dexter Pimentel M.D.;  Location: [facility];  Service: Orthopedics    HARDWARE REMOVAL ORTHO  2018    Procedure: HARDWARE REMOVAL ORTHO;  Surgeon: Dexter Pimentel M.D.;  Location: [facility];  Service: Orthopedics    BONE GRAFT  2018    Procedure: BONE GRAFT;  Surgeon: Dexter Pimentel M.D.;  Location: [facility];  Service: Orthopedics    REPEAT C SECTION  2017    Procedure: REPEAT C SECTION;  Surgeon: Asmita Bolivar M.D.;  [facility];  Service: Obstetrics    REPEAT C SECTION  10/19/2015    Procedure: REPEAT C SECTION;  Surgeon: Mary Randolph M.D.;  [facility]: [facility];  Service:     RECONSTRUCTION, KNEE, ACL, ARTHROSCOPIC  10/2/2014    Performed by Dexter Pimentel M.D. at [facility]    KNEE ARTHROSCOPY  2014    Performed by Dexter Pimentel M.D. at [facility]    HARDWARE REMOVAL ORTHO  2014    Performed by Dexter Pimentel M.D. at [facility]    BONE GRAFT  2014    Performed by Dexter Pimentel M.D. at [facility]    [place]  DELIV[place] ONLY      RECONSTRUCTION, KNEE, ACL Right 2006       MEDS:    Current Outpatient Medications:     dicyclomine (BENTYL) 20 MG Tab, Take 1 Tablet by mouth four times daily - before meals and nightly as needed (stomach cramps)., Disp: 28 Tablet, Rfl: 0    Fremanezumab-vfrm (AJOVY) 225 MG/1.5ML Solution Auto-injector, Inject 225 mg as directed every 4 weeks.  "INJECT 225 MG UNDER THE SKIN EVERY 4 WEEKS. Strength: 225 MG/1.5ML, Disp: 1.5 mL, Rfl: 5    drospirenone-ethinyl estradiol (DENISSE) 3-0.03 MG per tablet, Take 1 tablet by mouth daily, Disp: 84 Tablet, Rfl: 4    valacyclovir (VALTREX) 1 GM Tab, Take 1 Tablet by mouth every day., Disp: 90 Tablet, Rfl: 0    Rimegepant Sulfate (NURTEC) 75 MG TABLET DISPERSIBLE, Take 1 Tablet by mouth as needed (Headache). 1 tab at headache onset; repeat in 24 hours, Disp: 10 Tablet, Rfl: 5    montelukast (SINGULAIR) 10 MG Tab, Take 1 tablet by mouth daily, Disp: 30 Tablet, Rfl: 9    ferrous gluconate (FERGON) 324 (38 Fe) MG Tab, Take 1 Tablet by mouth every morning with breakfast., Disp: 90 Tablet, Rfl: 0    cyclobenzaprine (FLEXERIL) 10 mg Tab, Take 1 Tablet by mouth every day., Disp: 30 Tablet, Rfl: 3    sertraline (ZOLOFT) 50 MG Tab, Take 1 Tablet by mouth every day., Disp: 90 Tablet, Rfl: 3    acyclovir (ZOVIRAX) 5 % Cream, Apply 1 Application topically every 3 hours., Disp: 5 g, Rfl: 1    ondansetron (ZOFRAN) 4 MG Tab tablet, ZOFRAN 4 MG TABS, Disp: , Rfl:     ** I have documented what I find to be significant in regards to past medical, social, family and surgical history  in my HPI or under PMH/PSH/FH review section, otherwise it is noncontributory **           HPI    Review of Systems   Gastrointestinal:  Positive for abdominal pain.   All other systems reviewed and are negative.             Objective     /76   Pulse 91   Temp 36.2 °C (97.1 °F)   Resp 16   Ht 1.651 m (5' 5\")   Wt 82.6 kg (182 lb)   SpO2 99%   BMI 30.29 kg/m²      Physical Exam  Vitals and nursing note reviewed.   Constitutional:       General: She is not in acute distress.     Appearance: Normal appearance. She is well-developed.   HENT:      Head: Normocephalic.   Eyes:      Conjunctiva/sclera: Conjunctivae normal.   Cardiovascular:      Heart sounds: Normal heart sounds. No murmur heard.  Pulmonary:      Effort: Pulmonary effort is normal. No " respiratory distress.      Breath sounds: Normal breath sounds.   Abdominal:      General: There is no distension.      Palpations: Abdomen is soft.      Tenderness: There is no abdominal tenderness. There is no guarding or rebound.   Neurological:      Mental Status: She is alert.      Motor: No abnormal muscle tone.   Psychiatric:         Mood and Affect: Mood normal.         Behavior: Behavior normal.                             Assessment & Plan     1. Belly pain  POCT Urinalysis    POCT Pregnancy    dicyclomine (BENTYL) 20 MG Tab          Nonspecific, exam/vs wnl. May be IBS, trial of above     - Dx, plan & d/c instructions discussed   - Rest, stay hydrated  - bland diet    Follow up with your regular primary care providers office within a week to keep them updated and informed of this visit and for regular routine health maintenance check-ups. ER if not improving in 2-3 days or if feeling/getting worse.     Patient left in stable condition     POCT results reviewed/discussed      Pertinent prior lab work and/or imaging studies in Epic have been reviewed by me today on day of this visit and taken into account for my treatment and plan today    Pertinent PMH/PSH and/or chronic conditions and medications if any were reviewed today and taken into account for my treatment and plan today    Pertinent prior office visit notes in [facility] have been reviewed by me today on day of this visit.    Please note that this dictation may have been created using voice recognition software, if so I have made every reasonable attempt to correct obvious errors, but I expect that there are errors of grammar and possibly content that I did not discover before finalizing the note.

## 2024-01-08 ENCOUNTER — HOSPITAL ENCOUNTER (OUTPATIENT)
Dept: LAB | Facility: MEDICAL CENTER | Age: 34
End: 2024-01-08
Payer: COMMERCIAL

## 2024-01-08 ENCOUNTER — PHARMACY VISIT (OUTPATIENT)
Dept: PHARMACY | Facility: MEDICAL CENTER | Age: 34
End: 2024-01-08
Payer: COMMERCIAL

## 2024-01-08 LAB
ALBUMIN SERPL BCP-MCNC: 4 G/DL (ref 3.2–4.9)
ALBUMIN/GLOB SERPL: 1.5 G/DL
ALP SERPL-CCNC: 78 U/L (ref 30–99)
ALT SERPL-CCNC: 12 U/L (ref 2–50)
ANION GAP SERPL CALC-SCNC: 12 MMOL/L (ref 7–16)
AST SERPL-CCNC: 14 U/L (ref 12–45)
BILIRUB SERPL-MCNC: 0.4 MG/DL (ref 0.1–1.5)
BUN SERPL-MCNC: 15 MG/DL (ref 8–22)
CALCIUM ALBUM COR SERPL-MCNC: 8.3 MG/DL (ref 8.5–10.5)
CALCIUM SERPL-MCNC: 8.3 MG/DL (ref 8.5–10.5)
CHLORIDE SERPL-SCNC: 105 MMOL/L (ref 96–112)
CHOLEST SERPL-MCNC: 153 MG/DL (ref 100–199)
CO2 SERPL-SCNC: 22 MMOL/L (ref 20–33)
CREAT SERPL-MCNC: 0.63 MG/DL (ref 0.5–1.4)
EST. AVERAGE GLUCOSE BLD GHB EST-MCNC: 108 MG/DL
GFR SERPLBLD CREATININE-BSD FMLA CKD-EPI: 120 ML/MIN/1.73 M 2
GLOBULIN SER CALC-MCNC: 2.6 G/DL (ref 1.9–3.5)
GLUCOSE SERPL-MCNC: 92 MG/DL (ref 65–99)
HBA1C MFR BLD: 5.4 % (ref 4–5.6)
HCG SERPL QL: NEGATIVE
HDLC SERPL-MCNC: 45 MG/DL
LDLC SERPL CALC-MCNC: 77 MG/DL
LIPASE SERPL-CCNC: 39 U/L (ref 11–82)
POTASSIUM SERPL-SCNC: 4.4 MMOL/L (ref 3.6–5.5)
PROT SERPL-MCNC: 6.6 G/DL (ref 6–8.2)
SODIUM SERPL-SCNC: 139 MMOL/L (ref 135–145)
T3FREE SERPL-MCNC: 3.14 PG/ML (ref 2–4.4)
T4 FREE SERPL-MCNC: 1.19 NG/DL (ref 0.93–1.7)
TRIGL SERPL-MCNC: 153 MG/DL (ref 0–149)
TSH SERPL DL<=0.005 MIU/L-ACNC: 1.57 UIU/ML (ref 0.38–5.33)
VIT B12 SERPL-MCNC: 1180 PG/ML (ref 211–911)

## 2024-01-08 PROCEDURE — 80053 COMPREHEN METABOLIC PANEL: CPT

## 2024-01-08 PROCEDURE — 84481 FREE ASSAY (FT-3): CPT

## 2024-01-08 PROCEDURE — 84703 CHORIONIC GONADOTROPIN ASSAY: CPT

## 2024-01-08 PROCEDURE — 80061 LIPID PANEL: CPT

## 2024-01-08 PROCEDURE — 84443 ASSAY THYROID STIM HORMONE: CPT

## 2024-01-08 PROCEDURE — 83690 ASSAY OF LIPASE: CPT

## 2024-01-08 PROCEDURE — 84439 ASSAY OF FREE THYROXINE: CPT

## 2024-01-08 PROCEDURE — 82607 VITAMIN B-12: CPT

## 2024-01-08 PROCEDURE — 83036 HEMOGLOBIN GLYCOSYLATED A1C: CPT

## 2024-01-08 PROCEDURE — 36415 COLL VENOUS BLD VENIPUNCTURE: CPT

## 2024-01-15 DIAGNOSIS — M54.2 NECK PAIN: ICD-10-CM

## 2024-01-15 DIAGNOSIS — G43.009 MIGRAINE WITHOUT AURA AND WITHOUT STATUS MIGRAINOSUS, NOT INTRACTABLE: ICD-10-CM

## 2024-01-15 PROCEDURE — RXMED WILLOW AMBULATORY MEDICATION CHARGE: Performed by: NURSE PRACTITIONER

## 2024-01-16 PROCEDURE — RXMED WILLOW AMBULATORY MEDICATION CHARGE: Performed by: PHYSICIAN ASSISTANT

## 2024-01-16 RX ORDER — RIMEGEPANT SULFATE 75 MG/75MG
1 TABLET, ORALLY DISINTEGRATING ORAL PRN
Qty: 10 TABLET | Refills: 5 | Status: SHIPPED | OUTPATIENT
Start: 2024-01-16 | End: 2024-01-26

## 2024-01-17 PROCEDURE — RXMED WILLOW AMBULATORY MEDICATION CHARGE: Performed by: PHYSICIAN ASSISTANT

## 2024-01-17 RX ORDER — FERROUS GLUCONATE 324(38)MG
324 TABLET ORAL
Qty: 90 TABLET | Refills: 0 | Status: SHIPPED | OUTPATIENT
Start: 2024-01-17

## 2024-01-17 RX ORDER — CYCLOBENZAPRINE HCL 10 MG
10 TABLET ORAL
Qty: 30 TABLET | Refills: 3 | Status: SHIPPED | OUTPATIENT
Start: 2024-01-17 | End: 2024-02-22 | Stop reason: SDUPTHER

## 2024-01-17 NOTE — TELEPHONE ENCOUNTER
Received request via: Patient    Medication Name/Dosage Nurtec 75 mg tablet     When was medication last prescribed 8/30/23    How many refills were previously provided 5    How many Refills does the patient have left from last prescription 0    Was the patient seen in the last year in this department?NO    Date of last office visit 8/12/22     Per last Neurology Office Visit, when was the date of next follow up visit set for?                          6 mths   Date of office visit follow up request 2/12/23     Does the patient have an upcoming appointment? Yes   If yes, when 1/26/24             If no, schedule appointment     Does the patient have [facility] and need 100 day supply (blood pressure, diabetes and cholesterol meds only)? Medication is not for cholesterol, blood pressure or diabetes

## 2024-01-18 PROCEDURE — RXMED WILLOW AMBULATORY MEDICATION CHARGE: Performed by: PHYSICIAN ASSISTANT

## 2024-01-18 RX ORDER — VALACYCLOVIR HYDROCHLORIDE 1 G/1
1000 TABLET, FILM COATED ORAL
Qty: 90 TABLET | Refills: 3 | Status: SHIPPED | OUTPATIENT
Start: 2024-01-18

## 2024-01-22 ENCOUNTER — PHARMACY VISIT (OUTPATIENT)
Dept: PHARMACY | Facility: MEDICAL CENTER | Age: 34
End: 2024-01-22
Payer: COMMERCIAL

## 2024-01-22 PROCEDURE — RXMED WILLOW AMBULATORY MEDICATION CHARGE

## 2024-01-22 RX ORDER — ONDANSETRON 8 MG/1
TABLET, ORALLY DISINTEGRATING ORAL
Qty: 30 TABLET | Refills: 0 | Status: SHIPPED | OUTPATIENT
Start: 2024-01-22 | End: 2024-01-31

## 2024-01-26 ENCOUNTER — OFFICE VISIT (OUTPATIENT)
Dept: NEUROLOGY | Facility: MEDICAL CENTER | Age: 34
End: 2024-01-26
Attending: PSYCHIATRY & NEUROLOGY
Payer: COMMERCIAL

## 2024-01-26 VITALS
TEMPERATURE: 97.8 F | HEART RATE: 81 BPM | BODY MASS INDEX: 31.99 KG/M2 | OXYGEN SATURATION: 97 % | SYSTOLIC BLOOD PRESSURE: 120 MMHG | WEIGHT: 192.02 LBS | DIASTOLIC BLOOD PRESSURE: 78 MMHG | HEIGHT: 65 IN

## 2024-01-26 DIAGNOSIS — G43.009 MIGRAINE WITHOUT AURA AND WITHOUT STATUS MIGRAINOSUS, NOT INTRACTABLE: ICD-10-CM

## 2024-01-26 PROCEDURE — 99212 OFFICE O/P EST SF 10 MIN: CPT | Performed by: PSYCHIATRY & NEUROLOGY

## 2024-01-26 PROCEDURE — 3078F DIAST BP <80 MM HG: CPT | Performed by: PSYCHIATRY & NEUROLOGY

## 2024-01-26 PROCEDURE — 99214 OFFICE O/P EST MOD 30 MIN: CPT | Performed by: PSYCHIATRY & NEUROLOGY

## 2024-01-26 PROCEDURE — 3074F SYST BP LT 130 MM HG: CPT | Performed by: PSYCHIATRY & NEUROLOGY

## 2024-01-26 RX ORDER — FREMANEZUMAB-VFRM 225 MG/1.5ML
225 INJECTION SUBCUTANEOUS
Qty: 3 EACH | Refills: 3 | Status: SHIPPED | OUTPATIENT
Start: 2024-01-26 | End: 2024-02-14 | Stop reason: SDUPTHER

## 2024-01-26 RX ORDER — FROVATRIPTAN SUCCINATE 2.5 MG/1
TABLET, FILM COATED ORAL
Qty: 12 TABLET | Refills: 5 | Status: SHIPPED | OUTPATIENT
Start: 2024-01-26

## 2024-01-26 ASSESSMENT — ENCOUNTER SYMPTOMS
HEADACHES: 1
MEMORY LOSS: 0

## 2024-01-26 ASSESSMENT — PATIENT HEALTH QUESTIONNAIRE - PHQ9: CLINICAL INTERPRETATION OF PHQ2 SCORE: 0

## 2024-01-26 ASSESSMENT — FIBROSIS 4 INDEX: FIB4 SCORE: 0.41

## 2024-01-26 NOTE — PROGRESS NOTES
Subjective     Tonia Xavier is a 33 y.o. female who presents for follow-up, with a history of migraine without aura, but whose headaches have increased since September, 2023, for unclear reasons, and who has also noted a more consistent menstrual association.     HPI    When last seen, Maude had found the headaches responding quite cleanly to Ajovy 225 mg injections every 4 weeks.  She tolerated the injections well, with the use, she was reaching for rescue once or twice every 3-4 months.  Nurtec ODT 75 mg was providing consistent benefit as a single dose.    In September, for unclear reasons last year, the headaches began to increase.  She was having anywhere from 3-4 headaches every week, she contacted the office, she was also letting us know that Nurtec was no longer providing consistent benefit.  Review of the records as well as discussions with Maude reveals that there is no clear reason why the headache spontaneously increased.  She was getting the Ajovy, though there is some issue with national supply chain availability, she could be upwards of a few weeks late with an injection.    She is also complaining of some brief, intermittent right occipital stabbing pains that last for a matter of seconds, random in onset, not associated with any migrainous stigmata and not associated with the presence of her migraine headaches.  They are not positionally related.    Review of records indicates that she has failed Topamax and Inderal along with Maxalt, all because of inefficacy.    She also began to notice a consistent association with menses, she is on Renee birth control, typically the day before her placebo week is when the headaches began, and she will deal with another day or 2 of headache.  During placebo week she does have menses consistently, the headache usually has stopped by the time flow does.  She does get headaches on other occasions.  In the last month the headaches actually have  "improved once again, she is getting an attack once or twice in the 3 weeks between her placebo week of her BCP.    Medical, surgical and family histories are reviewed, there are no new drug allergies.  She has not had any reactivation of her right Bell's palsy when the headaches increased briefly. she is on Ajovy 225 mg injections every 4 weeks, Nurtec ODT 75 mg as needed, Flexeril, Bentyl, Zoloft, Zofran ODT, he Asman, Fergon, Valtrex, and Zovirax cream as needed.    Review of Systems   Neurological:  Positive for headaches.   Psychiatric/Behavioral:  Negative for memory loss.    All other systems reviewed and are negative.    Objective     /78 (BP Location: Right arm, Patient Position: Sitting, BP Cuff Size: Adult)   Pulse 81   Temp 36.6 °C (97.8 °F) (Temporal)   Ht 1.651 m (5' 5\")   Wt 87.1 kg (192 lb 0.3 oz)   SpO2 97%   BMI 31.95 kg/m²      Physical Exam    She appears in no acute distress.  Vital signs are stable.  There is no malar rash, jaw or temporal tenderness, jaw claudication, or allodynia.  The neck is supple, range of motion is full.  Cardiac evaluation reveals a regular rhythm.     Neurological Exam    Fully oriented, there is no aphasia, apraxia, or inattention.    PERRLA/EOMI, visual fields are full to movement detection on confrontation bilaterally.  There is a subtle flattening of the right nasolabial fold, her smile is symmetric.  There is no sensory loss to temperature on either side of the face.  The tongue and uvula are midline without bulbar dysfunction.  Shoulder shrug and head rotation are normal.    Musculoskeletal exam reveals normal tone bilaterally, there is no tremor, asterixis, or drift.  Strength is intact throughout.  There are no obvious reflex asymmetries, the toes are downgoing bilaterally.    She stands easily, gait is normal and station and stride length, armswing is symmetric.  There is no appendicular dystaxia.  Fine motor control is normal in all 4 " extremities.    Sensory exam is intact to vibration and temperature.    Assessment & Plan     1. Migraine without aura and without status migrainosus, not intractable  Now with a clear menstrual association, seen in about 60% of female migraine sufferers, we have some ability to schedule treatment.  Frovatriptan was studied and approved in menstrual migraine, I recommended she take her first dose of 2.5 mg twice daily the day before placebo week, and then for the next 2 days as well.  She can also use the drug on an as-needed basis for breakthrough headaches which fortunately are not happening that often.  She will continue the Ajovy.    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  Stabbing headaches are more commonly experienced by migraine sufferers, but they are limited in persistence.  They usually spontaneously remit.    I also recommended she talk with her prescribing physician to see if a continuous estrogen based BCP might be considered, even Mirena is another option.  The rationale and to why continuous estrogen contraception sometimes helps headaches was reviewed.  She is still going to use the Frova as described above.  We will follow-up in about 6 months otherwise, communicate via [facility] in the interim.    - Frovatriptan Succinate 2.5 MG Tab; BID Premenstrually for 3 days and as needed for headache  Dispense: 12 Tablet; Refill: 5  - Fremanezumab-vfrm (AJOVY) 225 MG/1.5ML Solution Auto-injector; Inject 225 mg as directed every 4 weeks. INJECT 225 MG UNDER THE SKIN EVERY 4 WEEKS.  Strength: 225 MG/1.5ML  Dispense: 3 Each; Refill: 3

## 2024-01-26 NOTE — Clinical Note
"Marielos, hopefully there will not be a need for PA for this patient with Frova.  This is just an FYI.  When ordering, it comes up with a big red \"X\" over the umbrella icon.  Thanks.  Bob"

## 2024-01-31 ENCOUNTER — HOSPITAL ENCOUNTER (EMERGENCY)
Facility: MEDICAL CENTER | Age: 34
End: 2024-01-31
Attending: EMERGENCY MEDICINE
Payer: COMMERCIAL

## 2024-01-31 VITALS
RESPIRATION RATE: 18 BRPM | HEART RATE: 80 BPM | TEMPERATURE: 98.5 F | BODY MASS INDEX: 31.77 KG/M2 | DIASTOLIC BLOOD PRESSURE: 61 MMHG | OXYGEN SATURATION: 96 % | HEIGHT: 65 IN | SYSTOLIC BLOOD PRESSURE: 100 MMHG | WEIGHT: 190.7 LBS

## 2024-01-31 DIAGNOSIS — K52.9 GASTROENTERITIS: ICD-10-CM

## 2024-01-31 DIAGNOSIS — R10.9 ABDOMINAL CRAMPING: ICD-10-CM

## 2024-01-31 LAB
ALBUMIN SERPL BCP-MCNC: 4.2 G/DL (ref 3.2–4.9)
ALBUMIN/GLOB SERPL: 1.4 G/DL
ALP SERPL-CCNC: 96 U/L (ref 30–99)
ALT SERPL-CCNC: 12 U/L (ref 2–50)
ANION GAP SERPL CALC-SCNC: 13 MMOL/L (ref 7–16)
APPEARANCE UR: CLEAR
AST SERPL-CCNC: 15 U/L (ref 12–45)
BACTERIA #/AREA URNS HPF: ABNORMAL /HPF
BASOPHILS # BLD AUTO: 0.4 % (ref 0–1.8)
BASOPHILS # BLD: 0.05 K/UL (ref 0–0.12)
BILIRUB SERPL-MCNC: 0.4 MG/DL (ref 0.1–1.5)
BILIRUB UR QL STRIP.AUTO: NEGATIVE
BUN SERPL-MCNC: 12 MG/DL (ref 8–22)
CALCIUM ALBUM COR SERPL-MCNC: 8.4 MG/DL (ref 8.5–10.5)
CALCIUM SERPL-MCNC: 8.6 MG/DL (ref 8.5–10.5)
CHLORIDE SERPL-SCNC: 103 MMOL/L (ref 96–112)
CO2 SERPL-SCNC: 20 MMOL/L (ref 20–33)
COLOR UR: YELLOW
CREAT SERPL-MCNC: 0.59 MG/DL (ref 0.5–1.4)
EOSINOPHIL # BLD AUTO: 0.22 K/UL (ref 0–0.51)
EOSINOPHIL NFR BLD: 1.7 % (ref 0–6.9)
EPI CELLS #/AREA URNS HPF: ABNORMAL /HPF
ERYTHROCYTE [DISTWIDTH] IN BLOOD BY AUTOMATED COUNT: 38.1 FL (ref 35.9–50)
FLUAV RNA SPEC QL NAA+PROBE: NEGATIVE
FLUBV RNA SPEC QL NAA+PROBE: NEGATIVE
GFR SERPLBLD CREATININE-BSD FMLA CKD-EPI: 122 ML/MIN/1.73 M 2
GLOBULIN SER CALC-MCNC: 2.9 G/DL (ref 1.9–3.5)
GLUCOSE SERPL-MCNC: 122 MG/DL (ref 65–99)
GLUCOSE UR STRIP.AUTO-MCNC: NEGATIVE MG/DL
HCG SERPL QL: NEGATIVE
HCT VFR BLD AUTO: 42.1 % (ref 37–47)
HGB BLD-MCNC: 14.4 G/DL (ref 12–16)
HYALINE CASTS #/AREA URNS LPF: ABNORMAL /LPF
IMM GRANULOCYTES # BLD AUTO: 0.04 K/UL (ref 0–0.11)
IMM GRANULOCYTES NFR BLD AUTO: 0.3 % (ref 0–0.9)
KETONES UR STRIP.AUTO-MCNC: 15 MG/DL
LEUKOCYTE ESTERASE UR QL STRIP.AUTO: ABNORMAL
LIPASE SERPL-CCNC: 31 U/L (ref 11–82)
LYMPHOCYTES # BLD AUTO: 1.57 K/UL (ref 1–4.8)
LYMPHOCYTES NFR BLD: 11.9 % (ref 22–41)
MCH RBC QN AUTO: 26.8 PG (ref 27–33)
MCHC RBC AUTO-ENTMCNC: 34.2 G/DL (ref 32.2–35.5)
MCV RBC AUTO: 78.3 FL (ref 81.4–97.8)
MICRO URNS: ABNORMAL
MONOCYTES # BLD AUTO: 0.58 K/UL (ref 0–0.85)
MONOCYTES NFR BLD AUTO: 4.4 % (ref 0–13.4)
NEUTROPHILS # BLD AUTO: 10.78 K/UL (ref 1.82–7.42)
NEUTROPHILS NFR BLD: 81.3 % (ref 44–72)
NITRITE UR QL STRIP.AUTO: NEGATIVE
NRBC # BLD AUTO: 0 K/UL
NRBC BLD-RTO: 0 /100 WBC (ref 0–0.2)
PH UR STRIP.AUTO: 7.5 [PH] (ref 5–8)
PLATELET # BLD AUTO: 346 K/UL (ref 164–446)
PMV BLD AUTO: 10.6 FL (ref 9–12.9)
POTASSIUM SERPL-SCNC: 4 MMOL/L (ref 3.6–5.5)
PROT SERPL-MCNC: 7.1 G/DL (ref 6–8.2)
PROT UR QL STRIP: NEGATIVE MG/DL
RBC # BLD AUTO: 5.38 M/UL (ref 4.2–5.4)
RBC # URNS HPF: ABNORMAL /HPF
RBC UR QL AUTO: NEGATIVE
RSV RNA SPEC QL NAA+PROBE: NEGATIVE
SARS-COV-2 RNA RESP QL NAA+PROBE: NOTDETECTED
SODIUM SERPL-SCNC: 136 MMOL/L (ref 135–145)
SP GR UR STRIP.AUTO: 1.02
UROBILINOGEN UR STRIP.AUTO-MCNC: 0.2 MG/DL
WBC # BLD AUTO: 13.2 K/UL (ref 4.8–10.8)
WBC #/AREA URNS HPF: ABNORMAL /HPF

## 2024-01-31 PROCEDURE — 0241U HCHG SARS-COV-2 COVID-19 NFCT DS RESP RNA 4 TRGT ED POC: CPT

## 2024-01-31 PROCEDURE — 700105 HCHG RX REV CODE 258: Performed by: EMERGENCY MEDICINE

## 2024-01-31 PROCEDURE — 96374 THER/PROPH/DIAG INJ IV PUSH: CPT

## 2024-01-31 PROCEDURE — 85025 COMPLETE CBC W/AUTO DIFF WBC: CPT

## 2024-01-31 PROCEDURE — 84703 CHORIONIC GONADOTROPIN ASSAY: CPT

## 2024-01-31 PROCEDURE — 83690 ASSAY OF LIPASE: CPT

## 2024-01-31 PROCEDURE — A9270 NON-COVERED ITEM OR SERVICE: HCPCS | Performed by: EMERGENCY MEDICINE

## 2024-01-31 PROCEDURE — 36415 COLL VENOUS BLD VENIPUNCTURE: CPT

## 2024-01-31 PROCEDURE — 700102 HCHG RX REV CODE 250 W/ 637 OVERRIDE(OP): Performed by: EMERGENCY MEDICINE

## 2024-01-31 PROCEDURE — 81001 URINALYSIS AUTO W/SCOPE: CPT

## 2024-01-31 PROCEDURE — 700111 HCHG RX REV CODE 636 W/ 250 OVERRIDE (IP): Mod: JZ | Performed by: EMERGENCY MEDICINE

## 2024-01-31 PROCEDURE — 80053 COMPREHEN METABOLIC PANEL: CPT

## 2024-01-31 PROCEDURE — 99285 EMERGENCY DEPT VISIT HI MDM: CPT

## 2024-01-31 RX ORDER — ONDANSETRON 2 MG/ML
4 INJECTION INTRAMUSCULAR; INTRAVENOUS ONCE
Status: COMPLETED | OUTPATIENT
Start: 2024-01-31 | End: 2024-01-31

## 2024-01-31 RX ORDER — SODIUM CHLORIDE 9 MG/ML
1000 INJECTION, SOLUTION INTRAVENOUS ONCE
Status: COMPLETED | OUTPATIENT
Start: 2024-01-31 | End: 2024-01-31

## 2024-01-31 RX ORDER — ONDANSETRON 4 MG/1
4 TABLET, ORALLY DISINTEGRATING ORAL EVERY 6 HOURS PRN
Qty: 10 TABLET | Refills: 0 | Status: SHIPPED | OUTPATIENT
Start: 2024-01-31

## 2024-01-31 RX ORDER — OMEPRAZOLE 20 MG/1
20 CAPSULE, DELAYED RELEASE ORAL DAILY
Qty: 30 CAPSULE | Refills: 0 | Status: SHIPPED | OUTPATIENT
Start: 2024-01-31

## 2024-01-31 RX ADMIN — ONDANSETRON 4 MG: 2 INJECTION INTRAMUSCULAR; INTRAVENOUS at 06:45

## 2024-01-31 RX ADMIN — SODIUM CHLORIDE 1000 ML: 9 INJECTION, SOLUTION INTRAVENOUS at 06:45

## 2024-01-31 RX ADMIN — LIDOCAINE HYDROCHLORIDE 30 ML: 20 SOLUTION ORAL; TOPICAL at 07:26

## 2024-01-31 ASSESSMENT — PAIN DESCRIPTION - PAIN TYPE
TYPE: ACUTE PAIN
TYPE: ACUTE PAIN

## 2024-01-31 ASSESSMENT — FIBROSIS 4 INDEX: FIB4 SCORE: 0.41

## 2024-01-31 NOTE — DISCHARGE INSTRUCTIONS
Your labs today were very reassuring.  Your liver function, kidney function, pancreas enzymes, urinalysis, and COVID testing are all normal.  I believe you likely have a viral stomach flu which we have seen a significant amount of this lately.  I have started you on some omeprazole, Zofran for symptom management

## 2024-01-31 NOTE — ED TRIAGE NOTES
"Chief Complaint   Patient presents with    Abdominal Pain     Patient ambulatory to triage c/o n/v/d and abdominal pain since 8pm last night.        /78   Pulse 95   Temp 37.1 °C (98.7 °F) (Temporal)   Resp 14   Ht 1.651 m (5' 5\")   Wt 86.5 kg (190 lb 11.2 oz)   SpO2 96%     Patient educated on ed triage process, instructed to notify staff of any new or worsening symptoms, verbalizes understanding. Patient returned to ed lobby, apologized for wait times.     "

## 2024-01-31 NOTE — ED NOTES
Bedside report received from off going RN/tech: Yajaira, assumed care of patient.  POC discussed with patient. Call light within reach, all needs addressed at this time.       Fall risk interventions in place: Move the patient closer to the nurse's station, Patient's personal possessions are with in their safe reach, and Keep floor surfaces clean and dry (all applicable per [place] Fall risk assessment)   Continuous monitoring: Pulse Ox or Blood Pressure  IVF/IV medications: Infusion per MAR (List Med(s)) NS  Oxygen: Room Air  Bedside sitter: Not Applicable   Isolation: Not Applicable

## 2024-01-31 NOTE — ED NOTES
Pt ambulatory to restroom, urine sample collected and sent to lab. Pt medicated according to MAR.

## 2024-01-31 NOTE — ED PROVIDER NOTES
ED Provider Note    CHIEF COMPLAINT  Chief Complaint   Patient presents with    Abdominal Pain     Patient ambulatory to triage c/o n/v/d and abdominal pain since 8pm last night.        EXTERNAL RECORDS REVIEWED  Relatively similar presentation 2024, patient prescribed Bentyl    HPI/ROS      Tonia Xavier is a 33 y.o. female who presents with cc of abdominal pain.  Patient reports that last night she was doing homework when she developed some bilateral flank pain, this bilateral flank pain that started radiating to her bilateral upper quadrants and is now diffuse.  Pain has been present for hours.  Is been persistent for hours, however decreased in intensity significantly upon arriving to the emergency department.  Patient reports associated nausea and vomiting.  She had 2 episodes of vomiting, nonbloody nonbilious.  She also had 2 episodes of diarrhea.  No associated black or bloody stool.  Patient reports she has had similar episodes in the past but these have resolved without any intervention.  She typically takes Bentyl for this.  She took Bentyl at around 2 AM but symptoms persisted despite this.  She denies any dysuria urgency or frequency.  She denies any bleeding outside of her cycle.  She denies any vaginal discharge.    PAST MEDICAL HISTORY   has a past medical history of Anemia, Anxiety, Asthma, HSV infection (2019), Hyperlipidemia LDL goal <100 (2019), IFG (impaired fasting glucose) (2019), Migraine without aura and without status migrainosus, not intractable (2022), Ovarian cyst (2018), and Vitamin D deficiency (2018).    SURGICAL HISTORY   has a past surgical history that includes reconstruction, knee, acl (Right, 2006);  delivery only (); knee arthroscopy (2014); hardware removal ortho (2014); bone graft (2014); reconstruction, knee, acl, arthroscopic (10/2/2014); repeat c section (10/19/2015); repeat c section  (11/25/2017); knee arthroscopy (Right, 11/13/2018); synovectomy (Right, 11/13/2018); meniscectomy, knee, medial (Right, 11/13/2018); chondroplasty (11/13/2018); hardware removal ortho (11/13/2018); bone graft (11/13/2018); knee scope,diagnostic (Right, 10/29/2020); meniscectomy, knee, medial (Right, 10/29/2020); hardware removal ortho (Right, 10/29/2020); bone graft (Right, 10/29/2020); and knee scope,aid ant cruciate repair (Right, 3/23/2021).    FAMILY HISTORY  Family History   Problem Relation Age of Onset    Diabetes Other     Hypertension Other     Diabetes Maternal Grandmother         DM w/ESRD    Diabetes Maternal Grandfather     Cancer Maternal Grandfather         prostate, esophageal    Hyperlipidemia Maternal Grandfather     Diabetes Paternal Grandmother         DM on dialysis    Hyperlipidemia Paternal Grandmother        SOCIAL HISTORY  Social History     Tobacco Use    Smoking status: Never    Smokeless tobacco: Never   Vaping Use    Vaping Use: Never used   Substance and Sexual Activity    Alcohol use: Not Currently     Comment: Occasionally    Drug use: Not Currently     Comment: Occ. marijuana    Sexual activity: Yes     Partners: Male     Birth control/protection: Pill       CURRENT MEDICATIONS  Home Medications       Reviewed by Justina Ramos R.N. (Registered Nurse) on 01/31/24 at 0522  Med List Status: <None>     Medication Last Dose Status   acyclovir (ZOVIRAX) 5 % Cream  Active   cyclobenzaprine (FLEXERIL) 10 mg Tab  Active   dicyclomine (BENTYL) 20 MG Tab  Active   drospirenone-ethinyl estradiol (DENISSE) 3-0.03 MG per tablet  Active   ferrous gluconate (FERGON) 324 (38 Fe) MG Tab  Active   Fremanezumab-vfrm (AJOVY) 225 MG/1.5ML Solution Auto-injector  Active   Frovatriptan Succinate 2.5 MG Tab  Active   montelukast (SINGULAIR) 10 MG Tab  Active   ondansetron (ZOFRAN ODT) 8 MG TABLET DISPERSIBLE  Active   sertraline (ZOLOFT) 50 MG Tab  Active   valacyclovir (VALTREX) 1 GM Tab  Active          "           ALLERGIES  Allergies   Allergen Reactions    Benzoin Hives     Rash   Other reaction(s): Itching, rash       PHYSICAL EXAM  VITAL SIGNS: /78   Pulse 95   Temp 37.1 °C (98.7 °F) (Temporal)   Resp 14   Ht 1.651 m (5' 5\")   Wt 86.5 kg (190 lb 11.2 oz)   LMP 01/17/2024   SpO2 96%   BMI 31.73 kg/m²    Physical Exam  Constitutional:       Appearance: She is well-developed.   HENT:      Head: Normocephalic and atraumatic.      Mouth/Throat:      Comments: Dry mucous membranes  Eyes:      Pupils: Pupils are equal, round, and reactive to light.   Cardiovascular:      Rate and Rhythm: Normal rate and regular rhythm.   Pulmonary:      Effort: Pulmonary effort is normal. No accessory muscle usage or respiratory distress.      Breath sounds: Normal breath sounds.   Abdominal:      General: Bowel sounds are normal.      Palpations: Abdomen is soft. There is no mass.      Tenderness: There is no abdominal tenderness.   Musculoskeletal:         General: Normal range of motion.   Skin:     General: Skin is warm.      Capillary Refill: Capillary refill takes less than 2 seconds.   Neurological:      General: No focal deficit present.      Mental Status: She is alert.   Psychiatric:         Mood and Affect: Mood normal. Mood is not anxious.           DIAGNOSTIC STUDIES / PROCEDURES      LABS  Results for orders placed or performed during the hospital encounter of 01/31/24   CBC WITH DIFFERENTIAL   Result Value Ref Range    WBC 13.2 (H) 4.8 - 10.8 K/uL    RBC 5.38 4.20 - 5.40 M/uL    Hemoglobin 14.4 12.0 - 16.0 g/dL    Hematocrit 42.1 37.0 - 47.0 %    MCV 78.3 (L) 81.4 - 97.8 fL    MCH 26.8 (L) 27.0 - 33.0 pg    MCHC 34.2 32.2 - 35.5 g/dL    RDW 38.1 35.9 - 50.0 fL    Platelet Count 346 164 - 446 K/uL    MPV 10.6 9.0 - 12.9 fL    Neutrophils-Polys 81.30 (H) 44.00 - 72.00 %    Lymphocytes 11.90 (L) 22.00 - 41.00 %    Monocytes 4.40 0.00 - 13.40 %    Eosinophils 1.70 0.00 - 6.90 %    Basophils 0.40 0.00 - 1.80 % "    Immature Granulocytes 0.30 0.00 - 0.90 %    Nucleated RBC 0.00 0.00 - 0.20 /100 WBC    Neutrophils (Absolute) 10.78 (H) 1.82 - 7.42 K/uL    Lymphs (Absolute) 1.57 1.00 - 4.80 K/uL    Monos (Absolute) 0.58 0.00 - 0.85 K/uL    Eos (Absolute) 0.22 0.00 - 0.51 K/uL    Baso (Absolute) 0.05 0.00 - 0.12 K/uL    Immature Granulocytes (abs) 0.04 0.00 - 0.11 K/uL    NRBC (Absolute) 0.00 K/uL   COMP METABOLIC PANEL   Result Value Ref Range    Sodium 136 135 - 145 mmol/L    Potassium 4.0 3.6 - 5.5 mmol/L    Chloride 103 96 - 112 mmol/L    Co2 20 20 - 33 mmol/L    Anion Gap 13.0 7.0 - 16.0    Glucose 122 (H) 65 - 99 mg/dL    Bun 12 8 - 22 mg/dL    Creatinine 0.59 0.50 - 1.40 mg/dL    Calcium 8.6 8.5 - 10.5 mg/dL    Correct Calcium 8.4 (L) 8.5 - 10.5 mg/dL    AST(SGOT) 15 12 - 45 U/L    ALT(SGPT) 12 2 - 50 U/L    Alkaline Phosphatase 96 30 - 99 U/L    Total Bilirubin 0.4 0.1 - 1.5 mg/dL    Albumin 4.2 3.2 - 4.9 g/dL    Total Protein 7.1 6.0 - 8.2 g/dL    Globulin 2.9 1.9 - 3.5 g/dL    A-G Ratio 1.4 g/dL   LIPASE   Result Value Ref Range    Lipase 31 11 - 82 U/L   HCG QUAL SERUM   Result Value Ref Range    Beta-Hcg Qualitative Serum Negative Negative   URINALYSIS,CULTURE IF INDICATED    Specimen: Urine   Result Value Ref Range    Color Yellow     Character Clear     Specific Gravity 1.016 <1.035    Ph 7.5 5.0 - 8.0    Glucose Negative Negative mg/dL    Ketones 15 (A) Negative mg/dL    Protein Negative Negative mg/dL    Bilirubin Negative Negative    Urobilinogen, Urine 0.2 Negative    Nitrite Negative Negative    Leukocyte Esterase Trace (A) Negative    Occult Blood Negative Negative    Micro Urine Req Microscopic    ESTIMATED GFR   Result Value Ref Range    GFR (CKD-EPI) 122 >60 mL/min/1.73 m 2   URINE MICROSCOPIC (W/UA)   Result Value Ref Range    WBC 0-2 /hpf    RBC 0-2 /hpf    Bacteria Rare (A) None /hpf    Epithelial Cells Few /hpf    Hyaline Cast 0-2 /lpf   POC CoV-2, FLU A/B, RSV by PCR   Result Value Ref Range    POC  Influenza A RNA, PCR Negative Negative    POC Influenza B RNA, PCR Negative Negative    POC RSV, by PCR Negative Negative    POC SARS-CoV-2, PCR NotDetected            COURSE & MEDICAL DECISION MAKING      INITIAL ASSESSMENT, COURSE AND PLAN  Care Narrative: Patient here with nausea vomiting and diarrhea with associated abdominal pain.  No reproducible pain on my exam.  Her abdominal exam is entirely benign without any reproducible tenderness.  Patient without any associated guarding or rebound.  My suspicion of surgical pathology therefore is very low in this very well-appearing patient.  Patient without any associated pelvic complaints, she has no tenderness in her lower quadrants, my suspicion of ovarian torsion, or pelvic etiology of symptoms is low.  I suspect viral gastroenteritis as we have seen a significant amount of this in the community lately.  Will check influenza as we have seen an increase in influenza as a cause of viral gastroenteritis recently as well.  Check basic labs for further risk stratification.  My suspicion of gallstone is low as the cause of patient's symptoms.  Awaiting urinalysis, and pregnancy test.  Given Zofran and GI cocktail.  Certainly pancreatitis could cause symptoms.      Patient chemistry is unremarkable.  Patient's white count with mild leukocytosis, patient lipase is normal.  Urinalysis is normal.    Patient able to tolerate p.o.  Viral testing is unremarkable.  Patient to be discharged home with supportive care.  Omeprazole and Zofran.  Return precautions reviewed.  Patient's pain has entirely resolved here in the emergency department.        DISPOSITION AND DISCUSSIONS      Escalation of care considered, and ultimately not performed: Suspicion of surgical pathology was incredibly low and therefore diagnostic imaging including CT, ultrasound was deferred        FINAL DIAGNOSIS  1. Abdominal cramping    2. Gastroenteritis

## 2024-02-09 PROCEDURE — RXMED WILLOW AMBULATORY MEDICATION CHARGE

## 2024-02-12 ENCOUNTER — PHARMACY VISIT (OUTPATIENT)
Dept: PHARMACY | Facility: MEDICAL CENTER | Age: 34
End: 2024-02-12
Payer: COMMERCIAL

## 2024-02-14 DIAGNOSIS — G43.009 MIGRAINE WITHOUT AURA AND WITHOUT STATUS MIGRAINOSUS, NOT INTRACTABLE: ICD-10-CM

## 2024-02-14 RX ORDER — FREMANEZUMAB-VFRM 225 MG/1.5ML
225 INJECTION SUBCUTANEOUS
Qty: 3 EACH | Refills: 3 | Status: SHIPPED | OUTPATIENT
Start: 2024-02-14 | End: 2024-03-22 | Stop reason: SDUPTHER

## 2024-02-15 SDOH — ECONOMIC STABILITY: INCOME INSECURITY: HOW HARD IS IT FOR YOU TO PAY FOR THE VERY BASICS LIKE FOOD, HOUSING, MEDICAL CARE, AND HEATING?: SOMEWHAT HARD

## 2024-02-15 SDOH — HEALTH STABILITY: PHYSICAL HEALTH: ON AVERAGE, HOW MANY MINUTES DO YOU ENGAGE IN EXERCISE AT THIS LEVEL?: 0 MIN

## 2024-02-15 SDOH — HEALTH STABILITY: PHYSICAL HEALTH: ON AVERAGE, HOW MANY DAYS PER WEEK DO YOU ENGAGE IN MODERATE TO STRENUOUS EXERCISE (LIKE A BRISK WALK)?: 0 DAYS

## 2024-02-15 SDOH — ECONOMIC STABILITY: INCOME INSECURITY: IN THE LAST 12 MONTHS, WAS THERE A TIME WHEN YOU WERE NOT ABLE TO PAY THE MORTGAGE OR RENT ON TIME?: YES

## 2024-02-15 SDOH — ECONOMIC STABILITY: FOOD INSECURITY: WITHIN THE PAST 12 MONTHS, YOU WORRIED THAT YOUR FOOD WOULD RUN OUT BEFORE YOU GOT MONEY TO BUY MORE.: NEVER TRUE

## 2024-02-15 SDOH — ECONOMIC STABILITY: FOOD INSECURITY: WITHIN THE PAST 12 MONTHS, THE FOOD YOU BOUGHT JUST DIDN'T LAST AND YOU DIDN'T HAVE MONEY TO GET MORE.: NEVER TRUE

## 2024-02-15 SDOH — ECONOMIC STABILITY: HOUSING INSECURITY: IN THE LAST 12 MONTHS, HOW MANY PLACES HAVE YOU LIVED?: 1

## 2024-02-15 SDOH — ECONOMIC STABILITY: HOUSING INSECURITY
IN THE LAST 12 MONTHS, WAS THERE A TIME WHEN YOU DID NOT HAVE A STEADY PLACE TO SLEEP OR SLEPT IN A SHELTER (INCLUDING NOW)?: YES

## 2024-02-15 SDOH — HEALTH STABILITY: MENTAL HEALTH
STRESS IS WHEN SOMEONE FEELS TENSE, NERVOUS, ANXIOUS, OR CAN'T SLEEP AT NIGHT BECAUSE THEIR MIND IS TROUBLED. HOW STRESSED ARE YOU?: ONLY A LITTLE

## 2024-02-15 ASSESSMENT — SOCIAL DETERMINANTS OF HEALTH (SDOH)
HOW MANY DRINKS CONTAINING ALCOHOL DO YOU HAVE ON A TYPICAL DAY WHEN YOU ARE DRINKING: 1 OR 2
DO YOU BELONG TO ANY CLUBS OR ORGANIZATIONS SUCH AS CHURCH GROUPS UNIONS, FRATERNAL OR ATHLETIC GROUPS, OR SCHOOL GROUPS?: NO
HOW OFTEN DO YOU HAVE A DRINK CONTAINING ALCOHOL: MONTHLY OR LESS
HOW OFTEN DO YOU ATTENT MEETINGS OF THE CLUB OR ORGANIZATION YOU BELONG TO?: NEVER
DO YOU BELONG TO ANY CLUBS OR ORGANIZATIONS SUCH AS CHURCH GROUPS UNIONS, FRATERNAL OR ATHLETIC GROUPS, OR SCHOOL GROUPS?: NO
HOW OFTEN DO YOU GET TOGETHER WITH FRIENDS OR RELATIVES?: ONCE A WEEK
HOW OFTEN DO YOU ATTENT MEETINGS OF THE CLUB OR ORGANIZATION YOU BELONG TO?: NEVER
HOW OFTEN DO YOU ATTEND CHURCH OR RELIGIOUS SERVICES?: MORE THAN 4 TIMES PER YEAR
HOW OFTEN DO YOU ATTEND CHURCH OR RELIGIOUS SERVICES?: MORE THAN 4 TIMES PER YEAR
HOW HARD IS IT FOR YOU TO PAY FOR THE VERY BASICS LIKE FOOD, HOUSING, MEDICAL CARE, AND HEATING?: SOMEWHAT HARD
WITHIN THE PAST 12 MONTHS, YOU WORRIED THAT YOUR FOOD WOULD RUN OUT BEFORE YOU GOT THE MONEY TO BUY MORE: NEVER TRUE
HOW OFTEN DO YOU GET TOGETHER WITH FRIENDS OR RELATIVES?: ONCE A WEEK
IN A TYPICAL WEEK, HOW MANY TIMES DO YOU TALK ON THE PHONE WITH FAMILY, FRIENDS, OR NEIGHBORS?: MORE THAN THREE TIMES A WEEK
IN A TYPICAL WEEK, HOW MANY TIMES DO YOU TALK ON THE PHONE WITH FAMILY, FRIENDS, OR NEIGHBORS?: MORE THAN THREE TIMES A WEEK
HOW OFTEN DO YOU HAVE SIX OR MORE DRINKS ON ONE OCCASION: NEVER

## 2024-02-15 ASSESSMENT — LIFESTYLE VARIABLES
HOW OFTEN DO YOU HAVE A DRINK CONTAINING ALCOHOL: MONTHLY OR LESS
HOW OFTEN DO YOU HAVE SIX OR MORE DRINKS ON ONE OCCASION: NEVER
AUDIT-C TOTAL SCORE: 1
HOW MANY STANDARD DRINKS CONTAINING ALCOHOL DO YOU HAVE ON A TYPICAL DAY: 1 OR 2
SKIP TO QUESTIONS 9-10: 1

## 2024-02-16 ENCOUNTER — TELEPHONE (OUTPATIENT)
Dept: PHARMACY | Facility: MEDICAL CENTER | Age: 34
End: 2024-02-16
Payer: COMMERCIAL

## 2024-02-16 NOTE — TELEPHONE ENCOUNTER
AJOVY 225MG/1.5ML SOAJ    PA Renewal      Obtained PA request form from [facility]. Filled out pa form with attached chart notes. Faxed pa form with chart notes to [facility] fax: [phone]      Called [facility] at  [phone] for PA status. No agents are aviabel to take my call.    Left VM with pt, drug, phone and fax number info for response          called [facility] at  [phone] for PA renewal.     spoke to Madelin stated they never received the PA request and to send the most recent chart notes to fax: [phone]      Sent most recent chart notes to [facility].      called [facility] at [phone] for PA status. spoke to Madelin stated  PA approved       Prior Authorization for Ajovy has been approved for a quantity of 1.5ml , day supply 30    Prior Authorization reference number: [id number]   Insurance: [facility]  Effective dates: 02/28/2024 - 02/27/2025  Copay: $N/A . Connection Error for coverage

## 2024-02-21 RX ORDER — LACTIC ACID, L-, CITRIC ACID MONOHYDRATE, AND POTASSIUM BITARTRATE 90; 50; 20 MG/5G; MG/5G; MG/5G
GEL VAGINAL
COMMUNITY
Start: 2024-01-09

## 2024-02-22 ENCOUNTER — OFFICE VISIT (OUTPATIENT)
Dept: MEDICAL GROUP | Facility: PHYSICIAN GROUP | Age: 34
End: 2024-02-22
Payer: COMMERCIAL

## 2024-02-22 VITALS
RESPIRATION RATE: 16 BRPM | TEMPERATURE: 97.1 F | HEIGHT: 65 IN | BODY MASS INDEX: 31.09 KG/M2 | HEART RATE: 86 BPM | WEIGHT: 186.6 LBS | DIASTOLIC BLOOD PRESSURE: 68 MMHG | SYSTOLIC BLOOD PRESSURE: 94 MMHG | OXYGEN SATURATION: 98 %

## 2024-02-22 DIAGNOSIS — E66.3 OVERWEIGHT: ICD-10-CM

## 2024-02-22 DIAGNOSIS — K90.0 CELIAC DISEASE: ICD-10-CM

## 2024-02-22 DIAGNOSIS — E61.1 IRON DEFICIENCY: ICD-10-CM

## 2024-02-22 DIAGNOSIS — Z86.39 HISTORY OF IRON DEFICIENCY: ICD-10-CM

## 2024-02-22 DIAGNOSIS — Z00.00 WELLNESS EXAMINATION: ICD-10-CM

## 2024-02-22 DIAGNOSIS — E55.9 VITAMIN D DEFICIENCY: ICD-10-CM

## 2024-02-22 DIAGNOSIS — Z11.59 NEED FOR HEPATITIS C SCREENING TEST: ICD-10-CM

## 2024-02-22 DIAGNOSIS — E78.5 HYPERLIPIDEMIA LDL GOAL <100: ICD-10-CM

## 2024-02-22 DIAGNOSIS — K64.9 HEMORRHOIDS, UNSPECIFIED HEMORRHOID TYPE: ICD-10-CM

## 2024-02-22 DIAGNOSIS — F32.9 MAJOR DEPRESSIVE DISORDER WITH SINGLE EPISODE, REMISSION STATUS UNSPECIFIED: ICD-10-CM

## 2024-02-22 DIAGNOSIS — T78.40XD ALLERGY, SUBSEQUENT ENCOUNTER: ICD-10-CM

## 2024-02-22 DIAGNOSIS — M54.2 NECK PAIN: ICD-10-CM

## 2024-02-22 DIAGNOSIS — M54.50 CHRONIC MIDLINE LOW BACK PAIN WITHOUT SCIATICA: ICD-10-CM

## 2024-02-22 DIAGNOSIS — G89.29 CHRONIC MIDLINE LOW BACK PAIN WITHOUT SCIATICA: ICD-10-CM

## 2024-02-22 DIAGNOSIS — F41.9 ANXIETY: ICD-10-CM

## 2024-02-22 PROBLEM — K44.9 DIAPHRAGMATIC HERNIA WITHOUT OBSTRUCTION OR GANGRENE: Status: ACTIVE | Noted: 2019-03-18

## 2024-02-22 PROBLEM — R19.7 DIARRHEA: Status: RESOLVED | Noted: 2024-02-22 | Resolved: 2024-02-22

## 2024-02-22 PROBLEM — R10.13 EPIGASTRIC PAIN: Status: ACTIVE | Noted: 2024-02-22

## 2024-02-22 PROBLEM — M85.80 OSTEOPENIA: Status: RESOLVED | Noted: 2024-02-22 | Resolved: 2024-02-22

## 2024-02-22 PROBLEM — K29.70 GASTRITIS, UNSPECIFIED, WITHOUT BLEEDING: Status: RESOLVED | Noted: 2019-03-18 | Resolved: 2024-02-22

## 2024-02-22 PROBLEM — R10.84 GENERALIZED ABDOMINAL PAIN: Status: RESOLVED | Noted: 2019-11-11 | Resolved: 2024-02-22

## 2024-02-22 PROBLEM — K29.70 GASTRITIS, UNSPECIFIED, WITHOUT BLEEDING: Status: ACTIVE | Noted: 2019-03-18

## 2024-02-22 PROBLEM — M85.80 OSTEOPENIA: Status: ACTIVE | Noted: 2024-02-22

## 2024-02-22 PROBLEM — K44.9 DIAPHRAGMATIC HERNIA WITHOUT OBSTRUCTION OR GANGRENE: Status: RESOLVED | Noted: 2019-03-18 | Resolved: 2024-02-22

## 2024-02-22 PROBLEM — R10.13 EPIGASTRIC PAIN: Status: RESOLVED | Noted: 2024-02-22 | Resolved: 2024-02-22

## 2024-02-22 PROBLEM — N83.202 LEFT OVARIAN CYST: Status: ACTIVE | Noted: 2024-02-22

## 2024-02-22 PROBLEM — R79.0 ABNORMAL LEVEL OF BLOOD MINERAL: Status: ACTIVE | Noted: 2024-02-22

## 2024-02-22 PROBLEM — R19.7 DIARRHEA: Status: ACTIVE | Noted: 2024-02-22

## 2024-02-22 PROBLEM — R79.0 ABNORMAL LEVEL OF BLOOD MINERAL: Status: RESOLVED | Noted: 2024-02-22 | Resolved: 2024-02-22

## 2024-02-22 PROCEDURE — 3078F DIAST BP <80 MM HG: CPT | Performed by: PHYSICIAN ASSISTANT

## 2024-02-22 PROCEDURE — 3074F SYST BP LT 130 MM HG: CPT | Performed by: PHYSICIAN ASSISTANT

## 2024-02-22 PROCEDURE — RXMED WILLOW AMBULATORY MEDICATION CHARGE: Performed by: PHYSICIAN ASSISTANT

## 2024-02-22 PROCEDURE — 99395 PREV VISIT EST AGE 18-39: CPT | Performed by: PHYSICIAN ASSISTANT

## 2024-02-22 RX ORDER — CYCLOBENZAPRINE HCL 10 MG
10 TABLET ORAL
Qty: 30 TABLET | Refills: 3 | Status: SHIPPED | OUTPATIENT
Start: 2024-02-22

## 2024-02-22 ASSESSMENT — ENCOUNTER SYMPTOMS
SHORTNESS OF BREATH: 0
FEVER: 0
CHILLS: 0

## 2024-02-22 ASSESSMENT — FIBROSIS 4 INDEX: FIB4 SCORE: 0.4

## 2024-02-22 NOTE — ASSESSMENT & PLAN NOTE
Chronic, stable.  Has had pain for about 10 years, most days.  Tolerating/continue cyclobenzaprine 10 mg as directed and as needed.

## 2024-02-22 NOTE — PROGRESS NOTES
SUBJECTIVE:     CC: Wellness exam    HPI:   Tonia presents today for her wellness exam.        ASSESSMENT & PLAN by Problem:       Problem List Items Addressed This Visit       Allergies     Chronic, stable.  Continues with allergy shots through her allergist.         Anxiety     Chronic, stable.  Tolerating/continue sertraline 50 mg daily         Celiac disease    Chronic midline low back pain without sciatica     Chronic, stable.  Has had pain for about 10 years, most days.  Tolerating/continue cyclobenzaprine 10 mg as directed and as needed.         Relevant Medications    cyclobenzaprine (FLEXERIL) 10 mg Tab    Hemorrhoids     Chronic, stable.  Denies current issues         History of iron deficiency     Chronic, stable.  Tolerating/continue 1 iron supplement daily.         Hyperlipidemia LDL goal <100    Relevant Orders    Lipid Profile    RESOLVED: Iron deficiency    Major depressive disorder, single episode, unspecified     Chronic, stable.  Tolerating/continue sertraline 50 mg daily         Overweight    Relevant Orders    CBC WITH DIFFERENTIAL    Comp Metabolic Panel    TSH WITH REFLEX TO FT4    Vitamin D deficiency     Chronic, controlled.  Tolerating/continue 2 OTC vitamin supplement (unsure of dose).          Relevant Orders    VITAMIN D,25 HYDROXY (DEFICIENCY)     Other Visit Diagnoses       Wellness examination        Neck pain        pain is in joshua shoulders and radiates to upper neck.  try medrol.  if not better use voltaren.  RF flexeril.  cervical xray is still painful after tx.      Need for hepatitis C screening test        Relevant Orders    HEP C VIRUS ANTIBODY            Repeat labs in the fall.    Return in about 1 year (around 2/22/2025), or sooner as needed.        Healthcare Maintenance:      HPI:     Problem   Hemorrhoids    Chronic, stable.  Denies current issues     Major Depressive Disorder, Single Episode, Unspecified    Chronic, stable.  Tolerating/continue sertraline 50 mg  daily     Left Ovarian Cyst   Allergies    Chronic, stable.  Sees an allergist once a year.  Has allergy shots monthly.  Has been doing allergy shots for years.     Chronic Midline Low Back Pain Without Sciatica    Chronic, stable.  Has had pain for about 10 years, most days.  Tolerating/continue cyclobenzaprine 10 mg as directed and as needed.     Overweight    Chronic, uncontrolled.  Having a hard time losing weight.  Had side effects to phentermine.  Tried Ozempic with a weight loss physician in the past but it did not seem to help.  Mounjaro is not covered by insurance.  Discussed increasing plant-based foods, decreasing animal-based and processed foods.  Discussed increasing activity.     4/25/2023:  Chronic, uncontrolled but improving.  Patient tolerated phentermine 30 mg daily.  15 mg did not help.  She does report a decrease in appetite and 7 pound weight loss since her last visit.  She denies palpitations and difficulty sleeping.  She would like to continue 30 mg daily.    6/16/2023:  Chronic, uncontrolled.  Patient states phentermine was not helping with her appetite as much during the second month.  She would like to go back to Ozempic.  She was on 0.25 mg weekly without side effects.  This was well covered by her insurance.  She would like to go back to this medication.  1 pen prescribed which should be 8 doses, 8 weeks.  Recommend follow-up in 6 weeks.    7/28/2023:  Ozempic was not covered by her insurance.  She would like to try phentermine again.  She tolerated phentermine 30 mg without issue although it did start waning on its efficacy with her appetite.  Will try phentermine 37.5 mg daily with 1 refill.     Celiac Disease    Chronic, stable.  Does report pain with gluten ingestion.  Tries to limit it but does not always.  Has appointment with GI in 2 weeks because she has been having some pain and bloating.      History of Iron Deficiency    Chronic, stable.  Tolerating/continue 1 iron supplement  "daily.         Hyperlipidemia Ldl Goal <100    Chronic, uncontrolled.  LDL normal.  Triglycerides remain mildly elevated.  Discussed increasing activity and decreasing carbohydrate type foods.    Latest Labs:   Lab Results   Component Value Date/Time    CHOLSTRLTOT 153 01/08/2024 06:36 AM    LDL 77 01/08/2024 06:36 AM    HDL 45 01/08/2024 06:36 AM    TRIGLYCERIDE 153 (H) 01/08/2024 06:36 AM      Medications: none    Risk calculator: The ASCVD Risk score (Zelda OLIVEIRA, et al., 2019) failed to calculate.        Vitamin D Deficiency    Chronic, controlled.  Tolerating/continue 2 OTC vitamin supplement (unsure of dose).          Anxiety    Chronic, stable.  Tolerating/continue sertraline 50 mg daily       Abnormal Level of Blood Mineral (Resolved)   Diarrhea (Resolved)   Epigastric Pain (Resolved)   Iron Deficiency (Resolved)    Chronic, controlled.       Osteopenia (Resolved)   Generalized Abdominal Pain (Resolved)   Diaphragmatic Hernia Without Obstruction Or Gangrene (Resolved)   Gastritis, Unspecified, Without Bleeding (Resolved)              ROS:  Review of Systems   Constitutional:  Negative for chills and fever.   Respiratory:  Negative for shortness of breath.    Cardiovascular:  Negative for chest pain.       OBJECTIVE:     Exam:  BP 94/68 (BP Location: Right arm, Patient Position: Sitting, BP Cuff Size: Adult)   Pulse 86   Temp 36.2 °C (97.1 °F) (Temporal)   Resp 16   Ht 1.651 m (5' 5\")   Wt 84.6 kg (186 lb 9.6 oz)   LMP 02/16/2024 (Exact Date)   SpO2 98%   Breastfeeding No   BMI 31.05 kg/m²  Body mass index is 31.05 kg/m².    Physical Exam  Vitals reviewed.   Constitutional:       General: She is not in acute distress.     Appearance: Normal appearance.   Pulmonary:      Effort: Pulmonary effort is normal.   Neurological:      General: No focal deficit present.      Mental Status: She is alert.   Psychiatric:         Mood and Affect: Mood normal.         Behavior: Behavior normal.         Judgment: " Judgment normal.       Anticipatory Guidance:  Wear a seat belt in the car.  Keep guns in a gun safe/locked up.  Recommend wearing sunscreen when outside.  Stay active most days of the week.  Eat plenty fruits and vegetables.  Drink plenty of fluid (without caffeine or alcohol).      CHART REVIEW:     Labs:                Please note that this dictation was created using voice recognition software. I have made every reasonable attempt to correct obvious errors, but I expect that there are errors of grammar and possibly content that I did not discover before finalizing the note.

## 2024-03-04 ENCOUNTER — HOSPITAL ENCOUNTER (OUTPATIENT)
Dept: LAB | Facility: MEDICAL CENTER | Age: 34
End: 2024-03-04
Payer: COMMERCIAL

## 2024-03-04 PROCEDURE — 88175 CYTOPATH C/V AUTO FLUID REDO: CPT

## 2024-03-06 ENCOUNTER — PHARMACY VISIT (OUTPATIENT)
Dept: PHARMACY | Facility: MEDICAL CENTER | Age: 34
End: 2024-03-06
Payer: COMMERCIAL

## 2024-03-09 LAB
COMMENT NL11729A: NORMAL
CYTOLOGIST CVX/VAG CYTO: NORMAL
CYTOLOGY CVX/VAG DOC CYTO: NORMAL
CYTOLOGY CVX/VAG DOC THIN PREP: NORMAL
NOTE NL11727A: NORMAL
OTHER STN SPEC: NORMAL
STAT OF ADQ CVX/VAG CYTO-IMP: NORMAL

## 2024-03-22 ENCOUNTER — PATIENT MESSAGE (OUTPATIENT)
Dept: NEUROLOGY | Facility: MEDICAL CENTER | Age: 34
End: 2024-03-22
Payer: COMMERCIAL

## 2024-03-22 DIAGNOSIS — G43.009 MIGRAINE WITHOUT AURA AND WITHOUT STATUS MIGRAINOSUS, NOT INTRACTABLE: ICD-10-CM

## 2024-03-22 NOTE — PATIENT COMMUNICATION
PLEASE RESEND THIS PTS AJOVY TO THE [facility]!   THIS PT IS ALSO REQUESTING AN ALTERNATIVE TO THE FROVATRIPTAN DUE TO INSURANCE NOT WANTING TO COVER IT! THANK YOU IN ADVANCE! Padmini Robb, Med Ass't

## 2024-03-26 RX ORDER — FREMANEZUMAB-VFRM 225 MG/1.5ML
225 INJECTION SUBCUTANEOUS
Qty: 3 EACH | Refills: 3 | Status: SHIPPED | OUTPATIENT
Start: 2024-03-26

## 2024-03-27 ENCOUNTER — TELEPHONE (OUTPATIENT)
Dept: NEUROLOGY | Facility: MEDICAL CENTER | Age: 34
End: 2024-03-27
Payer: COMMERCIAL

## 2024-03-27 NOTE — TELEPHONE ENCOUNTER
Received Refill PA request via [facility]  for Ajovy 225 MG/1.5ML SOAJ. (Quantity:4.5ml, Day Supply:90) - Copay $75.00 or $25.00 #1.5ml/30 DS     Insurance: [facility] ([employer])  Member ID:  [id number]  BIN: [id number]  PCN: [facility]  Group: [id number]     Ran Test claim via [facility] Pays for a $75.00 copay. Will outreach to patient to offer specialty pharmacy services and or release to preferred pharmacy

## 2024-04-03 PROCEDURE — RXMED WILLOW AMBULATORY MEDICATION CHARGE: Performed by: PHYSICIAN ASSISTANT

## 2024-04-08 ENCOUNTER — PATIENT MESSAGE (OUTPATIENT)
Dept: NEUROLOGY | Facility: MEDICAL CENTER | Age: 34
End: 2024-04-08
Payer: COMMERCIAL

## 2024-04-08 DIAGNOSIS — G43.009 MIGRAINE WITHOUT AURA AND WITHOUT STATUS MIGRAINOSUS, NOT INTRACTABLE: ICD-10-CM

## 2024-04-08 RX ORDER — RIZATRIPTAN BENZOATE 10 MG/1
TABLET ORAL
Qty: 10 TABLET | Refills: 6 | Status: SHIPPED | OUTPATIENT
Start: 2024-04-08

## 2024-04-08 NOTE — PATIENT COMMUNICATION
Rizatriptan (Maxalt) 10 MG Tabs - Request rec'd via [employer], RTAnju Franklin req'd - TC paid copay $30.00 #36/90 DS or $10.00 #12or10/30 DS, notifying liaison and/or Outreach. - 04/08/2024 1:08pm Carl

## 2024-04-08 NOTE — PATIENT COMMUNICATION
Please advise:   Pt needs an alternative to her frovatriptan as her insurance will not pay for it! Thank you in advance! Padmini Robb, Blaise Ass't

## 2024-04-15 ENCOUNTER — PHARMACY VISIT (OUTPATIENT)
Dept: PHARMACY | Facility: MEDICAL CENTER | Age: 34
End: 2024-04-15
Payer: COMMERCIAL

## 2024-04-15 DIAGNOSIS — G43.009 MIGRAINE WITHOUT AURA AND WITHOUT STATUS MIGRAINOSUS, NOT INTRACTABLE: ICD-10-CM

## 2024-04-15 RX ORDER — FREMANEZUMAB-VFRM 225 MG/1.5ML
225 INJECTION SUBCUTANEOUS
Qty: 3 EACH | Refills: 3 | Status: SHIPPED | OUTPATIENT
Start: 2024-04-15 | End: 2024-04-17 | Stop reason: SDUPTHER

## 2024-04-15 NOTE — TELEPHONE ENCOUNTER
PLEASE ADVISE:   PT NEEDS THIS PENDED MEDICATION SENT OVER TO THE [facility] PLEASE AND THANK YOU! Padmini Robb, Blaise Ass't

## 2024-04-16 ENCOUNTER — TELEPHONE (OUTPATIENT)
Dept: NEUROLOGY | Facility: MEDICAL CENTER | Age: 34
End: 2024-04-16
Payer: COMMERCIAL

## 2024-04-16 NOTE — TELEPHONE ENCOUNTER
Received Refill PA request via [facility]  for Ajovy 225 MG/1.5ML SOAJ. (Quantity:4.5ml, Day Supply:90) - Copay $75.00 or $25.00 #1.5ml/30 DS PA approved prev end date 02/27/2025     Insurance: [facility] ([employer])  Member ID:  [id number]  BIN: [id number]  PCN: [facility]  Group: [id number]     Ran Test claim via [place] [employer] medication Pays for a $75.00 copay. Will outreach to patient to offer specialty pharmacy services and or release to preferred pharmacy

## 2024-04-17 DIAGNOSIS — G43.009 MIGRAINE WITHOUT AURA AND WITHOUT STATUS MIGRAINOSUS, NOT INTRACTABLE: ICD-10-CM

## 2024-04-17 NOTE — TELEPHONE ENCOUNTER
Hello,      I am requesting another script for Tonia Xavier's Ajovy. The last one was set to print rx and did not get released properly to [facility]. Can you please approve this prescription?    Thank you,    Teresa   Rx Coordinator

## 2024-04-18 RX ORDER — FREMANEZUMAB-VFRM 225 MG/1.5ML
225 INJECTION SUBCUTANEOUS
Qty: 3 EACH | Refills: 3 | Status: SHIPPED | OUTPATIENT
Start: 2024-04-18 | End: 2024-04-24 | Stop reason: SDUPTHER

## 2024-04-23 PROCEDURE — RXMED WILLOW AMBULATORY MEDICATION CHARGE: Performed by: PHYSICIAN ASSISTANT

## 2024-04-23 PROCEDURE — RXMED WILLOW AMBULATORY MEDICATION CHARGE: Performed by: NURSE PRACTITIONER

## 2024-04-24 DIAGNOSIS — G43.009 MIGRAINE WITHOUT AURA AND WITHOUT STATUS MIGRAINOSUS, NOT INTRACTABLE: ICD-10-CM

## 2024-04-24 RX ORDER — FREMANEZUMAB-VFRM 225 MG/1.5ML
225 INJECTION SUBCUTANEOUS
Qty: 3 EACH | Refills: 3 | Status: SHIPPED | OUTPATIENT
Start: 2024-04-24 | End: 2024-04-25 | Stop reason: SDUPTHER

## 2024-04-24 NOTE — TELEPHONE ENCOUNTER
Good morning Dr Iglesias,    Can we please get a new prescription for Tonia's Ajovy sent to the [facility]? She needs it in the next day. The last prescription was set to print so it was not sent correctly to the pharmacy.      Thank you,    Teresa  Rx Coordinator

## 2024-04-25 DIAGNOSIS — G43.009 MIGRAINE WITHOUT AURA AND WITHOUT STATUS MIGRAINOSUS, NOT INTRACTABLE: ICD-10-CM

## 2024-04-25 PROCEDURE — RXMED WILLOW AMBULATORY MEDICATION CHARGE: Performed by: PSYCHIATRY & NEUROLOGY

## 2024-04-25 RX ORDER — FREMANEZUMAB-VFRM 225 MG/1.5ML
225 INJECTION SUBCUTANEOUS
Qty: 4.5 ML | Refills: 3 | OUTPATIENT
Start: 2024-04-25

## 2024-05-10 ENCOUNTER — PHARMACY VISIT (OUTPATIENT)
Dept: PHARMACY | Facility: MEDICAL CENTER | Age: 34
End: 2024-05-10
Payer: COMMERCIAL

## 2024-05-10 PROCEDURE — RXMED WILLOW AMBULATORY MEDICATION CHARGE: Performed by: PSYCHIATRY & NEUROLOGY

## 2024-05-14 ENCOUNTER — PHARMACY VISIT (OUTPATIENT)
Dept: PHARMACY | Facility: MEDICAL CENTER | Age: 34
End: 2024-05-14
Payer: COMMERCIAL

## 2024-05-15 PROCEDURE — RXMED WILLOW AMBULATORY MEDICATION CHARGE

## 2024-05-15 PROCEDURE — RXMED WILLOW AMBULATORY MEDICATION CHARGE: Performed by: PHYSICIAN ASSISTANT

## 2024-05-17 ENCOUNTER — PHARMACY VISIT (OUTPATIENT)
Dept: PHARMACY | Facility: MEDICAL CENTER | Age: 34
End: 2024-05-17
Payer: COMMERCIAL

## 2024-05-24 ENCOUNTER — PHARMACY VISIT (OUTPATIENT)
Dept: PHARMACY | Facility: MEDICAL CENTER | Age: 34
End: 2024-05-24
Payer: COMMERCIAL

## 2024-05-26 DIAGNOSIS — F41.9 ANXIETY: ICD-10-CM

## 2024-05-28 ENCOUNTER — OFFICE VISIT (OUTPATIENT)
Dept: URGENT CARE | Facility: PHYSICIAN GROUP | Age: 34
End: 2024-05-28
Payer: COMMERCIAL

## 2024-05-28 VITALS
TEMPERATURE: 96.9 F | WEIGHT: 198.41 LBS | OXYGEN SATURATION: 97 % | RESPIRATION RATE: 16 BRPM | SYSTOLIC BLOOD PRESSURE: 108 MMHG | DIASTOLIC BLOOD PRESSURE: 78 MMHG | BODY MASS INDEX: 33.06 KG/M2 | HEART RATE: 88 BPM | HEIGHT: 65 IN

## 2024-05-28 DIAGNOSIS — M62.838 CERVICAL PARASPINAL MUSCLE SPASM: Primary | ICD-10-CM

## 2024-05-28 DIAGNOSIS — R51.9 NONINTRACTABLE EPISODIC HEADACHE, UNSPECIFIED HEADACHE TYPE: ICD-10-CM

## 2024-05-28 PROCEDURE — 3078F DIAST BP <80 MM HG: CPT | Performed by: PHYSICIAN ASSISTANT

## 2024-05-28 PROCEDURE — RXMED WILLOW AMBULATORY MEDICATION CHARGE: Performed by: PHYSICIAN ASSISTANT

## 2024-05-28 PROCEDURE — 99213 OFFICE O/P EST LOW 20 MIN: CPT | Performed by: PHYSICIAN ASSISTANT

## 2024-05-28 PROCEDURE — 3074F SYST BP LT 130 MM HG: CPT | Performed by: PHYSICIAN ASSISTANT

## 2024-05-28 RX ORDER — PREDNISONE 20 MG/1
20 TABLET ORAL DAILY
Qty: 5 TABLET | Refills: 0 | Status: SHIPPED | OUTPATIENT
Start: 2024-05-28 | End: 2024-06-03

## 2024-05-28 ASSESSMENT — FIBROSIS 4 INDEX: FIB4 SCORE: 0.41

## 2024-05-28 ASSESSMENT — ENCOUNTER SYMPTOMS: NECK PAIN: 1

## 2024-05-29 ENCOUNTER — PHARMACY VISIT (OUTPATIENT)
Dept: PHARMACY | Facility: MEDICAL CENTER | Age: 34
End: 2024-05-29
Payer: COMMERCIAL

## 2024-05-29 NOTE — PROGRESS NOTES
Subjective     Tonia Xavier is a 34 y.o. female who presents with Neck Pain (Which is causing headaches x this morning. )    PMH:  has a past medical history of Anemia, Anxiety, Asthma, HSV infection (09/07/2019), Hyperlipidemia LDL goal <100 (09/09/2019), IFG (impaired fasting glucose) (09/09/2019), Migraine without aura and without status migrainosus, not intractable (02/13/2022), Ovarian cyst (11/2018), and Vitamin D deficiency (02/01/2018).       MEDS:   Current Outpatient Medications:     Fremanezumab-vfrm (AJOVY) 225 MG/1.5ML Solution Auto-injector, Inject 225 mg as directed under the skin every 4 weeks., Disp: 4.5 mL, Rfl: 3    rizatriptan (MAXALT) 10 MG tablet, Take 1 tablet by mouth at headache onset; repeat 1 tab every 1 hour as needed up to #4 tab/24 hours, Disp: 10 Tablet, Rfl: 6    cyclobenzaprine (FLEXERIL) 10 mg Tab, Take 1 Tablet by mouth every day., Disp: 30 Tablet, Rfl: 3    ondansetron (ZOFRAN ODT) 4 MG TABLET DISPERSIBLE, Take 1 Tablet by mouth every 6 hours as needed for Nausea/Vomiting., Disp: 10 Tablet, Rfl: 0    valacyclovir (VALTREX) 1 GM Tab, Take 1 Tablet by mouth every day., Disp: 90 Tablet, Rfl: 3    ferrous gluconate (FERGON) 324 (38 Fe) MG Tab, Take 1 Tablet by mouth every morning with breakfast., Disp: 90 Tablet, Rfl: 0    dicyclomine (BENTYL) 20 MG Tab, Take 1 Tablet by mouth four times daily - before meals and nightly as needed (stomach cramps)., Disp: 28 Tablet, Rfl: 0    drospirenone-ethinyl estradiol (DENISSE) 3-0.03 MG per tablet, Take 1 tablet by mouth daily, Disp: 84 Tablet, Rfl: 4    montelukast (SINGULAIR) 10 MG Tab, Take 1 tablet by mouth daily, Disp: 30 Tablet, Rfl: 9    sertraline (ZOLOFT) 50 MG Tab, Take 1 Tablet by mouth every day., Disp: 90 Tablet, Rfl: 3    acyclovir (ZOVIRAX) 5 % Cream, Apply 1 Application topically every 3 hours., Disp: 5 g, Rfl: 1    drospirenone-ethinyl estradiol (DENISSE) 3-0.03 MG per tablet, Take 1 tablet by mouth every day (Patient  not taking: Reported on 5/28/2024), Disp: 84 Tablet, Rfl: 4    PHEXXI 1.8-1-0.4 % Gel, , Disp: , Rfl:   ALLERGIES:   Allergies   Allergen Reactions    Benzoin Hives     Rash   Other reaction(s): Itching, rash     SURGHX:   Past Surgical History:   Procedure Laterality Date    PB KNEE SCOPE,AID ANT CRUCIATE REPAIR Right 3/23/2021    Procedure: RECONSTRUCTION, KNEE, ACL, ARTHROSCOPIC - WITH ALLOGRAFT BONE TENDON BONE;  Surgeon: Dexter Pimentel M.D.;  Location: [facility];  Service: Orthopedics    PB KNEE SCOPE,DIAGNOSTIC Right 10/29/2020    Procedure: ARTHROSCOPY, KNEE;  Surgeon: Dexter Pimentel M.D.;  Location: [facility];  Service: Orthopedics    MENISCECTOMY, KNEE, MEDIAL Right 10/29/2020    Procedure: MENISCECTOMY, KNEE, MEDIAL - PARTIAL;  Surgeon: Dexter Pimentel M.D.;  Location: [facility];  Service: Orthopedics    HARDWARE REMOVAL ORTHO Right 10/29/2020    Procedure: REMOVAL, HARDWARE tibia ;  Surgeon: Dexter Pimentel M.D.;  Location: [facility];  Service: Orthopedics    BONE GRAFT Right 10/29/2020    Procedure: curetage and BONE GRAFT femoral cyst, microfracture;  Surgeon: Dexter Pimentel M.D.;  Location: [facility];  Service: Orthopedics    KNEE ARTHROSCOPY Right 11/13/2018    Procedure: KNEE ARTHROSCOPY;  Surgeon: Dexter Pimentel M.D.;  Location: [facility];  Service: Orthopedics    SYNOVECTOMY Right 11/13/2018    Procedure: SYNOVECTOMY;  Surgeon: Dexter Pimentel M.D.;  Location: [facility];  Service: Orthopedics    MENISCECTOMY, KNEE, MEDIAL Right 11/13/2018    Procedure: MEDIAL MENISCECTOMY-  PARTIAL, AND PARTIAL LATERAL;  Surgeon: Dexter Pimentel M.D.;  Location: [facility];  Service: Orthopedics    CHONDROPLASTY  11/13/2018    Procedure: CHONDROPLASTY;  Surgeon: Dexter Pimentel M.D.;  Location: [facility];  Service: Orthopedics    HARDWARE REMOVAL ORTHO  11/13/2018    Procedure: HARDWARE REMOVAL ORTHO;  Surgeon: Dexter Pimentel  M.D.;  Location: [facility];  Service: Orthopedics    BONE GRAFT  2018    Procedure: BONE GRAFT;  Surgeon: Dexter Pimentel M.D.;  Location: [facility];  Service: Orthopedics    REPEAT C SECTION  2017    Procedure: REPEAT C SECTION;  Surgeon: Asmita Bolivar M.D.;  [facility];  Service: Obstetrics    REPEAT C SECTION  10/19/2015    Procedure: REPEAT C SECTION;  Surgeon: Mary Randolph M.D.;  [facility]: [facility];  Service:     RECONSTRUCTION, KNEE, ACL, ARTHROSCOPIC  10/2/2014    Performed by Dexter Pimentel M.D. at [facility]    KNEE ARTHROSCOPY  2014    Performed by Dexter Pimentel M.D. at [facility]    HARDWARE REMOVAL ORTHO  2014    Performed by Dexter Pimentel M.D. at [facility]    BONE GRAFT  2014    Performed by Dexter [facility]imentel M.D. at [facility]    [facility]      RECONSTRUCTION, KNEE, ACL Right 2006     SOCHX:  reports that she has never smoked. She has never used smokeless tobacco. She reports that she does not currently use alcohol. She reports that she does not use drugs.  FH: Reviewed with patient, not pertinent to this visit.           Patient presents with:  Neck Pain: Which is causing headaches x this morning.  Patient is unsure if she slept in a weird position she woke up with neck pain.  Patient denies injury to neck, recent fever, chills, cough cold or URI symptoms.  Patient has taken some over-the-counter ibuprofen with change in her symptoms.  No other complaints.          Neck Pain   This is a new problem. The current episode started today. The pain is associated with a sleep position. The pain is present in the occipital region. The quality of the pain is described as cramping, burning and aching. The pain is moderate. The symptoms are aggravated by position and twisting. Associated symptoms include headaches. Pertinent negatives include no  "pain with swallowing or trouble swallowing. She has tried NSAIDs and heat for the symptoms. The treatment provided no relief.       Review of Systems   HENT:  Negative for trouble swallowing.    Musculoskeletal:  Positive for myalgias and neck pain.   Neurological:  Positive for headaches.   All other systems reviewed and are negative.             Objective     /78 (BP Location: Left arm, Patient Position: Sitting, BP Cuff Size: Adult long)   Pulse 88   Temp 36.1 °C (96.9 °F) (Temporal)   Resp 16   Ht 1.651 m (5' 5\")   Wt 90 kg (198 lb 6.6 oz)   SpO2 97%   BMI 33.02 kg/m²      Physical Exam  Vitals and nursing note reviewed.   Constitutional:       General: She is not in acute distress.     Appearance: Normal appearance. She is well-developed. She is not ill-appearing or toxic-appearing.   HENT:      Head: Normocephalic and atraumatic.      Right Ear: Tympanic membrane normal.      Left Ear: Tympanic membrane normal.      Nose: Nose normal.      Mouth/Throat:      Lips: Pink.      Mouth: Mucous membranes are moist.      Pharynx: Oropharynx is clear. Uvula midline.   Eyes:      Extraocular Movements: Extraocular movements intact.      Conjunctiva/sclera: Conjunctivae normal.      Pupils: Pupils are equal, round, and reactive to light.   Cardiovascular:      Rate and Rhythm: Normal rate and regular rhythm.      Pulses: Normal pulses.      Heart sounds: Normal heart sounds.   Pulmonary:      Effort: Pulmonary effort is normal.      Breath sounds: Normal breath sounds.   Abdominal:      General: Bowel sounds are normal.      Palpations: Abdomen is soft.   Musculoskeletal:      Cervical back: Neck supple. Spasms and tenderness present. No swelling, erythema, rigidity, torticollis, bony tenderness or crepitus. Pain with movement present. Decreased range of motion.        Back:    Skin:     General: Skin is warm and dry.      Capillary Refill: Capillary refill takes less than 2 seconds.   Neurological:      " General: No focal deficit present.      Mental Status: She is alert and oriented to person, place, and time.      Cranial Nerves: No cranial nerve deficit.      Motor: Motor function is intact.      Coordination: Coordination is intact.      Gait: Gait normal.   Psychiatric:         Mood and Affect: Mood normal.                             Assessment & Plan                1. Cervical paraspinal muscle spasm  predniSONE (DELTASONE) 20 MG Tab          Patient HPI and physical exam consistent with cervical muscle spasm of neck resulting in headache.  Patient currently has a prescription for Flexeril which she has taken already with limited improvement.  Patient has used steroids in the past with good effect, so I we will treat with short course of prednisone.  Prescription has been sent to the pharmacy.    Patient can continue her over-the-counter ibuprofen as well as Flexeril as desired.    Differential diagnosis, supportive care, and indications for immediate follow-up discussed with patient.  Instructed to return to clinic or nearest emergency department for any change in condition, further concerns, or worsening of symptoms.    I personally reviewed prior external notes and test results pertinent to today's visit.  I have independently reviewed and interpreted all diagnostics ordered during this urgent care visit.    PT should follow up with PCP in 1-2 days for re-evaluation if symptoms have not improved.      Discussed red flags and reasons to return to UC or ED.      Pt and/or family verbalized understanding of diagnosis and follow up instructions and was offered informational handout on diagnosis.  PT discharged.     Please note that this dictation was created using voice recognition software. I have made every reasonable attempt to correct obvious errors, but I expect that there may be errors of grammar and possibly content that I did not discover before finalizing the note.

## 2024-05-30 ASSESSMENT — ENCOUNTER SYMPTOMS
MYALGIAS: 1
TROUBLE SWALLOWING: 0
HEADACHES: 1

## 2024-06-03 PROCEDURE — RXMED WILLOW AMBULATORY MEDICATION CHARGE: Performed by: PHYSICIAN ASSISTANT

## 2024-06-03 RX ORDER — LIDOCAINE 50 MG/G
1 PATCH TOPICAL
Qty: 10 PATCH | Refills: 0 | Status: SHIPPED | OUTPATIENT
Start: 2024-06-03

## 2024-06-11 ENCOUNTER — PHARMACY VISIT (OUTPATIENT)
Dept: PHARMACY | Facility: MEDICAL CENTER | Age: 34
End: 2024-06-11
Payer: COMMERCIAL

## 2024-06-23 DIAGNOSIS — F41.9 ANXIETY: ICD-10-CM

## 2024-06-23 PROCEDURE — RXMED WILLOW AMBULATORY MEDICATION CHARGE: Performed by: PHYSICIAN ASSISTANT

## 2024-06-23 PROCEDURE — RXMED WILLOW AMBULATORY MEDICATION CHARGE: Performed by: NURSE PRACTITIONER

## 2024-06-25 PROCEDURE — RXMED WILLOW AMBULATORY MEDICATION CHARGE: Performed by: INTERNAL MEDICINE

## 2024-06-25 NOTE — TELEPHONE ENCOUNTER
Received request via: Pharmacy    Was the patient seen in the last year in this department? Yes    Does the patient have an active prescription (recently filled or refills available) for medication(s) requested? No    Pharmacy Name: [facility]     Does the patient have halfway Plus and need 100 day supply (blood pressure, diabetes and cholesterol meds only)? Patient does not have SCP

## 2024-06-27 ENCOUNTER — PHARMACY VISIT (OUTPATIENT)
Dept: PHARMACY | Facility: MEDICAL CENTER | Age: 34
End: 2024-06-27
Payer: COMMERCIAL

## 2024-07-18 ENCOUNTER — OFFICE VISIT (OUTPATIENT)
Dept: MEDICAL GROUP | Facility: LAB | Age: 34
End: 2024-07-18
Payer: COMMERCIAL

## 2024-07-18 VITALS
DIASTOLIC BLOOD PRESSURE: 70 MMHG | WEIGHT: 198.4 LBS | BODY MASS INDEX: 33.05 KG/M2 | SYSTOLIC BLOOD PRESSURE: 110 MMHG | OXYGEN SATURATION: 96 % | RESPIRATION RATE: 16 BRPM | HEART RATE: 82 BPM | TEMPERATURE: 98.6 F | HEIGHT: 65 IN

## 2024-07-18 DIAGNOSIS — E66.3 OVERWEIGHT: ICD-10-CM

## 2024-07-18 PROCEDURE — 3074F SYST BP LT 130 MM HG: CPT | Performed by: STUDENT IN AN ORGANIZED HEALTH CARE EDUCATION/TRAINING PROGRAM

## 2024-07-18 PROCEDURE — 99203 OFFICE O/P NEW LOW 30 MIN: CPT | Performed by: STUDENT IN AN ORGANIZED HEALTH CARE EDUCATION/TRAINING PROGRAM

## 2024-07-18 PROCEDURE — 3078F DIAST BP <80 MM HG: CPT | Performed by: STUDENT IN AN ORGANIZED HEALTH CARE EDUCATION/TRAINING PROGRAM

## 2024-07-18 ASSESSMENT — FIBROSIS 4 INDEX: FIB4 SCORE: 0.41

## 2024-07-18 ASSESSMENT — ENCOUNTER SYMPTOMS
FEVER: 0
CHILLS: 0
SHORTNESS OF BREATH: 0

## 2024-07-25 DIAGNOSIS — M54.50 CHRONIC MIDLINE LOW BACK PAIN WITHOUT SCIATICA: ICD-10-CM

## 2024-07-25 DIAGNOSIS — G89.29 CHRONIC MIDLINE LOW BACK PAIN WITHOUT SCIATICA: ICD-10-CM

## 2024-07-25 PROCEDURE — RXMED WILLOW AMBULATORY MEDICATION CHARGE: Performed by: PHYSICIAN ASSISTANT

## 2024-07-25 PROCEDURE — RXMED WILLOW AMBULATORY MEDICATION CHARGE

## 2024-07-25 RX ORDER — CYCLOBENZAPRINE HCL 10 MG
10 TABLET ORAL
Qty: 30 TABLET | Refills: 0 | Status: SHIPPED | OUTPATIENT
Start: 2024-07-25

## 2024-07-30 ENCOUNTER — PHARMACY VISIT (OUTPATIENT)
Dept: PHARMACY | Facility: MEDICAL CENTER | Age: 34
End: 2024-07-30
Payer: COMMERCIAL

## 2024-08-08 ENCOUNTER — PHARMACY VISIT (OUTPATIENT)
Dept: PHARMACY | Facility: MEDICAL CENTER | Age: 34
End: 2024-08-08
Payer: COMMERCIAL

## 2024-08-08 ENCOUNTER — OFFICE VISIT (OUTPATIENT)
Dept: URGENT CARE | Facility: PHYSICIAN GROUP | Age: 34
End: 2024-08-08
Payer: COMMERCIAL

## 2024-08-08 VITALS
SYSTOLIC BLOOD PRESSURE: 120 MMHG | HEIGHT: 66 IN | BODY MASS INDEX: 31.74 KG/M2 | HEART RATE: 93 BPM | WEIGHT: 197.5 LBS | TEMPERATURE: 97 F | DIASTOLIC BLOOD PRESSURE: 52 MMHG | OXYGEN SATURATION: 97 %

## 2024-08-08 DIAGNOSIS — J20.9 ACUTE BRONCHITIS, UNSPECIFIED ORGANISM: ICD-10-CM

## 2024-08-08 PROCEDURE — RXMED WILLOW AMBULATORY MEDICATION CHARGE: Performed by: STUDENT IN AN ORGANIZED HEALTH CARE EDUCATION/TRAINING PROGRAM

## 2024-08-08 PROCEDURE — 3078F DIAST BP <80 MM HG: CPT | Performed by: STUDENT IN AN ORGANIZED HEALTH CARE EDUCATION/TRAINING PROGRAM

## 2024-08-08 PROCEDURE — 99213 OFFICE O/P EST LOW 20 MIN: CPT | Performed by: STUDENT IN AN ORGANIZED HEALTH CARE EDUCATION/TRAINING PROGRAM

## 2024-08-08 PROCEDURE — 3074F SYST BP LT 130 MM HG: CPT | Performed by: STUDENT IN AN ORGANIZED HEALTH CARE EDUCATION/TRAINING PROGRAM

## 2024-08-08 RX ORDER — PREDNISONE 20 MG/1
40 TABLET ORAL DAILY
Qty: 10 TABLET | Refills: 0 | Status: SHIPPED | OUTPATIENT
Start: 2024-08-08 | End: 2024-08-13

## 2024-08-08 RX ORDER — BENZONATATE 100 MG/1
200 CAPSULE ORAL 3 TIMES DAILY PRN
Qty: 60 CAPSULE | Refills: 0 | Status: SHIPPED | OUTPATIENT
Start: 2024-08-08

## 2024-08-08 ASSESSMENT — FIBROSIS 4 INDEX: FIB4 SCORE: 0.41

## 2024-08-08 NOTE — LETTER
August 8, 2024    To Whom It May Concern:         This is confirmation that Tonia Xavier attended her scheduled appointment with Reagan Vides P.A.-C. on 8/08/24.  Excuse from work on 8/8/2024 through 8/9/2024         If you have any questions please do not hesitate to call me at the phone number listed below.    Sincerely,          Reagan Vides P.A.-C.  [phone]

## 2024-08-08 NOTE — PROGRESS NOTES
"Subjective:   Tonia Xavier is a 34 y.o. female who presents for Cough (Wet cough, 1-2 really bad cough attacks daily x2 weeks/Chest felt \"weird\" last night almost like palpitations)      HPI:  34-year-old female presents to urgent care for proximately 2 weeks of a mixed dry/productive cough.  Has not had a fever during this timeframe.  Denies any nasal congestion, runny nose, headache, dizziness, shortness of breath, or chest pain.  States that last night she did feel when she had some sensation of palpitations that occurred while coughing.  She did check her pulse and did not notice any irregularity.  This has since completely gone away.  Not having any nausea, vomiting, or diarrhea.  No history of asthma or lung disease.    Medications:    acyclovir Crea  Ajovy Soaj  benzonatate Caps  cyclobenzaprine Tabs  dicyclomine Tabs  drospirenone-ethinyl estradiol  ferrous gluconate Tabs  lidocaine Ptch  montelukast Tabs  ondansetron Tbdp  Phexxi Gel  predniSONE Tabs  rizatriptan  sertraline Tabs  valacyclovir Tabs    Allergies: Benzoin    Problem List: Tonia Xavier does not have any pertinent problems on file.    Surgical History:  Past Surgical History:   Procedure Laterality Date    PB KNEE SCOPE,AID ANT CRUCIATE REPAIR Right 3/23/2021    Procedure: RECONSTRUCTION, KNEE, ACL, ARTHROSCOPIC - WITH ALLOGRAFT BONE TENDON BONE;  Surgeon: Dexter Pimentel M.D.;  Location: [facility];  Service: Orthopedics    PB KNEE SCOPE,DIAGNOSTIC Right 10/29/2020    Procedure: ARTHROSCOPY, KNEE;  Surgeon: Dexter Pimentel M.D.;  Location: [facility];  Service: Orthopedics    MENISCECTOMY, KNEE, MEDIAL Right 10/29/2020    Procedure: MENISCECTOMY, KNEE, MEDIAL - PARTIAL;  Surgeon: Dexter Pimentel M.D.;  Location: [facility];  Service: Orthopedics    HARDWARE REMOVAL ORTHO Right 10/29/2020    Procedure: REMOVAL, HARDWARE tibia ;  Surgeon: Dexter Pimentel M.D.;  Location: [facility];  Service: " Orthopedics    BONE GRAFT Right 10/29/2020    Procedure: curetage and BONE GRAFT femoral cyst, microfracture;  Surgeon: Dexter Pimentel M.D.;  Location: [facility];  Service: Orthopedics    KNEE ARTHROSCOPY Right 2018    Procedure: KNEE ARTHROSCOPY;  Surgeon: Dexter Pimentel M.D.;  Location: [facility];  Service: Orthopedics    SYNOVECTOMY Right 2018    Procedure: SYNOVECTOMY;  Surgeon: Dexter Pimentel M.D.;  Location: [facility];  Service: Orthopedics    MENISCECTOMY, KNEE, MEDIAL Right 2018    Procedure: MEDIAL MENISCECTOMY-  PARTIAL, AND PARTIAL LATERAL;  Surgeon: Dexter Pimentel M.D.;  Location: [facility];  Service: Orthopedics    CHONDROPLASTY  2018    Procedure: CHONDROPLASTY;  Surgeon: Dexter Pimentel M.D.;  Location: [facility];  Service: Orthopedics    HARDWARE REMOVAL ORTHO  2018    Procedure: HARDWARE REMOVAL ORTHO;  Surgeon: Dexter Pimentel M.D.;  Location: [facility];  Service: Orthopedics    BONE GRAFT  2018    Procedure: BONE GRAFT;  Surgeon: Dexter Pimentel M.D.;  Location: [facility];  Service: Orthopedics    REPEAT C SECTION  2017    Procedure: REPEAT C SECTION;  Surgeon: Asmita Bolivar M.D.;  [facility]: [facility];  Service: Obstetrics    REPEAT C SECTION  10/19/2015    Procedure: REPEAT C SECTION;  Surgeon: Mary Randolph M.D.;  Location: [facility];  Service:     RECONSTRUCTION, KNEE, ACL, ARTHROSCOPIC  10/2/2014    Performed by Dexter Pimentel M.D. at [facility]    KNEE ARTHROSCOPY  2014    Performed by Dexter Pimentel M.D. at [facility]    HARDWARE REMOVAL ORTHO  2014    Performed by Dexter Pimentel M.D. at [facility]    BONE GRAFT  2014    Performed by Dexter Pimentel M.D. at [facility]    [place]  DELIVERY ONLY      RECONSTRUCTION, KNEE, ACL Right 2006       Past Social Hx: Tonia  "Siri Xavier  reports that she has never smoked. She has never used smokeless tobacco. She reports that she does not currently use alcohol. She reports that she does not use drugs.     Past Family Hx:  Tonia Xavier family history includes Cancer in her maternal grandfather; Diabetes in her maternal grandfather, maternal grandmother, paternal grandmother, and another family member; Hyperlipidemia in her maternal grandfather and paternal grandmother; Hypertension in an other family member.     Problem list, medications, and allergies reviewed by myself today in Epic.     Objective:     /52 (BP Location: Left arm, Patient Position: Sitting, BP Cuff Size: Adult long)   Pulse 93   Temp 36.1 °C (97 °F) (Temporal)   Ht 1.686 m (5' 6.38\")   Wt 89.6 kg (197 lb 8 oz)   LMP 07/27/2024 (Exact Date)   SpO2 97%   BMI 31.52 kg/m²     Physical Exam  Vitals reviewed.   Cardiovascular:      Rate and Rhythm: Normal rate and regular rhythm.      Pulses: Normal pulses.      Heart sounds: Normal heart sounds. No murmur heard.  Pulmonary:      Effort: Pulmonary effort is normal. No respiratory distress.      Breath sounds: Normal breath sounds. No stridor. No wheezing, rhonchi or rales.         Assessment/Plan:     Diagnosis and associated orders:     1. Acute bronchitis, unspecified organism  predniSONE (DELTASONE) 20 MG Tab    benzonatate (TESSALON) 100 MG Cap         Comments/MDM:     Patient's presentation physical exam findings consistent with suspected acute bronchitis.  Cough does appear to be more inflammatory rather than bacterial.  I do not suspect a pneumonia at this time.  No wheezing, rales, rhonchi.  Prednisone and Tessalon prescribed.  This should be self-limited.  No palpitations today or chest pain.  Do not suspect cardiac etiology.  Strict ED/return precautions given.         Differential diagnosis, natural history, supportive care, and indications for immediate follow-up " discussed.    Advised the patient to follow-up with the primary care physician for recheck, reevaluation, and consideration of further management.    Please note that this dictation was created using voice recognition software. I have made a reasonable attempt to correct obvious errors, but I expect that there are errors of grammar and possibly content that I did not discover before finalizing the note.    Electronically signed by Reagan Vides PA-C.

## 2024-08-13 ENCOUNTER — PHARMACY VISIT (OUTPATIENT)
Dept: PHARMACY | Facility: MEDICAL CENTER | Age: 34
End: 2024-08-13
Payer: COMMERCIAL

## 2024-08-13 ENCOUNTER — OFFICE VISIT (OUTPATIENT)
Dept: OPHTHALMOLOGY | Facility: MEDICAL CENTER | Age: 34
End: 2024-08-13
Payer: COMMERCIAL

## 2024-08-13 DIAGNOSIS — H52.13 MYOPIA OF BOTH EYES: ICD-10-CM

## 2024-08-13 DIAGNOSIS — B00.9 HSV INFECTION: ICD-10-CM

## 2024-08-13 DIAGNOSIS — H10.31 ACUTE CONJUNCTIVITIS OF RIGHT EYE, UNSPECIFIED ACUTE CONJUNCTIVITIS TYPE: ICD-10-CM

## 2024-08-13 PROCEDURE — 99203 OFFICE O/P NEW LOW 30 MIN: CPT | Performed by: OPHTHALMOLOGY

## 2024-08-13 PROCEDURE — RXMED WILLOW AMBULATORY MEDICATION CHARGE: Performed by: OPHTHALMOLOGY

## 2024-08-13 RX ORDER — MOXIFLOXACIN 5 MG/ML
1 SOLUTION/ DROPS OPHTHALMIC 3 TIMES DAILY
Qty: 3 ML | Refills: 0 | Status: SHIPPED | OUTPATIENT
Start: 2024-08-13 | End: 2024-09-02

## 2024-08-13 RX ORDER — VALACYCLOVIR HYDROCHLORIDE 1 G/1
1000 TABLET, FILM COATED ORAL 2 TIMES DAILY
Qty: 60 TABLET | Refills: 2 | Status: SHIPPED | OUTPATIENT
Start: 2024-08-13

## 2024-08-13 ASSESSMENT — EXTERNAL EXAM - LEFT EYE: OS_EXAM: NORMAL

## 2024-08-13 ASSESSMENT — VISUAL ACUITY
CORRECTION_TYPE: GLASSES
OD_CC: 20/30
OS_CC: 20/40
OD_PH_CC: 20/20
METHOD: SNELLEN - LINEAR
OS_PH_CC: 20/20

## 2024-08-13 ASSESSMENT — ENCOUNTER SYMPTOMS
EYE DISCHARGE: 1
EYE REDNESS: 1

## 2024-08-13 ASSESSMENT — SLIT LAMP EXAM - LIDS
COMMENTS: NORMAL
COMMENTS: NORMAL

## 2024-08-13 ASSESSMENT — REFRACTION_WEARINGRX
SPECS_TYPE: SVL
OD_CYLINDER: +0.25
OD_SPHERE: -2.75
OS_CYLINDER: SPHERE
OS_SPHERE: -3.00
OD_AXIS: 087

## 2024-08-13 ASSESSMENT — REFRACTION_MANIFEST
OS_CYLINDER: SPHERE
OD_SPHERE: -3.25
OS_SPHERE: -3.50
OD_CYLINDER: SPHERE

## 2024-08-13 ASSESSMENT — EXTERNAL EXAM - RIGHT EYE: OD_EXAM: NORMAL

## 2024-08-13 NOTE — PROGRESS NOTES
Peds/Neuro Ophthalmology:   Ross Conteh M.D.    Date & Time note created:    8/13/2024   3:11 PM     Referring MD / APRN:  Yajaira Mendoza P.A.-C., No att. providers found    Patient ID:  Name:             Tonia Xavier   YOB: 1990  Age:                 34 y.o.  female   MRN:               [id number]    Chief Complaint/Reason for Visit:     Other (Red,irritated right eye with discharge and crusting x 3 days.Decreased vision at night x 1 month.)      History of Present Illness:    Tonia Xavier is a 34 y.o. female   Patient complaining red,irritated and discharge from right eye x 3 days.Cough x 1 week.Diagnosed with bronchitis.    Other        Review of Systems:  Review of Systems   Eyes:  Positive for discharge and redness.   All other systems reviewed and are negative.      Past Medical History:   Past Medical History:   Diagnosis Date    Anemia     Anxiety     Asthma     HSV infection 09/07/2019    Hyperlipidemia LDL goal <100 09/09/2019    IFG (impaired fasting glucose) 09/09/2019    Migraine without aura and without status migrainosus, not intractable 02/13/2022    Ovarian cyst 11/2018    left side    Vitamin D deficiency 02/01/2018       Past Surgical History:  Past Surgical History:   Procedure Laterality Date    PB KNEE SCOPE,AID ANT CRUCIATE REPAIR Right 3/23/2021    Procedure: RECONSTRUCTION, KNEE, ACL, ARTHROSCOPIC - WITH ALLOGRAFT BONE TENDON BONE;  Surgeon: Dexter Pimentel M.D.;  Location: [facility];  Service: Orthopedics    PB KNEE SCOPE,DIAGNOSTIC Right 10/29/2020    Procedure: ARTHROSCOPY, KNEE;  Surgeon: Dexter Pimentel M.D.;  Location: [facility];  Service: Orthopedics    MENISCECTOMY, KNEE, MEDIAL Right 10/29/2020    Procedure: MENISCECTOMY, KNEE, MEDIAL - PARTIAL;  Surgeon: Dexter Pimentel M.D.;  Location: [facility];  Service: Orthopedics    HARDWARE REMOVAL ORTHO Right 10/29/2020    Procedure: REMOVAL, HARDWARE tibia ;   Surgeon: Dexter Pimentel M.D.;  Location: [facility];  Service: Orthopedics    BONE GRAFT Right 10/29/2020    Procedure: curetage and BONE GRAFT femoral cyst, microfracture;  Surgeon: Dexter Pimentel M.D.;  Location: [facility];  Service: Orthopedics    KNEE ARTHROSCOPY Right 2018    Procedure: KNEE ARTHROSCOPY;  Surgeon: Dexter Pimentel M.D.;  Location: [facility];  Service: Orthopedics    SYNOVECTOMY Right 2018    Procedure: SYNOVECTOMY;  Surgeon: Dexter Pimentel M.D.;  Location: [facility];  Service: Orthopedics    MENISCECTOMY, KNEE, MEDIAL Right 2018    Procedure: MEDIAL MENISCECTOMY-  PARTIAL, AND PARTIAL LATERAL;  Surgeon: Dexter Pimentel M.D.;  Location: [facility];  Service: Orthopedics    CHONDROPLASTY  2018    Procedure: CHONDROPLASTY;  Surgeon: Dexter Pimentel M.D.;  Location: [facility];  Service: Orthopedics    HARDWARE REMOVAL ORTHO  2018    Procedure: HARDWARE REMOVAL ORTHO;  Surgeon: Dexter Pimentel M.D.;  Location: [facility];  Service: Orthopedics    BONE GRAFT  2018    Procedure: BONE GRAFT;  Surgeon: Dexter Pimentel M.D.;  Location: [facility];  Service: Orthopedics    REPEAT C SECTION  2017    Procedure: REPEAT C SECTION;  Surgeon: Asmita Bolivar M.D.;  [facility];  Service: Obstetrics    REPEAT C SECTION  10/19/2015    Procedure: REPEAT C SECTION;  Surgeon: Mary Randolph M.D.;  [facility]: [facility];  Service:     RECONSTRUCTION, KNEE, ACL, ARTHROSCOPIC  10/2/2014    Performed by Dexter Pimentel M.D. at [facility]    KNEE ARTHROSCOPY  2014    Performed by Dexter Pimentel M.D. at [facility]    HARDWARE REMOVAL ORTHO  2014    Performed by Dexter Pimentel M.D. at [facility]    BONE GRAFT  2014    Performed by Dexter Pimentel M.D. at [facility]    [place]  DELIVERY ONLY  2011     RECONSTRUCTION, KNEE, ACL Right 12/2006       Current Outpatient Medications:  Current Outpatient Medications   Medication Sig Dispense Refill    valacyclovir (VALTREX) 1 GM Tab Take 1 Tablet by mouth 2 times a day. 60 Tablet 2    moxifloxacin (VIGAMOX) 0.5 % Solution Administer 1 Drop into the right eye 3 times a day for 5 days. 3 mL 0    benzonatate (TESSALON) 100 MG Cap Take 2 Capsules by mouth 3 times a day as needed for Cough. 60 Capsule 0    cyclobenzaprine (FLEXERIL) 10 mg Tab Take 1 Tablet by mouth every day. 30 Tablet 0    sertraline (ZOLOFT) 50 MG Tab Take 1 Tablet by mouth every day. 90 Tablet 0    Fremanezumab-vfrm (AJOVY) 225 MG/1.5ML Solution Auto-injector Inject 225 mg as directed under the skin every 4 weeks. 4.5 mL 3    rizatriptan (MAXALT) 10 MG tablet Take 1 tablet by mouth at headache onset; repeat 1 tab every 1 hour as needed up to #4 tab/24 hours 10 Tablet 6    PHEXXI 1.8-1-0.4 % Gel       ondansetron (ZOFRAN ODT) 4 MG TABLET DISPERSIBLE Take 1 Tablet by mouth every 6 hours as needed for Nausea/Vomiting. 10 Tablet 0    ferrous gluconate (FERGON) 324 (38 Fe) MG Tab Take 1 Tablet by mouth every morning with breakfast. 90 Tablet 0    dicyclomine (BENTYL) 20 MG Tab Take 1 Tablet by mouth four times daily - before meals and nightly as needed (stomach cramps). 28 Tablet 0    drospirenone-ethinyl estradiol (DENISSE) 3-0.03 MG per tablet Take 1 tablet by mouth daily 84 Tablet 4    montelukast (SINGULAIR) 10 MG Tab Take 1 tablet by mouth daily 30 Tablet 9    acyclovir (ZOVIRAX) 5 % Cream Apply 1 Application topically every 3 hours. 5 g 1    predniSONE (DELTASONE) 20 MG Tab Take 2 Tablets by mouth every day for 5 days. (Patient not taking: Reported on 8/13/2024) 10 Tablet 0    lidocaine (LIDODERM) 5 % Patch Place 1 Patch on the skin every 24 hours as needed (pain). 12 hours on, 12 hours off (Patient not taking: Reported on 8/8/2024) 10 Patch 0    drospirenone-ethinyl estradiol (DENISSE) 3-0.03 MG per  tablet Take 1 tablet by mouth every day (Patient not taking: Reported on 8/8/2024) 84 Tablet 4     No current facility-administered medications for this visit.       Allergies:  Allergies   Allergen Reactions    Benzoin Hives     Rash   Other reaction(s): Itching, rash       Family History:  Family History   Problem Relation Age of Onset    Diabetes Other     Hypertension Other     Diabetes Maternal Grandmother         DM w/ESRD    Diabetes Maternal Grandfather     Cancer Maternal Grandfather         prostate, esophageal    Hyperlipidemia Maternal Grandfather     Diabetes Paternal Grandmother         DM on dialysis    Hyperlipidemia Paternal Grandmother        Social History:  Social History     Socioeconomic History    Marital status:      Spouse name: Not on file    Number of children: Not on file    Years of education: Not on file    Highest education level: Some college, no degree   Occupational History    Not on file   Tobacco Use    Smoking status: Never    Smokeless tobacco: Never   Vaping Use    Vaping status: Never Used   Substance and Sexual Activity    Alcohol use: Not Currently     Comment: Occasionally    Drug use: Never     Comment: Occ. marijuana    Sexual activity: Yes     Partners: Male     Birth control/protection: Pill   Other Topics Concern    Not on file   Social History Narrative    Lives with  and 3 boys.     Social Determinants of Health     Financial Resource Strain: Medium Risk (2/15/2024)    Overall Financial Resource Strain (CARDIA)     Difficulty of Paying Living Expenses: Somewhat hard   Food Insecurity: No Food Insecurity (2/15/2024)    Hunger Vital Sign     Worried About Running Out of Food in the Last Year: Never true     Ran Out of Food in the Last Year: Never true   Transportation Needs: No Transportation Needs (2/15/2024)    PRAPARE - Transportation     Lack of Transportation (Medical): No     Lack of Transportation (Non-Medical): No   Physical Activity: Inactive  (2/15/2024)    Exercise Vital Sign     Days of Exercise per Week: 0 days     Minutes of Exercise per Session: 0 min   Stress: No Stress Concern Present (2/15/2024)    [place] [employer] - Occupational Stress Questionnaire     Feeling of Stress : Only a little   Social Connections: Moderately Integrated (2/15/2024)    Social Connection and Isolation Panel [NHANES]     Frequency of Communication with Friends and Family: More than three times a week     Frequency of Social Gatherings with Friends and Family: Once a week     Attends Anabaptist Services: More than 4 times per year     Active Member of Clubs or Organizations: No     Attends Club or Organization Meetings: Never     Marital Status:    Intimate Partner Violence: Not on file   Housing Stability: High Risk (2/15/2024)    Housing Stability Vital Sign     Unable to Pay for Housing in the Last Year: Yes     Number of Places Lived in the Last Year: 1     Unstable Housing in the Last Year: No          Physical Exam:  Physical Exam    Oriented x 3  Weight/BMI: There is no height or weight on file to calculate BMI.  There were no vitals taken for this visit.    Base Eye Exam       Visual Acuity (Snellen - Linear)         Right Left    Dist cc 20/30 20/40    Dist ph cc 20/20 20/20      Correction: Glasses              Pupils         Pupils    Right PERRL    Left PERRL                  Slit Lamp and Fundus Exam       External Exam         Right Left    External Normal Normal              Slit Lamp Exam         Right Left    Lids/Lashes Normal Normal    Conjunctiva/Sclera Injection White and quiet    Cornea Clear Clear    Anterior Chamber Deep and quiet Deep and quiet    Iris Round and reactive Round and reactive    Lens Clear Clear    Vitreous Normal Normal                  Refraction       Wearing Rx         Sphere Cylinder Axis    Right -2.75 +0.25 087    Left -3.00 Sphere       Age: 4yrs    Type: SVL              Manifest Refraction          Sphere Cylinder    Right -3.25 Sphere    Left -3.50 Sphere                    Pertinent Lab/Test/Imaging Review:      Assessment and Plan:     Myopia of both eyes  8/13/2024-May eventually need adjustment in glasses Rx    HSV infection  8/13/2024-recent onset of conjunctivitis with some lid edema.  This is in association with exacerbation of a HSV lesion on her lip and oral steroids for bronchitis.  She is already taking valacyclovir 1 g daily prophylaxis.  This could be HSV conjunctivitis so recommend decreasing develops acyclovir to 1 g twice daily.    Acute conjunctivitis of right eye  8/13/2024-acute onset conjunctivitis right eye with mild preseptal involvement.  This in association with a recent exacerbation of a HSV lesion on her lip as well as being on oral steroids for bronchitis.  The lip lesion is a breakthrough given that she has already been on valacyclovir 1 g/day.  I do not see an any dendritic vessels on her cornea, but the concern would be that this could be early HSV conjunctivitis.  Also I do feel possibly an early stye or chalazion so this could have some bacterial secondary infection.  Therefore discussed increasing valacyclovir to 1 g twice daily and add moxifloxacin 1 drop right eye 3 times daily for 5 days.  Might need addition of topical steroids but would like to see on the valacyclovir at a higher dose prior.        Ross Conteh M.D.

## 2024-08-13 NOTE — ASSESSMENT & PLAN NOTE
8/13/2024-acute onset conjunctivitis right eye with mild preseptal involvement.  This in association with a recent exacerbation of a HSV lesion on her lip as well as being on oral steroids for bronchitis.  The lip lesion is a breakthrough given that she has already been on valacyclovir 1 g/day.  I do not see an any dendritic vessels on her cornea, but the concern would be that this could be early HSV conjunctivitis.  Also I do feel possibly an early stye or chalazion so this could have some bacterial secondary infection.  Therefore discussed increasing valacyclovir to 1 g twice daily and add moxifloxacin 1 drop right eye 3 times daily for 5 days.  Might need addition of topical steroids but would like to see on the valacyclovir at a higher dose prior.

## 2024-08-13 NOTE — ASSESSMENT & PLAN NOTE
8/13/2024-recent onset of conjunctivitis with some lid edema.  This is in association with exacerbation of a HSV lesion on her lip and oral steroids for bronchitis.  She is already taking valacyclovir 1 g daily prophylaxis.  This could be HSV conjunctivitis so recommend decreasing develops acyclovir to 1 g twice daily.

## 2024-08-15 ENCOUNTER — OFFICE VISIT (OUTPATIENT)
Dept: MEDICAL GROUP | Facility: PHYSICIAN GROUP | Age: 34
End: 2024-08-15
Payer: COMMERCIAL

## 2024-08-15 VITALS
OXYGEN SATURATION: 96 % | HEART RATE: 78 BPM | WEIGHT: 197.2 LBS | HEIGHT: 65 IN | SYSTOLIC BLOOD PRESSURE: 94 MMHG | RESPIRATION RATE: 16 BRPM | TEMPERATURE: 97.1 F | DIASTOLIC BLOOD PRESSURE: 68 MMHG | BODY MASS INDEX: 32.86 KG/M2

## 2024-08-15 DIAGNOSIS — B00.1 COLD SORE: ICD-10-CM

## 2024-08-15 DIAGNOSIS — R05.3 PERSISTENT COUGH: ICD-10-CM

## 2024-08-15 PROCEDURE — RXMED WILLOW AMBULATORY MEDICATION CHARGE: Performed by: PHYSICIAN ASSISTANT

## 2024-08-15 PROCEDURE — 3074F SYST BP LT 130 MM HG: CPT | Performed by: PHYSICIAN ASSISTANT

## 2024-08-15 PROCEDURE — 99214 OFFICE O/P EST MOD 30 MIN: CPT | Performed by: PHYSICIAN ASSISTANT

## 2024-08-15 PROCEDURE — 3078F DIAST BP <80 MM HG: CPT | Performed by: PHYSICIAN ASSISTANT

## 2024-08-15 RX ORDER — ALBUTEROL SULFATE 90 UG/1
2 AEROSOL, METERED RESPIRATORY (INHALATION) EVERY 4 HOURS PRN
Qty: 8.5 G | Refills: 11 | Status: SHIPPED | OUTPATIENT
Start: 2024-08-15

## 2024-08-15 ASSESSMENT — ENCOUNTER SYMPTOMS
FEVER: 0
CHILLS: 0
COUGH: 1
SHORTNESS OF BREATH: 0

## 2024-08-15 ASSESSMENT — FIBROSIS 4 INDEX: FIB4 SCORE: 0.41

## 2024-08-15 NOTE — PROGRESS NOTES
"SUBJECTIVE:     CC:     HPI:   Tonia presents today with the following:    ASSESSMENT & PLAN by Problem:       Problem List Items Addressed This Visit    None  Visit Diagnoses       Persistent cough        Subacute.  Likely viral.  Trial albuterol.  Low suspicion for pneumonia.  Vitals and exam unremarkable.    Cold sore        Chronic, intermittent, currently uncontrolled.  Increase Valtrex to 2 g every 12 hours for 3 doses.          Trial heating pad at night to see if this helps the cough.  Trial albuterol.  Recommend chlorpheniramine tablets as needed for congestion.    Follow-up instructions from 2/22/2020 for primary care visit:  Repeat labs in the fall.  Return in about 1 year (around 2/22/2025), or sooner as needed.         Return if symptoms worsen or fail to improve.        HPI:     Cough x 3 weeks  UC 1 week ago; tessalon and prednisone  Slight improvement with prednisone  No change with tessalon  Mucous in the morning  Dry cough during the day  Denies fever, chills  + nasal congestion, ST, HA  SOB when supine  Cold sores since starting prednisone  Two sores on lips; currently on  valtrex 1g/day, increased to 2g/day           ROS:  Review of Systems   Constitutional:  Negative for chills and fever.   HENT:  Positive for congestion.    Respiratory:  Positive for cough. Negative for shortness of breath.    Cardiovascular:  Negative for chest pain.       OBJECTIVE:     Exam:  BP 94/68 (BP Location: Left arm, Patient Position: Sitting, BP Cuff Size: Adult)   Pulse 78   Temp 36.2 °C (97.1 °F) (Temporal)   Resp 16   Ht 1.651 m (5' 5\")   Wt 89.4 kg (197 lb 3.2 oz)   LMP 07/27/2024 (Exact Date)   SpO2 96%   BMI 32.82 kg/m²  Body mass index is 32.82 kg/m².    Physical Exam  Vitals reviewed.   Constitutional:       General: She is not in acute distress.     Appearance: Normal appearance. She is not toxic-appearing.   HENT:      Mouth/Throat:      Comments: 2 cold sores noted, 1 on the top lip and 1 on " the bottom lip.  Eyes:      Conjunctiva/sclera: Conjunctivae normal.   Cardiovascular:      Rate and Rhythm: Normal rate and regular rhythm.      Heart sounds: No murmur heard.  Pulmonary:      Effort: Pulmonary effort is normal. No respiratory distress.      Breath sounds: No wheezing, rhonchi or rales.   Skin:     General: Skin is warm.   Neurological:      General: No focal deficit present.      Mental Status: She is alert.   Psychiatric:         Mood and Affect: Mood normal.         Behavior: Behavior normal.         Judgment: Judgment normal.           CHART REVIEW:         HPI:  34-year-old female presents to urgent care for proximately 2 weeks of a mixed dry/productive cough.  Has not had a fever during this timeframe.  Denies any nasal congestion, runny nose, headache, dizziness, shortness of breath, or chest pain.  States that last night she did feel when she had some sensation of palpitations that occurred while coughing.  She did check her pulse and did not notice any irregularity.  This has since completely gone away.  Not having any nausea, vomiting, or diarrhea.  No history of asthma or lung disease.                 Please note that this dictation was created using voice recognition software. I have made every reasonable attempt to correct obvious errors, but I expect that there are errors of grammar and possibly content that I did not discover before finalizing the note.

## 2024-08-16 ENCOUNTER — PHARMACY VISIT (OUTPATIENT)
Dept: PHARMACY | Facility: MEDICAL CENTER | Age: 34
End: 2024-08-16
Payer: COMMERCIAL

## 2024-08-19 ENCOUNTER — APPOINTMENT (OUTPATIENT)
Dept: MEDICAL GROUP | Facility: PHYSICIAN GROUP | Age: 34
End: 2024-08-19
Payer: COMMERCIAL

## 2024-09-10 ENCOUNTER — APPOINTMENT (OUTPATIENT)
Dept: MEDICAL GROUP | Facility: PHYSICIAN GROUP | Age: 34
End: 2024-09-10
Payer: COMMERCIAL

## 2024-09-10 ENCOUNTER — PHARMACY VISIT (OUTPATIENT)
Dept: PHARMACY | Facility: MEDICAL CENTER | Age: 34
End: 2024-09-10
Payer: COMMERCIAL

## 2024-09-10 VITALS
HEART RATE: 87 BPM | OXYGEN SATURATION: 98 % | TEMPERATURE: 96.8 F | DIASTOLIC BLOOD PRESSURE: 68 MMHG | HEIGHT: 65 IN | RESPIRATION RATE: 16 BRPM | SYSTOLIC BLOOD PRESSURE: 114 MMHG | BODY MASS INDEX: 32.99 KG/M2 | WEIGHT: 198 LBS

## 2024-09-10 DIAGNOSIS — E66.3 OVERWEIGHT: ICD-10-CM

## 2024-09-10 PROCEDURE — RXMED WILLOW AMBULATORY MEDICATION CHARGE: Performed by: PHYSICIAN ASSISTANT

## 2024-09-10 PROCEDURE — 3078F DIAST BP <80 MM HG: CPT | Performed by: PHYSICIAN ASSISTANT

## 2024-09-10 PROCEDURE — 99214 OFFICE O/P EST MOD 30 MIN: CPT | Performed by: PHYSICIAN ASSISTANT

## 2024-09-10 PROCEDURE — 3074F SYST BP LT 130 MM HG: CPT | Performed by: PHYSICIAN ASSISTANT

## 2024-09-10 PROCEDURE — RXOTC WILLOW AMBULATORY OTC CHARGE: Performed by: PHARMACIST

## 2024-09-10 RX ORDER — PHENTERMINE HYDROCHLORIDE 37.5 MG/1
37.5 CAPSULE ORAL EVERY MORNING
Qty: 30 CAPSULE | Refills: 2 | Status: SHIPPED | OUTPATIENT
Start: 2024-09-10 | End: 2024-10-10

## 2024-09-10 RX ORDER — TOPIRAMATE 25 MG/1
25 TABLET, FILM COATED ORAL DAILY
Qty: 30 TABLET | Refills: 2 | Status: SHIPPED | OUTPATIENT
Start: 2024-09-10

## 2024-09-10 ASSESSMENT — ENCOUNTER SYMPTOMS
CHILLS: 0
SHORTNESS OF BREATH: 0
FEVER: 0

## 2024-09-10 ASSESSMENT — FIBROSIS 4 INDEX: FIB4 SCORE: 0.41

## 2024-09-10 NOTE — ASSESSMENT & PLAN NOTE
"Chronic, uncontrolled.  Feels like she has gained 10 pounds without any change in her eating habits or exercise.  Feels like she has no discipline or control over her eating.  Patient is agreeable to seeing a therapist, thinking this may be part of her eating issues.  Has tolerated phentermine in the past.  Restarting phentermine and adding Topamax.  Follow-up in 3 months.    2023: 194#  8/8/2024: 197#  9/10/2024: 198#    Interval history:  Having a hard time losing weight.  Had side effects to phentermine.  Tried Ozempic with a weight loss physician in the past but it did not seem to help.  Mounjaro is not covered by insurance.  Discussed increasing plant-based foods, decreasing animal-based and processed foods.  Discussed increasing activity.     4/25/2023:  Chronic, uncontrolled but improving.  Patient tolerated phentermine 30 mg daily.  15 mg did not help.  She does report a decrease in appetite and 7 pound weight loss since her last visit.  She denies palpitations and difficulty sleeping.  She would like to continue 30 mg daily.    6/16/2023:  Chronic, uncontrolled.  Patient states phentermine was not helping with her appetite as much during the second month.  She would like to go back to Ozempic.  She was on 0.25 mg weekly without side effects.  This was well covered by her insurance.  She would like to go back to this medication.  1 pen prescribed which should be 8 doses, 8 weeks.  Recommend follow-up in 6 weeks.    7/28/2023:  Ozempic was not covered by her insurance.  She would like to try phentermine again.  She tolerated phentermine 30 mg without issue although it did start waning on its efficacy with her appetite.  Will try phentermine 37.5 mg daily with 1 refill.\"    Patient reports this is the heaviest she has ever been, prior to having kids she was around 160-165    24 hour diet recall:  Ice coffee sugar free, heavy whipping cream  Premier protein shake   Yogurt, string cheese  Chicken wings     -she " had tried keto diet in the past without much improvement in weight  -she has been trying to stick to 1500 calories/day for the past 2 weeks     -overall she struggles with sweets     Exercise:2-3x/week   She does resistance training initially and then cardio for 30 minutes

## 2024-09-10 NOTE — PROGRESS NOTES
SUBJECTIVE:     CC: overweight    HPI:   Tonia presents today with the following:    ASSESSMENT & PLAN by Problem:     Problem   Overweight    Chronic, uncontrolled.  Restarting phentermine and adding Topamax.  Follow-up in 3 months.         Return in about 2 months (around 11/10/2024), or if symptoms worsen or fail to improve.    Follow-up instructions from 2/22/2024 primary care visit:  Repeat labs in the fall.  Return in about 1 year (around 2/22/2025), or sooner as needed.      HPI:     Problem List Items Addressed This Visit       Overweight     Chronic, uncontrolled.  Feels like she has gained 10 pounds without any change in her eating habits or exercise.  Feels like she has no discipline or control over her eating.  Patient is agreeable to seeing a therapist, thinking this may be part of her eating issues.  Has tolerated phentermine in the past.  Restarting phentermine and adding Topamax.  Follow-up in 3 months.    2023: 194#  8/8/2024: 197#  9/10/2024: 198#    Interval history:  Having a hard time losing weight.  Had side effects to phentermine.  Tried Ozempic with a weight loss physician in the past but it did not seem to help.  Mounjaro is not covered by insurance.  Discussed increasing plant-based foods, decreasing animal-based and processed foods.  Discussed increasing activity.     4/25/2023:  Chronic, uncontrolled but improving.  Patient tolerated phentermine 30 mg daily.  15 mg did not help.  She does report a decrease in appetite and 7 pound weight loss since her last visit.  She denies palpitations and difficulty sleeping.  She would like to continue 30 mg daily.    6/16/2023:  Chronic, uncontrolled.  Patient states phentermine was not helping with her appetite as much during the second month.  She would like to go back to Ozempic.  She was on 0.25 mg weekly without side effects.  This was well covered by her insurance.  She would like to go back to this medication.  1 pen prescribed which should  "be 8 doses, 8 weeks.  Recommend follow-up in 6 weeks.    7/28/2023:  Ozempic was not covered by her insurance.  She would like to try phentermine again.  She tolerated phentermine 30 mg without issue although it did start waning on its efficacy with her appetite.  Will try phentermine 37.5 mg daily with 1 refill.\"    Patient reports this is the heaviest she has ever been, prior to having kids she was around 160-165    24 hour diet recall:  Ice coffee sugar free, heavy whipping cream  Premier protein shake   Yogurt, string cheese  Chicken wings     -she had tried keto diet in the past without much improvement in weight  -she has been trying to stick to 1500 calories/day for the past 2 weeks     -overall she struggles with sweets     Exercise:2-3x/week   She does resistance training initially and then cardio for 30 minutes            Relevant Medications    phentermine 37.5 MG capsule    topiramate (TOPAMAX) 25 MG Tab    Other Relevant Orders    Referral to Behavioral Health              ROS:  Review of Systems   Constitutional:  Negative for chills and fever.   Respiratory:  Negative for shortness of breath.    Cardiovascular:  Negative for chest pain.       OBJECTIVE:     Exam:  /68 (BP Location: Left arm, Patient Position: Sitting, BP Cuff Size: Large adult)   Pulse 87   Temp 36 °C (96.8 °F) (Temporal)   Resp 16   Ht 1.651 m (5' 5\")   Wt 89.8 kg (198 lb)   LMP 08/29/2024 (Exact Date)   SpO2 98%   Breastfeeding No   BMI 32.95 kg/m²  Body mass index is 32.95 kg/m².    Physical Exam  Vitals reviewed.   Constitutional:       General: She is not in acute distress.     Appearance: Normal appearance.   Pulmonary:      Effort: Pulmonary effort is normal.   Neurological:      General: No focal deficit present.      Mental Status: She is alert.   Psychiatric:         Mood and Affect: Mood normal.         Behavior: Behavior normal.         Judgment: Judgment normal.              Please note that this dictation " was created using voice recognition software. I have made every reasonable attempt to correct obvious errors, but I expect that there are errors of grammar and possibly content that I did not discover before finalizing the note.

## 2024-09-11 DIAGNOSIS — M54.50 CHRONIC MIDLINE LOW BACK PAIN WITHOUT SCIATICA: ICD-10-CM

## 2024-09-11 DIAGNOSIS — G89.29 CHRONIC MIDLINE LOW BACK PAIN WITHOUT SCIATICA: ICD-10-CM

## 2024-09-12 PROCEDURE — RXMED WILLOW AMBULATORY MEDICATION CHARGE: Performed by: PHYSICIAN ASSISTANT

## 2024-09-12 RX ORDER — CYCLOBENZAPRINE HCL 10 MG
10 TABLET ORAL
Qty: 30 TABLET | Refills: 0 | Status: SHIPPED | OUTPATIENT
Start: 2024-09-12

## 2024-09-12 NOTE — TELEPHONE ENCOUNTER
Received request via: Pharmacy    Was the patient seen in the last year in this department? Yes    Does the patient have an active prescription (recently filled or refills available) for medication(s) requested? No    Does the patient have senior living Plus and need 100-day supply? (This applies to ALL medications) Patient does not have SCP

## 2024-09-13 ENCOUNTER — PHARMACY VISIT (OUTPATIENT)
Dept: PHARMACY | Facility: MEDICAL CENTER | Age: 34
End: 2024-09-13
Payer: COMMERCIAL

## 2024-09-24 ENCOUNTER — IMMUNIZATION (OUTPATIENT)
Dept: OCCUPATIONAL MEDICINE | Facility: CLINIC | Age: 34
End: 2024-09-24

## 2024-09-24 DIAGNOSIS — Z23 NEED FOR VACCINATION: Primary | ICD-10-CM

## 2024-10-07 PROCEDURE — RXMED WILLOW AMBULATORY MEDICATION CHARGE: Performed by: OPHTHALMOLOGY

## 2024-10-07 PROCEDURE — RXMED WILLOW AMBULATORY MEDICATION CHARGE: Performed by: PHYSICIAN ASSISTANT

## 2024-10-10 ENCOUNTER — PHARMACY VISIT (OUTPATIENT)
Dept: PHARMACY | Facility: MEDICAL CENTER | Age: 34
End: 2024-10-10
Payer: COMMERCIAL

## 2024-10-24 ENCOUNTER — HOSPITAL ENCOUNTER (OUTPATIENT)
Dept: LAB | Facility: MEDICAL CENTER | Age: 34
End: 2024-10-24
Attending: PHYSICIAN ASSISTANT
Payer: COMMERCIAL

## 2024-10-24 DIAGNOSIS — E78.5 HYPERLIPIDEMIA LDL GOAL <100: ICD-10-CM

## 2024-10-24 DIAGNOSIS — Z11.59 NEED FOR HEPATITIS C SCREENING TEST: ICD-10-CM

## 2024-10-24 DIAGNOSIS — E55.9 VITAMIN D DEFICIENCY: ICD-10-CM

## 2024-10-24 DIAGNOSIS — E66.3 OVERWEIGHT: ICD-10-CM

## 2024-10-24 LAB
25(OH)D3 SERPL-MCNC: 72 NG/ML (ref 30–100)
ALBUMIN SERPL BCP-MCNC: 4.1 G/DL (ref 3.2–4.9)
ALBUMIN/GLOB SERPL: 1.5 G/DL
ALP SERPL-CCNC: 90 U/L (ref 30–99)
ALT SERPL-CCNC: 15 U/L (ref 2–50)
ANION GAP SERPL CALC-SCNC: 13 MMOL/L (ref 7–16)
AST SERPL-CCNC: 17 U/L (ref 12–45)
BASOPHILS # BLD AUTO: 0.6 % (ref 0–1.8)
BASOPHILS # BLD: 0.05 K/UL (ref 0–0.12)
BILIRUB SERPL-MCNC: 0.4 MG/DL (ref 0.1–1.5)
BUN SERPL-MCNC: 13 MG/DL (ref 8–22)
CALCIUM ALBUM COR SERPL-MCNC: 8.7 MG/DL (ref 8.5–10.5)
CALCIUM SERPL-MCNC: 8.8 MG/DL (ref 8.5–10.5)
CHLORIDE SERPL-SCNC: 105 MMOL/L (ref 96–112)
CHOLEST SERPL-MCNC: 134 MG/DL (ref 100–199)
CO2 SERPL-SCNC: 21 MMOL/L (ref 20–33)
CREAT SERPL-MCNC: 0.68 MG/DL (ref 0.5–1.4)
EOSINOPHIL # BLD AUTO: 0.28 K/UL (ref 0–0.51)
EOSINOPHIL NFR BLD: 3.5 % (ref 0–6.9)
ERYTHROCYTE [DISTWIDTH] IN BLOOD BY AUTOMATED COUNT: 39.5 FL (ref 35.9–50)
GFR SERPLBLD CREATININE-BSD FMLA CKD-EPI: 117 ML/MIN/1.73 M 2
GLOBULIN SER CALC-MCNC: 2.8 G/DL (ref 1.9–3.5)
GLUCOSE SERPL-MCNC: 103 MG/DL (ref 65–99)
HCT VFR BLD AUTO: 41 % (ref 37–47)
HCV AB SER QL: NORMAL
HDLC SERPL-MCNC: 37 MG/DL
HGB BLD-MCNC: 13.8 G/DL (ref 12–16)
IMM GRANULOCYTES # BLD AUTO: 0.03 K/UL (ref 0–0.11)
IMM GRANULOCYTES NFR BLD AUTO: 0.4 % (ref 0–0.9)
LDLC SERPL CALC-MCNC: 67 MG/DL
LYMPHOCYTES # BLD AUTO: 2.35 K/UL (ref 1–4.8)
LYMPHOCYTES NFR BLD: 29.8 % (ref 22–41)
MCH RBC QN AUTO: 28 PG (ref 27–33)
MCHC RBC AUTO-ENTMCNC: 33.7 G/DL (ref 32.2–35.5)
MCV RBC AUTO: 83.3 FL (ref 81.4–97.8)
MONOCYTES # BLD AUTO: 0.49 K/UL (ref 0–0.85)
MONOCYTES NFR BLD AUTO: 6.2 % (ref 0–13.4)
NEUTROPHILS # BLD AUTO: 4.69 K/UL (ref 1.82–7.42)
NEUTROPHILS NFR BLD: 59.5 % (ref 44–72)
NRBC # BLD AUTO: 0 K/UL
NRBC BLD-RTO: 0 /100 WBC (ref 0–0.2)
PLATELET # BLD AUTO: 361 K/UL (ref 164–446)
PMV BLD AUTO: 11.3 FL (ref 9–12.9)
POTASSIUM SERPL-SCNC: 4.7 MMOL/L (ref 3.6–5.5)
PROT SERPL-MCNC: 6.9 G/DL (ref 6–8.2)
RBC # BLD AUTO: 4.92 M/UL (ref 4.2–5.4)
SODIUM SERPL-SCNC: 139 MMOL/L (ref 135–145)
TRIGL SERPL-MCNC: 150 MG/DL (ref 0–149)
TSH SERPL DL<=0.005 MIU/L-ACNC: 0.83 UIU/ML (ref 0.38–5.33)
WBC # BLD AUTO: 7.9 K/UL (ref 4.8–10.8)

## 2024-10-24 PROCEDURE — 86803 HEPATITIS C AB TEST: CPT

## 2024-10-24 PROCEDURE — 82306 VITAMIN D 25 HYDROXY: CPT

## 2024-10-24 PROCEDURE — 84443 ASSAY THYROID STIM HORMONE: CPT

## 2024-10-24 PROCEDURE — 80061 LIPID PANEL: CPT

## 2024-10-24 PROCEDURE — 80053 COMPREHEN METABOLIC PANEL: CPT

## 2024-10-24 PROCEDURE — 36415 COLL VENOUS BLD VENIPUNCTURE: CPT

## 2024-10-24 PROCEDURE — 85025 COMPLETE CBC W/AUTO DIFF WBC: CPT

## 2024-10-31 DIAGNOSIS — F41.9 ANXIETY: ICD-10-CM

## 2024-10-31 DIAGNOSIS — G89.29 CHRONIC MIDLINE LOW BACK PAIN WITHOUT SCIATICA: ICD-10-CM

## 2024-10-31 DIAGNOSIS — M54.50 CHRONIC MIDLINE LOW BACK PAIN WITHOUT SCIATICA: ICD-10-CM

## 2024-10-31 PROCEDURE — RXMED WILLOW AMBULATORY MEDICATION CHARGE

## 2024-10-31 RX ORDER — CYCLOBENZAPRINE HCL 10 MG
10 TABLET ORAL
Qty: 90 TABLET | Refills: 1 | Status: SHIPPED | OUTPATIENT
Start: 2024-10-31

## 2024-11-04 DIAGNOSIS — F41.9 ANXIETY: ICD-10-CM

## 2024-11-04 NOTE — TELEPHONE ENCOUNTER
Received request via: Pharmacy    Was the patient seen in the last year in this department? Yes    Does the patient have an active prescription (recently filled or refills available) for medication(s) requested? No    Pharmacy Name:   [facility] [place]  [address], [address]  [facility] [place] [address]  Phone: [phone] Fax: [phone]      Does the patient have custodial Plus and need 100-day supply? (This applies to ALL medications) Patient does not have SCP

## 2024-11-11 ENCOUNTER — PHARMACY VISIT (OUTPATIENT)
Dept: PHARMACY | Facility: MEDICAL CENTER | Age: 34
End: 2024-11-11
Payer: COMMERCIAL

## 2024-11-11 ENCOUNTER — OFFICE VISIT (OUTPATIENT)
Dept: MEDICAL GROUP | Facility: PHYSICIAN GROUP | Age: 34
End: 2024-11-11
Payer: COMMERCIAL

## 2024-11-11 VITALS
BODY MASS INDEX: 31.65 KG/M2 | SYSTOLIC BLOOD PRESSURE: 94 MMHG | WEIGHT: 190 LBS | RESPIRATION RATE: 16 BRPM | TEMPERATURE: 96.4 F | OXYGEN SATURATION: 98 % | HEART RATE: 105 BPM | HEIGHT: 65 IN | DIASTOLIC BLOOD PRESSURE: 66 MMHG

## 2024-11-11 DIAGNOSIS — M54.50 CHRONIC MIDLINE LOW BACK PAIN WITHOUT SCIATICA: ICD-10-CM

## 2024-11-11 DIAGNOSIS — T78.40XD ALLERGY, SUBSEQUENT ENCOUNTER: ICD-10-CM

## 2024-11-11 DIAGNOSIS — G89.29 CHRONIC MIDLINE LOW BACK PAIN WITHOUT SCIATICA: ICD-10-CM

## 2024-11-11 DIAGNOSIS — F41.9 ANXIETY: ICD-10-CM

## 2024-11-11 DIAGNOSIS — E66.3 OVERWEIGHT: ICD-10-CM

## 2024-11-11 DIAGNOSIS — E78.5 HYPERLIPIDEMIA LDL GOAL <100: ICD-10-CM

## 2024-11-11 DIAGNOSIS — F32.9 MAJOR DEPRESSIVE DISORDER WITH SINGLE EPISODE, REMISSION STATUS UNSPECIFIED: ICD-10-CM

## 2024-11-11 DIAGNOSIS — G43.009 MIGRAINE WITHOUT AURA AND WITHOUT STATUS MIGRAINOSUS, NOT INTRACTABLE: ICD-10-CM

## 2024-11-11 PROCEDURE — 3078F DIAST BP <80 MM HG: CPT | Performed by: PHYSICIAN ASSISTANT

## 2024-11-11 PROCEDURE — RXMED WILLOW AMBULATORY MEDICATION CHARGE: Performed by: PHYSICIAN ASSISTANT

## 2024-11-11 PROCEDURE — 3074F SYST BP LT 130 MM HG: CPT | Performed by: PHYSICIAN ASSISTANT

## 2024-11-11 PROCEDURE — 99214 OFFICE O/P EST MOD 30 MIN: CPT | Performed by: PHYSICIAN ASSISTANT

## 2024-11-11 RX ORDER — CYCLOBENZAPRINE HCL 10 MG
10 TABLET ORAL
Qty: 90 TABLET | Refills: 3 | Status: SHIPPED | OUTPATIENT
Start: 2024-11-11

## 2024-11-11 RX ORDER — PHENTERMINE HYDROCHLORIDE 37.5 MG/1
37.5 TABLET ORAL
Qty: 90 TABLET | Refills: 0 | Status: SHIPPED | OUTPATIENT
Start: 2024-11-11 | End: 2025-02-09

## 2024-11-11 RX ORDER — TOPIRAMATE 25 MG/1
25 TABLET, FILM COATED ORAL DAILY
Qty: 90 TABLET | Refills: 2 | Status: SHIPPED | OUTPATIENT
Start: 2024-11-11

## 2024-11-11 ASSESSMENT — ENCOUNTER SYMPTOMS
SHORTNESS OF BREATH: 0
CHILLS: 0
FEVER: 0

## 2024-11-11 ASSESSMENT — FIBROSIS 4 INDEX: FIB4 SCORE: 0.4

## 2024-11-11 NOTE — ASSESSMENT & PLAN NOTE
Chronic, stable.  Followed by neurology.  Managed with Ajovy injections and Nurtec as needed.  Failed (SE): NSAIDS (GI), Reyvow (MS, dizzy)  Failed (ineffective): TPX, Inderal, Maxalt

## 2024-11-11 NOTE — PROGRESS NOTES
SUBJECTIVE:     CC: phentermine and topamax follow up     HPI:   Tonia presents today with the following:    ASSESSMENT & PLAN by Problem:     Problem   Major Depressive Disorder, Single Episode, Unspecified    Chronic, stable.  Tolerating/continue sertraline 50 mg daily.     Allergies    Chronic, stable.  Sees an allergist once a year.  Has allergy shots monthly.  Has been doing allergy shots for years.     Chronic Midline Low Back Pain Without Sciatica    Chronic, stable.  Has had pain for about 10 years, most days.  Tolerating/continue cyclobenzaprine 10 mg as directed and as needed.       Overweight    Chronic, uncontrolled.  Tolerating/continue phentermine 37.5 mg and Topamax 25 mg daily.       Migraine Without Aura and Without Status Migrainosus, Not Intractable    Chronic, stable.  Followed by neurology.     Hyperlipidemia Ldl Goal <100    Chronic, stable.   Diet controlled.        today  Did take some cold medicine.  Continue phentermine and Topamax for weight loss.  Continue chlorpheniramine tablets for allergy symptoms.  Consider adding fluticasone nasal spray.    Follow-up in 3 months for weight loss reevaluation, sooner as needed.    Repeat labs in 1 year.      Return in about 3 months (around 2/11/2025), or if symptoms worsen or fail to improve, for weight loss.        HPI:     Problem List Items Addressed This Visit       Allergies     Chronic, currently uncontrolled.  ST started 11/9/2024  Voice is changing  Post nasal drip  Denies fever, chills, body aches  Cough but mostly throat clearing  No SOB         Anxiety    Relevant Medications    sertraline (ZOLOFT) 50 MG Tab    Chronic midline low back pain without sciatica    Relevant Medications    topiramate (TOPAMAX) 25 MG Tab    sertraline (ZOLOFT) 50 MG Tab    cyclobenzaprine (FLEXERIL) 10 mg Tab    Hyperlipidemia LDL goal <100     Chronic, stable.   Diet controlled.    Latest Labs:   Lab Results   Component Value Date/Time    CHOLSTRLTOT  "134 10/24/2024 08:38 AM    LDL 67 10/24/2024 08:38 AM    HDL 37 (A) 10/24/2024 08:38 AM    TRIGLYCERIDE 150 (H) 10/24/2024 08:38 AM      Medications: none    Risk calculator: The ASCVD Risk score (Zelda OLIVEIRA, et al., 2019) failed to calculate.            Major depressive disorder, single episode, unspecified    Relevant Medications    sertraline (ZOLOFT) 50 MG Tab    Migraine without aura and without status migrainosus, not intractable     Chronic, stable.  Followed by neurology.  Managed with Ajovy injections and Nurtec as needed.  Failed (SE): NSAIDS (GI), Reyvow (MS, dizzy)  Failed (ineffective): TPX, Inderal, Maxalt         Relevant Medications    topiramate (TOPAMAX) 25 MG Tab    sertraline (ZOLOFT) 50 MG Tab    cyclobenzaprine (FLEXERIL) 10 mg Tab    Overweight     Chronic, uncontrolled.    2023: 194#  8/8/2024: 197#  9/10/2024: 198#  11/2024: 190#    Interval history:  Having a hard time losing weight.  Had side effects to phentermine.  Tried Ozempic with a weight loss physician in the past but it did not seem to help.  Mounjaro is not covered by insurance.      -she had tried keto diet in the past without much improvement in weight  -she has been trying to stick to 1500 calories/day for the past 2 weeks     -overall she struggles with sweets     Exercise:2-3x/week   She does resistance training initially and then cardio for 30 minutes            Relevant Medications    topiramate (TOPAMAX) 25 MG Tab    phentermine (ADIPEX-P) 37.5 MG tablet              ROS:  Review of Systems   Constitutional:  Negative for chills and fever.   Respiratory:  Negative for shortness of breath.    Cardiovascular:  Negative for chest pain.       OBJECTIVE:     Exam:  BP 94/66 (BP Location: Left arm, Patient Position: Sitting, BP Cuff Size: Large adult)   Pulse (!) 105   Temp (!) 35.8 °C (96.4 °F) (Temporal)   Resp 16   Ht 1.651 m (5' 5\")   Wt 86.2 kg (190 lb)   LMP 10/28/2024 (Exact Date)   SpO2 98%   Breastfeeding No   " BMI 31.62 kg/m²  Body mass index is 31.62 kg/m².    Physical Exam  Vitals reviewed.   Constitutional:       General: She is not in acute distress.     Appearance: Normal appearance.   Pulmonary:      Effort: Pulmonary effort is normal.   Neurological:      General: No focal deficit present.      Mental Status: She is alert.   Psychiatric:         Mood and Affect: Mood normal.         Behavior: Behavior normal.         Judgment: Judgment normal.           CHART REVIEW:     Labs:                           Please note that this dictation was created using voice recognition software. I have made every reasonable attempt to correct obvious errors, but I expect that there are errors of grammar and possibly content that I did not discover before finalizing the note.

## 2024-11-11 NOTE — ASSESSMENT & PLAN NOTE
Chronic, stable.   Diet controlled.    Latest Labs:   Lab Results   Component Value Date/Time    CHOLSTRLTOT 134 10/24/2024 08:38 AM    LDL 67 10/24/2024 08:38 AM    HDL 37 (A) 10/24/2024 08:38 AM    TRIGLYCERIDE 150 (H) 10/24/2024 08:38 AM      Medications: none    Risk calculator: The ASCVD Risk score (Zelda OLIVEIRA, et al., 2019) failed to calculate.

## 2024-11-11 NOTE — ASSESSMENT & PLAN NOTE
Chronic, uncontrolled.    2023: 194#  8/8/2024: 197#  9/10/2024: 198#  11/2024: 190#    Interval history:  Having a hard time losing weight.  Had side effects to phentermine.  Tried Ozempic with a weight loss physician in the past but it did not seem to help.  Mounjaro is not covered by insurance.      -she had tried keto diet in the past without much improvement in weight  -she has been trying to stick to 1500 calories/day for the past 2 weeks     -overall she struggles with sweets     Exercise:2-3x/week   She does resistance training initially and then cardio for 30 minutes

## 2024-11-11 NOTE — ASSESSMENT & PLAN NOTE
Chronic, currently uncontrolled.  ST started 11/9/2024  Voice is changing  Post nasal drip  Denies fever, chills, body aches  Cough but mostly throat clearing  No SOB

## 2024-11-11 NOTE — LETTER
November 11, 2024    To Whom It May Concern:         This is confirmation that Tonia Xavier attended her scheduled appointment with Yajaira Mendoza P.A.-C. on 11/11/24. She will return to work today.         If you have any questions please do not hesitate to call me at the phone number listed below.    Sincerely,          Yajaira Mendoza P.A.-C.  [phone]

## 2024-11-13 PROCEDURE — RXMED WILLOW AMBULATORY MEDICATION CHARGE: Performed by: PHYSICIAN ASSISTANT

## 2024-11-13 RX ORDER — MONTELUKAST SODIUM 10 MG/1
10 TABLET ORAL DAILY
Qty: 90 TABLET | Refills: 3 | Status: SHIPPED | OUTPATIENT
Start: 2024-11-13

## 2024-11-13 RX ORDER — FERROUS GLUCONATE 324(38)MG
324 TABLET ORAL
Qty: 90 TABLET | Refills: 2 | Status: SHIPPED | OUTPATIENT
Start: 2024-11-13

## 2024-11-13 NOTE — TELEPHONE ENCOUNTER
Received request via: Pharmacy    Was the patient seen in the last year in this department? Yes    Does the patient have an active prescription (recently filled or refills available) for medication(s) requested? No    Does the patient have long term Plus and need 100-day supply? (This applies to ALL medications) Patient does not have SCP

## 2024-11-13 NOTE — TELEPHONE ENCOUNTER
Received request via: Patient    Was the patient seen in the last year in this department? Yes    Does the patient have an active prescription (recently filled or refills available) for medication(s) requested? No    Does the patient have long term Plus and need 100-day supply? (This applies to ALL medications) Patient does not have SCP

## 2024-11-20 ENCOUNTER — PHARMACY VISIT (OUTPATIENT)
Dept: PHARMACY | Facility: MEDICAL CENTER | Age: 34
End: 2024-11-20
Payer: COMMERCIAL

## 2024-11-20 PROCEDURE — RXMED WILLOW AMBULATORY MEDICATION CHARGE: Performed by: PHYSICIAN ASSISTANT

## 2024-11-20 RX ORDER — AZITHROMYCIN 250 MG/1
TABLET, FILM COATED ORAL
Qty: 6 TABLET | Refills: 0 | Status: SHIPPED | OUTPATIENT
Start: 2024-11-20

## 2024-11-22 ENCOUNTER — PHARMACY VISIT (OUTPATIENT)
Dept: PHARMACY | Facility: MEDICAL CENTER | Age: 34
End: 2024-11-22
Payer: COMMERCIAL

## 2024-11-30 PROCEDURE — RXMED WILLOW AMBULATORY MEDICATION CHARGE: Performed by: PHYSICIAN ASSISTANT

## 2024-12-06 ENCOUNTER — PHARMACY VISIT (OUTPATIENT)
Dept: PHARMACY | Facility: MEDICAL CENTER | Age: 34
End: 2024-12-06
Payer: COMMERCIAL

## 2025-01-14 NOTE — PROGRESS NOTES
HPI   Chief Complaint   Patient presents with    Seizures     Pt presents to ED d/t a seizure at home. Pt's girlfriend woke up to him having a tonic clonic seizure and stated it lasted 1-2 minutes. Pt states he hasn't taken his keppra in more than a week.        28-year-old male with past medical history of seizure disorder presents to ED with seizure-like activity.  The patient's girlfriend woke up to the patient having tonic-clonic like seizure activity next to her in bed.  Lasts about 1 to 2 minutes and self terminated.  Here upon arrival the patient is back to baseline mental status.  Says he does not remember the episode.  He says he is not has a mild frontal headache at this point time.  Denies vision change weakness numbness to extremities.  No neck pain or fever.  He says he supposed be on Keppra but he does not take it because he is does not want to be dependent on pills.              Patient History   Past Medical History:   Diagnosis Date    ADHD     Seizures (Multi)      No past surgical history on file.  No family history on file.  Social History     Tobacco Use    Smoking status: Not on file    Smokeless tobacco: Not on file   Substance Use Topics    Alcohol use: Not on file    Drug use: Not on file       Physical Exam   ED Triage Vitals [01/14/25 0449]   Temperature Heart Rate Respirations BP   37.1 °C (98.7 °F) 94 12 (!) 159/105      Pulse Ox Temp Source Heart Rate Source Patient Position   100 % Temporal Monitor --      BP Location FiO2 (%)     -- --       Physical Exam  Vitals and nursing note reviewed.   Constitutional:       General: He is not in acute distress.     Appearance: He is well-developed.   HENT:      Head: Normocephalic and atraumatic.   Eyes:      Conjunctiva/sclera: Conjunctivae normal.   Cardiovascular:      Rate and Rhythm: Normal rate and regular rhythm.      Heart sounds: No murmur heard.  Pulmonary:      Effort: Pulmonary effort is normal. No respiratory distress.      Breath  Tonia presents to the clinic to establish care as New Patient.    Past w Zainab Arriaga for PCP. Seen last month for toes.    Her PMH includes  Anxiety  ACl Reconstruction of Right Ankle  Obesity  LBP/Back Strain  Strep Throat  Toenail darkening  Breast Feeding Currently (1/11/18)    [place]  REport:  11/29/17 Percocet 5/325 # 20 by Maureen Bolivar     Chief Complaint: New Patient, Toenail issue    HPI:      Toenail deformity  Pt reports large toe nails have dark area under each nail  Did not get better w Tea Tree oil BID and has not helped.  Denies trauma to nails nor tight shoes.      Anxiety  Pt reports has anxiety for 6 years.  IN past Zoloft helped.  Last on Zoloft 1 year ago.  Denies depression or suicidal ideation  Is breast feeding.  Wants to restart Zoloft.  Discussed low dose start and f/u 3 weeks.      Patient Active Problem List    Diagnosis Date Noted   • Toenail deformity 01/11/2018   • Obesity (BMI 30-39.9) 01/11/2018   • Anxiety 01/11/2018       Allergies:Patient has no known allergies.    Current Outpatient Prescriptions   Medication Sig Dispense Refill   • clotrimazole-betamethasone (LOTRISONE) 1-0.05 % Cream Apply to affected toes twice daily 1 Tube 2   • fluconazole (DIFLUCAN) 150 MG tablet Take one tablet day 1, then one tablet day 3, then one tablet day 7, May repeat in 2 weeks 6 Tab 0   • sertraline (ZOLOFT) 25 MG tablet Take 1 Tab by mouth every day. 30 Tab 3   • ferrous sulfate 325 (65 Fe) MG tablet Take 1 Tab by mouth every morning with breakfast. 60 Tab 0   • Prenatal MV-Min-Fe Fum-FA-DHA (PRENATAL 1 PO) Take 1 Tab by mouth every day.       No current facility-administered medications for this visit.        Social History   Substance Use Topics   • Smoking status: Never Smoker   • Smokeless tobacco: Never Used   • Alcohol use No       Family History   Problem Relation Age of Onset   • Diabetes     • Hypertension         ROS:  Review of Systems   See HPI Above        Exam:  Blood pressure  "100/76, pulse (!) 56, temperature 36.4 °C (97.6 °F), resp. rate 16, height 1.651 m (5' 5\"), weight 83.9 kg (185 lb), last menstrual period 11/25/2017, SpO2 98 %, currently breastfeeding.    General:  Well nourished, overweight, well developed female in NAD  HENT:Head is grossly normal. PERRL.  Neck: Supple. Trachea is midline.  Pulmonary: Clear to ausculation .  Normal effort. No rales, ronchi, or wheezing.   Cardiovascular: Regular rate and rhythm.  Abdomen-Abdomen is soft, No tenderness.  Upper extremities- Strong = . Good ROM  Lower extremities- neg for edema, redness, tenderness except slight dark area to each large toenail mid position, Appears under nail.  Slight edges of nail starting to grow inward  Neuro- A & O x 4. Speech clear and appropriate.     Current medications, allergies, and problem list reviewed with patient and updated in  EPIC today.    Assessment/Plan:  1. Screening, iron deficiency anemia  CBC WITH DIFFERENTIAL    IRON/TOTAL IRON BIND    FERRITIN   2. Screening for diabetes mellitus  COMP METABOLIC PANEL    HEMOGLOBIN A1C   3. Encounter for vitamin deficiency screening  VITAMIN D,25 HYDROXY    VITAMIN B12   4. Screening for thyroid disorder  TSH   5. Screening cholesterol level  LIPID PROFILE   6. Toenail deformity  clotrimazole-betamethasone (LOTRISONE) 1-0.05 % Cream   7. Obesity (BMI 30-39.9)  Patient identified as having weight management issue.  Appropriate orders and counseling given.   8. Fungal toenail infection  clotrimazole-betamethasone (LOTRISONE) 1-0.05 % Cream  Epsom salts and warm water soaks BID then apply cream    fluconazole (DIFLUCAN) 150 MG tablet   9. Anxiety  sertraline (ZOLOFT) 25 MG tablet   Follow up in 3 weeks for lab review, f/u on anxiety, toenail issue. Call or return if questions, concerns, or worsening condition.  " sounds: Normal breath sounds.   Abdominal:      Palpations: Abdomen is soft.      Tenderness: There is no abdominal tenderness.   Musculoskeletal:         General: No swelling.      Cervical back: Neck supple. No rigidity or tenderness.   Skin:     General: Skin is warm and dry.      Capillary Refill: Capillary refill takes less than 2 seconds.   Neurological:      General: No focal deficit present.      Mental Status: He is alert and oriented to person, place, and time.      Cranial Nerves: No cranial nerve deficit.      Sensory: No sensory deficit.      Motor: No weakness.      Coordination: Coordination normal.      Gait: Gait normal.   Psychiatric:         Mood and Affect: Mood normal.           ED Course & MDM   ED Course as of 01/14/25 0717 Tue Jan 14, 2025   0606 EKG shows sinus rhythm.  No STEMI.  Normal intervals and axis. [RS]      ED Course User Index  [RS] Papa Ortiz DO         Diagnoses as of 01/14/25 0717   Breakthrough seizure (Multi)                 No data recorded     [place] Coma Scale Score: 14 (01/14/25 0504 : Elvira Stacy RN)                           Medical Decision Making  HISTORIAN:  Patient    CHART REVIEW:  No pertinent findings    PT SUMMARY:  28-year-old male presents ED with seizure-like activity.  Vital signs stable.    DDX:  Breakthrough seizure, intracranial bleed, meningitis, medication noncompliance    PLAN:  Obtain CBC, CMP, EKG, CT brain    DISPO/RE-EVAL:  Patient's lab work shows no significant abnormalities.  He was given a dose of IV Keppra.  CT brain negative for acute pathology.  Patient has not had any further seizure-like activity during his ER stay.  Seizure-like activity likely was breakthrough the nature him being noncompliant with his Keppra.  I evaluated patient is feeling better.  He does have Keppra at home to take.  Therefore, will discharge patient home with instructions to take his medications.  Will refer him to neurology.  Recommend coming back to the  ED for any new or worsening symptoms.          Procedure  Procedures     Papa Ortiz,   01/14/25 0707       Papa Ortiz,   01/14/25 0718

## 2025-01-15 ENCOUNTER — PHARMACY VISIT (OUTPATIENT)
Dept: PHARMACY | Facility: MEDICAL CENTER | Age: 35
End: 2025-01-15
Payer: COMMERCIAL

## 2025-01-15 PROCEDURE — RXOTC WILLOW AMBULATORY OTC CHARGE

## 2025-01-17 PROCEDURE — RXMED WILLOW AMBULATORY MEDICATION CHARGE: Performed by: OPHTHALMOLOGY

## 2025-01-21 ENCOUNTER — PHARMACY VISIT (OUTPATIENT)
Dept: PHARMACY | Facility: MEDICAL CENTER | Age: 35
End: 2025-01-21
Payer: COMMERCIAL

## 2025-02-14 ENCOUNTER — OFFICE VISIT (OUTPATIENT)
Dept: MEDICAL GROUP | Facility: PHYSICIAN GROUP | Age: 35
End: 2025-02-14
Payer: COMMERCIAL

## 2025-02-14 VITALS
WEIGHT: 191.2 LBS | HEART RATE: 82 BPM | HEIGHT: 65 IN | SYSTOLIC BLOOD PRESSURE: 102 MMHG | BODY MASS INDEX: 31.86 KG/M2 | RESPIRATION RATE: 16 BRPM | DIASTOLIC BLOOD PRESSURE: 70 MMHG | TEMPERATURE: 97.6 F | OXYGEN SATURATION: 96 %

## 2025-02-14 DIAGNOSIS — E55.9 VITAMIN D DEFICIENCY: ICD-10-CM

## 2025-02-14 DIAGNOSIS — E66.3 OVERWEIGHT: ICD-10-CM

## 2025-02-14 DIAGNOSIS — E78.5 HYPERLIPIDEMIA LDL GOAL <100: ICD-10-CM

## 2025-02-14 DIAGNOSIS — Z86.39 HISTORY OF IRON DEFICIENCY: ICD-10-CM

## 2025-02-14 PROCEDURE — 99214 OFFICE O/P EST MOD 30 MIN: CPT | Performed by: PHYSICIAN ASSISTANT

## 2025-02-14 PROCEDURE — 3074F SYST BP LT 130 MM HG: CPT | Performed by: PHYSICIAN ASSISTANT

## 2025-02-14 PROCEDURE — RXMED WILLOW AMBULATORY MEDICATION CHARGE: Performed by: PHYSICIAN ASSISTANT

## 2025-02-14 PROCEDURE — 3078F DIAST BP <80 MM HG: CPT | Performed by: PHYSICIAN ASSISTANT

## 2025-02-14 RX ORDER — BUPROPION HYDROCHLORIDE 75 MG/1
75 TABLET ORAL 2 TIMES DAILY
Qty: 60 TABLET | Refills: 2 | Status: SHIPPED | OUTPATIENT
Start: 2025-02-14

## 2025-02-14 RX ORDER — NALTREXONE HYDROCHLORIDE 50 MG/1
25 TABLET, FILM COATED ORAL 2 TIMES DAILY
Qty: 30 TABLET | Refills: 2 | Status: SHIPPED | OUTPATIENT
Start: 2025-02-14

## 2025-02-14 ASSESSMENT — ENCOUNTER SYMPTOMS
CHILLS: 0
FEVER: 0
SHORTNESS OF BREATH: 0

## 2025-02-14 ASSESSMENT — FIBROSIS 4 INDEX: FIB4 SCORE: 0.4

## 2025-02-14 NOTE — PROGRESS NOTES
SUBJECTIVE:     CC: weight loss    HPI:   Tonia presents today with the following:    ASSESSMENT & PLAN by Problem:     Problem   Overweight    Chronic, uncontrolled.  Failed phentermine/topamax, ozempic.  Wants to try bupropion/naltrexone.  Has a friend using a compound pharmacy in [place].  If she wants to try this route she will send me the information.       History of Iron Deficiency    Chronic, stable.  Tolerating/continue 1 iron supplement daily.       Hyperlipidemia Ldl Goal <100    Chronic, stable.   Diet controlled.      Vitamin D Deficiency    Chronic, controlled.  Tolerating/continue 2 OTC vitamin supplement (unsure of dose).          Repeat labs end of 2025/beginning of 2026.    Return in about 1 year (around 2/14/2026), or if symptoms worsen or fail to improve, for lab discussion; return to clinic sooner as needed for weight loss concerns/issues.         HPI:     Problem List Items Addressed This Visit       History of iron deficiency    Relevant Orders    IRON/TOTAL IRON BIND    FERRITIN    Hyperlipidemia LDL goal <100    Chronic, stable.   Diet controlled.    Latest Labs:   Lab Results   Component Value Date/Time    CHOLSTRLTOT 134 10/24/2024 08:38 AM    LDL 67 10/24/2024 08:38 AM    HDL 37 (A) 10/24/2024 08:38 AM    TRIGLYCERIDE 150 (H) 10/24/2024 08:38 AM      Medications: none    Risk calculator: The ASCVD Risk score (Zelda DK, et al., 2019) failed to calculate.            Overweight    Chronic, uncontrolled.  Failed phentermine/topamax, ozempic.  Wants to try bupropion/naltrexone.    2023: 194#  8/8/2024: 197#  9/10/2024: 198#  11/2024: 190#  2/2025: 191#    Interval history:  Phentermine and topamax didn't help.  Tried Ozempic with a weight loss physician in the past but it did not seem to help.  Mounjaro is not covered by insurance.      -she had tried keto diet in the past without much improvement in weight  -she has been trying to stick to 1500 calories/day for the past 2 weeks  "    -overall she struggles with sweets     Exercise:2-3x/week   She does resistance training initially and then cardio for 30 minutes            Relevant Medications    buPROPion (WELLBUTRIN) 75 MG Tab    naltrexone (DEPADE) 50 MG Tab    Other Relevant Orders    CBC WITH DIFFERENTIAL    Comp Metabolic Panel    Lipid Profile    TSH WITH REFLEX TO FT4    Vitamin D deficiency    Chronic, controlled.  Tolerating/continue 2 OTC vitamin supplement (unsure of dose).          Relevant Orders    VITAMIN D,25 HYDROXY (DEFICIENCY)              ROS:  Review of Systems   Constitutional:  Negative for chills and fever.   Respiratory:  Negative for shortness of breath.    Cardiovascular:  Negative for chest pain.       OBJECTIVE:     Exam:  /70 (BP Location: Left arm, Patient Position: Sitting, BP Cuff Size: Adult)   Pulse 82   Temp 36.4 °C (97.6 °F) (Temporal)   Resp 16   Ht 1.651 m (5' 5\")   Wt 86.7 kg (191 lb 3.2 oz)   LMP 01/17/2025   SpO2 96%   BMI 31.82 kg/m²  Body mass index is 31.82 kg/m².    Physical Exam  Vitals reviewed.   Constitutional:       General: She is not in acute distress.     Appearance: Normal appearance.   Pulmonary:      Effort: Pulmonary effort is normal.   Neurological:      General: No focal deficit present.      Mental Status: She is alert.   Psychiatric:         Mood and Affect: Mood normal.         Behavior: Behavior normal.         Judgment: Judgment normal.          Please note that this dictation was created using voice recognition software. I have made every reasonable attempt to correct obvious errors, but I expect that there are errors of grammar and possibly content that I did not discover before finalizing the note.    "

## 2025-02-14 NOTE — ASSESSMENT & PLAN NOTE
Chronic, uncontrolled.  Failed phentermine/topamax, ozempic.  Wants to try bupropion/naltrexone.    2023: 194#  8/8/2024: 197#  9/10/2024: 198#  11/2024: 190#  2/2025: 191#    Interval history:  Phentermine and topamax didn't help.  Tried Ozempic with a weight loss physician in the past but it did not seem to help.  Mounjaro is not covered by insurance.      -she had tried keto diet in the past without much improvement in weight  -she has been trying to stick to 1500 calories/day for the past 2 weeks     -overall she struggles with sweets     Exercise:2-3x/week   She does resistance training initially and then cardio for 30 minutes

## 2025-02-18 ENCOUNTER — PHARMACY VISIT (OUTPATIENT)
Dept: PHARMACY | Facility: MEDICAL CENTER | Age: 35
End: 2025-02-18
Payer: COMMERCIAL

## 2025-02-18 ENCOUNTER — APPOINTMENT (OUTPATIENT)
Dept: RADIOLOGY | Facility: IMAGING CENTER | Age: 35
End: 2025-02-18
Attending: ORTHOPAEDIC SURGERY
Payer: COMMERCIAL

## 2025-02-18 ENCOUNTER — OFFICE VISIT (OUTPATIENT)
Dept: SURGICAL ONCOLOGY | Facility: MEDICAL CENTER | Age: 35
End: 2025-02-18
Payer: COMMERCIAL

## 2025-02-18 VITALS
OXYGEN SATURATION: 91 % | SYSTOLIC BLOOD PRESSURE: 98 MMHG | HEART RATE: 98 BPM | DIASTOLIC BLOOD PRESSURE: 70 MMHG | WEIGHT: 191 LBS | HEIGHT: 65 IN | BODY MASS INDEX: 31.82 KG/M2 | TEMPERATURE: 97.9 F

## 2025-02-18 DIAGNOSIS — G89.29 CHRONIC PAIN OF RIGHT KNEE: ICD-10-CM

## 2025-02-18 DIAGNOSIS — M17.31 POST-TRAUMATIC OSTEOARTHRITIS OF RIGHT KNEE: ICD-10-CM

## 2025-02-18 DIAGNOSIS — M25.561 CHRONIC PAIN OF RIGHT KNEE: ICD-10-CM

## 2025-02-18 PROCEDURE — 3074F SYST BP LT 130 MM HG: CPT | Performed by: ORTHOPAEDIC SURGERY

## 2025-02-18 PROCEDURE — 99204 OFFICE O/P NEW MOD 45 MIN: CPT | Performed by: ORTHOPAEDIC SURGERY

## 2025-02-18 PROCEDURE — 3078F DIAST BP <80 MM HG: CPT | Performed by: ORTHOPAEDIC SURGERY

## 2025-02-18 PROCEDURE — RXMED WILLOW AMBULATORY MEDICATION CHARGE: Performed by: ORTHOPAEDIC SURGERY

## 2025-02-18 PROCEDURE — 73564 X-RAY EXAM KNEE 4 OR MORE: CPT | Mod: TC,RT | Performed by: ORTHOPAEDIC SURGERY

## 2025-02-18 RX ORDER — MELOXICAM 15 MG/1
15 TABLET ORAL DAILY
Qty: 30 TABLET | Refills: 2 | Status: SHIPPED | OUTPATIENT
Start: 2025-02-18

## 2025-02-18 ASSESSMENT — FIBROSIS 4 INDEX: FIB4 SCORE: 0.4

## 2025-02-19 ASSESSMENT — ENCOUNTER SYMPTOMS
MYALGIAS: 0
WEAKNESS: 0
SHORTNESS OF BREATH: 0
CHILLS: 0
FEVER: 0
SENSORY CHANGE: 1

## 2025-02-19 NOTE — PROGRESS NOTES
[facility] Orthopedic Surgery    Subjective:   2/18/2025  3:01 PM  Primary care physician:Yajaira Mendoza P.A.-C.    Chief Complaint:   Right knee pain    History of presenting illness:  Tonia Xavier  is a pleasant 34 y.o. female who is seen for evaluation of right knee pain.  She has pretty extensive history with this right knee.  She had her first ACL rupture at age 17 which was reconstructed.  She think she has had a total of 6 operations on her right knee including multiple revised ACL grafts as well as bone grafting procedures and surgeries for torn meniscus.  Her last surgery was in 2021.  She reports primarily experiencing pain on the medial aspect of the knee.  She is not having giving way or locking episodes.  She has tried some over-the-counter anti-inflammatories which provide minimal relief and can sometimes cause stomach upset.  She does not take anything on a regular basis.  She is also previously had a corticosteroid injection which did not provide any relief.        Past Medical History:   Diagnosis Date    Anemia     Anxiety     Asthma     HSV infection 09/07/2019    Hyperlipidemia LDL goal <100 09/09/2019    IFG (impaired fasting glucose) 09/09/2019    Migraine without aura and without status migrainosus, not intractable 02/13/2022    Ovarian cyst 11/2018    left side    PONV (postoperative nausea and vomiting)     Vitamin D deficiency 02/01/2018     Past Surgical History:   Procedure Laterality Date    PB KNEE SCOPE,AID ANT CRUCIATE REPAIR Right 03/23/2021    Procedure: RECONSTRUCTION, KNEE, ACL, ARTHROSCOPIC - WITH ALLOGRAFT BONE TENDON BONE;  Surgeon: Dexter Pimentel M.D.;  Location: SURGERY [facility];  Service: Orthopedics    PB KNEE SCOPE,DIAGNOSTIC Right 10/29/2020    Procedure: ARTHROSCOPY, KNEE;  Surgeon: Dexter Pimentel M.D.;  Location: SURGERY [facility];  Service: Orthopedics    MENISCECTOMY, KNEE, MEDIAL Right 10/29/2020    Procedure: MENISCECTOMY, KNEE, MEDIAL - PARTIAL;  Surgeon: Dexter  YURI Pimentel;  Location: [facility];  Service: Orthopedics    HARDWARE REMOVAL ORTHO Right 10/29/2020    Procedure: REMOVAL, HARDWARE tibia ;  Surgeon: Dexter Pimentel M.D.;  Location: [facility];  Service: Orthopedics    BONE GRAFT Right 10/29/2020    Procedure: curetage and BONE GRAFT femoral cyst, microfracture;  Surgeon: Dexter Pimentel M.D.;  Location: [facility];  Service: Orthopedics    KNEE ARTHROSCOPY Right 11/13/2018    Procedure: KNEE ARTHROSCOPY;  Surgeon: Dexter Pimentel M.D.;  Location: [facility];  Service: Orthopedics    SYNOVECTOMY Right 11/13/2018    Procedure: SYNOVECTOMY;  Surgeon: Dexter Pimentel M.D.;  Location: [facility];  Service: Orthopedics    MENISCECTOMY, KNEE, MEDIAL Right 11/13/2018    Procedure: MEDIAL MENISCECTOMY-  PARTIAL, AND PARTIAL LATERAL;  Surgeon: Dexter Pimentel M.D.;  Location: [facility];  Service: Orthopedics    CHONDROPLASTY  11/13/2018    Procedure: CHONDROPLASTY;  Surgeon: Dexter Pimentel M.D.;  Location: [facility];  Service: Orthopedics    HARDWARE REMOVAL ORTHO  11/13/2018    Procedure: HARDWARE REMOVAL ORTHO;  Surgeon: Dexter Pimentel M.D.;  Location: [facility];  Service: Orthopedics    BONE GRAFT  11/13/2018    Procedure: BONE GRAFT;  Surgeon: Dexter Pimentel M.D.;  Location: [facility];  Service: Orthopedics    REPEAT C SECTION  11/25/2017    Procedure: REPEAT C SECTION;  Surgeon: Asmita Bolivar M.D.;  Location: LABOR AND DELIVERY;  Service: Obstetrics    REPEAT C SECTION  10/19/2015    Procedure: REPEAT C SECTION;  Surgeon: Mary Randolph M.D.;  Location: LABOR AND DELIVERY;  Service:     RECONSTRUCTION, KNEE, ACL, ARTHROSCOPIC  10/02/2014    Performed by Dexter Pimentel M.D. at [facility]    KNEE ARTHROSCOPY  04/17/2014    Performed by Dexter Pimentel M.D. at [facility]    HARDWARE REMOVAL ORTHO  04/17/2014    Performed by Dexter Pimentel M.D.  at SURGERY [facility]    BONE GRAFT  2014    Performed by Dexter Pimentel M.D. at SURGERY [facility]    [facility]  DELIVERY ONLY      RECONSTRUCTION, KNEE, ACL Right 2006    ORIF, KNEE       Allergies   Allergen Reactions    Benzoin Hives     Rash   Other reaction(s): Itching, rash     Outpatient Encounter Medications as of 2025   Medication Sig Dispense Refill    meloxicam (MOBIC) 15 MG tablet Take 1 Tablet by mouth every day. 30 Tablet 2    buPROPion (WELLBUTRIN) 75 MG Tab Take 1 Tablet by mouth 2 times a day. 60 Tablet 2    naltrexone (DEPADE) 50 MG Tab Take one-half (0.5) Tablet by mouth 2 times a day. 30 Tablet 2    ferrous gluconate (FERGON) 324 (38 Fe) MG Tab Take 1 Tablet by mouth every morning with breakfast. 90 Tablet 2    montelukast (SINGULAIR) 10 MG Tab Take 1 tablet by mouth daily 90 Tablet 3    topiramate (TOPAMAX) 25 MG Tab Take 1 Tablet by mouth every day. 90 Tablet 2    sertraline (ZOLOFT) 50 MG Tab Take 1 Tablet by mouth every day. 90 Tablet 3    cyclobenzaprine (FLEXERIL) 10 mg Tab Take 1 Tablet by mouth every day. 90 Tablet 3    albuterol 108 (90 Base) MCG/ACT Aero Soln inhalation aerosol Inhale 2 Puffs by mouth every four hours as needed for Shortness of Breath. 8.5 g 11    valacyclovir (VALTREX) 1 GM Tab Take 1 Tablet by mouth 2 times a day. 60 Tablet 2    rizatriptan (MAXALT) 10 MG tablet Take 1 tablet by mouth at headache onset; repeat 1 tab every 1 hour as needed up to #4 tab/24 hours 10 Tablet 6    ondansetron (ZOFRAN ODT) 4 MG TABLET DISPERSIBLE Take 1 Tablet by mouth every 6 hours as needed for Nausea/Vomiting. 10 Tablet 0    dicyclomine (BENTYL) 20 MG Tab Take 1 Tablet by mouth four times daily - before meals and nightly as needed (stomach cramps). 28 Tablet 0    drospirenone-ethinyl estradiol (DENISSE) 3-0.03 MG per tablet Take 1 tablet by mouth daily 84 Tablet 4     No facility-administered encounter medications on file as of 2025.     Social History      Socioeconomic History    Marital status:      Spouse name: Not on file    Number of children: Not on file    Years of education: Not on file    Highest education level: Some college, no degree   Occupational History    Not on file   Tobacco Use    Smoking status: Never    Smokeless tobacco: Never   Vaping Use    Vaping status: Never Used   Substance and Sexual Activity    Alcohol use: Not Currently     Comment: Occasionally    Drug use: Never    Sexual activity: Yes     Partners: Male     Birth control/protection: Pill   Other Topics Concern    Not on file   Social History Narrative    Lives with  and 3 boys.     Social Drivers of Health     Financial Resource Strain: Medium Risk (2/15/2024)    Overall Financial Resource Strain (CARDIA)     Difficulty of Paying Living Expenses: Somewhat hard   Food Insecurity: No Food Insecurity (2/15/2024)    Hunger Vital Sign     Worried About Running Out of Food in the Last Year: Never true     Ran Out of Food in the Last Year: Never true   Transportation Needs: No Transportation Needs (2/15/2024)    PRAPARE - Transportation     Lack of Transportation (Medical): No     Lack of Transportation (Non-Medical): No   Physical Activity: Inactive (2/15/2024)    Exercise Vital Sign     Days of Exercise per Week: 0 days     Minutes of Exercise per Session: 0 min   Stress: No Stress Concern Present (2/15/2024)    [employer] - Occupational Stress Questionnaire     Feeling of Stress : Only a little   Social Connections: Moderately Integrated (2/15/2024)    Social Connection and Isolation Panel [NHANES]     Frequency of Communication with Friends and Family: More than three times a week     Frequency of Social Gatherings with Friends and Family: Once a week     Attends Restorationist Services: More than 4 times per year     Active Member of Clubs or Organizations: No     Attends Club or Organization Meetings: Never     Marital Status:   "  Intimate Partner Violence: Not on file   Housing Stability: High Risk (2/15/2024)    Housing Stability Vital Sign     Unable to Pay for Housing in the Last Year: Yes     Number of Places Lived in the Last Year: 1     Unstable Housing in the Last Year: No      Social History     Tobacco Use   Smoking Status Never   Smokeless Tobacco Never     Social History     Substance and Sexual Activity   Alcohol Use Not Currently    Comment: Occasionally     Social History     Substance and Sexual Activity   Drug Use Never        Family History   Problem Relation Age of Onset    Anxiety disorder Mother     Depression Mother     Diabetes Other     Hypertension Other     Diabetes Maternal Grandmother         DM w/ESRD    Diabetes Maternal Grandfather     Cancer Maternal Grandfather         prostate, esophageal    Hyperlipidemia Maternal Grandfather     Arthritis Maternal Grandfather     Hypertension Maternal Grandfather     Diabetes Paternal Grandmother         DM on dialysis    Hyperlipidemia Paternal Grandmother     Arthritis Paternal Grandmother     Cancer Paternal Grandmother     Arthritis Other     Diabetes Other     Hyperlipidemia Other     Hypertension Other     Thyroid Other     Cancer Other        Review of Systems   Constitutional:  Negative for chills and fever.   Respiratory:  Negative for shortness of breath.    Cardiovascular:  Negative for chest pain.   Musculoskeletal:  Positive for joint pain. Negative for myalgias.   Neurological:  Positive for sensory change. Negative for weakness.        Decree sensation on right leg after surgery.        Objective:   BP 98/70 (BP Location: Left arm, Patient Position: Sitting, BP Cuff Size: Adult)   Pulse 98   Temp 36.6 °C (97.9 °F) (Temporal)   Ht 1.651 m (5' 5\")   Wt 86.6 kg (191 lb)   LMP 01/17/2025   SpO2 91%   BMI 31.78 kg/m²     Physical Exam  Constitutional:       General: She is not in acute distress.     Appearance: Normal appearance. She is not ill-appearing. "   HENT:      Head: Normocephalic and atraumatic.      Right Ear: External ear normal.      Left Ear: External ear normal.      Mouth/Throat:      Mouth: Mucous membranes are moist.   Eyes:      Extraocular Movements: Extraocular movements intact.      Conjunctiva/sclera: Conjunctivae normal.   Cardiovascular:      Rate and Rhythm: Normal rate.      Pulses: Normal pulses.   Pulmonary:      Effort: Pulmonary effort is normal. No respiratory distress.   Musculoskeletal:      Cervical back: Normal range of motion and neck supple.   Skin:     General: Skin is warm and dry.      Capillary Refill: Capillary refill takes less than 2 seconds.   Neurological:      Mental Status: She is alert and oriented to person, place, and time.   Psychiatric:         Mood and Affect: Mood normal.         Behavior: Behavior normal.       Knee Exam -   right knee: Overlying skin demonstrates no erythema, ecchymoses or wounds.  Well-healed surgical scars over the knee and leg.  Knee ROM is WNL.  SILT spn, dpn, plantar and sural nerves. Motor intact to knee extension, ankle dorsiflexion, ankle plantar flexion, and eversion. DP/PT pulse 2+. There is tenderness at the medial joint line. There is not tenderness elsewhere. positive Lachman. negative Nando. stable varus/valgus stress.         Imaging:  Multiple views of the right knee and single Maki view of the bilateral knees demonstrates mild degenerative changes of joint space narrowing primarily in the medial and patellofemoral compartments.  There is postsurgical change consistent with tunnel drilling in the tibia and femur.  There are no acute fractures or destructive osseous lesions.         Diagnosis:     1. Post-traumatic osteoarthritis of right knee  meloxicam (MOBIC) 15 MG tablet    Referral to Pain Clinic              Assessment/Plan:   I counseled the patient regarding the above diagnosis.  We reviewed the natural history and etiology of knee osteoarthritis. We discussed  conservative and surgical treatment options in detail. Conservative treatment options include weight loss, low impact exercise (pool, bike, etc), oral NSAIDs, corticosteroid injections, bracing, and ablative procedures of the sensory nerves to the joint. Surgical treatment includes total joint arthroplasty.     I recommended she try a prescription anti-inflammatory that is once a day.  I counseled her on the safe use of this medication and to always take with food and water.  If she does get stomach upset she will need to stop the medication.  We discussed the risk and benefits of the medication.  I also recommended she try genicular nerve ablation and she was in agreement with this plan.  I sent a referral to the interventional pain clinic today.  All questions were answered and they were in agreement with the plan.     Referrals: Interventional pain clinic for genicular nerve ablation  Restrictions: None  New medications/refills: None  Imaging studies: None  Follow-up: As needed      Juana Mortensen, DO  Orthopedic Oncology and General Orthopedics

## 2025-02-20 ENCOUNTER — PHARMACY VISIT (OUTPATIENT)
Dept: PHARMACY | Facility: MEDICAL CENTER | Age: 35
End: 2025-02-20
Payer: COMMERCIAL

## 2025-02-20 PROCEDURE — RXOTC WILLOW AMBULATORY OTC CHARGE: Performed by: PHARMACIST

## 2025-02-27 ENCOUNTER — OFFICE VISIT (OUTPATIENT)
Dept: PHYSICAL MEDICINE AND REHAB | Facility: MEDICAL CENTER | Age: 35
End: 2025-02-27
Payer: COMMERCIAL

## 2025-02-27 VITALS
TEMPERATURE: 97.6 F | BODY MASS INDEX: 31.82 KG/M2 | SYSTOLIC BLOOD PRESSURE: 122 MMHG | DIASTOLIC BLOOD PRESSURE: 82 MMHG | HEART RATE: 100 BPM | WEIGHT: 191 LBS | HEIGHT: 65 IN | OXYGEN SATURATION: 97 %

## 2025-02-27 DIAGNOSIS — M70.51 PES ANSERINUS BURSITIS OF RIGHT KNEE: ICD-10-CM

## 2025-02-27 DIAGNOSIS — M17.31 POST-TRAUMATIC OSTEOARTHRITIS OF RIGHT KNEE: ICD-10-CM

## 2025-02-27 RX ORDER — DEXAMETHASONE SODIUM PHOSPHATE 4 MG/ML
4 INJECTION, SOLUTION INTRA-ARTICULAR; INTRALESIONAL; INTRAMUSCULAR; INTRAVENOUS; SOFT TISSUE ONCE
Status: COMPLETED | OUTPATIENT
Start: 2025-02-27 | End: 2025-02-27

## 2025-02-27 RX ORDER — HYLAN G-F 20 16MG/2ML
16 SYRINGE (ML) INTRAARTICULAR ONCE
COMMUNITY

## 2025-02-27 RX ADMIN — Medication 2 ML: at 10:23

## 2025-02-27 RX ADMIN — DEXAMETHASONE SODIUM PHOSPHATE 4 MG: 4 INJECTION, SOLUTION INTRA-ARTICULAR; INTRALESIONAL; INTRAMUSCULAR; INTRAVENOUS; SOFT TISSUE at 10:23

## 2025-02-27 ASSESSMENT — PATIENT HEALTH QUESTIONNAIRE - PHQ9: CLINICAL INTERPRETATION OF PHQ2 SCORE: 0

## 2025-02-27 ASSESSMENT — FIBROSIS 4 INDEX: FIB4 SCORE: 0.4

## 2025-02-27 ASSESSMENT — PAIN SCALES - GENERAL: PAINLEVEL_OUTOF10: NO PAIN

## 2025-02-27 NOTE — PROGRESS NOTES
[facility] (Physical Medicine and Rehabilitation)  Sports Medicine& Interventional Spine   New Patient Visit    Patient Name: Tonia Xavier   Patient : 1990  PCP: Yajaira Mendoza P.A.-C.  MRN: [id number]     Date of service: 25    Referring provider: Juana Mortensen D.O.    CHIEF COMPLAINT  Chief Complaint   Patient presents with    New Patient     R Knee         Tonia Xavier is a  34 y.o. very pleasant female  who presents today with Diagnoses of Pes anserinus bursitis of right knee and Post-traumatic osteoarthritis of right knee were pertinent to this visit.    Verbal consent was obtained for Sergio copilot: Yes      Medical records review:  I reviewed the note from the referring provider Juana Mortensen D.O. including the note dated 25.    Prior Relevant MSK/Interventional Procedures:   Patient has undergone multiple corticosteroid injections into the knee with minimal relief, last one was in   Patient is also undergone 6 previous right knee surgeries related to initial ACL rupture as well as multiple revisions    HPI:    History of Present Illness  The patient is a 34-year-old female who presents today for initial evaluation of chronic right-sided knee pain. She was referred to our clinic by Dr. Mcdaniel, an orthopedic surgeon.    She recently underwent a corticosteroid injection into the right knee. Pain today is 0 out of 10 at the right knee, but she states the pain is 7 out of 10 at its worst. She currently works as a surgery scheduler. She was previously seen by Dr. Pimentel at [facility]. She reports a history of 6 total right knee surgeries related to ACL. The pain has been present for the past 6 months and is worse with walking, side bending, twisting, and walking upstairs. She has a history of receiving 2 cortisone injections, one ultrasound-guided at [place] in  and another without ultrasound guidance at [place]. Despite these  interventions, she reports no significant relief, with the effects of the injections lasting approximately 1 month. She has a history of multiple right knee surgeries, including an ACL reconstruction in March 2021. Post-surgery, she received a cortisone injection without ultrasound guidance. She also reports experiencing shooting pain in her knee, which occurs sporadically during ambulation. Additionally, she reports numbness in her shin area, a symptom that has persisted since her surgeries. Current medications include meloxicam, naproxen, Voltaren gel, and lidocaine patches.    SOCIAL HISTORY  She currently works as a surgery scheduler for [employer]    MEDICATIONS  Meloxicam, naproxen, Voltaren gel, lidocaine patches        GOALS OF TREATMENT: SymptomPain relief. improve function.      Psychological testing for pain as depression and pain commonly coexist and need to both be treated.     Opioid Risk Score: No value filed.      Interpretation of Opioid Risk Score   Score 0-3 = Low risk of abuse. Do UDS at least once per year.  Score 4-7 = Moderate risk of abuse. Do UDS 1-4 times per year.  Score 8+ = High risk of abuse. Refer to specialist.    PHQ      2/21/2023    11:00 AM 1/26/2024     8:40 AM 2/27/2025     9:20 AM   Depression Screen (PHQ-2/PHQ-9)   PHQ-2 Total Score 0 0 0       Interpretation of PHQ-9 Total Score   Score Severity   1-4 No Depression   5-9 Mild Depression   10-14 Moderate Depression   15-19 Moderately Severe Depression   20-27 Severe Depression      PMHx:   Past Medical History:   Diagnosis Date    Anemia     Anxiety     Asthma     HSV infection 09/07/2019    Hyperlipidemia LDL goal <100 09/09/2019    IFG (impaired fasting glucose) 09/09/2019    Migraine without aura and without status migrainosus, not intractable 02/13/2022    Ovarian cyst 11/2018    left side    PONV (postoperative nausea and vomiting)     Vitamin D deficiency 02/01/2018       PSHx:   Past Surgical History:   Procedure  Laterality Date    PB KNEE SCOPE,AID ANT CRUCIATE REPAIR Right 03/23/2021    Procedure: RECONSTRUCTION, KNEE, ACL, ARTHROSCOPIC - WITH ALLOGRAFT BONE TENDON BONE;  Surgeon: Dexter Pimentel M.D.;  Location: [facility];  Service: Orthopedics    PB KNEE SCOPE,DIAGNOSTIC Right 10/29/2020    Procedure: ARTHROSCOPY, KNEE;  Surgeon: Dexter Pimentel M.D.;  Location: [facility];  Service: Orthopedics    MENISCECTOMY, KNEE, MEDIAL Right 10/29/2020    Procedure: MENISCECTOMY, KNEE, MEDIAL - PARTIAL;  Surgeon: Dexter Pimentel M.D.;  Location: [facility];  Service: Orthopedics    HARDWARE REMOVAL ORTHO Right 10/29/2020    Procedure: REMOVAL, HARDWARE tibia ;  Surgeon: Dexter Pimentel M.D.;  Location: [facility];  Service: Orthopedics    BONE GRAFT Right 10/29/2020    Procedure: curetage and BONE GRAFT femoral cyst, microfracture;  Surgeon: Dexter Pimentel M.D.;  Location: [facility];  Service: Orthopedics    KNEE ARTHROSCOPY Right 11/13/2018    Procedure: KNEE ARTHROSCOPY;  Surgeon: Dexter Pimentel M.D.;  Location: [facility];  Service: Orthopedics    SYNOVECTOMY Right 11/13/2018    Procedure: SYNOVECTOMY;  Surgeon: Dexter Pimentel M.D.;  Location: [facility];  Service: Orthopedics    MENISCECTOMY, KNEE, MEDIAL Right 11/13/2018    Procedure: MEDIAL MENISCECTOMY-  PARTIAL, AND PARTIAL LATERAL;  Surgeon: Dexter Pimentel M.D.;  Location: [facility];  Service: Orthopedics    CHONDROPLASTY  11/13/2018    Procedure: CHONDROPLASTY;  Surgeon: Dexter Pimentel M.D.;  Location: [facility];  Service: Orthopedics    HARDWARE REMOVAL ORTHO  11/13/2018    Procedure: HARDWARE REMOVAL ORTHO;  Surgeon: Dexter Pimentel M.D.;  Location: [facility];  Service: Orthopedics    BONE GRAFT  11/13/2018    Procedure: BONE GRAFT;  Surgeon: Dexter Pimentel M.D.;  Location: [facility];  Service: Orthopedics    REPEAT C SECTION  11/25/2017    Procedure: REPEAT  C SECTION;  Surgeon: Asmita Bolivar M.D.;  [facility]: [facility];  Service: Obstetrics    REPEAT C SECTION  10/19/2015    Procedure: REPEAT C SECTION;  Surgeon: Mary Randolph M.D.;  Location: [facility];  Service:     RECONSTRUCTION, KNEE, ACL, ARTHROSCOPIC  10/02/2014    Performed by Dexter Pimentel M.D. at SURGERY [facility]    KNEE ARTHROSCOPY  2014    Performed by Dexter Pimentel M.D. at SURGERY [facility]    HARDWARE REMOVAL ORTHO  2014    Performed by Dexter Pimentel M.D. at SURGERY [facility]    BONE GRAFT  2014    Performed by Dexter Pimentel M.D. at SURGERY [facility]    NM  DELIVERY ONLY      RECONSTRUCTION, KNEE, ACL Right 2006    ORIF, KNEE         Family history   Family History   Problem Relation Age of Onset    Anxiety disorder Mother     Depression Mother     Diabetes Other     Hypertension Other     Diabetes Maternal Grandmother         DM w/ESRD    Diabetes Maternal Grandfather     Cancer Maternal Grandfather         prostate, esophageal    Hyperlipidemia Maternal Grandfather     Arthritis Maternal Grandfather     Hypertension Maternal Grandfather     Diabetes Paternal Grandmother         DM on dialysis    Hyperlipidemia Paternal Grandmother     Arthritis Paternal Grandmother     Cancer Paternal Grandmother     Arthritis Other     Diabetes Other     Hyperlipidemia Other     Hypertension Other     Thyroid Other     Cancer Other        Medications: reviewed on epic.   Outpatient Medications Marked as Taking for the 25 encounter (Office Visit) with Reagan Oneill D.O.   Medication Sig Dispense Refill    hylan (SYNVISC) 16 MG/2ML Solution Prefilled Syringe injection Inject 2 mL into the joint one time.      meloxicam (MOBIC) 15 MG tablet Take 1 Tablet by mouth every day. 30 Tablet 2    ferrous gluconate (FERGON) 324 (38 Fe) MG Tab Take 1 Tablet by mouth every morning with breakfast. 90 Tablet 2    montelukast (SINGULAIR) 10 MG Tab  Take 1 tablet by mouth daily 90 Tablet 3    sertraline (ZOLOFT) 50 MG Tab Take 1 Tablet by mouth every day. 90 Tablet 3    cyclobenzaprine (FLEXERIL) 10 mg Tab Take 1 Tablet by mouth every day. 90 Tablet 3    albuterol 108 (90 Base) MCG/ACT Aero Soln inhalation aerosol Inhale 2 Puffs by mouth every four hours as needed for Shortness of Breath. 8.5 g 11    valacyclovir (VALTREX) 1 GM Tab Take 1 Tablet by mouth 2 times a day. 60 Tablet 2    ondansetron (ZOFRAN ODT) 4 MG TABLET DISPERSIBLE Take 1 Tablet by mouth every 6 hours as needed for Nausea/Vomiting. 10 Tablet 0    dicyclomine (BENTYL) 20 MG Tab Take 1 Tablet by mouth four times daily - before meals and nightly as needed (stomach cramps). 28 Tablet 0    drospirenone-ethinyl estradiol (DENISSE) 3-0.03 MG per tablet Take 1 tablet by mouth daily 84 Tablet 4     Current Facility-Administered Medications for the 2/27/25 encounter (Office Visit) with Reagan Oneill D.O.   Medication Dose Route Frequency Provider Last Rate Last Admin    dexamethasone (Decadron) injection 4 mg  4 mg Injection Once         lidocaine (Xylocaine) 1 % injection 2 mL  2 mL Injection Once             Allergies:   Allergies   Allergen Reactions    Benzoin Hives     Rash   Other reaction(s): Itching, rash       Social Hx:   Social History     Socioeconomic History    Marital status:      Spouse name: Not on file    Number of children: Not on file    Years of education: Not on file    Highest education level: Some college, no degree   Occupational History    Not on file   Tobacco Use    Smoking status: Never    Smokeless tobacco: Never   Vaping Use    Vaping status: Never Used   Substance and Sexual Activity    Alcohol use: Not Currently     Comment: Occasionally    Drug use: Never    Sexual activity: Yes     Partners: Male     Birth control/protection: Pill   Other Topics Concern    Not on file   Social History Narrative    Lives with  and 3 boys.     Social Drivers of Health  "    Financial Resource Strain: Medium Risk (2/15/2024)    Overall Financial Resource Strain (CARDIA)     Difficulty of Paying Living Expenses: Somewhat hard   Food Insecurity: No Food Insecurity (2/15/2024)    Hunger Vital Sign     Worried About Running Out of Food in the Last Year: Never true     Ran Out of Food in the Last Year: Never true   Transportation Needs: No Transportation Needs (2/15/2024)    PRAPARE - Transportation     Lack of Transportation (Medical): No     Lack of Transportation (Non-Medical): No   Physical Activity: Inactive (2/15/2024)    Exercise Vital Sign     Days of Exercise per Week: 0 days     Minutes of Exercise per Session: 0 min   Stress: No Stress Concern Present (2/15/2024)    [employer] - Occupational Stress Questionnaire     Feeling of Stress : Only a little   Social Connections: Moderately Integrated (2/15/2024)    Social Connection and Isolation Panel [NHANES]     Frequency of Communication with Friends and Family: More than three times a week     Frequency of Social Gatherings with Friends and Family: Once a week     Attends Orthodoxy Services: More than 4 times per year     Active Member of Clubs or Organizations: No     Attends Club or Organization Meetings: Never     Marital Status:    Intimate Partner Violence: Not on file   Housing Stability: High Risk (2/15/2024)    Housing Stability Vital Sign     Unable to Pay for Housing in the Last Year: Yes     Number of Places Lived in the Last Year: 1     Unstable Housing in the Last Year: No         EXAMINATION   Vitals: /82 (BP Location: Right arm, Patient Position: Sitting, BP Cuff Size: Adult)   Pulse 100   Temp 36.4 °C (97.6 °F) (Temporal)   Ht 1.651 m (5' 5\")   Wt 86.6 kg (191 lb)   SpO2 97%   Physical Exam:     Body Habitus: Body mass index is 31.78 kg/m².  Appearance: Well-groomed, well-nourished, not disheveled  Eyes: No scleral icterus to suggest severe liver disease, no " proptosis to suggest severe hyperthyroid  ENT -no obvious auditory deficits, no external lesions, moist mucus membranes   Skin -no rashes or lesions noted. No appreciable skin breakdown on exposed skin areas.    Respiratory-  breathing comfortably on room air, no audible wheezing, full sentences  Cardiovascular- No lower extremity edema noted.   Psychiatric- alert and oriented, calm, comfortable, cooperative     Musculoskeletal and Neuro:  Right Knee:   Inspection: Healed scars at the anterior and lateral aspect of the knee joint as well as vertical scar at the medial portion of the distal patellar tendon  Palpation: no effusion.  There is significant tenderness at the medial joint line as well as significant tenderness over the Pez anserine bursa  ROM: extension to 0 degrees, flexion to 120 degrees; no crepitus   Strength : 5/5 in flexion, extension.   Sensation: intact  Special Tests:   A/P Drawer  negative  Lachman's  negative  Durga's negative  Thessaly negative  Varus stress negative  Valgus stress negative   Patellar grind negative         Physical Exam  There are well-healed scars at the medial and lateral anterior portion of the knee as well as inferior just medial to the patellar tendon. There is also significant tenderness to palpation over the right pes anserine bursa as well as over the medial joint line. Full range of motion at the knee with extension and flexion. Full strength with hip flexion at the right with ankle dorsiflexion and plantar flexion. There is some mild numbness and mild decrease in sensation at the anterior portion of the lower leg, which is chronic related to previous surgeries.          MEDICAL DECISION MAKING    Medical records review: see under HPI section.     DATA    Labs:   Lab Results   Component Value Date/Time    SODIUM 139 10/24/2024 08:38 AM    POTASSIUM 4.7 10/24/2024 08:38 AM    CHLORIDE 105 10/24/2024 08:38 AM    CO2 21 10/24/2024 08:38 AM    ANION 13.0 10/24/2024  08:38 AM    GLUCOSE 103 (H) 10/24/2024 08:38 AM    BUN 13 10/24/2024 08:38 AM    CREATININE 0.68 10/24/2024 08:38 AM    CALCIUM 8.8 10/24/2024 08:38 AM    ASTSGOT 17 10/24/2024 08:38 AM    ALTSGPT 15 10/24/2024 08:38 AM    TBILIRUBIN 0.4 10/24/2024 08:38 AM    ALBUMIN 4.1 10/24/2024 08:38 AM    TOTPROTEIN 6.9 10/24/2024 08:38 AM    GLOBULIN 2.8 10/24/2024 08:38 AM    AGRATIO 1.5 10/24/2024 08:38 AM   ]    Lab Results   Component Value Date/Time    PROTHROMBTM 13.7 11/11/2019 07:20 AM    INR 1.03 11/11/2019 07:20 AM        Lab Results   Component Value Date/Time    WBC 7.9 10/24/2024 08:38 AM    RBC 4.92 10/24/2024 08:38 AM    HEMOGLOBIN 13.8 10/24/2024 08:38 AM    HEMATOCRIT 41.0 10/24/2024 08:38 AM    MCV 83.3 10/24/2024 08:38 AM    MCH 28.0 10/24/2024 08:38 AM    MCHC 33.7 10/24/2024 08:38 AM    MPV 11.3 10/24/2024 08:38 AM    NEUTSPOLYS 59.50 10/24/2024 08:38 AM    LYMPHOCYTES 29.80 10/24/2024 08:38 AM    MONOCYTES 6.20 10/24/2024 08:38 AM    EOSINOPHILS 3.50 10/24/2024 08:38 AM    BASOPHILS 0.60 10/24/2024 08:38 AM    HYPOCHROMIA 1 05/19/2014 08:12 AM        Lab Results   Component Value Date/Time    HBA1C 5.4 01/08/2024 06:36 AM        Imaging:   I personally reviewed following images, these are my reads  Results  Imaging  X-rays of the right knee dated 02/18/2025 show mild degenerative changes with joint space narrowing primarily in the medial and patellofemoral compartments.  Findings consistent with KL grade 1 osteoarthritis        IMAGING radiology reads. I reviewed the following radiology reads   Results for orders placed during the hospital encounter of 02/21/20    MR-BRAIN IAC'S W/WO    Impression  1. MRI of the brain without and with contrast within normal limits.    2. No evidence of acoustic schwannoma or other IAC lesion.                                                                Results for orders placed during the hospital encounter of 01/05/23    DX-CERVICAL SPINE-2 OR 3  VIEWS    Impression  No acute compression or dislocation. If symptoms persist, CT would be recommended for further evaluation.                     Results for orders placed in visit on 10/31/22    DX-KNEE 2- RIGHT   Results for orders placed during the hospital encounter of 20    DX-KNEE 3 VIEWS RIGHT    Impression  1.  No acute right knee fracture, dislocation, or joint effusion.    2.  Postoperative changes of the right knee.   Results for orders placed in visit on 21    DX-KNEES-AP BILATERAL STANDING    Impression  Postsurgical change from ACL reconstruction in the right knee.    No acute fracture or dislocation.   Results for orders placed in visit on 25    DX-KNEE COMPLETE 4+ RIGHT    Impression  No acute osseous abnormality or significant interval change.   Results for orders placed during the hospital encounter of 20    DX-LUMBAR SPINE-2 OR 3 VIEWS    Impression  No compression deformity or acute fracture is identified.               Results for orders placed during the hospital encounter of 21    DX-THORACIC SPINE-2 VIEWS    Impression  1.  Mild multilevel degenerative change of thoracic spine.  2.  No fracture or subluxation.     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 Pes anserinus bursitis of right knee  Consent for all Surgical, Special Diagnostic or Therapeutic Procedures      2. Post-traumatic osteoarthritis of right knee  Referral to Pain Clinic            Assessment & Plan  1. Chronic right-sided knee pain.  The patient presents with chronic right-sided knee pain, related to multiple previous right ACL surgeries she  reports a history of 6 total right knee surgeries related to ACL reconstruction, meniscus repair, and bone grafting. X-rays of the right knee dated 02/18/2025 show mild degenerative changes with joint space narrowing primarily in the medial and patellofemoral compartments. The patient has tried corticosteroid injections in the past with limited relief. Treatment options discussed include hyaluronic acid gel injections, PRP injections, genicular nerve blocks, and genicular nerve ablation. Given her age and mild arthritic changes, gel injections are recommended first. If the gel injections do not provide sufficient relief, genicular nerve blocks and ablation will be considered. Surgery is not recommended at this time due to her age.    On examination today there is significant tenderness over the right Pez anserine bursa.  Recommend bursal injection for symptomatic relief.  Given no minimal improvement after previous intra-articular corticosteroid to knee recommend hyaluronic acid injection for potential symptomatic relief.  If this is not beneficial then would consider genicular nerve blocks as ablation.    PROCEDURE  Date of Service: 2/27/2025    Physician/s: Reagan Oneill D.O.      Pre-operative Diagnosis: right pes anserine bursitis     Post-operative Diagnosis: right  Pes anserine bursitis    Procedure: right pes anserine bursa injection ultrasound-guided    Description of procedure:    The risks, benefits, and alternatives of the procedure were reviewed and discussed with the patient.  Written informed consent was freely obtained. A pre-procedural time-out was conducted by the physician verifying patient’s identity, procedure to be performed, procedure site and side, and allergy verification. Appropriate equipment was determined to be in place for the procedure.     No sedation was used for this procedure.     In the office suite the patient was placed in seated position with her  Knee exposed. The ultrasound  probe was placed over the medial aspect of the knee to show the conjoined tendon and pes anserine bursa, the skin was marked for an appropriate injection site.  The skin was prepped and draped in the usual sterile fashion. A 27g 1.5 inch needle was placed into skin via a advanced under ultrasound guidance, in-plane approach, into the pes anserine bursa. Following negative aspiration, approx 4mL of 1% lidocaine, and 1mL of 4mg of dexamethasone was then injected, and the needle was subsequently removed intact. The patient's knee was wiped with a 4x4 gauze, the area was cleansed with alcohol prep, and a bandaid was applied. There were no complications noted.     Preprocedural pain: 5/10          Postprocedural pain: 1/10        Reagan Oneill DO  Physical Medicine and Rehabilitation  Sports Medicine and Spine  [facility]       Orders Placed This Encounter    Referral to Pain Clinic    dexamethasone (Decadron) injection 4 mg    lidocaine (Xylocaine) 1 % injection 2 mL    hylan (SYNVISC) 16 MG/2ML Solution Prefilled Syringe injection    Consent for all Surgical, Special Diagnostic or Therapeutic Procedures       -Medications/Modalities: Previously prescribed medications  -Limitations: Activity as tolerated }  -Interventional program: JACKSON Peralta anserine bursal injection to right knee today, will have patient follow-up in clinic for right hyaluronic acid knee injection  -Referrals: none required at this time      Follow-up: Next available for right hyaluronic acid injection into the right knee      Patient expressed understanding of the management plan. Patient (and Family Members) was/were encouraged to call if any worries, issues, problems or concerns prior to the next visit     Please note that this dictation was created using voice recognition software. I have made every reasonable attempt to correct obvious errors but there may be errors of grammar and content that I may have overlooked prior to finalization of  this note.    Reagan Oneill DO  Physical Medicine and Rehabilitation  Sports Medicine and Spine  [facility]

## 2025-03-04 ENCOUNTER — HOSPITAL ENCOUNTER (OUTPATIENT)
Facility: MEDICAL CENTER | Age: 35
End: 2025-03-04
Payer: COMMERCIAL

## 2025-03-04 PROCEDURE — 87624 HPV HI-RISK TYP POOLED RSLT: CPT

## 2025-03-04 PROCEDURE — 88142 CYTOPATH C/V THIN LAYER: CPT

## 2025-03-06 ENCOUNTER — APPOINTMENT (OUTPATIENT)
Dept: PHYSICAL MEDICINE AND REHAB | Facility: MEDICAL CENTER | Age: 35
End: 2025-03-06
Payer: COMMERCIAL

## 2025-03-07 ENCOUNTER — TELEPHONE (OUTPATIENT)
Dept: PHYSICAL MEDICINE AND REHAB | Facility: MEDICAL CENTER | Age: 35
End: 2025-03-07
Payer: COMMERCIAL

## 2025-03-07 NOTE — TELEPHONE ENCOUNTER
Caller Name: [facility] insurance rep  Call Back Number: not left    How would the patient prefer to be contacted with a response: Phone call OK to leave a detailed message    A rep from [facility] called to notify us that this pt had been denied for the synvisc referral sent by Dr. Oneill. A written notice is being sent.

## 2025-03-11 LAB
HPV I/H RISK 1 DNA SPEC QL NAA+PROBE: NOT DETECTED
SPECIMEN SOURCE: NORMAL
THINPREP PAP, CYTOLOGY NL11781: NORMAL

## 2025-03-11 PROCEDURE — RXMED WILLOW AMBULATORY MEDICATION CHARGE

## 2025-03-12 ENCOUNTER — PHARMACY VISIT (OUTPATIENT)
Dept: PHARMACY | Facility: MEDICAL CENTER | Age: 35
End: 2025-03-12
Payer: COMMERCIAL

## 2025-03-13 ENCOUNTER — APPOINTMENT (OUTPATIENT)
Dept: PHYSICAL MEDICINE AND REHAB | Facility: MEDICAL CENTER | Age: 35
End: 2025-03-13
Payer: COMMERCIAL

## 2025-03-22 PROCEDURE — RXMED WILLOW AMBULATORY MEDICATION CHARGE: Performed by: PHYSICIAN ASSISTANT

## 2025-03-24 ENCOUNTER — PHARMACY VISIT (OUTPATIENT)
Dept: PHARMACY | Facility: MEDICAL CENTER | Age: 35
End: 2025-03-24
Payer: COMMERCIAL

## 2025-03-24 ENCOUNTER — PATIENT MESSAGE (OUTPATIENT)
Dept: MEDICAL GROUP | Facility: PHYSICIAN GROUP | Age: 35
End: 2025-03-24
Payer: COMMERCIAL

## 2025-03-24 RX ORDER — VALACYCLOVIR HYDROCHLORIDE 1 G/1
1000 TABLET, FILM COATED ORAL 2 TIMES DAILY
Qty: 60 TABLET | Refills: 2 | OUTPATIENT
Start: 2025-03-24

## 2025-03-24 NOTE — PATIENT COMMUNICATION
Received request via: Pharmacy    Was the patient seen in the last year in this department? Yes    Does the patient have an active prescription (recently filled or refills available) for medication(s) requested? No    [facility]    Does the patient have [facility] and need 100-day supply? (This applies to ALL medications) Patient does not have SCP

## 2025-03-25 PROCEDURE — RXMED WILLOW AMBULATORY MEDICATION CHARGE: Performed by: PHYSICIAN ASSISTANT

## 2025-03-25 RX ORDER — VALACYCLOVIR HYDROCHLORIDE 1 G/1
1000 TABLET, FILM COATED ORAL 2 TIMES DAILY
Qty: 60 TABLET | Refills: 2 | Status: SHIPPED | OUTPATIENT
Start: 2025-03-25

## 2025-03-27 ENCOUNTER — PHARMACY VISIT (OUTPATIENT)
Dept: PHARMACY | Facility: MEDICAL CENTER | Age: 35
End: 2025-03-27
Payer: COMMERCIAL

## 2025-03-31 DIAGNOSIS — F41.9 ANXIETY: ICD-10-CM

## 2025-03-31 PROCEDURE — RXMED WILLOW AMBULATORY MEDICATION CHARGE: Performed by: PHYSICIAN ASSISTANT

## 2025-03-31 RX ORDER — ALPRAZOLAM 0.25 MG
.25-.5 TABLET ORAL EVERY 12 HOURS PRN
Qty: 20 TABLET | Refills: 0 | Status: SHIPPED | OUTPATIENT
Start: 2025-03-31 | End: 2025-04-30

## 2025-04-01 ENCOUNTER — PHARMACY VISIT (OUTPATIENT)
Dept: PHARMACY | Facility: MEDICAL CENTER | Age: 35
End: 2025-04-01
Payer: COMMERCIAL

## 2025-04-25 ENCOUNTER — APPOINTMENT (OUTPATIENT)
Dept: MEDICAL GROUP | Facility: PHYSICIAN GROUP | Age: 35
End: 2025-04-25
Payer: COMMERCIAL

## 2025-04-25 VITALS
DIASTOLIC BLOOD PRESSURE: 62 MMHG | TEMPERATURE: 97 F | OXYGEN SATURATION: 99 % | HEIGHT: 65 IN | HEART RATE: 84 BPM | WEIGHT: 178.8 LBS | RESPIRATION RATE: 10 BRPM | BODY MASS INDEX: 29.79 KG/M2 | SYSTOLIC BLOOD PRESSURE: 96 MMHG

## 2025-04-25 DIAGNOSIS — E66.3 OVERWEIGHT: ICD-10-CM

## 2025-04-25 DIAGNOSIS — F32.9 MAJOR DEPRESSIVE DISORDER WITH SINGLE EPISODE, REMISSION STATUS UNSPECIFIED: ICD-10-CM

## 2025-04-25 PROCEDURE — 99214 OFFICE O/P EST MOD 30 MIN: CPT | Performed by: PHYSICIAN ASSISTANT

## 2025-04-25 PROCEDURE — 3078F DIAST BP <80 MM HG: CPT | Performed by: PHYSICIAN ASSISTANT

## 2025-04-25 PROCEDURE — 3074F SYST BP LT 130 MM HG: CPT | Performed by: PHYSICIAN ASSISTANT

## 2025-04-25 ASSESSMENT — PATIENT HEALTH QUESTIONNAIRE - PHQ9
7. TROUBLE CONCENTRATING ON THINGS, SUCH AS READING THE NEWSPAPER OR WATCHING TELEVISION: NOT AT ALL
6. FEELING BAD ABOUT YOURSELF - OR THAT YOU ARE A FAILURE OR HAVE LET YOURSELF OR YOUR FAMILY DOWN: NOT AL ALL
1. LITTLE INTEREST OR PLEASURE IN DOING THINGS: NOT AT ALL
4. FEELING TIRED OR HAVING LITTLE ENERGY: NOT AT ALL
5. POOR APPETITE OR OVEREATING: NOT AT ALL
3. TROUBLE FALLING OR STAYING ASLEEP OR SLEEPING TOO MUCH: NOT AT ALL
SUM OF ALL RESPONSES TO PHQ QUESTIONS 1-9: 0
9. THOUGHTS THAT YOU WOULD BE BETTER OFF DEAD, OR OF HURTING YOURSELF: NOT AT ALL
2. FEELING DOWN, DEPRESSED, IRRITABLE, OR HOPELESS: NOT AT ALL
8. MOVING OR SPEAKING SO SLOWLY THAT OTHER PEOPLE COULD HAVE NOTICED. OR THE OPPOSITE, BEING SO FIGETY OR RESTLESS THAT YOU HAVE BEEN MOVING AROUND A LOT MORE THAN USUAL: NOT AT ALL
SUM OF ALL RESPONSES TO PHQ9 QUESTIONS 1 AND 2: 0

## 2025-04-25 ASSESSMENT — FIBROSIS 4 INDEX: FIB4 SCORE: 0.41

## 2025-04-25 ASSESSMENT — ENCOUNTER SYMPTOMS
FEVER: 0
SHORTNESS OF BREATH: 0
CHILLS: 0

## 2025-04-25 NOTE — PROGRESS NOTES
SUBJECTIVE:     CC:     HPI:   Tonia presents today with the following:    ASSESSMENT & PLAN by Problem:     Problem   Major Depressive Disorder, Single Episode, Unspecified    Chronic, stable.  Tolerating/continue sertraline 50 mg daily.      Overweight    Chronic, uncontrolled.  Improving.  Continue tirzepatide through [facility] out of [place], [place].  Increasing to 5 mg weekly.  Patient will let me know through [facility] if/when she wants to increase her dosing and what size vial she wants.  She will otherwise follow-up at the end of the year/beginning of 2026.       Follow-up instructions from 2/14/2025 primary care evaluation:  Repeat labs end of 2025/beginning of 2026.     Return in about 1 year (around 2/14/2026), or if symptoms worsen or fail to improve, for lab discussion; return to clinic sooner as needed for weight loss concerns/issues.    Return in about 10 months (around 2/25/2026) for lab discussion.        HPI:     Problem List Items Addressed This Visit       Major depressive disorder, single episode, unspecified    Overweight    Chronic, uncontrolled but improving.  Tolerating tirzepatide 2.5 mg through [facility] out of [place], [place].  Would like to go up to 5 mg.    2023: 194#  8/8/2024: 197#  9/10/2024: 198#  11/2024: 190#  2/2025: 191#  4/2025: 128#    Interval history:  Phentermine and topamax didn't help.  Tried Ozempic with a weight loss physician in the past but it did not seem to help.  Mounjaro is not covered by insurance.      -she had tried keto diet in the past without much improvement in weight  -she has been trying to stick to 1500 calories/day for the past 2 weeks     -overall she struggles with sweets     Exercise:2-3x/week   She does resistance training initially and then cardio for 30 minutes                      ROS:  Review of Systems   Constitutional:  Negative for chills and fever.   Respiratory:  Negative for shortness of breath.    Cardiovascular:  Negative  "for chest pain.       OBJECTIVE:     Exam:  BP 96/62   Pulse 84   Temp 36.1 °C (97 °F) (Temporal)   Resp (!) 10   Ht 1.651 m (5' 5\")   Wt 81.1 kg (178 lb 12.8 oz)   SpO2 99%   BMI 29.75 kg/m²  Body mass index is 29.75 kg/m².    Physical Exam  Vitals reviewed.   Constitutional:       General: She is not in acute distress.     Appearance: Normal appearance.   Pulmonary:      Effort: Pulmonary effort is normal.   Neurological:      General: No focal deficit present.      Mental Status: She is alert.   Psychiatric:         Mood and Affect: Mood normal.         Behavior: Behavior normal.         Judgment: Judgment normal.                Please note that this dictation was created using voice recognition software. I have made every reasonable attempt to correct obvious errors, but I expect that there are errors of grammar and possibly content that I did not discover before finalizing the note.    "

## 2025-04-25 NOTE — ASSESSMENT & PLAN NOTE
Chronic, uncontrolled but improving.  Tolerating tirzepatide 2.5 mg through [facility] out of [place], [place].  Would like to go up to 5 mg.    2023: 194#  8/8/2024: 197#  9/10/2024: 198#  11/2024: 190#  2/2025: 191#  4/2025: 128#    Interval history:  Phentermine and topamax didn't help.  Tried Ozempic with a weight loss physician in the past but it did not seem to help.  Mounjaro is not covered by insurance.      -she had tried keto diet in the past without much improvement in weight  -she has been trying to stick to 1500 calories/day for the past 2 weeks     -overall she struggles with sweets     Exercise:2-3x/week   She does resistance training initially and then cardio for 30 minutes

## 2025-04-29 PROCEDURE — RXMED WILLOW AMBULATORY MEDICATION CHARGE

## 2025-05-06 ENCOUNTER — PHARMACY VISIT (OUTPATIENT)
Dept: PHARMACY | Facility: MEDICAL CENTER | Age: 35
End: 2025-05-06
Payer: COMMERCIAL

## 2025-05-06 ENCOUNTER — TELEPHONE (OUTPATIENT)
Dept: PHYSICAL MEDICINE AND REHAB | Facility: MEDICAL CENTER | Age: 35
End: 2025-05-06
Payer: COMMERCIAL

## 2025-05-06 NOTE — TELEPHONE ENCOUNTER
VOICEMAIL  1. Caller Name: Maude                      Call Back Number: [phone]    2. Message: Pt LVM checking the status of the P2P of her Synvisc.    3. Patient approves office to leave a detailed voicemail/MyChart message: yes    Thank you  Melanie

## 2025-06-02 ENCOUNTER — APPOINTMENT (OUTPATIENT)
Dept: PHYSICAL MEDICINE AND REHAB | Facility: MEDICAL CENTER | Age: 35
End: 2025-06-02
Payer: COMMERCIAL

## 2025-06-02 VITALS
SYSTOLIC BLOOD PRESSURE: 92 MMHG | TEMPERATURE: 98.4 F | OXYGEN SATURATION: 96 % | DIASTOLIC BLOOD PRESSURE: 58 MMHG | RESPIRATION RATE: 16 BRPM | HEART RATE: 88 BPM

## 2025-06-02 DIAGNOSIS — M17.31 POST-TRAUMATIC OSTEOARTHRITIS OF RIGHT KNEE: Primary | ICD-10-CM

## 2025-06-02 PROCEDURE — 3074F SYST BP LT 130 MM HG: CPT | Performed by: STUDENT IN AN ORGANIZED HEALTH CARE EDUCATION/TRAINING PROGRAM

## 2025-06-02 PROCEDURE — 3078F DIAST BP <80 MM HG: CPT | Performed by: STUDENT IN AN ORGANIZED HEALTH CARE EDUCATION/TRAINING PROGRAM

## 2025-06-02 PROCEDURE — 20611 DRAIN/INJ JOINT/BURSA W/US: CPT | Mod: RT | Performed by: STUDENT IN AN ORGANIZED HEALTH CARE EDUCATION/TRAINING PROGRAM

## 2025-06-02 RX ORDER — HYLAN G-F 20 16MG/2ML
16 SYRINGE (ML) INTRAARTICULAR ONCE
COMMUNITY
End: 2025-06-02

## 2025-06-02 RX ADMIN — Medication 5 ML: at 16:40

## 2025-06-02 ASSESSMENT — PATIENT HEALTH QUESTIONNAIRE - PHQ9: CLINICAL INTERPRETATION OF PHQ2 SCORE: 0

## 2025-06-02 NOTE — PROCEDURES
"  Date of Service: 6/2/2025    Physician/s: Reagan Oneill D.O.      Pre-operative Diagnosis: right knee osteoarthritis, pain    Post-operative Diagnosis: right knee osteoarthritis, pain    Procedure: right Knee injection ultrasound-guided with Synvisc    Description of procedure:    The risks, benefits, and alternatives of the procedure were reviewed and discussed with the patient.  Risks include damage to surrounding structures, infection, hemorrhage, worsening of pain, swelling of the knee.  Written informed consent was freely obtained. A pre-procedural time-out was conducted by the physician verifying patient’s identity, procedure to be performed, procedure site and side, and allergy verification. Appropriate equipment was determined to be in place for the procedure.     No sedation was used for this procedure.     In the office suite the patient was placed in a sitting position, and the knee was prepped and draped in the usual sterile fashion. The ultrasound probe was placed over the RIGHT  suprapatellar joint recess.  The target for injection was marked, and a 27g 1.5 inch needle was used for local skin anesthesia with 5 mL of 1% lidocaine.  The needle was removed intact.  A 22-gauge 3.5\" needle was placed into skin and advanced under ultrasound guidance into the joint space. Following negative aspiration, 2mL of Synvisc was then injected into the joint, and the needle was subsequently removed intact. The patient's knee was wiped with a 4x4 gauze, the area was cleansed with alcohol prep, and a bandaid was applied. There were no complications noted.             Reagan Oneill DO  Physical Medicine and Rehabilitation  Sports Medicine and Spine  [facility]   "

## 2025-06-17 ENCOUNTER — PHARMACY VISIT (OUTPATIENT)
Dept: PHARMACY | Facility: MEDICAL CENTER | Age: 35
End: 2025-06-17
Payer: COMMERCIAL

## 2025-06-17 PROCEDURE — RXOTC WILLOW AMBULATORY OTC CHARGE

## 2025-07-11 PROCEDURE — RXMED WILLOW AMBULATORY MEDICATION CHARGE: Performed by: PHYSICIAN ASSISTANT

## 2025-07-14 ENCOUNTER — PHARMACY VISIT (OUTPATIENT)
Dept: PHARMACY | Facility: MEDICAL CENTER | Age: 35
End: 2025-07-14
Payer: COMMERCIAL

## 2025-07-29 ENCOUNTER — PHARMACY VISIT (OUTPATIENT)
Dept: PHARMACY | Facility: MEDICAL CENTER | Age: 35
End: 2025-07-29
Payer: COMMERCIAL

## 2025-07-29 PROCEDURE — RXOTC WILLOW AMBULATORY OTC CHARGE

## 2025-08-27 ENCOUNTER — PHARMACY VISIT (OUTPATIENT)
Dept: PHARMACY | Facility: MEDICAL CENTER | Age: 35
End: 2025-08-27
Payer: COMMERCIAL

## 2025-08-27 PROCEDURE — RXOTC WILLOW AMBULATORY OTC CHARGE: Performed by: PHARMACIST

## 2025-09-04 ENCOUNTER — APPOINTMENT (OUTPATIENT)
Dept: PHYSICAL MEDICINE AND REHAB | Facility: MEDICAL CENTER | Age: 35
End: 2025-09-04
Payer: COMMERCIAL

## (undated) DEVICE — GLOVE SZ 7.5 LF PROTEXIS (50PR/BX)

## (undated) DEVICE — ELECTRODE DUAL RETURN W/ CORD - (50/PK)

## (undated) DEVICE — PADDING CAST 6 IN STERILE - 6 X 4 YDS (24/CA)

## (undated) DEVICE — NEPTUNE 4 PORT MANIFOLD - (20/PK)

## (undated) DEVICE — NEEDLE SAFETY 18 GA X 1 1/2 IN (100EA/BX)

## (undated) DEVICE — DRAPE U SPLIT IMP 54 X 76 - (24/CA)

## (undated) DEVICE — TUBE CONNECTING SUCTION - CLEAR PLASTIC STERILE 72 IN (50EA/CA)

## (undated) DEVICE — PROTECTOR ULNA NERVE - (36PR/CA)

## (undated) DEVICE — SPONGE GAUZESTER 4 X 4 4PLY - (128PK/CA)

## (undated) DEVICE — CHLORAPREP 26 ML APPLICATOR - ORANGE TINT(25/CA)

## (undated) DEVICE — SHAVER 5.5 RESECTOR FORMULA (5EA/BX )

## (undated) DEVICE — KIT ANESTHESIA W/CIRCUIT & 3/LT BAG W/FILTER (20EA/CA)

## (undated) DEVICE — GLOVE BIOGEL PI INDICATOR SZ 6.5 SURGICAL PF LF - (50/BX 4BX/CA)

## (undated) DEVICE — WATER IRRIGATION STERILE 1000ML (12EA/CA)

## (undated) DEVICE — GLOVE BIOGEL INDICATOR SZ 7.5 SURGICAL PF LTX - (50PR/BX 4BX/CA)

## (undated) DEVICE — BLADE SURGICAL #15 - (50/BX 3BX/CA)

## (undated) DEVICE — SUTURE 2-0 MONOCRYL SH&UR-6 27 - (12/BX)

## (undated) DEVICE — SUTURE 3-0 ETHILON FS-1 - (36/BX) 30 INCH

## (undated) DEVICE — SUTURE 1 VICRYL PLUS CT-1 - 18 INCH (12/BX)

## (undated) DEVICE — PACK KNEE ARTHROSCOPY SM OR - (2EA/CA)

## (undated) DEVICE — GLOVE BIOGEL SZ 7 SURGICAL PF LTX - (50PR/BX 4BX/CA)

## (undated) DEVICE — CLOSURE PRINEO SKIN - (2EA/BX)

## (undated) DEVICE — SENSOR SPO2 NEO LNCS ADHESIVE (20/BX) SEE USER NOTES

## (undated) DEVICE — HUMID-VENT HEAT AND MOISTURE EXCHANGE- (50/BX)

## (undated) DEVICE — TUBING CLEARLINK DUO-VENT - C-FLO (48EA/CA)

## (undated) DEVICE — SODIUM CHL IRRIGATION 0.9% 1000ML (12EA/CA)

## (undated) DEVICE — TUBING PUMP WITH CONNECTOR REDEUCE (1EA)

## (undated) DEVICE — SYRINGE 30 ML LL (56/BX)

## (undated) DEVICE — LACTATED RINGERS INJ 1000 ML - (14EA/CA 60CA/PF)

## (undated) DEVICE — PACK LOWER EXTREMITY - (2/CA)

## (undated) DEVICE — GLOVE, LITE (PAIR)

## (undated) DEVICE — SODIUM CHL. IRRIGATION 0.9% 3000ML (4EA/CA 65CA/PF)

## (undated) DEVICE — GLOVE BIOGEL INDICATOR SZ 8 SURGICAL PF LTX - (50/BX 4BX/CA)

## (undated) DEVICE — SUTURE 4-0 MONOCRYL PLUS PS-2 - 27 INCH (36/BX)

## (undated) DEVICE — DRESSING INTERCEED ABSORBABLE ADHESION BARRIER TC7 (10EA/CA)

## (undated) DEVICE — SUTURE 1 ETHIBOND OS-4 30 (36PK/BX)"

## (undated) DEVICE — DETERGENT RENUZYME PLUS 10 OZ PACKET (50/BX)

## (undated) DEVICE — DRAPE LOWER EXTREMETY - (6/CA)

## (undated) DEVICE — BLADE SHAVER AGGRESSIVE PLUS 4.0MM ANGLED (5EA/BX)

## (undated) DEVICE — PLUMEPEN ULTRA 3/8 IN X 10 FT HOSE (20EA/CA)

## (undated) DEVICE — GOWN WARMING STANDARD FLEX - (30/CA)

## (undated) DEVICE — TRAY BLADDER CARE W/ 16 FR FOLEY CATHETER STATLOCK  (10/CA)

## (undated) DEVICE — TUBING PATIENT W/CONNECTOR REDEUCE (1EA)

## (undated) DEVICE — 0 CHROMIC CT-1

## (undated) DEVICE — SET EXTENSION WITH 2 PORTS (48EA/CA) ***PART #2C8610 IS A SUBSTITUTE*****

## (undated) DEVICE — TAPE CLOTH MEDIPORE 6 INCH - (12RL/CA)

## (undated) DEVICE — SUTURE PRELOADED #2 ULTRABRAID COBRAID (10EA/BX)

## (undated) DEVICE — SLEEVE, SEQUENTIAL CALF REG

## (undated) DEVICE — HEAD HOLDER JUNIOR/ADULT

## (undated) DEVICE — SUTURE 4-0 27IN VCRL PLUS ANTI (36PK/BX)

## (undated) DEVICE — SUCTION INSTRUMENT YANKAUER BULBOUS TIP W/O VENT (50EA/CA)

## (undated) DEVICE — CANISTER SUCTION RIGID RED 1500CC (40EA/CA)

## (undated) DEVICE — SUTURE GENERAL

## (undated) DEVICE — DRESSING XEROFORM 1X8 - (50/BX 4BX/CA)

## (undated) DEVICE — DRAPE LARGE 3 QUARTER - (20/CA)

## (undated) DEVICE — CONTAINER SPECIMEN BAG OR - STERILE 4 OZ W/LID (100EA/CA)

## (undated) DEVICE — SUTURE 3-0 VICRYL PLUS CT-1 - 36 INCH (36/BX)

## (undated) DEVICE — KIT SYR LS LNDRW FLTRPRO DEV - (200/CA)

## (undated) DEVICE — PIN GUIDE ARTHREX TIBIAL 2.4 SDS=6 (8TX3+1TX1+1TX2=27) (6EA/BX)

## (undated) DEVICE — BLOCK

## (undated) DEVICE — GOWN SURGEONS X-LARGE - DISP. (30/CA)

## (undated) DEVICE — MASK ANESTHESIA ADULT  - (100/CA)

## (undated) DEVICE — KIT  I.V. START (100EA/CA)

## (undated) DEVICE — SUTURE 0 36IN PDS + VIO CT-1 (36PK/BX)

## (undated) DEVICE — SUTURE 1 VICRYL PLUSCT-1 27IN - (36/BX)

## (undated) DEVICE — PIN GUIDE ARTH FEM W/EYE 2.4 W/WIRE SDS=6 (8TX3=24) (6EA/BX)

## (undated) DEVICE — CLOSURE SKIN STRIP 1/2 X 4 IN - (STERI STRIP) (50/BX 4BX/CA)

## (undated) DEVICE — CANNULA KIT UNIV 5X76 (SHOLD- - ER) (10EA/BX)

## (undated) DEVICE — BANDAGE ELASTIC STERILE VELCRO 6 X 5 YDS (25EA/CA)

## (undated) DEVICE — DRAPE FLUOROSCAN C-ARM - 54 X 78 (40EA/CA)

## (undated) DEVICE — SUTURE 2-0 MONOCRYL PLUS UNDYED CT-1 1 X 36 (36EA/BX)"

## (undated) DEVICE — DRL BIT4.5 STR ENDOSCPC CANN

## (undated) DEVICE — DRAPE SURGICAL U 77X120 - (10/CA)

## (undated) DEVICE — ELECTRODE 850 FOAM ADHESIVE - HYDROGEL RADIOTRNSPRNT (50/PK)

## (undated) DEVICE — RETRACTOR O C SECTION LRY - (5/BX)

## (undated) DEVICE — STERI STRIP COMPOUND BENZOIN - TINCTURE 0.6ML WITH APPLICATOR (40EA/BX)

## (undated) DEVICE — SUTURE 0 GUT-PLAIN (36PK/BX)

## (undated) DEVICE — BAG SPONGE COUNT 10.25 X 32 - BLUE (250/CA)

## (undated) DEVICE — PACK C-SECTION (2EA/CA)

## (undated) DEVICE — ARTHROWAND TURBOVAC 3.5/90 SCT

## (undated) DEVICE — GLOVE BIOGEL PI INDICATOR SZ 7.0 SURGICAL PF LF - (50/BX 4BX/CA)

## (undated) DEVICE — BAG, SPONGE COUNT 50600

## (undated) DEVICE — TRAY SPINAL ANESTHESIA NON-SAFETY (10/CA)

## (undated) DEVICE — GLOVE BIOGEL SZ 6.5 SURGICAL PF LTX (50PR/BX 4BX/CA)

## (undated) DEVICE — BLANKET UNDERBODY FULL ACCES - (5/CA)

## (undated) DEVICE — GLOVE BIOGEL PI ORTHO SZ 7.5 PF LF (40PR/BX)

## (undated) DEVICE — Device

## (undated) DEVICE — KIT ROOM DECONTAMINATION

## (undated) DEVICE — PACK MINOR BASIN - (2EA/CA)

## (undated) DEVICE — DRAPE IOBAN II INCISE 23X17 - (10EA/BX 4BX/CA)

## (undated) DEVICE — CATHETER IV 18 GA X 1-1/4 - SAFETY BRAUN (50/BX)

## (undated) DEVICE — CANISTER SUCTION 3000ML MECHANICAL FILTER AUTO SHUTOFF MEDI-VAC NONSTERILE LF DISP  (40EA/CA)